# Patient Record
Sex: FEMALE | Race: BLACK OR AFRICAN AMERICAN | NOT HISPANIC OR LATINO | Employment: OTHER | URBAN - METROPOLITAN AREA
[De-identification: names, ages, dates, MRNs, and addresses within clinical notes are randomized per-mention and may not be internally consistent; named-entity substitution may affect disease eponyms.]

---

## 2017-03-31 ENCOUNTER — TRANSCRIBE ORDERS (OUTPATIENT)
Dept: ADMINISTRATIVE | Facility: HOSPITAL | Age: 57
End: 2017-03-31

## 2017-03-31 DIAGNOSIS — Z12.39 ENCOUNTER FOR OTHER SCREENING FOR MALIGNANT NEOPLASM OF BREAST: ICD-10-CM

## 2017-03-31 DIAGNOSIS — Z12.31 VISIT FOR SCREENING MAMMOGRAM: Primary | ICD-10-CM

## 2017-04-13 ENCOUNTER — LAB REQUISITION (OUTPATIENT)
Dept: LAB | Facility: HOSPITAL | Age: 57
End: 2017-04-13
Payer: COMMERCIAL

## 2017-04-13 ENCOUNTER — GENERIC CONVERSION - ENCOUNTER (OUTPATIENT)
Dept: OTHER | Facility: OTHER | Age: 57
End: 2017-04-13

## 2017-04-13 ENCOUNTER — ALLSCRIPTS OFFICE VISIT (OUTPATIENT)
Dept: OTHER | Facility: OTHER | Age: 57
End: 2017-04-13

## 2017-04-13 DIAGNOSIS — R73.9 HYPERGLYCEMIA: ICD-10-CM

## 2017-04-13 DIAGNOSIS — E78.2 MIXED HYPERLIPIDEMIA: ICD-10-CM

## 2017-04-13 DIAGNOSIS — I10 ESSENTIAL (PRIMARY) HYPERTENSION: ICD-10-CM

## 2017-04-13 LAB
ALBUMIN SERPL BCP-MCNC: 3.8 G/DL (ref 3.5–5)
ALP SERPL-CCNC: 106 U/L (ref 46–116)
ALT SERPL W P-5'-P-CCNC: 25 U/L (ref 12–78)
ANION GAP SERPL CALCULATED.3IONS-SCNC: 8 MMOL/L (ref 4–13)
AST SERPL W P-5'-P-CCNC: 12 U/L (ref 5–45)
BASOPHILS # BLD AUTO: 0.03 THOUSANDS/ΜL (ref 0–0.1)
BASOPHILS NFR BLD AUTO: 1 % (ref 0–1)
BILIRUB SERPL-MCNC: 1.01 MG/DL (ref 0.2–1)
BUN SERPL-MCNC: 15 MG/DL (ref 5–25)
CALCIUM SERPL-MCNC: 9.1 MG/DL (ref 8.3–10.1)
CHLORIDE SERPL-SCNC: 106 MMOL/L (ref 100–108)
CHOLEST SERPL-MCNC: 188 MG/DL (ref 50–200)
CK SERPL-CCNC: 117 U/L (ref 26–192)
CO2 SERPL-SCNC: 27 MMOL/L (ref 21–32)
CREAT SERPL-MCNC: 0.78 MG/DL (ref 0.6–1.3)
EOSINOPHIL # BLD AUTO: 0.17 THOUSAND/ΜL (ref 0–0.61)
EOSINOPHIL NFR BLD AUTO: 3 % (ref 0–6)
ERYTHROCYTE [DISTWIDTH] IN BLOOD BY AUTOMATED COUNT: 13.9 % (ref 11.6–15.1)
EST. AVERAGE GLUCOSE BLD GHB EST-MCNC: 128 MG/DL
GFR SERPL CREATININE-BSD FRML MDRD: >60 ML/MIN/1.73SQ M
GLUCOSE P FAST SERPL-MCNC: 100 MG/DL (ref 65–99)
HBA1C MFR BLD: 6.1 % (ref 4.2–6.3)
HCT VFR BLD AUTO: 40.7 % (ref 34.8–46.1)
HDLC SERPL-MCNC: 79 MG/DL (ref 40–60)
HGB BLD-MCNC: 13.6 G/DL (ref 11.5–15.4)
LDLC SERPL CALC-MCNC: 98 MG/DL (ref 0–100)
LYMPHOCYTES # BLD AUTO: 1.92 THOUSANDS/ΜL (ref 0.6–4.47)
LYMPHOCYTES NFR BLD AUTO: 33 % (ref 14–44)
MCH RBC QN AUTO: 28.6 PG (ref 26.8–34.3)
MCHC RBC AUTO-ENTMCNC: 33.4 G/DL (ref 31.4–37.4)
MCV RBC AUTO: 86 FL (ref 82–98)
MONOCYTES # BLD AUTO: 0.21 THOUSAND/ΜL (ref 0.17–1.22)
MONOCYTES NFR BLD AUTO: 4 % (ref 4–12)
NEUTROPHILS # BLD AUTO: 3.48 THOUSANDS/ΜL (ref 1.85–7.62)
NEUTS SEG NFR BLD AUTO: 59 % (ref 43–75)
NRBC BLD AUTO-RTO: 0 /100 WBCS
PLATELET # BLD AUTO: 306 THOUSANDS/UL (ref 149–390)
PMV BLD AUTO: 9.7 FL (ref 8.9–12.7)
POTASSIUM SERPL-SCNC: 3.9 MMOL/L (ref 3.5–5.3)
PROT SERPL-MCNC: 7.7 G/DL (ref 6.4–8.2)
RBC # BLD AUTO: 4.76 MILLION/UL (ref 3.81–5.12)
SODIUM SERPL-SCNC: 141 MMOL/L (ref 136–145)
TRIGL SERPL-MCNC: 56 MG/DL
TSH SERPL DL<=0.05 MIU/L-ACNC: 1.11 UIU/ML (ref 0.36–3.74)
WBC # BLD AUTO: 5.82 THOUSAND/UL (ref 4.31–10.16)

## 2017-04-13 PROCEDURE — 82550 ASSAY OF CK (CPK): CPT | Performed by: FAMILY MEDICINE

## 2017-04-13 PROCEDURE — 83036 HEMOGLOBIN GLYCOSYLATED A1C: CPT | Performed by: FAMILY MEDICINE

## 2017-04-13 PROCEDURE — 80061 LIPID PANEL: CPT | Performed by: FAMILY MEDICINE

## 2017-04-13 PROCEDURE — 84443 ASSAY THYROID STIM HORMONE: CPT | Performed by: FAMILY MEDICINE

## 2017-04-13 PROCEDURE — 85025 COMPLETE CBC W/AUTO DIFF WBC: CPT | Performed by: FAMILY MEDICINE

## 2017-04-13 PROCEDURE — 80053 COMPREHEN METABOLIC PANEL: CPT | Performed by: FAMILY MEDICINE

## 2017-05-01 DIAGNOSIS — Z12.31 ENCOUNTER FOR SCREENING MAMMOGRAM FOR MALIGNANT NEOPLASM OF BREAST: ICD-10-CM

## 2017-05-01 DIAGNOSIS — M81.0 AGE-RELATED OSTEOPOROSIS WITHOUT CURRENT PATHOLOGICAL FRACTURE: ICD-10-CM

## 2017-05-02 ENCOUNTER — ALLSCRIPTS OFFICE VISIT (OUTPATIENT)
Dept: OTHER | Facility: OTHER | Age: 57
End: 2017-05-02

## 2017-05-02 ENCOUNTER — LAB REQUISITION (OUTPATIENT)
Dept: LAB | Facility: HOSPITAL | Age: 57
End: 2017-05-02
Payer: COMMERCIAL

## 2017-05-02 ENCOUNTER — HOSPITAL ENCOUNTER (OUTPATIENT)
Dept: MAMMOGRAPHY | Facility: MEDICAL CENTER | Age: 57
Discharge: HOME | End: 2017-05-02
Payer: COMMERCIAL

## 2017-05-02 DIAGNOSIS — Z12.31 ENCOUNTER FOR SCREENING MAMMOGRAM FOR MALIGNANT NEOPLASM OF BREAST: ICD-10-CM

## 2017-05-02 DIAGNOSIS — Z01.419 ENCOUNTER FOR GYNECOLOGICAL EXAMINATION WITHOUT ABNORMAL FINDING: ICD-10-CM

## 2017-05-02 PROCEDURE — G0202 SCR MAMMO BI INCL CAD: HCPCS

## 2017-05-02 PROCEDURE — G0145 SCR C/V CYTO,THINLAYER,RESCR: HCPCS | Performed by: OBSTETRICS & GYNECOLOGY

## 2017-05-03 ENCOUNTER — HOSPITAL ENCOUNTER (OUTPATIENT)
Dept: BONE DENSITY | Facility: MEDICAL CENTER | Age: 57
Discharge: HOME | End: 2017-05-03
Payer: COMMERCIAL

## 2017-05-03 DIAGNOSIS — M81.0 AGE-RELATED OSTEOPOROSIS WITHOUT CURRENT PATHOLOGICAL FRACTURE: ICD-10-CM

## 2017-05-03 PROCEDURE — 77080 DXA BONE DENSITY AXIAL: CPT

## 2017-05-05 ENCOUNTER — GENERIC CONVERSION - ENCOUNTER (OUTPATIENT)
Dept: OTHER | Facility: OTHER | Age: 57
End: 2017-05-05

## 2017-05-08 LAB
LAB AP GYN PRIMARY INTERPRETATION: NORMAL
Lab: NORMAL

## 2017-05-10 ENCOUNTER — TRANSCRIBE ORDERS (OUTPATIENT)
Dept: ADMINISTRATIVE | Facility: HOSPITAL | Age: 57
End: 2017-05-10

## 2017-05-10 DIAGNOSIS — R25.2 CRAMP OF LIMB: Primary | ICD-10-CM

## 2017-05-11 ENCOUNTER — GENERIC CONVERSION - ENCOUNTER (OUTPATIENT)
Dept: OTHER | Facility: OTHER | Age: 57
End: 2017-05-11

## 2017-05-22 ENCOUNTER — HOSPITAL ENCOUNTER (OUTPATIENT)
Dept: NEUROLOGY | Facility: CLINIC | Age: 57
Discharge: HOME | End: 2017-05-22
Payer: COMMERCIAL

## 2017-05-22 DIAGNOSIS — R25.2 CRAMP OF LIMB: ICD-10-CM

## 2017-05-22 DIAGNOSIS — M79.604 PAIN IN BOTH LOWER EXTREMITIES: ICD-10-CM

## 2017-05-22 DIAGNOSIS — M79.605 PAIN IN BOTH LOWER EXTREMITIES: ICD-10-CM

## 2017-05-22 PROCEDURE — 95910 NRV CNDJ TEST 7-8 STUDIES: CPT

## 2017-05-22 PROCEDURE — 95886 MUSC TEST DONE W/N TEST COMP: CPT

## 2017-08-29 ENCOUNTER — LAB REQUISITION (OUTPATIENT)
Dept: LAB | Facility: HOSPITAL | Age: 57
End: 2017-08-29
Payer: COMMERCIAL

## 2017-08-29 ENCOUNTER — ALLSCRIPTS OFFICE VISIT (OUTPATIENT)
Dept: OTHER | Facility: OTHER | Age: 57
End: 2017-08-29

## 2017-08-29 DIAGNOSIS — N89.8 OTHER SPECIFIED NONINFLAMMATORY DISORDER OF VAGINA: ICD-10-CM

## 2017-08-29 PROCEDURE — 87510 GARDNER VAG DNA DIR PROBE: CPT | Performed by: OBSTETRICS & GYNECOLOGY

## 2017-08-29 PROCEDURE — 87660 TRICHOMONAS VAGIN DIR PROBE: CPT | Performed by: OBSTETRICS & GYNECOLOGY

## 2017-08-29 PROCEDURE — 87480 CANDIDA DNA DIR PROBE: CPT | Performed by: OBSTETRICS & GYNECOLOGY

## 2017-08-30 LAB
CANDIDA RRNA VAG QL PROBE: POSITIVE
G VAGINALIS RRNA GENITAL QL PROBE: NEGATIVE
T VAGINALIS RRNA GENITAL QL PROBE: NEGATIVE

## 2017-10-11 ENCOUNTER — GENERIC CONVERSION - ENCOUNTER (OUTPATIENT)
Dept: OTHER | Facility: OTHER | Age: 57
End: 2017-10-11

## 2017-10-17 ENCOUNTER — ALLSCRIPTS OFFICE VISIT (OUTPATIENT)
Dept: OTHER | Facility: OTHER | Age: 57
End: 2017-10-17

## 2017-10-21 NOTE — PROGRESS NOTES
Assessment  1  Tinnitus, right ear (388 30) (H93 11)   2  Benign essential hypertension (401 1) (I10)    Discussion/Summary    Discussed condition with patient  I suspect that she has tinnitus of the right ear or any sensorineural hearing loss as this could be long-term hearing nerve damage that is the cause of this  I am also concerned, however, that the patient in essence can hear her pulse in her ear and she also has an elevated blood pressure today 150/94  She has been compliantly taking her blood pressure medication which is amlodipine 10 milligrams once daily  I recommend that she continue with this but that she buy a blood pressure cuff to start monitoring her pressures at home consistently once daily in the morning over the next month and if her pressures consistently run elevated above 140/90, then she will need to follow-up to discuss her blood pressure medication regimen and possibly adding an additional medication to the amlodipine  Possible side effects of new medications were reviewed with the patient/guardian today  The treatment plan was reviewed with the patient/guardian  The patient/guardian understands and agrees with the treatment plan      Chief Complaint  Pt presents with right ear discomfort, no pain  History of Present Illness  HPI: Pt  presents with a constant banging noise in her right ear for the past 2 weeks  She feels as though she can sense or hear her heart pulsing in her ear as well  She denies any pain in the ear itself but she does report a HA on that side  She denies any recent congestion or sinus symptoms and denies any changes in her hearing  Review of Systems    Constitutional: No fever, no chills, feels well, no tiredness, no recent weight gain or loss  ENT: as noted in HPI  Cardiovascular: no complaints of slow or fast heart rate, no chest pain, no palpitations, no leg claudication or lower extremity edema     Respiratory: no complaints of shortness of breath, no wheezing, no dyspnea on exertion, no orthopnea or PND  Gastrointestinal: no complaints of abdominal pain, no constipation, no nausea or diarrhea, no vomiting, no bloody stools  Genitourinary: no complaints of dysuria, no incontinence, no pelvic pain, no dysmenorrhea, no vaginal discharge or abnormal vaginal bleeding  Active Problems  1  Acquired trigger finger (727 03) (M65 30)   2  Acute vulvitis (616 10) (N76 2)   3  Anxiety (300 00) (F41 9)   4  Asthma (493 90) (J45 909)   5  Benign essential hypertension (401 1) (I10)   6  Cervicalgia (723 1) (M54 2)   7  Combined hyperlipidemia (272 2) (E78 2)   8  Cramp of both lower extremities (729 82) (R25 2)   9  Elevated blood sugar (790 29) (R73 9)   10  Encounter for breast cancer screening other than mammogram (V76 10) (Z12 39)   11  Encounter for routine gynecological examination with Papanicolaou smear of cervix    (V72 31,V76 2) (Z01 419)   12  Encounter for screening mammogram for malignant neoplasm of breast (V76 12)    (Z12 31)   13  Esophageal reflux (530 81) (K21 9)   14  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)   15  Flu vaccine need (V04 81) (Z23)   16  Ganglion cyst (727 43) (M67 40)   17  Helicobacter pylori gastritis (535 10,041 86) (K29 70,B96 81)   18  Helicobacter pylori gastritis (535 10,041 86) (K29 70,B96 81)   19  Morning joint stiffness of right hand (719 54) (M25 641)   20  Need for prophylactic vaccination and inoculation against influenza (V04 81) (Z23)   21  Osteoporosis (733 00) (M81 0)   22  Prediabetes (790 29) (R73 03)   23  Right shoulder pain (719 41) (M25 511)   24  Screening for depression (V79 0) (Z13 89)   25  Vaginal irritation (623 9) (N89 8)   26  Vitamin D deficiency (268 9) (E55 9)   27  Yeast infection (112 9) (B37 9)    Past Medical History  1  History of Abdominal discomfort (789 00) (R10 9)   2  Asthma (493 90) (J45 909)   3  History of Encounter for screening mammogram for breast cancer (V76 12) (Z12 31)   4   History of hypertension (V12 59) (Z86 79)   5  History of Screen for colon cancer (V76 51) (Z12 11)   6  History of Screening for malignant neoplasm of cervix (V76 2) (Z12 4)   7  History of Stomach problems (536 9) (K31 9)    Family History  Mother    1  Family history of Hypertension (401 9) (I10)  Father    2  Family history of Diabetes (250 00) (E11 9)   3  Family history of Hypertension (401 9) (I10)  Brother    4  Family history of Hypertension (401 9) (I10)  Paternal Grandmother    11  Family history of Diabetes (250 00) (E11 9)  Maternal Aunt    6  Family history of Hypertension (401 9) (I10)  Paternal Aunt    9  Family history of Diabetes (250 00) (E11 9)  Paternal Uncle    6  Family history of Diabetes (250 00) (E11 9)  Maternal Cousin    9  Family history of Hypertension (401 9) (I10)  Paternal Cousin    8  Family history of Diabetes (250 00) (E11 9)    Social History   · Never a smoker  The social history was reviewed and is unchanged  Surgical History  1  History of Foot Surgery    Current Meds   1  AmLODIPine Besylate 10 MG Oral Tablet; TAKE 1 TABLET BY MOUTH ONCE DAILY; Therapy: 03WGE5446 to Dorian Cameron)  Requested for: 62PEG4188; Last   Rx:90Dyj5203 Ordered   2  Atorvastatin Calcium 10 MG Oral Tablet; take 1 tablet daily at bedtime; Therapy: 06PPJ6122 to (Evaluate:10Uvc9822)  Requested for: 23Oct2016; Last   Rx:18Lji4845 Ordered   3  DrRx Mycolog II Cream 15 Grams; Apply small amount to area bid  a 15g tube; Therapy: 02Ozr0742 to (Evaluate:78Nmb0029); Last Rx:24Hpo6581 Ordered   4  Fluconazole 150 MG Oral Tablet; take one tab one time; Therapy: 72Tzc7321 to (Evaluate:34Lxe7397)  Requested for: 26Snp1940; Last   Rx:60Bzj1280; Status: ACTIVE - Transmit to Pharmacy - Awaiting Verification   Ordered   5  Nystatin-Triamcinolone 255190-4 1 UNIT/GM-% External Cream; Apply to the area twice   a day;    Therapy: 42Hew2949 to (Evaluate:33Tds2098)  Requested for: 52Hfv6096; Last   Mae Olea Ordered   6  ProAir  (90 Base) MCG/ACT Inhalation Aerosol Solution; INHALE 2 PUFFS   EVERY 4 HOURS AS NEEDED  Requested for: 27QIA9237; Last Rx:09Oct2017 Ordered    The medication list was reviewed and updated today  Allergies  1  No Known Drug Allergies    Vitals   Recorded: 15EAX7996 09:14AM Recorded: 92PMO9388 08:48AM   Temperature  97 5 F, Tympanic   Heart Rate  101   Respiration  16   Systolic 419 345   Diastolic 94 90   Height  5 ft 4 in   Weight  211 lb 3 oz   BMI Calculated  36 25   BSA Calculated  2   O2 Saturation  98   Pain Scale  0     Physical Exam    Constitutional   General appearance: No acute distress, well appearing and well nourished  Ears, Nose, Mouth, and Throat   External inspection of ears and nose: Normal     Otoscopic examination: Tympanic membranes translucent with normal light reflex  Canals patent without erythema  Nasal mucosa, septum, and turbinates: Normal without edema or erythema  Oropharynx: Normal with no erythema, edema, exudate or lesions  Pulmonary   Respiratory effort: No increased work of breathing or signs of respiratory distress  Auscultation of lungs: Clear to auscultation  Cardiovascular   Auscultation of heart: Normal rate and rhythm, normal S1 and S2, without murmurs  Carotid pulses: Normal     Lymphatic   Palpation of lymph nodes in neck: No lymphadenopathy  Psychiatric   Orientation to person, place, and time: Normal     Mood and affect: Normal     Additional Exam:  Vital signs reviewed; neck supple  Results/Data  PHQ-9 Adult Depression Screening 17Oct2017 08:54AM User, [facility]     Test Name Result Flag Reference   PHQ-9 Adult Depression Score 0     Over the last two weeks, how often have you been bothered by any of the following problems?   Little interest or pleasure in doing things: Not at all - 0  Feeling down, depressed, or hopeless: Not at all - 0  Trouble falling or staying asleep, or sleeping too much: Not at all - 0  Feeling tired or having little energy: Not at all - 0  Poor appetite or over eating: Not at all - 0  Feeling bad about yourself - or that you are a failure or have let yourself or your family down: Not at all - 0  Trouble concentrating on things, such as reading the newspaper or watching television: Not at all - 0  Moving or speaking so slowly that other people could have noticed  Or the opposite -  being so fidgety or restless that you have been moving around a lot more than usual: Not at all - 0  Thoughts that you would be better off dead, or of hurting yourself in some way: Not at all - 0   PHQ-9 Adult Depression Screening Negative     PHQ-9 Difficulty Level Not difficult at all     PHQ-9 Severity No Depression         Signatures   Electronically signed by :  Hulan Dance, [facility]; Oct 19 2017 12:28PM EST                       (Author)    Electronically signed by : Claudio Ho DO; Oct 20 2017  5:58AM EST                       (Co-author)

## 2017-10-24 NOTE — PROGRESS NOTES
Assessment  1  Vaginal irritation (623 9) (N89 8)    Plan  Acute vulvitis    · Nystatin-Triamcinolone 601344-8 1 UNIT/GM-% External Cream; Apply to the area  twice a day   Rx By: Francisco J Bentley; Dispense: 30 Days ; #:1 X 60 GM Tube; Refill: 3;For: Acute vulvitis; BONI = N; Verified Transmission to [facility]; Last Updated By: System, Melly; 8/29/2017 11:17:09 AM  Vaginal irritation    · (1) VAGINITIS/ VAGINOSIS, DNA ( AFFIRM); Status:Active - Retrospective By Protocol  Authorization; Requested for:94Vkx5431;    Perform:[facility]; Due:48Elv8485; Last Updated Juan Alberto Brock; 8/29/2017 11:16:28 AM;Ordered; For:Vaginal irritation; Ordered By:Myriam Ny;    Discussion/Summary  Discussion Summary:   No evidence vulvitis on exam  Will trial of topical triamcinolone nystatin cream for alleviation of symptoms  Affirm sent today  Chief Complaint  Chief Complaint Free Text Note Form: pt presents for vaginal irritation  History of Present Illness  HPI: Patient is a 50-year-old presenting with complaints of vulvar itching  Patient states her symptoms started 3 days ago and has progressively worsened  States she had douched 2 weeks ago  Denies any change in soaps or laundry detergent  Denies any vaginal discharge itching or burning  Reports mostly on the inside of the labia minora  No alleviating or precipitating factors  Patient denies dysuria, hematuria, urgency, or frequency  Review of Systems   Female ROS: vulvar pain, but-- no pelvic pain,-- no pelvic pressure,-- no vaginal pain,-- no vaginal discharge,-- no vaginal itching,-- no vaginal lump or mass,-- no vaginal odor,-- no vulvar itching,-- no vulvar lump or mass,-- no dysuria-- and-- no urinary loss of control  Active Problems  1  Acquired trigger finger (727 03) (M65 30)   2  Anxiety (300 00) (F41 9)   3  Asthma (493 90) (J45 909)   4  Benign essential hypertension (401 1) (I10)   5  Cervicalgia (723 1) (M54 2)   6  Combined hyperlipidemia (272 2) (E78 2)   7  Cramp of both lower extremities (729 82) (R25 2)   8  Elevated blood sugar (790 29) (R73 9)   9  Encounter for breast cancer screening other than mammogram (V76 10) (Z12 39)   10  Encounter for routine gynecological examination with Papanicolaou smear of cervix    (V72 31,V76 2) (Z01 419)   11  Encounter for screening mammogram for malignant neoplasm of breast (V76 12)    (Z12 31)   12  Esophageal reflux (530 81) (K21 9)   13  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)   14  Ganglion cyst (727 43) (M67 40)   15  Helicobacter pylori gastritis (535 10,041 86) (K29 70,B96 81)   16  Helicobacter pylori gastritis (535 10,041 86) (K29 70,B96 81)   17  Morning joint stiffness of right hand (719 54) (M25 641)   18  Need for prophylactic vaccination and inoculation against influenza (V04 81) (Z23)   19  Osteoporosis (733 00) (M81 0)   20  Prediabetes (790 29) (R73 03)   21  Right shoulder pain (719 41) (M25 511)   22  Screening for depression (V79 0) (Z13 89)   23  Vitamin D deficiency (268 9) (E55 9)    Past Medical History  1  History of Abdominal discomfort (789 00) (R10 9)   2  Asthma (493 90) (J45 909)   3  History of Encounter for screening mammogram for breast cancer (V76 12) (Z12 31)   4  History of hypertension (V12 59) (Z86 79)   5  History of Screen for colon cancer (V76 51) (Z12 11)   6  History of Screening for malignant neoplasm of cervix (V76 2) (Z12 4)   7  History of Stomach problems (536 9) (K31 9)    Surgical History  1  History of Foot Surgery    Family History  Mother    1  Family history of Hypertension (401 9) (I10)  Father    2  Family history of Diabetes (250 00) (E11 9)   3  Family history of Hypertension (401 9) (I10)  Brother    4  Family history of Hypertension (401 9) (I10)  Paternal Grandmother    11  Family history of Diabetes (250 00) (E11 9)  Maternal Aunt    6   Family history of Hypertension (401 9) (I10)  Paternal Aunt 7  Family history of Diabetes (250 00) (E11 9)  Paternal Uncle    8  Family history of Diabetes (250 00) (E11 9)  Maternal Cousin    9  Family history of Hypertension (401 9) (I10)  Paternal Cousin    8  Family history of Diabetes (250 00) (E11 9)    Social History   · Never a smoker    Current Meds   1  AmLODIPine Besylate 10 MG Oral Tablet; TAKE 1 TABLET BY MOUTH ONCE DAILY; Therapy: 64BHJ5772 to (([phone])  Requested for: 74OWS0065; Last   Rx:17Fbp2214 Ordered   2  Atorvastatin Calcium 10 MG Oral Tablet; take 1 tablet daily at bedtime; Therapy: 05XEP2819 to (Evaluate:21Apr2017)  Requested for: 23Oct2016; Last   Rx:23Oct2016 Ordered   3  ProAir  (90 Base) MCG/ACT Inhalation Aerosol Solution; INHALE 2 PUFFS   EVERY 4 HOURS AS NEEDED  Requested for: 01Apr2016; Last Rx:01Apr2016 Ordered    Allergies  1  No Known Drug Allergies    Vitals  Vital Signs    Recorded: 41SJP6134 46:89GN   Systolic 513    Diastolic 70    Patient Refused Weight Yes Yes     Physical Exam    Pulmonary   Respiratory effort: No increased work of breathing or signs of respiratory distress  Auscultation of lungs: Clear to auscultation  Cardiovascular   Auscultation of heart: Normal rate and rhythm, normal S1 and S2, no murmurs  Peripheral vascular exam: Normal pulses Throughout  Genitourinary   External genitalia: Normal and no lesions appreciated  Examination of the external genitalia showed no vulvitis  No lesions were seen on the external genitalia  The labia majora were normal  The labia minora were normal  Both Bartholin's glands were normal    Vagina: Normal, no lesions or dryness appreciated  Urethra: Normal     Urethral meatus: Normal     Bladder: Normal, soft, non-tender and no prolapse or masses appreciated  Cervix: Normal, no palpable masses  Health Management  History of Screen for colon cancer   COLONOSCOPY (GI, SURG); every 10 years; Last 99IAH6218; Next Due: 19QAN8087;  Active  Health Maintenance   *VB - Eye Exam; every 1 year; Last 08Apr2015; Next Due: 08Apr2016;  Overdue    Signatures   Electronically signed by : PEDRO Lind ; Aug 29 2017  1:30PM EST                       (Author)    Electronically signed by : PEDRO Lind ; Aug 29 2017  1:30PM EST                       (Author)

## 2017-10-27 ENCOUNTER — GENERIC CONVERSION - ENCOUNTER (OUTPATIENT)
Dept: OTHER | Facility: OTHER | Age: 57
End: 2017-10-27

## 2018-01-09 NOTE — MISCELLANEOUS
Provider Comments  Provider Comments:   Dear Brando Rodriguez     We called you about your scheduled appointment for 12/19/2016 today but were unable to reach you  It is very important that you follow up with us so that we can assess your physical and nutritional safety  Please call our office at [phone] to reschedule your appointment       Sincerely,     [facility]            Signatures   Electronically signed by : Faraz Arenas, ; Dec 19 2016  2:06PM EST                       (Author)

## 2018-01-11 NOTE — MISCELLANEOUS
Message  GI Reminder Recall Jaron Iba:   Date: 02/18/2016   Dear Elisha Villalta:     Review of our records shows you are due for the following: Colonoscopy  We have been trying to reach you several times  and have been unsuccessful  Please contact our office to schedule your procedure  Thank you  Please call the following office to schedule your appointment:   [address], [address], [address] [facility] [phone]  We look forward to hearing from you!      Sincerely,         Signatures   Electronically signed by : Nicole Ureña, ; Feb 18 2016  4:36PM EST                       (Author)

## 2018-01-11 NOTE — RESULT NOTES
Verified Results  * MAMMO SCREENING BILATERAL W CAD 26Apr2016 02:56PM Dominick Iverson Order Number: [id number]     Test Name Result Flag Reference   MAMMO SCREENING BILATERAL W CAD (Report)     Patient History:   Patient is postmenopausal and is nulliparous  Family history of breast cancer in maternal grandmother under age   48, breast cancer in maternal cousin under age 48, and breast    cancer in mother at age 48 or over  Reason for exam: screening (asymptomatic)  Mammo Screening Bilateral W CAD: April 26, 2016 - Check In #:    [de-identified]   Bilateral CC and MLO view(s) were taken  Technologist: DANETTE Camargo (DANETTE)(M)   Prior study comparison: March 6, 2015, bilateral digital    screening mammogram performed at [address] [facility]  January 28, 2014, digital bilateral screening    mammogram, performed at [address] [facility]  There are scattered fibroglandular densities  The parenchymal pattern appears stable  No dominant soft tissue    mass or suspicious calcifications are noted  The skin and nipple   contours are within normal limits  No mammographic evidence of malignancy  No    significant changes when compared with prior studies  ASSESSMENT: BiRad:1 - Negative     Recommendation:   Routine screening mammogram in 1 year  A reminder letter will be   scheduled  Analyzed by CAD     8-10% of cancers will be missed on mammography  Management of a    palpable abnormality must be based on clinical grounds  Patients   will be notified of their results via letter from our facility  Accredited by [employer] of Radiology and FDA       Transcription Location: DANETTE Crowder 98: [id number]     Risk Value(s):   Tyrer-Cuzick 10 Year: 7 822%, Tyrer-Cuzick Lifetime: 22 916%,    Myriad Table: 5 6%, CELIA 5 Year: 2 2%, NCI Lifetime: 12 0%, MRS    : Based on personal and/or family history,    consideration of hereditary risk assessment may be warranted     Signed by:   Erwin Fong MD   4/26/16

## 2018-01-11 NOTE — RESULT NOTES
Verified Results  (1) CBC/PLT/DIFF 19Oct2016 08:23AM Diego Crown[facility]oca Order Number: [id number]     Test Name Result Flag Reference   WBC COUNT 4 73 Thousand/uL  4 31-10 16   RBC COUNT 4 71 Million/uL  3 81-5 12   HEMOGLOBIN 13 3 g/dL  11 5-15 4   HEMATOCRIT 40 2 %  34 8-46  1   MCV 85 fL  82-98   MCH 28 2 pg  26 8-34 3   MCHC 33 1 g/dL  31 4-37 4   RDW 14 4 %  11 6-15 1   MPV 9 2 fL  8 9-12 7   PLATELET COUNT 966 Thousands/uL  149-390   nRBC AUTOMATED 0 /100 WBCs     NEUTROPHILS RELATIVE PERCENT 47 %  43-75   LYMPHOCYTES RELATIVE PERCENT 44 %  14-44   MONOCYTES RELATIVE PERCENT 5 %  4-12   EOSINOPHILS RELATIVE PERCENT 3 %  0-6   BASOPHILS RELATIVE PERCENT 1 %  0-1   NEUTROPHILS ABSOLUTE COUNT 2 23 Thousands/?L  1 85-7 62   LYMPHOCYTES ABSOLUTE COUNT 2 09 Thousands/?L  0 60-4 47   MONOCYTES ABSOLUTE COUNT 0 23 Thousand/?L  0 17-1 22   EOSINOPHILS ABSOLUTE COUNT 0 14 Thousand/?L  0 00-0 61   BASOPHILS ABSOLUTE COUNT 0 04 Thousands/?L  0 00-0 10   - Patient Instructions: This bloodwork is non-fasting  Please drink two glasses of water morning of bloodwork  (1) COMPREHENSIVE METABOLIC PANEL 34HUS4091 64:05XU Diego Mir Order Number: [id number]     Test Name Result Flag Reference   GLUCOSE,RANDM 97 mg/dL     If the patient is fasting, the ADA then defines impaired fasting glucose as > 100 mg/dL and diabetes as > or equal to 123 mg/dL     SODIUM 141 mmol/L  136-145   POTASSIUM 3 9 mmol/L  3 5-5 3   CHLORIDE 107 mmol/L  100-108   CARBON DIOXIDE 26 mmol/L  21-32   ANION GAP (CALC) 8 mmol/L  4-13   BLOOD UREA NITROGEN 17 mg/dL  5-25   CREATININE 0 75 mg/dL  0 60-1 30   Standardized to IDMS reference method   CALCIUM 8 9 mg/dL  8 3-10 1   BILI, TOTAL 0 76 mg/dL  0 20-1 00   ALK PHOSPHATAS 104 U/L     ALT (SGPT) 24 U/L  12-78   AST(SGOT) 13 U/L  5-45   ALBUMIN 3 7 g/dL  3 5-5 0   TOTAL PROTEIN 7 5 g/dL  6 4-8 2   eGFR Non-African American      >60 0 ml/min/1 73sq m   [facility] Kidney Disease Education Program recommendations are as follows:  GFR calculation is accurate only with a steady state creatinine  Chronic Kidney disease less than 60 ml/min/1 73 sq  meters  Kidney failure less than 15 ml/min/1 73 sq  meters  (1) LIPID PANEL FASTING W DIRECT LDL REFLEX 19Oct2016 08:23AM [facility] Order Number: [id number]     Test Name Result Flag Reference   CHOLESTEROL 241 mg/dL H    LDL CHOLESTEROL CALCULATED 152 mg/dL H 0-100   - Patient Instructions: This is a fasting blood test  Water, black tea or black coffee only after 9:00pm the night before test   Drink 2 glasses of water the morning of test       Triglyceride:         Normal              <150 mg/dl       Borderline High    150-199 mg/dl       High               200-499 mg/dl       Very High          >499 mg/dl  Cholesterol:         Desirable        <200 mg/dl      Borderline High  200-239 mg/dl      High             >239 mg/dl  HDL Cholesterol:        High    >59 mg/dL      Low     <41 mg/dL  LDL Cholesterol:        Optimal          <100 mg/dl        Near Optimal     100-129 mg/dl        Above Optimal          Borderline High   130-159 mg/dl          High              160-189 mg/dl          Very High        >189 mg/dl  LDL CALCULATED:    This screening LDL is a calculated result  It does not have the accuracy of the Direct Measured LDL in the monitoring of patients with hyperlipidemia and/or statin therapy  Direct Measure LDL (VTD751) must be ordered separately in these patients  TRIGLYCERIDES 69 mg/dL  <=150   Specimen collection should occur prior to N-Acetylcysteine or Metamizole administration due to the potential for falsely depressed results  HDL,DIRECT 75 mg/dL H 40-60   Specimen collection should occur prior to Metamizole administration due to the potential for falsely depressed results       (1) TSH WITH FT4 REFLEX 19Oct2016 08:23AM [facility] Order Number: [id number]     Test Name Result Flag Reference   TSH 1 110 uIU/mL  0 358-3 740   Patients undergoing fluorescein dye angiography may retain small amounts of fluorescein in the body for 48-72 hours post procedure  Samples containing fluorescein can produce falsely depressed TSH values  If the patient had this procedure,a specimen should be resubmitted post fluorescein clearance  The recommended reference ranges for TSH during pregnancy are as follows:  First trimester 0 1 to 2 5 uIU/mL  Second trimester  0 2 to 3 0 uIU/mL  Third trimester 0 3 to 3 0 uIU/m     (1) VITAMIN D 25-HYDROXY 28Xtm3896 08:23AM Nicole Topete Order Number: [id number]     Test Name Result Flag Reference   VIT D 25-HYDROX 29 7 ng/mL L 30 0-100 0   This assay is a certified procedure of the CDC Vitamin D Standardization Certification Program (VDSCP)     Deficiency <20ng/ml   Insufficiency 20-30ng/ml   Sufficient  ng/ml     *Patients undergoing fluorescein dye angiography may retain small amounts of fluorescein in the body for 48-72 hours post procedure  Samples containing fluorescein can produce falsely elevated Vitamin D values  If the patient had this procedure, a specimen should be resubmitted post fluorescein clearance

## 2018-01-13 VITALS
DIASTOLIC BLOOD PRESSURE: 64 MMHG | BODY MASS INDEX: 35.85 KG/M2 | HEIGHT: 64 IN | WEIGHT: 210 LBS | SYSTOLIC BLOOD PRESSURE: 108 MMHG

## 2018-01-14 VITALS
TEMPERATURE: 97.5 F | HEIGHT: 64 IN | RESPIRATION RATE: 16 BRPM | SYSTOLIC BLOOD PRESSURE: 150 MMHG | OXYGEN SATURATION: 98 % | WEIGHT: 211.19 LBS | DIASTOLIC BLOOD PRESSURE: 94 MMHG | HEART RATE: 101 BPM | BODY MASS INDEX: 36.05 KG/M2

## 2018-01-14 VITALS
HEART RATE: 105 BPM | DIASTOLIC BLOOD PRESSURE: 84 MMHG | OXYGEN SATURATION: 96 % | BODY MASS INDEX: 35.79 KG/M2 | TEMPERATURE: 98.1 F | RESPIRATION RATE: 17 BRPM | SYSTOLIC BLOOD PRESSURE: 132 MMHG | WEIGHT: 208.5 LBS

## 2018-01-14 NOTE — RESULT NOTES
Verified Results  (1) VAGINITIS/ VAGINOSIS, DNA ( AFFIRM) 08GYM3915 01:06PM Sandro Adame     Test Name Result Flag Reference   CANDIDA SPECIES Positive A Negative   GARDNERELLA VAGINALIS Negative  Negative   TRICHOMONAS VAGINALIS Negative  Negative   Performed at:  [facility] [employer] [address]  [id number]  : Brigitte Avendaño MD, Phone:  [phone]       Plan  Acute vulvitis    · Nystatin-Triamcinolone 191096-9 1 UNIT/GM-% External Cream; Apply to the area  twice a day  Vaginal irritation    · (1) VAGINITIS/ VAGINOSIS, DNA ( AFFIRM); Status:Active;  Requested for:35Ixh7524;

## 2018-01-15 VITALS — SYSTOLIC BLOOD PRESSURE: 152 MMHG | DIASTOLIC BLOOD PRESSURE: 70 MMHG

## 2018-01-15 NOTE — PROGRESS NOTES
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)    Plan  Benign essential hypertension, Combined hyperlipidemia, Health Maintenance,  Exogenous obesity, Vitamin D deficiency    · (1) CBC/PLT/DIFF; Status:Active; Requested for:19Oct2016;    · (1) COMPREHENSIVE METABOLIC PANEL; Status:Active; Requested for:19Oct2016;    · (1) LIPID PANEL FASTING W DIRECT LDL REFLEX; Status:Active; Requested  for:19Oct2016;    · (1) TSH WITH FT4 REFLEX; Status:Active; Requested for:19Oct2016;    · (1) VITAMIN D 25-HYDROXY; Status:Active; Requested CTZ:97NWV0613;   Exogenous obesity    · 1 Nunu Marcial MD, Hayley Vargas (Internal Medicine) Physician Referral  Consult  Status: Active   Requested for: 03KEL3121  Care Summary provided  : Yes  Health Maintenance    · Fluzone Quadrivalent Intramuscular Suspension; INJECT 0 5  ML  Intramuscular; To Be Done: [id number]  Screening for depression    · *VB-Depression Screening; Status:Complete - Retrospective By Protocol Authorization;    Done: 71MKU2992 07:54AM    Discussion/Summary    61year-old physical examination today    Overall patient feels well  Her diet is fair  She does not exercise  Patient is current with GYN exams, mammography, and colonoscopy  Flu shot given today  Will also check fasting blood work  --Hypertension: Suboptimal  Patient has not been compliant with her amlodipine 10 (she only takes 3-4 days per week)  Stressed compliance  --Lipids: Patient stopped atorvastatin 10 shortly after was prescribed  Will check labs today  Patient is agreeable to restarting med if needed  --Asthma: Mild intermittent  With rare use of Proventil hfa  Med refilled today  --Obesity: BMI 36  Discussed diet and exercise   Recommend consider Weight Watchers, my fitness pal, and purchasing a fitbit  Patient also requested referral to weight loss specialist (dr Dariusz Lipscomb)  We'll continue to monitor    Will call with lab results  Recheck blood pressure and weight in 2 months  Chief Complaint  Pt presents for a yearly PE and med refills  Pt states she would like to discuss alternatives for her inhalers due to cost       History of Present Illness  HPI: Patient presents today for 61year-old physical examination/checkup today  Overall she is feeling well  She has not been compliant with her blood pressure medication, amlodipine (she only takes medication 3-4 days a week)  Patient stopped atorvastatin shortly after it was prescribed  Patient states diet is fair  She does not exercise  She is current with GYN exams and mammography      Review of Systems    Constitutional: No fever, no chills, feels well, no tiredness, no recent weight gain or weight loss  Cardiovascular: No complaints of slow heart rate, no fast heart rate, no chest pain, no palpitations, no leg claudication, no lower extremity edema  Respiratory: No complaints of shortness of breath, no wheezing, no cough, no SOB on exertion, no orthopnea, no PND  Gastrointestinal: No complaints of abdominal pain, no constipation, no nausea or vomiting, no diarrhea, no bloody stools  Genitourinary: No complaints of dysuria, no incontinence, no pelvic pain, no dysmenorrhea, no vaginal discharge or bleeding  Musculoskeletal: No complaints of arthralgias, no myalgias, no joint swelling or stiffness, no limb pain or swelling  Over the past 2 weeks, how often have you been bothered by the following problems? 1 ) Little interest or pleasure in doing things? Not at all    2 ) Feeling down, depressed or hopeless? Not at all    3 ) Trouble falling asleep or sleeping too much? Not at all    4 ) Feeling tired or having little energy? Not at all    5 ) Poor appetite or overeating?  Not at all    6 ) Feeling bad about yourself, or that you are a failure, or have let yourself or your family down? Not at all    7 ) Trouble concentrating on things, such as reading a newspaper or watching television? Not at all    8 ) Moving or speaking so slowly that other people could have noticed, or the opposite, moving or speaking faster than usual? Not at all  How difficult have these problems made it for you to do your work, take care of things at home, or get along with people? Not at all  Score 0      Active Problems    1  Acquired trigger finger (727 03) (M65 30)   2  Anxiety (300 00) (F41 9)   3  Asthma (493 90) (J45 909)   4  Benign essential hypertension (401 1) (I10)   5  Cervicalgia (723 1) (M54 2)   6  Combined hyperlipidemia (272 2) (E78 2)   7  Esophageal reflux (530 81) (K21 9)   8  Ganglion cyst (727 43) (M67 40)   9  Helicobacter pylori gastritis (535 10,041 86) (K29 70,B96 81)   10  Helicobacter pylori gastritis (535 10,041 86) (K29 70,B96 81)   11  Morning joint stiffness of right hand (719 54) (M25 641)   12  Osteoporosis (733 00) (M81 0)   13  Right shoulder pain (719 41) (M25 511)   14   Vitamin D deficiency (268 9) (E55 9)    Past Medical History    · Asthma (493 90) (J45 909)   · History of Encounter for screening mammogram for malignant neoplasm of breast  (V76 12) (Z12 31)   · History of hypertension (V12 59) (Z86 79)   · History of Screening for malignant neoplasm of cervix (V76 2) (Z12 4)   · History of Stomach problems (536 9) (K31 9)    Surgical History    · History of Foot Surgery    Family History  Mother    · Family history of Hypertension (401 9) (I10)  Father    · Family history of Diabetes (250 00) (E11 9)   · Family history of Hypertension (401 9) (I10)  Brother    · Family history of Hypertension (401 9) (I10)  Paternal Grandmother    · Family history of Diabetes (250 00) (E11 9)  Maternal Aunt    · Family history of Hypertension (401 9) (I10)  Paternal Aunt    · Family history of Diabetes (250 00) (E11 9)  Paternal Uncle    · Family history of Diabetes (250 00) (E11 9)  Maternal Cousin    · Family history of Hypertension (401 9) (I10)  Paternal Cousin    · Family history of Diabetes (250 00) (E11 9)    Social History    · Never a smoker    Current Meds   1  AmLODIPine Besylate 10 MG Oral Tablet; TAKE 1 TABLET BY MOUTH ONCE DAILY    Requested for: 04Oct2016; Last Rx:04Oct2016 Ordered   2  Atrovent HFA 17 MCG/ACT Inhalation Aerosol Solution; INHALE 2 PUFFS Twice daily    Requested for: 01Apr2016; Last Rx:01Apr2016 Ordered   3  Nasonex 50 MCG/ACT Nasal Suspension; USE AS DIRECTED Recorded   4  ProAir  (90 Base) MCG/ACT Inhalation Aerosol Solution; INHALE 2 PUFFS   EVERY 4 HOURS AS NEEDED  Requested for: 01Apr2016; Last Rx:01Apr2016 Ordered    Allergies    1  No Known Drug Allergies    Vitals   Recorded: 86WQT6595 08:06AM Recorded: 49LVS0145 45:75QD   Systolic 274 606, LUE, Sitting   Diastolic 94 98, LUE, Sitting   Heart Rate  96   Pulse Quality  Norm   Respiration Quality  Norm   Respiration  16   Temperature  97 9 F, Tympanic   Pain Scale  0   O2 Saturation  98   Height  5 ft 4 in   Weight  212 lb 4 96 oz   BMI Calculated  36 44   BSA Calculated  2 02     Physical Exam    Constitutional   General appearance: No acute distress, well appearing and well nourished  Ears, Nose, Mouth, and Throat   Oropharynx: Normal with no erythema, edema, exudate or lesions  Pulmonary   Respiratory effort: No increased work of breathing or signs of respiratory distress  Auscultation of lungs: Clear to auscultation  Cardiovascular   Palpation of heart: Normal PMI, no thrills  Auscultation of heart: Normal rate and rhythm, normal S1 and S2, without murmurs  Examination of extremities for edema and/or varicosities: Normal     Abdomen   Abdomen: Non-tender, no masses  Liver and spleen: No hepatomegaly or splenomegaly  Lymphatic   Palpation of lymph nodes in neck: No lymphadenopathy      Musculoskeletal   Gait and station: Normal     Psychiatric   Orientation to person, place, and time: Normal     Mood and affect: Normal        Results/Data  *VB-Depression Screening 19Oct2016 07:54AM Rollene Doing     Test Name Result Flag Reference   Depression Scale Result      Depression Screen - Negative For Symptoms       Health Management  History of Screen for colon cancer   COLONOSCOPY (GI, SURG); every 10 years; Last 17DRS4757; Next Due: 28JFQ8605; Active  Health Maintenance   *VB - Eye Exam; every 1 year; Last 08Apr2015; Next Due: 08Apr2016; Overdue    Signatures   Electronically signed by :  Annia Gresham DO; Oct 19 2016  8:22AM EST                       (Author)

## 2018-01-16 NOTE — RESULT NOTES
Verified Results  (1) CBC/PLT/DIFF 13Apr2017 08:37AM Becheikhn Finer Order Number: [id number]     Test Name Result Flag Reference   WBC COUNT 5 82 Thousand/uL  4 31-10 16   RBC COUNT 4 76 Million/uL  3 81-5 12   HEMOGLOBIN 13 6 g/dL  11 5-15 4   HEMATOCRIT 40 7 %  34 8-46  1   MCV 86 fL  82-98   MCH 28 6 pg  26 8-34 3   MCHC 33 4 g/dL  31 4-37 4   RDW 13 9 %  11 6-15 1   MPV 9 7 fL  8 9-12 7   PLATELET COUNT 890 Thousands/uL  149-390   nRBC AUTOMATED 0 /100 WBCs     NEUTROPHILS RELATIVE PERCENT 59 %  43-75   LYMPHOCYTES RELATIVE PERCENT 33 %  14-44   MONOCYTES RELATIVE PERCENT 4 %  4-12   EOSINOPHILS RELATIVE PERCENT 3 %  0-6   BASOPHILS RELATIVE PERCENT 1 %  0-1   NEUTROPHILS ABSOLUTE COUNT 3 48 Thousands/? ??L  1 85-7 62   LYMPHOCYTES ABSOLUTE COUNT 1 92 Thousands/? ??L  0 60-4 47   MONOCYTES ABSOLUTE COUNT 0 21 Thousand/? ??L  0 17-1 22   EOSINOPHILS ABSOLUTE COUNT 0 17 Thousand/? ??L  0 00-0 61   BASOPHILS ABSOLUTE COUNT 0 03 Thousands/? ??L  0 00-0 10   - Patient Instructions: This bloodwork is non-fasting  Please drink two glasses of water morning of bloodwork       (1) COMPREHENSIVE METABOLIC PANEL 15XRG1799 49:34DZ Miltonjose maria Finer Order Number: [id number]     Test Name Result Flag Reference   SODIUM 141 mmol/L  136-145   POTASSIUM 3 9 mmol/L  3 5-5 3   CHLORIDE 106 mmol/L  100-108   CARBON DIOXIDE 27 mmol/L  21-32   ANION GAP (CALC) 8 mmol/L  4-13   BLOOD UREA NITROGEN 15 mg/dL  5-25   CREATININE 0 78 mg/dL  0 60-1 30   Standardized to IDMS reference method   CALCIUM 9 1 mg/dL  8 3-10 1   BILI, TOTAL 1 01 mg/dL H 0 20-1 00   ALK PHOSPHATAS 106 U/L     ALT (SGPT) 25 U/L  12-78   AST(SGOT) 12 U/L  5-45   ALBUMIN 3 8 g/dL  3 5-5 0   TOTAL PROTEIN 7 7 g/dL  6 4-8 2   eGFR Non-African American      >60 0 ml/min/1 73sq m   [employer] Disease Education Program recommendations are as follows:  GFR calculation is accurate only with a steady state creatinine  Chronic Kidney disease less than 60 ml/min/1 73 sq  meters  Kidney failure less than 15 ml/min/1 73 sq  meters  GLUCOSE FASTING 100 mg/dL H 65-99     (1) HEMOGLOBIN A1C 13Apr2017 08:37AM Rondi Sensor Order Number: [id number]     Test Name Result Flag Reference   HEMOGLOBIN A1C 6 1 %  4 2-6 3   EST  AVG  GLUCOSE 128 mg/dl       (1) LIPID PANEL FASTING W DIRECT LDL REFLEX 13Apr2017 08:37AM Rondi Sensor Order Number: [id number]     Test Name Result Flag Reference   CHOLESTEROL 188 mg/dL     LDL CHOLESTEROL CALCULATED 98 mg/dL  0-100   - Patient Instructions: This is a fasting blood test  Water, black tea or black coffee only after 9:00pm the night before test   Drink 2 glasses of water the morning of test       Triglyceride:         Normal              <150 mg/dl       Borderline High    150-199 mg/dl       High               200-499 mg/dl       Very High          >499 mg/dl  Cholesterol:         Desirable        <200 mg/dl      Borderline High  200-239 mg/dl      High             >239 mg/dl  HDL Cholesterol:        High    >59 mg/dL      Low     <41 mg/dL  LDL Cholesterol:        Optimal          <100 mg/dl        Near Optimal     100-129 mg/dl        Above Optimal          Borderline High   130-159 mg/dl          High              160-189 mg/dl          Very High        >189 mg/dl  LDL CALCULATED:    This screening LDL is a calculated result  It does not have the accuracy of the Direct Measured LDL in the monitoring of patients with hyperlipidemia and/or statin therapy  Direct Measure LDL (BDY006) must be ordered separately in these patients  TRIGLYCERIDES 56 mg/dL  <=150   Specimen collection should occur prior to N-Acetylcysteine or Metamizole administration due to the potential for falsely depressed results  HDL,DIRECT 79 mg/dL H 40-60   Specimen collection should occur prior to Metamizole administration due to the potential for falsely depressed results       (1) TSH WITH FT4 REFLEX 05XBC8026 08:37AM Anai Fail Order Number: [id number]     Test Name Result Flag Reference   TSH 1 110 uIU/mL  0 358-3 740   Patients undergoing fluorescein dye angiography may retain small amounts of fluorescein in the body for 48-72 hours post procedure  Samples containing fluorescein can produce falsely depressed TSH values  If the patient had this procedure,a specimen should be resubmitted post fluorescein clearance            The recommended reference ranges for TSH during pregnancy are as follows:  First trimester 0 1 to 2 5 uIU/mL  Second trimester  0 2 to 3 0 uIU/mL  Third trimester 0 3 to 3 0 uIU/m     (1) CK (CPK) 13Apr2017 08:37AM Scholastica Fail Order Number: [id number]     Test Name Result Flag Reference   CK (CPK) 117 U/L         Plan  Cramp of both lower extremities    · EMG TWO EXTREMITIES WITH OR W/O RELATED PARASPINAL AREAS; Status:Hold  For - Scheduling; Requested for:13Apr2017;   TWO : RLE  ONE : LLE

## 2018-01-18 NOTE — RESULT NOTES
Verified Results  * DXA BONE DENSITY SPINE HIP AND PELVIS 48THS1932 01:11PM Anna Colbert Order Number: [id number]    - Patient Instructions: To schedule this appointment, please contact Central Scheduling at [phone]  Test Name Result Flag Reference   DXA BONE DENSITY SPINE HIP AND PELVIS (Report)     CENTRAL DXA SCAN     CLINICAL HISTORY:  61year old post-menopausal  female risk factors include estrogen deficiency  Osteopenia  TECHNIQUE: Bone densitometry was performed using a [employer] W bone densitometer  Regions of interest appear properly placed  There are no obvious fractures or other confounding variables which could limit the study  None     COMPARISON: Follow-up     RESULTS:    LUMBAR SPINE: L1-L4:   BMD 0 960 gm/cm2   T-score below normal, -1 7 and 7% higher than in 2015, statistically significant change  Z-score -0 3     LEFT TOTAL HIP:   BMD 0 905 gm/cm2   T-score normal, -0 8   Z-score -0 1     LEFT FEMORAL NECK:   BMD 0 807 gm/cm2   T-score below normal, -1 0,and1% higher than in 2015  Z-score -0 1             IMPRESSION:   1  Based on the [facility] classification, this study identifies osteopenia as the diagnosis at the spine area and the patient is considered at low risk for fracture  2  A daily intake of calcium of at least 1200 mg and vitamin D, 800-1000 IU, as well as weight bearing and muscle strengthening exercise, fall prevention and avoidance of tobacco and excessive alcohol intake as basic preventive measures are recommended  3  Repeat DXA scan on the same equipment in 24-36 months as clinically indicated  The 10 year risk of hip fracture is 0 1%, with the 10 year risk of major osteoporotic fracture being 2 4%, as calculated by the [facility] fracture risk assessment tool (FRAX)  The current NOF guidelines recommend treating patients with FRAX 10 year risk score   of >3% for hip fracture and >20% for major osteoporotic fracture        WHO CLASSIFICATION:   Normal (a T-score of -1 0 or higher)   Low bone mineral density (a T-score of less than -1 0 but higher than -2 5)   Osteoporosis (a T-score of -2 5 or less)   Severe osteoporosis (a T-score of -2 5 or less with a fragility fracture)     Thank you for allowing us the opportunity to participate in your patient care  The expanded DEXA report will no longer be arriving in your mail  If you desire to view the full report please contact [address] [place] [address] or access the PACS system          Workstation performed: [id number]     Signed by:   Henrry Singer MD   5/5/17

## 2018-01-22 VITALS — TEMPERATURE: 97.9 F

## 2018-02-26 ENCOUNTER — TELEPHONE (OUTPATIENT)
Dept: FAMILY MEDICINE CLINIC | Facility: CLINIC | Age: 58
End: 2018-02-26

## 2018-02-27 DIAGNOSIS — Z12.39 SCREENING FOR BREAST CANCER: Primary | ICD-10-CM

## 2018-05-03 ENCOUNTER — HOSPITAL ENCOUNTER (OUTPATIENT)
Dept: MAMMOGRAPHY | Facility: MEDICAL CENTER | Age: 58
Discharge: HOME | End: 2018-05-03
Payer: COMMERCIAL

## 2018-05-03 DIAGNOSIS — Z12.39 SCREENING FOR BREAST CANCER: ICD-10-CM

## 2018-05-03 PROCEDURE — 77067 SCR MAMMO BI INCL CAD: CPT

## 2018-05-04 ENCOUNTER — ANNUAL EXAM (OUTPATIENT)
Dept: OBGYN CLINIC | Facility: MEDICAL CENTER | Age: 58
End: 2018-05-04
Payer: COMMERCIAL

## 2018-05-04 VITALS — BODY MASS INDEX: 37.08 KG/M2 | SYSTOLIC BLOOD PRESSURE: 132 MMHG | WEIGHT: 216 LBS | DIASTOLIC BLOOD PRESSURE: 82 MMHG

## 2018-05-04 DIAGNOSIS — Z01.419 ENCOUNTER FOR WELL WOMAN EXAM WITH ROUTINE GYNECOLOGICAL EXAM: Primary | ICD-10-CM

## 2018-05-04 DIAGNOSIS — Z12.39 SCREENING FOR MALIGNANT NEOPLASM OF BREAST: ICD-10-CM

## 2018-05-04 DIAGNOSIS — Z12.4 ENCOUNTER FOR PAPANICOLAOU SMEAR FOR CERVICAL CANCER SCREENING: ICD-10-CM

## 2018-05-04 PROCEDURE — G0145 SCR C/V CYTO,THINLAYER,RESCR: HCPCS | Performed by: OBSTETRICS & GYNECOLOGY

## 2018-05-04 PROCEDURE — S0612 ANNUAL GYNECOLOGICAL EXAMINA: HCPCS | Performed by: OBSTETRICS & GYNECOLOGY

## 2018-05-04 NOTE — PROGRESS NOTES
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 for annual gynecologic examination  She has the following concerns:  No GYN complaints  Requesting pap due to mother's history  DEXA normal last year - stopped alendronate     Health Maintenance:    She wears her seatbelt routinely  She does perform regular monthly self breast exams  She feels safe at home  Pap: 2017  Last mammogram: 2018  Last colonoscopy:     Past Medical History:   Diagnosis Date    Asthma     Last Assessed: 10/19/2016    Hypertension        Past Surgical History:   Procedure Laterality Date    BUNIONECTOMY      BUNIONECTOMY Right     BUNIONECTOMY Left     FOOT SURGERY      NASAL POLYP EXCISION      IL COLONOSCOPY FLX DX W/COLLJ SPEC WHEN PFRMD N/A 2016    Procedure: COLONOSCOPY;  Surgeon: Irma Burton DO;  Location: [facility]; Service: Gastroenterology    SHOULDER ARTHROSCOPY Right     SHOULDER ARTHROSCOPY W/ ROTATOR CUFF REPAIR Right        Past OB/Gyn History:  OB History      Para Term  AB Living    0 0 0 0 0 0    SAB TAB Ectopic Multiple Live Births    0 0 0 0 0        Pt does not have menstrual issues  History of sexually transmitted infection: No   History of abnormal pap smears: No      Patient  currently sexually active  heterosexual   The current method of family planning is post menopausal status      Family History   Problem Relation Age of Onset    Hypertension Mother     Diabetes Father     Hypertension Father    Vena Yung Hypertension Brother     Diabetes Paternal Grandmother     Hypertension Maternal Aunt     Diabetes Paternal Aunt     Diabetes Paternal Uncle     Hypertension Cousin     Diabetes Cousin        Social History:  Social History     Social History    Marital status: /Civil Union     Spouse name: N/A    Number of children: N/A    Years of education: N/A     Occupational History    Not on file  Social History Main Topics    Smoking status: Never Smoker    Smokeless tobacco: Never Used    Alcohol use No    Drug use: No    Sexual activity: Yes     Partners: Male     Birth control/ protection: None     Other Topics Concern    Not on file     Social History Narrative    No narrative on file     No Known Allergies    Current Outpatient Prescriptions:     albuterol (2 5 mg/3 mL) 0 083 % nebulizer solution, Take 2 5 mg by nebulization every 4 (four) hours as needed for wheezing , Disp: , Rfl:     amLODIPine (NORVASC) 10 mg tablet, Take 10 mg by mouth daily Indications: has not taken in several months , Disp: , Rfl:     ipratropium (ATROVENT HFA) 17 mcg/act inhaler, Inhale 2 puffs every 6 (six) hours as needed for wheezing , Disp: , Rfl:     multivitamin (THERAGRAN) TABS, Take 1 tablet by mouth daily  , Disp: , Rfl:     omeprazole (PriLOSEC) 40 MG capsule, Take 40 mg by mouth daily  , Disp: , Rfl:       Review of Systems  Constitutional :no fever, feels well, no tiredness, no recent weight gain or loss  ENT: no ear ache, no loss of hearing, no nosebleeds or nasal discharge, no sore throat or hoarseness  Cardiovascular: no complaints of slow or fast heart beat, no chest pain, no palpitations, no leg claudication or lower extremity edema    Respiratory: no complaints of shortness of shortness of breath, no CASTANON  Breasts:no complaints of breast pain, breast lump, or nipple discharge  Gastrointestinal: no complaints of abdominal pain, constipation, nausea, vomiting, or diarrhea or bloody stools  Genitourinary : no complaints of dysuria, incontinence, pelvic pain, no dysmenorrhea, vaginal discharge or abnormal vaginal bleeding and as noted in HPI  Musculoskeletal: no complaints of arthralgia, no myalgia, no joint swelling or stiffness, no limb pain or swelling  Integumentary: no complaints of skin rash or lesion, itching or dry skin  Neurological: no complaints of headache, no confusion, no numbness or tingling, no dizziness or fainting    Objective      /82   Wt 98 kg (216 lb)   BMI 37 08 kg/m²     General:   appears stated age, cooperative, alert normal mood and affect   Neck: normal, supple,trachea midline, no masses   Heart: regular rate and rhythm, S1, S2 normal, no murmur, click, rub or gallop   Lungs: clear to auscultation bilaterally   Breasts: normal appearance, no masses or tenderness, Inspection negative, No nipple retraction or dimpling, No nipple discharge or bleeding, No axillary or supraclavicular adenopathy, Normal to palpation without dominant masses   Abdomen: soft, non-tender, without masses or organomegaly   Vulva: normal, normal female genitalia, Bartholin's, Urethra, Donaldson normal, no lesions   Vagina: normal vagina, no discharge, exudate, lesion, or erythema   Urethra: normal   Cervix: Normal, no discharge  PAP done  Uterus: normal size, contour, position, consistency, mobility, non-tender   Adnexa: normal adnexa   Lymphatic palpation of lymph nodes in neck, axilla, groin and/or other locations: no lymphadenopathy or masses noted   Skin normal skin turgor and no rashes     Psychiatric orientation to person, place, and time: normal  mood and affect: normal

## 2018-05-09 LAB
LAB AP GYN PRIMARY INTERPRETATION: NORMAL
Lab: NORMAL

## 2018-06-01 DIAGNOSIS — I10 BENIGN ESSENTIAL HYPERTENSION: Primary | ICD-10-CM

## 2018-06-02 RX ORDER — AMLODIPINE BESYLATE 10 MG/1
10 TABLET ORAL DAILY
Qty: 90 TABLET | Refills: 1 | Status: SHIPPED | OUTPATIENT
Start: 2018-06-02 | End: 2019-03-19 | Stop reason: SDUPTHER

## 2018-12-15 ENCOUNTER — TELEPHONE (OUTPATIENT)
Dept: FAMILY MEDICINE CLINIC | Facility: CLINIC | Age: 58
End: 2018-12-15

## 2018-12-15 ENCOUNTER — OFFICE VISIT (OUTPATIENT)
Dept: FAMILY MEDICINE CLINIC | Facility: CLINIC | Age: 58
End: 2018-12-15
Payer: COMMERCIAL

## 2018-12-15 VITALS
SYSTOLIC BLOOD PRESSURE: 130 MMHG | TEMPERATURE: 99.9 F | HEART RATE: 110 BPM | WEIGHT: 206 LBS | BODY MASS INDEX: 36.5 KG/M2 | DIASTOLIC BLOOD PRESSURE: 80 MMHG | RESPIRATION RATE: 18 BRPM | HEIGHT: 63 IN | OXYGEN SATURATION: 97 %

## 2018-12-15 DIAGNOSIS — Z13.0 SCREENING FOR IRON DEFICIENCY ANEMIA: ICD-10-CM

## 2018-12-15 DIAGNOSIS — I10 BENIGN ESSENTIAL HYPERTENSION: ICD-10-CM

## 2018-12-15 DIAGNOSIS — E78.2 COMBINED HYPERLIPIDEMIA: ICD-10-CM

## 2018-12-15 DIAGNOSIS — J01.90 ACUTE SINUSITIS, RECURRENCE NOT SPECIFIED, UNSPECIFIED LOCATION: ICD-10-CM

## 2018-12-15 DIAGNOSIS — Z13.29 SCREENING FOR THYROID DISORDER: ICD-10-CM

## 2018-12-15 DIAGNOSIS — R73.03 PREDIABETES: Primary | ICD-10-CM

## 2018-12-15 PROCEDURE — 99214 OFFICE O/P EST MOD 30 MIN: CPT | Performed by: FAMILY MEDICINE

## 2018-12-15 RX ORDER — MOMETASONE FUROATE 50 UG/1
2 SPRAY, METERED NASAL DAILY
Qty: 17 G | Refills: 0 | Status: SHIPPED | OUTPATIENT
Start: 2018-12-15 | End: 2019-07-12

## 2018-12-15 RX ORDER — ATORVASTATIN CALCIUM 10 MG/1
1 TABLET, FILM COATED ORAL
COMMUNITY
Start: 2015-03-10 | End: 2019-07-15 | Stop reason: SDUPTHER

## 2018-12-15 RX ORDER — FLUCONAZOLE 150 MG/1
TABLET ORAL
COMMUNITY
Start: 2017-08-31 | End: 2020-01-16 | Stop reason: ALTCHOICE

## 2018-12-15 RX ORDER — LEVOFLOXACIN 500 MG/1
500 TABLET, FILM COATED ORAL EVERY 24 HOURS
Qty: 7 TABLET | Refills: 0 | Status: SHIPPED | OUTPATIENT
Start: 2018-12-15 | End: 2018-12-22

## 2018-12-15 NOTE — PROGRESS NOTES
Assessment/Plan:   1  Acute sinusitis, recurrence not specified, unspecified location  Symptoms appear likely secondary to acute sinusitis  Start supportive care  Maintain hydration  Take over-the-counter Mucinex  She may be highly benefit from a nasal steroid  She was given a referral for Nasonex  If this is not covered by her insurance, she may take over-the-counter Flonase  Start treatment with Levaquin 500 mg daily for 7 days  Follow up if any symptoms are persisting   - levofloxacin (LEVAQUIN) 500 mg tablet; Take 1 tablet (500 mg total) by mouth every 24 hours for 7 days  Dispense: 7 tablet; Refill: 0  - mometasone (NASONEX) 50 mcg/act nasal spray; 2 sprays into each nostril daily  Dispense: 17 g; Refill: 0    2  Prediabetes/Combined hyperlipidemia/Benign essential hypertension  Patient has not been seen for a evaluation on her chronic conditions in over 1 year  At this time, she has self discontinued multiple medications  Will check fasting blood work for her  She was advised as well to follow up 1 week after she completes this blood work to address all of her medical conditions  - Comprehensive metabolic panel; Future  - Lipid Panel with Direct LDL reflex; Future  - TSH, 3rd generation with Free T4 reflex; Future  - CBC and Platelet; Future  - HEMOGLOBIN A1C W/ EAG ESTIMATION; Future         Diagnoses and all orders for this visit:    Prediabetes  -     Comprehensive metabolic panel; Future  -     Lipid Panel with Direct LDL reflex; Future  -     TSH, 3rd generation with Free T4 reflex; Future  -     CBC and Platelet; Future  -     HEMOGLOBIN A1C W/ EAG ESTIMATION; Future    Combined hyperlipidemia  -     Comprehensive metabolic panel; Future  -     Lipid Panel with Direct LDL reflex; Future  -     TSH, 3rd generation with Free T4 reflex; Future  -     CBC and Platelet; Future  -     HEMOGLOBIN A1C W/ EAG ESTIMATION;  Future    Benign essential hypertension  -     Comprehensive metabolic panel; Future  -     Lipid Panel with Direct LDL reflex; Future  -     TSH, 3rd generation with Free T4 reflex; Future  -     CBC and Platelet; Future  -     HEMOGLOBIN A1C W/ EAG ESTIMATION; Future    Screening for iron deficiency anemia  -     Comprehensive metabolic panel; Future  -     Lipid Panel with Direct LDL reflex; Future  -     TSH, 3rd generation with Free T4 reflex; Future  -     CBC and Platelet; Future  -     HEMOGLOBIN A1C W/ EAG ESTIMATION; Future    Screening for thyroid disorder  -     Comprehensive metabolic panel; Future  -     Lipid Panel with Direct LDL reflex; Future  -     TSH, 3rd generation with Free T4 reflex; Future  -     CBC and Platelet; Future  -     HEMOGLOBIN A1C W/ EAG ESTIMATION; Future    Other orders  -     atorvastatin (LIPITOR) 10 mg tablet; Take 1 tablet by mouth  -     fluconazole (DIFLUCAN) 150 mg tablet; Take by mouth          Subjective:    Chief Complaint   Patient presents with    Sore Throat    Cough    Nasal Drainage    Headache    patient is also c/o pain on R/ side of rib cage     for 2 days        Patient ID: Cliff Bowman is a 60 y o  female  URI    This is a new problem  The current episode started in the past 7 days  The problem has been unchanged  There has been no fever  Associated symptoms include congestion, coughing, headaches, rhinorrhea, sinus pain, sneezing and a sore throat  Pertinent negatives include no abdominal pain, chest pain, diarrhea, dysuria, ear pain or nausea  She has tried decongestant for the symptoms  The treatment provided no relief  Review of Systems   Constitutional: Negative for activity change, chills, fatigue and fever  HENT: Positive for congestion, rhinorrhea, sinus pain, sneezing and sore throat  Negative for ear pain and sinus pressure  Eyes: Negative for redness, itching and visual disturbance  Respiratory: Positive for cough  Negative for shortness of breath      Cardiovascular: Negative for chest pain and palpitations  Gastrointestinal: Negative for abdominal pain, diarrhea and nausea  Endocrine: Negative for cold intolerance and heat intolerance  Genitourinary: Negative for dysuria, flank pain and frequency  Musculoskeletal: Negative for arthralgias, back pain, gait problem and myalgias  Skin: Negative for color change  Allergic/Immunologic: Negative for environmental allergies  Neurological: Positive for headaches  Negative for dizziness and numbness  Psychiatric/Behavioral: Negative for behavioral problems and sleep disturbance  The following portions of the patient's history were reviewed and updated as appropriate : past family history, past medical history, past social history and past surgical history  Current Outpatient Prescriptions:     albuterol (2 5 mg/3 mL) 0 083 % nebulizer solution, Take 2 5 mg by nebulization every 4 (four) hours as needed for wheezing , Disp: , Rfl:     amLODIPine (NORVASC) 10 mg tablet, Take 1 tablet (10 mg total) by mouth daily, Disp: 90 tablet, Rfl: 1    multivitamin (THERAGRAN) TABS, Take 1 tablet by mouth daily  , Disp: , Rfl:     omeprazole (PriLOSEC) 40 MG capsule, Take 40 mg by mouth daily  , Disp: , Rfl:     atorvastatin (LIPITOR) 10 mg tablet, Take 1 tablet by mouth, Disp: , Rfl:     fluconazole (DIFLUCAN) 150 mg tablet, Take by mouth, Disp: , Rfl:     ipratropium (ATROVENT HFA) 17 mcg/act inhaler, Inhale 2 puffs every 6 (six) hours as needed for wheezing , Disp: , Rfl:     Objective:    Vitals:    12/15/18 1103   BP: 130/80   BP Location: Left arm   Patient Position: Sitting   Cuff Size: Large   Pulse: (!) 110   Resp: 18   Temp: 99 9 °F (37 7 °C)   TempSrc: Tympanic   SpO2: 97%   Weight: 98 2 kg (216 lb 9 6 oz)   Height: 5' 3 39" (1 61 m)        Physical Exam   Constitutional: She is oriented to person, place, and time  She appears well-developed and well-nourished  HENT:   Head: Normocephalic and atraumatic     Nose: Nose normal  Mouth/Throat: No oropharyngeal exudate  Eyes: Pupils are equal, round, and reactive to light  Right eye exhibits no discharge  Left eye exhibits no discharge  Neck: Normal range of motion  Neck supple  No tracheal deviation present  Cardiovascular: Normal rate, regular rhythm and intact distal pulses  Exam reveals no gallop and no friction rub  No murmur heard  Pulses:       Dorsalis pedis pulses are 2+ on the right side, and 2+ on the left side  Posterior tibial pulses are 2+ on the right side, and 2+ on the left side  Pulmonary/Chest: Effort normal and breath sounds normal  No respiratory distress  She has no wheezes  She has no rales  Abdominal: Soft  Bowel sounds are normal  She exhibits no distension  There is no tenderness  There is no rebound and no guarding  Musculoskeletal: Normal range of motion  She exhibits no edema  Lymphadenopathy:        Head (right side): No submental and no submandibular adenopathy present  Head (left side): No submental and no submandibular adenopathy present  She has no cervical adenopathy  Right cervical: No superficial cervical, no deep cervical and no posterior cervical adenopathy present  Left cervical: No superficial cervical, no deep cervical and no posterior cervical adenopathy present  Neurological: She is alert and oriented to person, place, and time  No cranial nerve deficit or sensory deficit  Skin: Skin is warm, dry and intact  Psychiatric: Her speech is normal and behavior is normal  Judgment normal  Her mood appears not anxious  Cognition and memory are normal  She does not exhibit a depressed mood  Vitals reviewed

## 2018-12-15 NOTE — TELEPHONE ENCOUNTER
Patient called the office, stating that on her AVS it showed 216 for weight but while it might not matter to some it matters to her  She says scale showed 206  Weight updated

## 2019-03-19 DIAGNOSIS — I10 BENIGN ESSENTIAL HYPERTENSION: ICD-10-CM

## 2019-03-19 RX ORDER — AMLODIPINE BESYLATE 10 MG/1
10 TABLET ORAL DAILY
Qty: 90 TABLET | Refills: 1 | Status: SHIPPED | OUTPATIENT
Start: 2019-03-19 | End: 2019-07-12 | Stop reason: SDUPTHER

## 2019-04-09 ENCOUNTER — TELEPHONE (OUTPATIENT)
Dept: FAMILY MEDICINE CLINIC | Facility: CLINIC | Age: 59
End: 2019-04-09

## 2019-04-09 DIAGNOSIS — Z12.39 SCREENING FOR BREAST CANCER: Primary | ICD-10-CM

## 2019-05-15 ENCOUNTER — ANNUAL EXAM (OUTPATIENT)
Dept: OBGYN CLINIC | Facility: MEDICAL CENTER | Age: 59
End: 2019-05-15
Payer: COMMERCIAL

## 2019-05-15 ENCOUNTER — HOSPITAL ENCOUNTER (OUTPATIENT)
Dept: MAMMOGRAPHY | Facility: MEDICAL CENTER | Age: 59
Discharge: HOME | End: 2019-05-15
Payer: COMMERCIAL

## 2019-05-15 VITALS — BODY MASS INDEX: 36.5 KG/M2 | HEIGHT: 63 IN | WEIGHT: 206 LBS

## 2019-05-15 VITALS — DIASTOLIC BLOOD PRESSURE: 65 MMHG | WEIGHT: 209.3 LBS | SYSTOLIC BLOOD PRESSURE: 130 MMHG | BODY MASS INDEX: 37.08 KG/M2

## 2019-05-15 DIAGNOSIS — D21.9 FIBROIDS: ICD-10-CM

## 2019-05-15 DIAGNOSIS — Z01.419 ENCOUNTER FOR WELL WOMAN EXAM WITH ROUTINE GYNECOLOGICAL EXAM: Primary | ICD-10-CM

## 2019-05-15 DIAGNOSIS — M85.80 OSTEOPENIA, UNSPECIFIED LOCATION: ICD-10-CM

## 2019-05-15 DIAGNOSIS — Z12.4 ENCOUNTER FOR PAPANICOLAOU SMEAR FOR CERVICAL CANCER SCREENING: ICD-10-CM

## 2019-05-15 DIAGNOSIS — Z12.39 BREAST CANCER SCREENING: ICD-10-CM

## 2019-05-15 DIAGNOSIS — Z12.39 SCREENING FOR BREAST CANCER: ICD-10-CM

## 2019-05-15 PROCEDURE — 77063 BREAST TOMOSYNTHESIS BI: CPT

## 2019-05-15 PROCEDURE — 87624 HPV HI-RISK TYP POOLED RSLT: CPT | Performed by: OBSTETRICS & GYNECOLOGY

## 2019-05-15 PROCEDURE — G0145 SCR C/V CYTO,THINLAYER,RESCR: HCPCS | Performed by: OBSTETRICS & GYNECOLOGY

## 2019-05-15 PROCEDURE — S0612 ANNUAL GYNECOLOGICAL EXAMINA: HCPCS | Performed by: OBSTETRICS & GYNECOLOGY

## 2019-05-15 PROCEDURE — 77067 SCR MAMMO BI INCL CAD: CPT

## 2019-05-17 LAB
HPV HR 12 DNA CVX QL NAA+PROBE: NEGATIVE
HPV16 DNA CVX QL NAA+PROBE: NEGATIVE
HPV18 DNA CVX QL NAA+PROBE: NEGATIVE

## 2019-05-21 LAB
LAB AP GYN PRIMARY INTERPRETATION: NORMAL
Lab: NORMAL

## 2019-05-28 ENCOUNTER — HOSPITAL ENCOUNTER (OUTPATIENT)
Dept: BONE DENSITY | Facility: MEDICAL CENTER | Age: 59
Discharge: HOME | End: 2019-05-28
Payer: COMMERCIAL

## 2019-05-28 ENCOUNTER — HOSPITAL ENCOUNTER (OUTPATIENT)
Dept: ULTRASOUND IMAGING | Facility: MEDICAL CENTER | Age: 59
Discharge: HOME | End: 2019-05-28
Payer: COMMERCIAL

## 2019-05-28 DIAGNOSIS — D21.9 FIBROIDS: ICD-10-CM

## 2019-05-28 DIAGNOSIS — M85.80 OSTEOPENIA, UNSPECIFIED LOCATION: ICD-10-CM

## 2019-05-28 PROCEDURE — 76856 US EXAM PELVIC COMPLETE: CPT

## 2019-05-28 PROCEDURE — 76830 TRANSVAGINAL US NON-OB: CPT

## 2019-05-28 PROCEDURE — 77080 DXA BONE DENSITY AXIAL: CPT

## 2019-06-19 ENCOUNTER — OFFICE VISIT (OUTPATIENT)
Dept: OBGYN CLINIC | Facility: MEDICAL CENTER | Age: 59
End: 2019-06-19
Payer: COMMERCIAL

## 2019-06-19 VITALS — SYSTOLIC BLOOD PRESSURE: 130 MMHG | WEIGHT: 210.8 LBS | BODY MASS INDEX: 37.34 KG/M2 | DIASTOLIC BLOOD PRESSURE: 70 MMHG

## 2019-06-19 DIAGNOSIS — M81.6 LOCALIZED OSTEOPOROSIS WITHOUT CURRENT PATHOLOGICAL FRACTURE: ICD-10-CM

## 2019-06-19 DIAGNOSIS — D21.9 FIBROID: Primary | ICD-10-CM

## 2019-06-19 PROCEDURE — 99214 OFFICE O/P EST MOD 30 MIN: CPT | Performed by: OBSTETRICS & GYNECOLOGY

## 2019-06-19 RX ORDER — IBUPROFEN 200 MG
1200 CAPSULE ORAL DAILY
COMMUNITY

## 2019-07-12 ENCOUNTER — OFFICE VISIT (OUTPATIENT)
Dept: FAMILY MEDICINE CLINIC | Facility: CLINIC | Age: 59
End: 2019-07-12
Payer: COMMERCIAL

## 2019-07-12 ENCOUNTER — APPOINTMENT (OUTPATIENT)
Dept: LAB | Facility: CLINIC | Age: 59
End: 2019-07-12
Payer: COMMERCIAL

## 2019-07-12 VITALS
BODY MASS INDEX: 36.5 KG/M2 | HEIGHT: 63 IN | TEMPERATURE: 98 F | SYSTOLIC BLOOD PRESSURE: 130 MMHG | WEIGHT: 206 LBS | DIASTOLIC BLOOD PRESSURE: 90 MMHG | RESPIRATION RATE: 16 BRPM | HEART RATE: 98 BPM | OXYGEN SATURATION: 98 %

## 2019-07-12 DIAGNOSIS — E78.2 COMBINED HYPERLIPIDEMIA: ICD-10-CM

## 2019-07-12 DIAGNOSIS — J45.909 MILD ASTHMA WITHOUT COMPLICATION, UNSPECIFIED WHETHER PERSISTENT: Primary | ICD-10-CM

## 2019-07-12 DIAGNOSIS — H69.81 DYSFUNCTION OF RIGHT EUSTACHIAN TUBE: ICD-10-CM

## 2019-07-12 DIAGNOSIS — R73.03 PREDIABETES: ICD-10-CM

## 2019-07-12 DIAGNOSIS — I10 BENIGN ESSENTIAL HYPERTENSION: ICD-10-CM

## 2019-07-12 LAB
ALBUMIN SERPL BCP-MCNC: 3.6 G/DL (ref 3.5–5)
ALP SERPL-CCNC: 105 U/L (ref 46–116)
ALT SERPL W P-5'-P-CCNC: 21 U/L (ref 12–78)
ANION GAP SERPL CALCULATED.3IONS-SCNC: 5 MMOL/L (ref 4–13)
AST SERPL W P-5'-P-CCNC: 14 U/L (ref 5–45)
BASOPHILS # BLD AUTO: 0.05 THOUSANDS/ΜL (ref 0–0.1)
BASOPHILS NFR BLD AUTO: 1 % (ref 0–1)
BILIRUB SERPL-MCNC: 0.81 MG/DL (ref 0.2–1)
BUN SERPL-MCNC: 14 MG/DL (ref 5–25)
CALCIUM SERPL-MCNC: 9.4 MG/DL (ref 8.3–10.1)
CHLORIDE SERPL-SCNC: 107 MMOL/L (ref 100–108)
CHOLEST SERPL-MCNC: 235 MG/DL (ref 50–200)
CO2 SERPL-SCNC: 28 MMOL/L (ref 21–32)
CREAT SERPL-MCNC: 0.92 MG/DL (ref 0.6–1.3)
EOSINOPHIL # BLD AUTO: 0.12 THOUSAND/ΜL (ref 0–0.61)
EOSINOPHIL NFR BLD AUTO: 3 % (ref 0–6)
ERYTHROCYTE [DISTWIDTH] IN BLOOD BY AUTOMATED COUNT: 13.6 % (ref 11.6–15.1)
EST. AVERAGE GLUCOSE BLD GHB EST-MCNC: 117 MG/DL
GFR SERPL CREATININE-BSD FRML MDRD: 78 ML/MIN/1.73SQ M
GLUCOSE P FAST SERPL-MCNC: 88 MG/DL (ref 65–99)
HBA1C MFR BLD: 5.7 % (ref 4.2–6.3)
HCT VFR BLD AUTO: 41.6 % (ref 34.8–46.1)
HDLC SERPL-MCNC: 69 MG/DL (ref 40–60)
HGB BLD-MCNC: 13.5 G/DL (ref 11.5–15.4)
IMM GRANULOCYTES # BLD AUTO: 0.01 THOUSAND/UL (ref 0–0.2)
IMM GRANULOCYTES NFR BLD AUTO: 0 % (ref 0–2)
LDLC SERPL CALC-MCNC: 153 MG/DL (ref 0–100)
LYMPHOCYTES # BLD AUTO: 2.09 THOUSANDS/ΜL (ref 0.6–4.47)
LYMPHOCYTES NFR BLD AUTO: 45 % (ref 14–44)
MCH RBC QN AUTO: 28.4 PG (ref 26.8–34.3)
MCHC RBC AUTO-ENTMCNC: 32.5 G/DL (ref 31.4–37.4)
MCV RBC AUTO: 88 FL (ref 82–98)
MONOCYTES # BLD AUTO: 0.26 THOUSAND/ΜL (ref 0.17–1.22)
MONOCYTES NFR BLD AUTO: 6 % (ref 4–12)
NEUTROPHILS # BLD AUTO: 2.17 THOUSANDS/ΜL (ref 1.85–7.62)
NEUTS SEG NFR BLD AUTO: 45 % (ref 43–75)
NRBC BLD AUTO-RTO: 0 /100 WBCS
PLATELET # BLD AUTO: 274 THOUSANDS/UL (ref 149–390)
PMV BLD AUTO: 9.4 FL (ref 8.9–12.7)
POTASSIUM SERPL-SCNC: 3.7 MMOL/L (ref 3.5–5.3)
PROT SERPL-MCNC: 8 G/DL (ref 6.4–8.2)
RBC # BLD AUTO: 4.75 MILLION/UL (ref 3.81–5.12)
SODIUM SERPL-SCNC: 140 MMOL/L (ref 136–145)
TRIGL SERPL-MCNC: 67 MG/DL
TSH SERPL DL<=0.05 MIU/L-ACNC: 0.86 UIU/ML (ref 0.36–3.74)
WBC # BLD AUTO: 4.7 THOUSAND/UL (ref 4.31–10.16)

## 2019-07-12 PROCEDURE — 80053 COMPREHEN METABOLIC PANEL: CPT

## 2019-07-12 PROCEDURE — 84443 ASSAY THYROID STIM HORMONE: CPT

## 2019-07-12 PROCEDURE — 99214 OFFICE O/P EST MOD 30 MIN: CPT | Performed by: FAMILY MEDICINE

## 2019-07-12 PROCEDURE — 1036F TOBACCO NON-USER: CPT | Performed by: FAMILY MEDICINE

## 2019-07-12 PROCEDURE — 3008F BODY MASS INDEX DOCD: CPT | Performed by: FAMILY MEDICINE

## 2019-07-12 PROCEDURE — 36415 COLL VENOUS BLD VENIPUNCTURE: CPT

## 2019-07-12 PROCEDURE — 85025 COMPLETE CBC W/AUTO DIFF WBC: CPT

## 2019-07-12 PROCEDURE — 80061 LIPID PANEL: CPT

## 2019-07-12 PROCEDURE — 83036 HEMOGLOBIN GLYCOSYLATED A1C: CPT

## 2019-07-12 RX ORDER — FLUTICASONE PROPIONATE 50 MCG
2 SPRAY, SUSPENSION (ML) NASAL DAILY
Qty: 1 BOTTLE | Refills: 3 | Status: SHIPPED | OUTPATIENT
Start: 2019-07-12 | End: 2021-11-16

## 2019-07-12 RX ORDER — ALBUTEROL SULFATE 90 UG/1
2 AEROSOL, METERED RESPIRATORY (INHALATION) EVERY 6 HOURS PRN
Qty: 1 INHALER | Refills: 3 | Status: SHIPPED | OUTPATIENT
Start: 2019-07-12 | End: 2020-09-16 | Stop reason: SDUPTHER

## 2019-07-12 RX ORDER — ALBUTEROL SULFATE 2.5 MG/3ML
2.5 SOLUTION RESPIRATORY (INHALATION) EVERY 4 HOURS PRN
Qty: 3 ML | Refills: 3 | Status: CANCELLED | OUTPATIENT
Start: 2019-07-12

## 2019-07-12 RX ORDER — AMLODIPINE BESYLATE 10 MG/1
10 TABLET ORAL DAILY
Qty: 90 TABLET | Refills: 1 | Status: SHIPPED | OUTPATIENT
Start: 2019-07-12 | End: 2020-01-16 | Stop reason: SDUPTHER

## 2019-07-12 NOTE — PATIENT INSTRUCTIONS
Heart Healthy Diet   WHAT YOU NEED TO KNOW:   A heart healthy diet is an eating plan low in total fat, unhealthy fats, and sodium (salt)  A heart healthy diet helps decrease your risk for heart disease and stroke  Limit the amount of fat you eat to 25% to 35% of your total daily calories  Limit sodium to less than 2,300 mg each day  DISCHARGE INSTRUCTIONS:   Healthy fats:  Healthy fats can help improve cholesterol levels  The risk for heart disease is decreased when cholesterol levels are normal  Choose healthy fats, such as the following:  · Unsaturated fat  is found in foods such as soybean, canola, olive, corn, and safflower oils  It is also found in soft tub margarine that is made with liquid vegetable oil  · Omega-3 fat  is found in certain fish, such as salmon, tuna, and trout, and in walnuts and flaxseed  Unhealthy fats:  Unhealthy fats can cause unhealthy cholesterol levels in your blood and increase your risk of heart disease  Limit unhealthy fats, such as the following:  · Cholesterol  is found in animal foods, such as eggs and lobster, and in dairy products made from whole milk  Limit cholesterol to less than 300 milligrams (mg) each day  You may need to limit cholesterol to 200 mg each day if you have heart disease  · Saturated fat  is found in meats, such as hernandez and hamburger  It is also found in chicken or turkey skin, whole milk, and butter  Limit saturated fat to less than 7% of your total daily calories  Limit saturated fat to less than 6% if you have heart disease or are at increased risk for it  · Trans fat  is found in packaged foods, such as potato chips and cookies  It is also in hard margarine, some fried foods, and shortening  Avoid trans fats as much as possible    Heart healthy foods and drinks to include:  Ask your dietitian or healthcare provider how many servings to have from each of the following food groups:  · Grains:      ¨ Whole-wheat breads, cereals, and pastas, and brown rice    ¨ Low-fat, low-sodium crackers and chips    · Vegetables:      ¨ Broccoli, green beans, green peas, and spinach    ¨ Collards, kale, and lima beans    ¨ Carrots, sweet potatoes, tomatoes, and peppers    ¨ Canned vegetables with no salt added    · Fruits:      ¨ Bananas, peaches, pears, and pineapple    ¨ Grapes, raisins, and dates    ¨ Oranges, tangerines, grapefruit, orange juice, and grapefruit juice    ¨ Apricots, mangoes, melons, and papaya    ¨ Raspberries and strawberries    ¨ Canned fruit with no added sugar    · Low-fat dairy products:      ¨ Nonfat (skim) milk, 1% milk, and low-fat almond, cashew, or soy milks fortified with calcium    ¨ Low-fat cheese, regular or frozen yogurt, and cottage cheese    · Meats and proteins , such as lean cuts of beef and pork (loin, leg, round), skinless chicken and turkey, legumes, soy products, egg whites, and nuts  Foods and drinks to limit or avoid:  Ask your dietitian or healthcare provider about these and other foods that are high in unhealthy fat, sodium, and sugar:  · Snack or packaged foods , such as frozen dinners, cookies, macaroni and cheese, and cereals with more than 300 mg of sodium per serving    · Canned or dry mixes  for cakes, soups, sauces, or gravies    · Vegetables with added sodium , such as instant potatoes, vegetables with added sauces, or regular canned vegetables    · Other foods high in sodium , such as ketchup, barbecue sauce, salad dressing, pickles, olives, soy sauce, and miso    · High-fat dairy foods  such as whole or 2% milk, cream cheese, or sour cream, and cheeses     · High-fat protein foods  such as high-fat cuts of beef (T-bone steaks, ribs), chicken or turkey with skin, and organ meats, such as liver    · Cured or smoked meats , such as hot dogs, hernandez, and sausage    · Unhealthy fats and oils , such as butter, stick margarine, shortening, and cooking oils such as coconut or palm oil    · Food and drinks high in sugar , such as soft drinks (soda), sports drinks, sweetened tea, candy, cake, cookies, pies, and doughnuts  Other diet guidelines to follow:   · Eat more foods containing omega-3 fats  Eat fish high in omega-3 fats at least 2 times a week  · Limit alcohol  Too much alcohol can damage your heart and raise your blood pressure  Women should limit alcohol to 1 drink a day  Men should limit alcohol to 2 drinks a day  A drink of alcohol is 12 ounces of beer, 5 ounces of wine, or 1½ ounces of liquor  · Choose low-sodium foods  High-sodium foods can lead to high blood pressure  Add little or no salt to food you prepare  Use herbs and spices in place of salt  · Eat more fiber  to help lower cholesterol levels  Eat at least 5 servings of fruits and vegetables each day  Eat 3 ounces of whole-grain foods each day  Legumes (beans) are also a good source of fiber  Lifestyle guidelines:   · Do not smoke  Nicotine and other chemicals in cigarettes and cigars can cause lung and heart damage  Ask your healthcare provider for information if you currently smoke and need help to quit  E-cigarettes or smokeless tobacco still contain nicotine  Talk to your healthcare provider before you use these products  · Exercise regularly  to help you maintain a healthy weight and improve your blood pressure and cholesterol levels  Ask your healthcare provider about the best exercise plan for you  Do not start an exercise program without asking your healthcare provider  Follow up with your healthcare provider as directed:  Write down your questions so you remember to ask them during your visits  © 2017 [employer] Information is for End User's use only and may not be sold, redistributed or otherwise used for commercial purposes  All illustrations and images included in CareNotes® are the copyrighted property of A [employer]  or [employer]  The above information is an  only   It is not intended as medical advice for individual conditions or treatments  Talk to your doctor, nurse or pharmacist before following any medical regimen to see if it is safe and effective for you

## 2019-07-12 NOTE — PROGRESS NOTES
[facility]      NAME: Padmini Herman  AGE: 60 y o  SEX: female  : 1960   MRN: [id number]    DATE: 2019  TIME: 11:00 AM    Assessment and Plan     Problem List Items Addressed This Visit     Asthma - Primary     Doing well with rare/occasional use of ProAir  Med was refilled today         Relevant Medications    albuterol (PROAIR HFA) 90 mcg/act inhaler    Benign essential hypertension     Reasonably controlled on amlodipine 10 mg daily         Relevant Medications    amLODIPine (NORVASC) 10 mg tablet    Other Relevant Orders    CBC and differential    TSH, 3rd generation with Free T4 reflex    Combined hyperlipidemia     Patient stopped atorvastatin 10  Will check lipids today to see if she needs to restart med  I also gave her heart healthy diet information         Relevant Orders    Comprehensive metabolic panel    Lipid Panel with Direct LDL reflex    Prediabetes     Will check A1c today  Recommend reduced carb diet and exercise  Relevant Orders    Comprehensive metabolic panel    Hemoglobin A1C      Other Visit Diagnoses     Dysfunction of right eustachian tube        Relevant Medications    fluticasone (FLONASE) 50 mcg/act nasal spray              Return to office in: will give rx for FBW  Will call    6 mos    Chief Complaint     Chief Complaint   Patient presents with    Follow-up     6 months check up       History of Present Illness     Patient presents for recheck chronic medical problems today  Patient complains of occasional aches and pains  Otherwise she is doing well  Doing well on amlodipine for blood pressure  Doing well with rare use of ProAir for asthma  Patient no longer taking atorvastatin for cholesterol    She is fasting this morning      The following portions of the patient's history were reviewed and updated as appropriate: allergies, current medications, past family history, past medical history, past social history, past surgical history and problem list     Review of Systems   Review of Systems   Respiratory: Negative  Cardiovascular: Negative  Gastrointestinal: Negative  Genitourinary: Negative  Active Problem List     Patient Active Problem List   Diagnosis    Anxiety    Asthma    Benign essential hypertension    Combined hyperlipidemia    Prediabetes    Vitamin D deficiency    Fibroid    Localized osteoporosis without current pathological fracture       Objective   /90 (BP Location: Left arm, Patient Position: Sitting, Cuff Size: Adult)   Pulse 98   Temp 98 °F (36 7 °C) (Tympanic)   Resp 16   Ht 5' 2 99" (1 6 m)   Wt 93 4 kg (206 lb)   LMP 12/15/2003 (Within Years)   SpO2 98%   BMI 36 50 kg/m²     Physical Exam   Cardiovascular: Normal rate, regular rhythm, normal heart sounds and intact distal pulses  Carotids: no bruits  Ext: no edema   Pulmonary/Chest: Effort normal  No respiratory distress  She has no wheezes  She has no rales  Psychiatric: She has a normal mood and affect  Her behavior is normal  Thought content normal        Pertinent Laboratory/Diagnostic Studies:  none    Current Medications     Current Outpatient Medications:     amLODIPine (NORVASC) 10 mg tablet, Take 1 tablet (10 mg total) by mouth daily, Disp: 90 tablet, Rfl: 1    atorvastatin (LIPITOR) 10 mg tablet, Take 1 tablet by mouth, Disp: , Rfl:     calcium carbonate (OS-HALIMA) 1250 (500 Ca) MG tablet, Take 1 tablet by mouth daily, Disp: , Rfl:     cholecalciferol (VITAMIN D3) 1,000 units tablet, Take 1,000 Units by mouth daily, Disp: , Rfl:     multivitamin (THERAGRAN) TABS, Take 1 tablet by mouth daily  , Disp: , Rfl:     albuterol (PROAIR HFA) 90 mcg/act inhaler, Inhale 2 puffs every 6 (six) hours as needed for wheezing, Disp: 1 Inhaler, Rfl: 3    fluconazole (DIFLUCAN) 150 mg tablet, Take by mouth, Disp: , Rfl:     fluticasone (FLONASE) 50 mcg/act nasal spray, 2 sprays into each nostril daily, Disp: 1 Bottle, Rfl: 3    Health Maintenance     Health Maintenance   Topic Date Due    Hepatitis C Screening  1960    BMI: Followup Plan  07/05/1977    DTaP,Tdap,and Td Vaccines (1 - Tdap) 07/05/1980    INFLUENZA VACCINE  07/01/2019    Depression Screening PHQ  12/15/2019    MAMMOGRAM  05/15/2020    BMI: Adult  06/19/2020    Pneumococcal Vaccine: 65+ Years (1 of 2 - PCV13) 07/05/2024    CRC Screening: Colonoscopy  05/23/2026    Pneumococcal Vaccine: Pediatrics (0 to 5 Years) and At-Risk Patients (6 to 59 Years)  Aged Out    HEPATITIS B VACCINES  Aged [employer] History   Administered Date(s) Administered     Influenza (IM) Preservative Free 10/15/2018    Influenza Quadrivalent, 6-35 Months IM 10/19/2016, 10/11/2017    Influenza TIV (IM) 1960, 11/05/2015, 11/01/2016       Barbra Litten, [facility]

## 2019-07-12 NOTE — ASSESSMENT & PLAN NOTE
Patient stopped atorvastatin 10  Will check lipids today to see if she needs to restart med    I also gave her heart healthy diet information

## 2019-07-15 ENCOUNTER — TELEPHONE (OUTPATIENT)
Dept: FAMILY MEDICINE CLINIC | Facility: CLINIC | Age: 59
End: 2019-07-15

## 2019-07-15 DIAGNOSIS — E78.2 COMBINED HYPERLIPIDEMIA: Primary | ICD-10-CM

## 2019-07-15 RX ORDER — ATORVASTATIN CALCIUM 10 MG/1
10 TABLET, FILM COATED ORAL DAILY
Qty: 30 TABLET | Refills: 5 | Status: SHIPPED | OUTPATIENT
Start: 2019-07-15 | End: 2020-01-16 | Stop reason: SDUPTHER

## 2019-07-15 NOTE — TELEPHONE ENCOUNTER
Pt notified with details and recommendations, pt would like Rx sent to [id number] [facility] on [facility]

## 2019-07-15 NOTE — TELEPHONE ENCOUNTER
----- Message from Anderson Alfredo DO sent at 7/12/2019  5:31 PM EDT -----  Call patient with lab results  Her cholesterol was high at 235  Remainder of labs looked good  I would recommend restarting her cholesterol medication (atorvastatin 10 mg daily)  Where which she like me to send prescription?   Recheck labs and appointment in 6 months

## 2019-09-19 DIAGNOSIS — I10 BENIGN ESSENTIAL HYPERTENSION: ICD-10-CM

## 2019-09-19 RX ORDER — AMLODIPINE BESYLATE 10 MG/1
10 TABLET ORAL DAILY
Qty: 90 TABLET | Refills: 0 | Status: CANCELLED | OUTPATIENT
Start: 2019-09-19

## 2019-10-30 ENCOUNTER — IMMUNIZATIONS (OUTPATIENT)
Dept: FAMILY MEDICINE CLINIC | Facility: CLINIC | Age: 59
End: 2019-10-30
Payer: COMMERCIAL

## 2019-10-30 DIAGNOSIS — Z23 NEED FOR VACCINATION: Primary | ICD-10-CM

## 2019-10-30 PROCEDURE — 90682 RIV4 VACC RECOMBINANT DNA IM: CPT | Performed by: FAMILY MEDICINE

## 2019-10-30 PROCEDURE — 90471 IMMUNIZATION ADMIN: CPT | Performed by: FAMILY MEDICINE

## 2019-12-30 DIAGNOSIS — L60.0 INGROWN TOENAIL: Primary | ICD-10-CM

## 2020-01-16 ENCOUNTER — OFFICE VISIT (OUTPATIENT)
Dept: FAMILY MEDICINE CLINIC | Facility: CLINIC | Age: 60
End: 2020-01-16
Payer: COMMERCIAL

## 2020-01-16 ENCOUNTER — APPOINTMENT (OUTPATIENT)
Dept: RADIOLOGY | Facility: CLINIC | Age: 60
End: 2020-01-16
Payer: COMMERCIAL

## 2020-01-16 ENCOUNTER — APPOINTMENT (OUTPATIENT)
Dept: LAB | Facility: CLINIC | Age: 60
End: 2020-01-16
Payer: COMMERCIAL

## 2020-01-16 VITALS
RESPIRATION RATE: 17 BRPM | SYSTOLIC BLOOD PRESSURE: 136 MMHG | WEIGHT: 209.5 LBS | DIASTOLIC BLOOD PRESSURE: 80 MMHG | BODY MASS INDEX: 35.77 KG/M2 | HEIGHT: 64 IN | HEART RATE: 90 BPM | TEMPERATURE: 97.5 F | OXYGEN SATURATION: 97 %

## 2020-01-16 DIAGNOSIS — J45.909 MILD ASTHMA WITHOUT COMPLICATION, UNSPECIFIED WHETHER PERSISTENT: ICD-10-CM

## 2020-01-16 DIAGNOSIS — M79.605 PAIN IN BOTH LOWER EXTREMITIES: ICD-10-CM

## 2020-01-16 DIAGNOSIS — M54.2 NECK PAIN: ICD-10-CM

## 2020-01-16 DIAGNOSIS — I10 BENIGN ESSENTIAL HYPERTENSION: Primary | ICD-10-CM

## 2020-01-16 DIAGNOSIS — R73.03 PREDIABETES: ICD-10-CM

## 2020-01-16 DIAGNOSIS — J01.00 ACUTE NON-RECURRENT MAXILLARY SINUSITIS: ICD-10-CM

## 2020-01-16 DIAGNOSIS — M79.604 PAIN IN BOTH LOWER EXTREMITIES: ICD-10-CM

## 2020-01-16 DIAGNOSIS — E78.2 COMBINED HYPERLIPIDEMIA: ICD-10-CM

## 2020-01-16 DIAGNOSIS — L60.0 INGROWN TOENAIL: ICD-10-CM

## 2020-01-16 PROBLEM — M25.511 ACUTE PAIN OF RIGHT SHOULDER: Status: ACTIVE | Noted: 2020-01-16

## 2020-01-16 LAB
ALBUMIN SERPL BCP-MCNC: 3.8 G/DL (ref 3.5–5)
ALP SERPL-CCNC: 114 U/L (ref 46–116)
ALT SERPL W P-5'-P-CCNC: 26 U/L (ref 12–78)
ANION GAP SERPL CALCULATED.3IONS-SCNC: 5 MMOL/L (ref 4–13)
AST SERPL W P-5'-P-CCNC: 16 U/L (ref 5–45)
BILIRUB SERPL-MCNC: 1.12 MG/DL (ref 0.2–1)
BUN SERPL-MCNC: 16 MG/DL (ref 5–25)
CALCIUM SERPL-MCNC: 9.7 MG/DL (ref 8.3–10.1)
CHLORIDE SERPL-SCNC: 111 MMOL/L (ref 100–108)
CHOLEST SERPL-MCNC: 188 MG/DL (ref 50–200)
CO2 SERPL-SCNC: 27 MMOL/L (ref 21–32)
CREAT SERPL-MCNC: 0.9 MG/DL (ref 0.6–1.3)
EST. AVERAGE GLUCOSE BLD GHB EST-MCNC: 120 MG/DL
GFR SERPL CREATININE-BSD FRML MDRD: 80 ML/MIN/1.73SQ M
GLUCOSE P FAST SERPL-MCNC: 83 MG/DL (ref 65–99)
HBA1C MFR BLD: 5.8 % (ref 4.2–6.3)
HDLC SERPL-MCNC: 77 MG/DL
LDLC SERPL CALC-MCNC: 100 MG/DL (ref 0–100)
POTASSIUM SERPL-SCNC: 4 MMOL/L (ref 3.5–5.3)
PROT SERPL-MCNC: 8 G/DL (ref 6.4–8.2)
SODIUM SERPL-SCNC: 143 MMOL/L (ref 136–145)
TRIGL SERPL-MCNC: 57 MG/DL

## 2020-01-16 PROCEDURE — 72110 X-RAY EXAM L-2 SPINE 4/>VWS: CPT

## 2020-01-16 PROCEDURE — 80061 LIPID PANEL: CPT

## 2020-01-16 PROCEDURE — 36415 COLL VENOUS BLD VENIPUNCTURE: CPT

## 2020-01-16 PROCEDURE — 3079F DIAST BP 80-89 MM HG: CPT | Performed by: FAMILY MEDICINE

## 2020-01-16 PROCEDURE — 72050 X-RAY EXAM NECK SPINE 4/5VWS: CPT

## 2020-01-16 PROCEDURE — 3075F SYST BP GE 130 - 139MM HG: CPT | Performed by: FAMILY MEDICINE

## 2020-01-16 PROCEDURE — 83036 HEMOGLOBIN GLYCOSYLATED A1C: CPT

## 2020-01-16 PROCEDURE — 3008F BODY MASS INDEX DOCD: CPT | Performed by: FAMILY MEDICINE

## 2020-01-16 PROCEDURE — 99214 OFFICE O/P EST MOD 30 MIN: CPT | Performed by: FAMILY MEDICINE

## 2020-01-16 PROCEDURE — 80053 COMPREHEN METABOLIC PANEL: CPT

## 2020-01-16 RX ORDER — ATORVASTATIN CALCIUM 10 MG/1
10 TABLET, FILM COATED ORAL DAILY
Qty: 30 TABLET | Refills: 5 | Status: SHIPPED | OUTPATIENT
Start: 2020-01-16 | End: 2020-09-16 | Stop reason: SDUPTHER

## 2020-01-16 RX ORDER — AMLODIPINE BESYLATE 10 MG/1
10 TABLET ORAL DAILY
Qty: 90 TABLET | Refills: 1 | Status: SHIPPED | OUTPATIENT
Start: 2020-01-16 | End: 2020-09-16 | Stop reason: SDUPTHER

## 2020-01-16 RX ORDER — AMOXICILLIN 875 MG/1
875 TABLET, COATED ORAL 2 TIMES DAILY
Qty: 20 TABLET | Refills: 0 | Status: SHIPPED | OUTPATIENT
Start: 2020-01-16 | End: 2020-01-26

## 2020-01-16 NOTE — ASSESSMENT & PLAN NOTE
Somewhat suboptimal today  Patient compliant with amlodipine 10 mg daily  Recommend lifestyle modification and weight loss    Will refer to weight loss

## 2020-01-16 NOTE — ASSESSMENT & PLAN NOTE
Patient also complains of bilateral thigh and lower leg pain (right greater than left )  Also low back pain  Will sent for x-rays of lumbar spine  Possible refer to physical therapy and or advanced imaging (patient may also need vascular workup )

## 2020-01-16 NOTE — PROGRESS NOTES
BMI Counseling: Body mass index is 35 96 kg/m²  The BMI is above normal  Nutrition recommendations include 3-5 servings of fruits/vegetables daily  [facility]      NAME: Adeline Velasquez  AGE: 60 y o  SEX: female  : 1960   MRN: [id number]    DATE: 2020  TIME: 9:19 AM    Assessment and Plan     Problem List Items Addressed This Visit     Asthma       Patient continues to do well with only rare use of rescue inhaler ( ProAir HFA )  Benign essential hypertension - Primary      Somewhat suboptimal today  Patient compliant with amlodipine 10 mg daily  Recommend lifestyle modification and weight loss  Will refer to weight loss          Relevant Medications    amLODIPine (NORVASC) 10 mg tablet    Combined hyperlipidemia      Cholesterol from 2019 was 235 with LDL of 153  HDL was 69  Patient currently on atorvastatin 10  Will recheck lipids today  Possible adjust med         Relevant Medications    atorvastatin (LIPITOR) 10 mg tablet    Other Relevant Orders    Lipid Panel with Direct LDL reflex    Prediabetes       A1c from 2019 was 5 7%  Discussed diet and exercise  Will recheck today         Relevant Orders    Comprehensive metabolic panel    Hemoglobin A1C    Pain in both lower extremities      Patient also complains of bilateral thigh and lower leg pain (right greater than left )  Also low back pain  Will sent for x-rays of lumbar spine  Possible refer to physical therapy and or advanced imaging (patient may also need vascular workup )  Relevant Orders    XR spine lumbar minimum 4 views non injury    Neck pain      Patient complains of right-sided neck and shoulder pain for the past few months  She does have history of rotator cuff tear with surgical repair in  by a surgeon in [place]  Pain occasionally radiates  Further down patient's right arm  Will sent for x-rays of cervical spine    Possible refer to physical therapy or advanced imaging         Relevant Orders    XR spine cervical complete 4 or 5 vw non injury    BMI 35 0-35 9,adult      BMI today 35 96  Discussed diet and exercise  Will refer to weight loss           Relevant Orders    Ambulatory referral to Weight Management      Other Visit Diagnoses     Ingrown toenail        Acute non-recurrent maxillary sinusitis        Relevant Medications    amoxicillin (AMOXIL) 875 mg tablet              Return to office in:  Rx given for fasting blood work   And x-rays of cervical and lumbar spine  Will call with results     6 months (?labs)    Chief Complaint     Chief Complaint   Patient presents with    Follow-up     6m check up to chronic conditions with no recent labs    Cold Like Symptoms     sinus pressure and PND on/off 1 month  Has not taken anything for Sx's       History of Present Illness      Patient presents for recheck of chronic medical problems today  pt c/o right  Sided neck andshoulder pain recently  Hx of R RC tear w/ surgical repain in [place] in 2012  Also, pt c/o increased thick nasal mucus  No fevers  Also occ pain in b/l thighs/lower legs (R>L)  occ LBP  Otherwise doing well on blood pressure and cholesterol meds  The following portions of the patient's history were reviewed and updated as appropriate: allergies, current medications, past family history, past medical history, past social history, past surgical history and problem list     Review of Systems   Review of Systems   Respiratory: Negative  Cardiovascular: Negative  Gastrointestinal: Negative  Genitourinary: Negative  Musculoskeletal: Positive for arthralgias         Active Problem List     Patient Active Problem List   Diagnosis    Anxiety    Asthma    Benign essential hypertension    Combined hyperlipidemia    Prediabetes    Vitamin D deficiency    Fibroid    Localized osteoporosis without current pathological fracture    Acute pain of right shoulder    Pain in both lower extremities    Neck pain    BMI 35 0-35 9,adult       Objective   /80 (BP Location: Left arm, Patient Position: Sitting, Cuff Size: Adult)   Pulse 90   Temp 97 5 °F (36 4 °C) (Tympanic)   Resp 17   Ht 5' 4" (1 626 m)   Wt 95 kg (209 lb 8 oz)   LMP 12/15/2003 (Within Years)   SpO2 97%   Breastfeeding? No   BMI 35 96 kg/m²     Physical Exam   Cardiovascular: Normal rate, regular rhythm, normal heart sounds and intact distal pulses  Carotids: no bruits  Ext: no edema   Pulmonary/Chest: Effort normal  No respiratory distress  She has no wheezes  She has no rales  Psychiatric: She has a normal mood and affect  Her behavior is normal  Thought content normal        Pertinent Laboratory/Diagnostic Studies:    Labs from July 2019 reviewed    Current Medications     Current Outpatient Medications:     albuterol (PROAIR HFA) 90 mcg/act inhaler, Inhale 2 puffs every 6 (six) hours as needed for wheezing, Disp: 1 Inhaler, Rfl: 3    amLODIPine (NORVASC) 10 mg tablet, Take 1 tablet (10 mg total) by mouth daily, Disp: 90 tablet, Rfl: 1    atorvastatin (LIPITOR) 10 mg tablet, Take 1 tablet (10 mg total) by mouth daily, Disp: 30 tablet, Rfl: 5    calcium carbonate (OS-HALIMA) 1250 (500 Ca) MG tablet, Take 1,200 mg by mouth daily , Disp: , Rfl:     cholecalciferol (VITAMIN D3) 1,000 units tablet, Take 1,000 Units by mouth daily, Disp: , Rfl:     fluticasone (FLONASE) 50 mcg/act nasal spray, 2 sprays into each nostril daily, Disp: 1 Bottle, Rfl: 3    multivitamin (THERAGRAN) TABS, Take 1 tablet by mouth daily  , Disp: , Rfl:     amoxicillin (AMOXIL) 875 mg tablet, Take 1 tablet (875 mg total) by mouth 2 (two) times a day for 10 days, Disp: 20 tablet, Rfl: 0    Health Maintenance     Health Maintenance   Topic Date Due    Hepatitis C Screening  1960    BMI: Followup Plan  07/05/1977    Annual Physical  07/05/1977    HIV Screening  01/17/2020 (Originally 7/5/1974)   Mac Patel DTaP,Tdap,and Td Vaccines (1 - Tdap) 01/16/2021 (Originally 7/5/1970)    Pneumococcal Vaccine: Pediatrics (0 to 5 Years) and At-Risk Patients (6 to 59 Years) (1 of 1 - PPSV23) 01/16/2025 (Originally 7/5/1965)    MAMMOGRAM  05/15/2020    Depression Screening PHQ  01/16/2021    BMI: Adult  01/16/2021    Cervical Cancer Screening  05/15/2024    Pneumococcal Vaccine: 65+ Years (1 of 2 - PCV13) 07/05/2024    CRC Screening: Colonoscopy  05/23/2026    Influenza Vaccine  Completed    HIB Vaccine  Aged Out    Hepatitis B Vaccine  Aged Out    IPV Vaccine  Aged Out    Hepatitis A Vaccine  Aged Out    Meningococcal ACWY Vaccine  Aged Out    HPV Vaccine  Aged Out     Immunization History   Administered Date(s) Administered     Influenza (IM) Preservative Free 10/15/2018    INFLUENZA 10/15/2018    Influenza Quadrivalent, 6-35 Months IM 10/19/2016, 10/11/2017    Influenza TIV (IM) 1960, 11/05/2015, 11/01/2016    Influenza, recombinant, quadrivalent,injectable, preservative free 10/30/2019       Cyndy Bai DO  [facility]

## 2020-01-16 NOTE — PATIENT INSTRUCTIONS
Obesity   AMBULATORY CARE:   Obesity  is when your body mass index (BMI) is greater than 30  Your healthcare provider will use your height and weight to measure your BMI  The risks of obesity include  many health problems, such as injuries or physical disability  You may need tests to check for the following:  · Diabetes     · High blood pressure or high cholesterol     · Heart disease     · Gallbladder or liver disease     · Cancer of the colon, breast, prostate, liver, or kidney     · Sleep apnea     · Arthritis or gout  Seek care immediately if:   · You have a severe headache, confusion, or difficulty speaking  · You have weakness on one side of your body  · You have chest pain, sweating, or shortness of breath  Contact your healthcare provider if:   · You have symptoms of gallbladder or liver disease, such as pain in your upper abdomen  · You have knee or hip pain and discomfort while walking  · You have symptoms of diabetes, such as intense hunger and thirst, and frequent urination  · You have symptoms of sleep apnea, such as snoring or daytime sleepiness  · You have questions or concerns about your condition or care  Treatment for obesity  focuses on helping you lose weight to improve your health  Even a small decrease in BMI can reduce the risk for many health problems  Your healthcare provider will help you set a weight-loss goal   · Lifestyle changes  are the first step in treating obesity  These include making healthy food choices and getting regular physical activity  Your healthcare provider may suggest a weight-loss program that involves coaching, education, and therapy  · Medicine  may help you lose weight when it is used with a healthy diet and physical activity  · Surgery  can help you lose weight if you are very obese and have other health problems  There are several types of weight-loss surgery  Ask your healthcare provider for more information    Be successful losing weight:   · Set small, realistic goals  An example of a small goal is to walk for 20 minutes 5 days a week  Anther goal is to lose 5% of your body weight  · Tell friends, family members, and coworkers about your goals  and ask for their support  Ask a friend to lose weight with you, or join a weight-loss support group  · Identify foods or triggers that may cause you to overeat , and find ways to avoid them  Remove tempting high-calorie foods from your home and workplace  Place a bowl of fresh fruit on your kitchen counter  If stress causes you to eat, then find other ways to cope with stress  · Keep a diary to track what you eat and drink  Also write down how many minutes of physical activity you do each day  Weigh yourself once a week and record it in your diary  Eating changes: You will need to eat 500 to 1,000 fewer calories each day than you currently eat to lose 1 to 2 pounds a week  The following changes will help you cut calories:  · Eat smaller portions  Use small plates, no larger than 9 inches in diameter  Fill your plate half full of fruits and vegetables  Measure your food using measuring cups until you know what a serving size looks like  · Eat 3 meals and 1 or 2 snacks each day  Plan your meals in advance  Laurie Brush and eat at home most of the time  Eat slowly  · Eat fruits and vegetables at every meal   They are low in calories and high in fiber, which makes you feel full  Do not add butter, margarine, or cream sauce to vegetables  Use herbs to season steamed vegetables  · Eat less fat and fewer fried foods  Eat more baked or grilled chicken and fish  These protein sources are lower in calories and fat than red meat  Limit fast food  Dress your salads with olive oil and vinegar instead of bottled dressing  · Limit the amount of sugar you eat  Do not drink sugary beverages  Limit alcohol  Activity changes:  Physical activity is good for your body in many ways   It helps you burn calories and build strong muscles  It decreases stress and depression, and improves your mood  It can also help you sleep better  Talk to your healthcare provider before you begin an exercise program   · Exercise for at least 30 minutes 5 days a week  Start slowly  Set aside time each day for physical activity that you enjoy and that is convenient for you  It is best to do both weight training and an activity that increases your heart rate, such as walking, bicycling, or swimming  · Find ways to be more active  Do yard work and housecleaning  Walk up the stairs instead of using elevators  Spend your leisure time going to events that require walking, such as outdoor festivals or fairs  This extra physical activity can help you lose weight and keep it off  Follow up with your healthcare provider as directed: You may need to meet with a dietitian  Write down your questions so you remember to ask them during your visits  © 2017 [employer] Information is for End User's use only and may not be sold, redistributed or otherwise used for commercial purposes  All illustrations and images included in CareNotes® are the copyrighted property of A [employer] M [employer] Inc  or [employer]  The above information is an  only  It is not intended as medical advice for individual conditions or treatments  Talk to your doctor, nurse or pharmacist before following any medical regimen to see if it is safe and effective for you

## 2020-01-16 NOTE — ASSESSMENT & PLAN NOTE
Patient complains of right-sided neck and shoulder pain for the past few months  She does have history of rotator cuff tear with surgical repair in 2012 by a surgeon in [place]  Pain occasionally radiates  Further down patient's right arm  Will sent for x-rays of cervical spine    Possible refer to physical therapy or advanced imaging

## 2020-01-22 ENCOUNTER — TELEPHONE (OUTPATIENT)
Dept: FAMILY MEDICINE CLINIC | Facility: CLINIC | Age: 60
End: 2020-01-22

## 2020-01-22 DIAGNOSIS — M25.511 ACUTE PAIN OF RIGHT SHOULDER: Primary | ICD-10-CM

## 2020-01-22 DIAGNOSIS — M79.605 PAIN IN BOTH LOWER EXTREMITIES: Primary | ICD-10-CM

## 2020-01-22 DIAGNOSIS — M79.604 PAIN IN BOTH LOWER EXTREMITIES: Primary | ICD-10-CM

## 2020-01-22 NOTE — TELEPHONE ENCOUNTER
----- Message from Milton Quiroz DO sent at 1/22/2020  9:48 AM EST -----  Call patient, the x-rays of her lumbar spine showed evidence of arthritis  Recommend refer to physical therapy  They can hopefully work on her low back and neck at the same time)

## 2020-01-22 NOTE — TELEPHONE ENCOUNTER
----- Message from Sailaja Yost DO sent at 1/22/2020  9:46 AM EST -----  Call patient,  The x-rays of her neck showed evidence of degenerative disc disease especially at the level of C5-C6  This could very well be causing the symptoms into her shoulder and upper arm  Recommend refer to physical therapy    I would like to see her back in 1 month

## 2020-01-27 ENCOUNTER — EVALUATION (OUTPATIENT)
Dept: PHYSICAL THERAPY | Facility: CLINIC | Age: 60
End: 2020-01-27
Payer: COMMERCIAL

## 2020-01-27 DIAGNOSIS — M54.2 CERVICALGIA: Primary | ICD-10-CM

## 2020-01-27 DIAGNOSIS — M79.605 PAIN IN BOTH LOWER EXTREMITIES: ICD-10-CM

## 2020-01-27 DIAGNOSIS — M25.511 ACUTE PAIN OF RIGHT SHOULDER: ICD-10-CM

## 2020-01-27 DIAGNOSIS — M79.604 PAIN IN BOTH LOWER EXTREMITIES: ICD-10-CM

## 2020-01-27 PROCEDURE — 97163 PT EVAL HIGH COMPLEX 45 MIN: CPT | Performed by: PHYSICAL THERAPIST

## 2020-01-27 NOTE — PROGRESS NOTES
PT Evaluation     Today's date: 2020  Patient name: Donny Lanier  : 1960  MRN: [id number]  Referring provider: Key العلي DO  Dx:   Encounter Diagnosis     ICD-10-CM    1  Cervicalgia M54 2    2  Acute pain of right shoulder M25 511    3  Pain in both lower extremities M79 604 Ambulatory referral to Physical Therapy    M79 605                   Assessment  Assessment details: Pt presents w/ c/o R shldr PN, further inspection revealing R UT, posterior shldr and periscapular PN w/ moderate ttp thru UT, lev scap, and c/s paraspinal musculature, w/ no consistent effect to postural correction nor distraction of compression  R shldr ROM is WNL's but slight PN is noted at end range  Screen of R shldr is - for [facility] pathology  Sx's most indicative of C5-6 radiculopathy which coincides w/ recent X-ray films  Skilled PT is advised to address, pending response further diagnostic study may be warranted to r/o disc pathology  Screen of LB yields little ROM deficit, slight core strength deficit, but PN thru B LE is reported by pt that varies at times  Pt reports swelling of legs at times, no pitting present this day and girth measurements are equal B  Will cont to monitor  Impairments: abnormal or restricted ROM, lacks appropriate home exercise program, pain with function and poor posture   Understanding of Dx/Px/POC: good   Prognosis: good    Goals  ST  Reduce PN <3/10 thru B LE and R shldr/C/s within 3 wks  2  Increase C/s AROM >10% within 2 wks  3  Improve postural strength +1 grade within 3 wks  LT  5/5 strength t/o c/s and posterior shldr within 5 wks  2  C/s ROM WNL's within 5 wks  3   I in HEP within 5 wks    Plan  Patient would benefit from: skilled physical therapy  Planned therapy interventions: stretching, strengthening, therapeutic exercise and patient education  Frequency: 2x week  Duration in visits: 10  Duration in weeks: 5  Plan of Care beginning date: 2020  Plan of Care expiration date: 3/2/2020  Treatment plan discussed with: patient        Subjective Evaluation    History of Present Illness  Mechanism of injury: Pt reports B LE PN that comes and goes w/ no significant aggravators w/ insidious onset at times disturbing sleep and affecting activity tolerance  She admits at times the legs feel tight, like they are swollen at times, reporting her socks cut in sometimes, concerned w/ potential circulation issue  More significant aggravation is noted in R posterior shldr, UT region to c/s irritated w/ household chores and heavier physical activity but also at times worsened w/ sleep, disrupting sleep, and occasionally responding to use of heating pad  Pt denies parasthesia thru R UE  Recurrent probem    Quality of life: good    Pain  Current pain ratin  At best pain ratin  At worst pain ratin  Location: R shldr + c/s, also  LE  Quality: dull ache, cramping, discomfort, tight and squeezing      Diagnostic Tests  X-ray: abnormal    FCE comments: LB X-ray + for mild deg change  C/s X-ray + for severe C5-6 deg change Treatments  No previous or current treatments  Patient Goals  Patient goals for therapy: decreased pain, increased motion, independence with ADLs/IADLs and increased strength          Objective     Postural Observations  Seated posture: fair  Standing posture: fair    Additional Postural Observation Details  Moderate B rounded shldrs w/ moderate fwd posturing       Active Range of Motion     Lumbar   Flexion:  WFL  Extension:  WFL  Left lateral flexion:  WFL  Right lateral flexion:  WFL  Left rotation:  WFL  Right rotation:  [facility]    Additional Active Range of Motion Details  C/s SB R limited 10%  C/s SB L limited 15%  C/s extension limited 25%  C/s rotation limited 15% R, 20% L    Joint Play     Hypomobile: C5 and C6     Pain: C5 and C6   Mechanical Assessment    Cervical    Seated retraction: repeated movements   Pain location: no change  Pain level: produced  Seated Extension: repeated movements  Pain intensity: worse  Pain level: produced  Seated Left Sidebend: repeated movements   Pain intensity: worse  Pain level: increased    Thoracic      Lumbar      Tests   Cervical   Positive repeated extension  Negative vertical compression and lumbar distraction  Lumbar   Negative vertical compression  General Comments:      Shoulder Comments   R shldr ROM WNL's, Mild PN response end range OH flexion and abd within R UT to posterior shldr region  R shldr strength WNL's all planes    [facility]  [facility] -     Graphical documentation and              Precautions: NKA      Manual  1-27            MFR R posterior shldr/C/s NV                                                                    Exercise Diary  1-27            C/s AROM NV            UT str 10s x3            Chin tucks NV            Scap retraction 3s x10            Lev scap str 10s x3                                                                                                                                                                                                                   Modalities

## 2020-02-05 ENCOUNTER — OFFICE VISIT (OUTPATIENT)
Dept: PHYSICAL THERAPY | Facility: CLINIC | Age: 60
End: 2020-02-05
Payer: COMMERCIAL

## 2020-02-05 DIAGNOSIS — M79.605 PAIN IN BOTH LOWER EXTREMITIES: ICD-10-CM

## 2020-02-05 DIAGNOSIS — M79.604 PAIN IN BOTH LOWER EXTREMITIES: ICD-10-CM

## 2020-02-05 DIAGNOSIS — M25.511 ACUTE PAIN OF RIGHT SHOULDER: ICD-10-CM

## 2020-02-05 DIAGNOSIS — M54.2 CERVICALGIA: Primary | ICD-10-CM

## 2020-02-05 PROCEDURE — 97110 THERAPEUTIC EXERCISES: CPT | Performed by: PHYSICAL THERAPIST

## 2020-02-05 PROCEDURE — 97140 MANUAL THERAPY 1/> REGIONS: CPT | Performed by: PHYSICAL THERAPIST

## 2020-02-05 NOTE — PROGRESS NOTES
Daily Note     Today's date: 2020  Patient name: Radha Iraheat  : 1960  MRN: [id number]  Referring provider: Skylar Mcdonough DO  Dx:   Encounter Diagnosis     ICD-10-CM    1  Cervicalgia M54 2    2  Acute pain of right shoulder M25 511    3  Pain in both lower extremities M79 604     M79 605                   Subjective: Pt reports feeling well this AM, initiated HEP after IE w/ good alicia, PN sx's vary dependent on activity level and such  Objective: See treatment diary below      Assessment: Tolerated treatment well  Patient would benefit from continued PT       Plan: Continue per plan of care        Precautions: NKA      Manual   2-5           MFR R posterior shldr/C/s NV jph                                                                   Exercise Diary   2-5           C/s AROM NV Rot 3s x10           UT str 10s x3 10s x3           Chin tucks NV 3s x15           Scap retraction 3s x10 3s x15           Lev scap str 10s x3 10s x3                                                  LAQ  x15           H/s curl  x15           Seated HR  x30           Seated TR  x30                                                                                                                       Modalities

## 2020-02-12 ENCOUNTER — OFFICE VISIT (OUTPATIENT)
Dept: PODIATRY | Facility: CLINIC | Age: 60
End: 2020-02-12
Payer: COMMERCIAL

## 2020-02-12 ENCOUNTER — OFFICE VISIT (OUTPATIENT)
Dept: PHYSICAL THERAPY | Facility: CLINIC | Age: 60
End: 2020-02-12
Payer: COMMERCIAL

## 2020-02-12 VITALS
DIASTOLIC BLOOD PRESSURE: 75 MMHG | SYSTOLIC BLOOD PRESSURE: 130 MMHG | HEIGHT: 64 IN | BODY MASS INDEX: 35 KG/M2 | HEART RATE: 85 BPM | WEIGHT: 205 LBS

## 2020-02-12 DIAGNOSIS — L60.0 INGROWN TOENAIL: Primary | ICD-10-CM

## 2020-02-12 DIAGNOSIS — M79.674 PAIN IN TOE OF RIGHT FOOT: ICD-10-CM

## 2020-02-12 DIAGNOSIS — M79.675 PAIN IN TOE OF LEFT FOOT: ICD-10-CM

## 2020-02-12 DIAGNOSIS — M79.604 PAIN IN BOTH LOWER EXTREMITIES: ICD-10-CM

## 2020-02-12 DIAGNOSIS — M25.511 ACUTE PAIN OF RIGHT SHOULDER: ICD-10-CM

## 2020-02-12 DIAGNOSIS — M54.2 CERVICALGIA: Primary | ICD-10-CM

## 2020-02-12 DIAGNOSIS — M79.605 PAIN IN BOTH LOWER EXTREMITIES: ICD-10-CM

## 2020-02-12 PROCEDURE — 3008F BODY MASS INDEX DOCD: CPT | Performed by: PODIATRIST

## 2020-02-12 PROCEDURE — 11750 EXCISION NAIL&NAIL MATRIX: CPT | Performed by: PODIATRIST

## 2020-02-12 PROCEDURE — 97110 THERAPEUTIC EXERCISES: CPT | Performed by: PHYSICAL THERAPIST

## 2020-02-12 PROCEDURE — 97140 MANUAL THERAPY 1/> REGIONS: CPT | Performed by: PHYSICAL THERAPIST

## 2020-02-12 PROCEDURE — 3075F SYST BP GE 130 - 139MM HG: CPT | Performed by: PODIATRIST

## 2020-02-12 PROCEDURE — 3078F DIAST BP <80 MM HG: CPT | Performed by: PODIATRIST

## 2020-02-12 PROCEDURE — 1036F TOBACCO NON-USER: CPT | Performed by: PODIATRIST

## 2020-02-12 PROCEDURE — 99202 OFFICE O/P NEW SF 15 MIN: CPT | Performed by: PODIATRIST

## 2020-02-12 RX ORDER — LIDOCAINE HYDROCHLORIDE AND EPINEPHRINE 10; 10 MG/ML; UG/ML
1 INJECTION, SOLUTION INFILTRATION; PERINEURAL ONCE
Status: COMPLETED | OUTPATIENT
Start: 2020-02-12 | End: 2020-02-12

## 2020-02-12 RX ORDER — LIDOCAINE HYDROCHLORIDE 10 MG/ML
1 INJECTION, SOLUTION EPIDURAL; INFILTRATION; INTRACAUDAL; PERINEURAL ONCE
Status: COMPLETED | OUTPATIENT
Start: 2020-02-12 | End: 2020-02-12

## 2020-02-12 RX ADMIN — LIDOCAINE HYDROCHLORIDE 1 ML: 10 INJECTION, SOLUTION EPIDURAL; INFILTRATION; INTRACAUDAL; PERINEURAL at 15:30

## 2020-02-12 RX ADMIN — LIDOCAINE HYDROCHLORIDE AND EPINEPHRINE 1 ML: 10; 10 INJECTION, SOLUTION INFILTRATION; PERINEURAL at 15:31

## 2020-02-12 NOTE — PROGRESS NOTES
Assessment/Plan:    Explained to patient that she is dealing with chronic ingrown toenails  Discussed treatment options recommending partial matrixectomy  The goal of this procedure is permanent read occasion of the offending nail border  Verbal consent obtained  Procedure performed as follows: Anesthesia via 6 cc of a 1:1 mixture of 1% lidocaine with epinephrine and 1% xylocaine plain  Betadine prep was performed  Lateral nail border of each great toe was avulsed to the eponychium  A partial matrixectomy was performed utilizing phenol in a standard manner  A bacitracin dressing was applied  The patient is to soak in warm water b i d  Tomorrow followed by a Neosporin dressing  No problem-specific Assessment & Plan notes found for this encounter  Diagnoses and all orders for this visit:    Ingrown toenail  -     lidocaine (PF) (XYLOCAINE-MPF) 1 % injection 1 mL  -     lidocaine-epinephrine (XYLOCAINE/EPINEPHRINE) 1 %-1:100,000 injection 1 mL    Pain in toe of right foot    Pain in toe of left foot          Subjective:      Patient ID: Lopez Schultz is a 60 y o  female  HPI     Patient, a 79-year-old female in good health presents with chronic ingrown toenail pain affecting the lateral nail border of each great toe  Patient states that she has had difficulty with this toenails for years  She states that she goes to a cosmetic tolerated assist frequently and they always remove it but they can never get it all out  Patient is interested in surgical correction  The following portions of the patient's history were reviewed and updated as appropriate: allergies, current medications, past family history, past medical history, past social history, past surgical history and problem list     Review of Systems   Respiratory: Negative  Cardiovascular: Negative  Gastrointestinal: Negative  Musculoskeletal: Negative  Skin: Negative                Objective:      /75   Pulse 85   Ht 5' 4" (1 626 m)   Wt 93 kg (205 lb)   LMP 12/15/2003 (Within Years)   BMI 35 19 kg/m²          Physical Exam   Cardiovascular: Regular rhythm and intact distal pulses  Musculoskeletal: Normal range of motion  She exhibits no deformity  Skin:   Pain with palpation lateral nail border each great toe  No evidence of infection or paronychia  Nail removal  Date/Time: 2/12/2020 4:21 PM  Performed by: Werner Ramirez DPM  Authorized by: Werner Ramirez DPM     Patient location:  Clinic  Other Assisting Provider: No    Consent:     Consent obtained:  Verbal    Consent given by:  Patient    Risks discussed:  Permanent nail deformity, pain and infection    Alternatives discussed:  No treatment  Universal protocol:     Procedure explained and questions answered to patient or proxy's satisfaction: yes      Patient identity confirmed:  Verbally with patient  Location:     Foot:  R big toe  Pre-procedure details:     Skin preparation:  Betadine  Anesthesia (see MAR for exact dosages): Anesthesia method:  Nerve block    Block needle gauge:  25 G    Block anesthetic:  Lidocaine 1% w/o epi and lidocaine 1% WITH epi    Block outcome:  Anesthesia achieved  Nail Removal:     Nail removed:  Partial    Nail side:  Lateral    Nail bed sutured: no    Ingrown nail:     Nail matrix removed or ablated:  Partial  Post-procedure details:     Dressing:  4x4 sterile gauze and antibiotic ointment    Patient tolerance of procedure:   Tolerated well, no immediate complications  Nail removal  Date/Time: 2/12/2020 4:21 PM  Performed by: Werner Ramirez DPM  Authorized by: Werner Ramirez DPM     Patient location:  Clinic  Other Assisting Provider: No    Consent:     Consent obtained:  Verbal    Consent given by:  Patient    Risks discussed:  Permanent nail deformity, pain and infection    Alternatives discussed:  No treatment  Universal protocol:     Procedure explained and questions answered to patient or proxy's satisfaction: yes Patient identity confirmed:  Verbally with patient  Location:     Foot:  L big toe  Pre-procedure details:     Skin preparation:  Betadine  Anesthesia (see MAR for exact dosages): Anesthesia method:  Nerve block    Block needle gauge:  25 G    Block anesthetic:  Lidocaine 1% w/o epi and lidocaine 1% WITH epi    Block outcome:  Anesthesia achieved  Nail Removal:     Nail removed:  Partial    Nail side:  Lateral    Nail bed sutured: no    Ingrown nail:     Nail matrix removed or ablated:  Partial  Post-procedure details:     Dressing:  4x4 sterile gauze and antibiotic ointment    Patient tolerance of procedure:   Tolerated well, no immediate complications

## 2020-02-12 NOTE — PROGRESS NOTES
Daily Note     Today's date: 2020  Patient name: Vira Zamora  : 1960  MRN: [id number]  Referring provider: Russ Nichole DO  Dx:   Encounter Diagnosis     ICD-10-CM    1  Cervicalgia M54 2    2  Acute pain of right shoulder M25 511    3  Pain in both lower extremities M79 604     M79 605                   Subjective: Pt reports no PN this AM, PN comes on insidiously and w/ certain activity and she rates it about a 9/10  Objective: See treatment diary below      Assessment: Tolerated treatment well  Patient would benefit from continued PT Able to advance TE this day emphasizing posturing and core activation w/ good response but mod fatigue  Manuals reveal significant soft tissue restriction from c/s thru R posterior shldr region  Plan: Continue per plan of care        Precautions: NKA      Manual   2-5 2-12          MFR R posterior shldr/C/s NV jph jph                                                                  Exercise Diary   2-5 2-12          C/s AROM NV Rot 3s x10 Rot 3s x10          UT str 10s x3 10s x3 10s x3          Chin tucks NV 3s x15 3s x20          Scap retraction 3s x10 3s x15 3s x30          Lev scap str 10s x3 10s x3 10s x3          Supine serratus push   3s x20          Alt B shldr extension w/ TB   PnkTB x20          B shldr ER w/ TB   PnkTB x20          LAQ  x15 2x15          H/s curl  x15 2x15          Seated HR  x30 x30          Seated TR  x30 x30          PPT    3s x20          Seated march w/ abd iso   x20                                                                                            Modalities

## 2020-02-19 ENCOUNTER — OFFICE VISIT (OUTPATIENT)
Dept: PODIATRY | Facility: CLINIC | Age: 60
End: 2020-02-19

## 2020-02-19 ENCOUNTER — OFFICE VISIT (OUTPATIENT)
Dept: PHYSICAL THERAPY | Facility: CLINIC | Age: 60
End: 2020-02-19
Payer: COMMERCIAL

## 2020-02-19 VITALS — WEIGHT: 215 LBS | HEIGHT: 64 IN | BODY MASS INDEX: 36.7 KG/M2

## 2020-02-19 DIAGNOSIS — M79.604 PAIN IN BOTH LOWER EXTREMITIES: ICD-10-CM

## 2020-02-19 DIAGNOSIS — M79.605 PAIN IN BOTH LOWER EXTREMITIES: ICD-10-CM

## 2020-02-19 DIAGNOSIS — L60.0 INGROWN TOENAIL: Primary | ICD-10-CM

## 2020-02-19 DIAGNOSIS — M25.511 ACUTE PAIN OF RIGHT SHOULDER: ICD-10-CM

## 2020-02-19 DIAGNOSIS — M54.2 CERVICALGIA: Primary | ICD-10-CM

## 2020-02-19 PROCEDURE — 3075F SYST BP GE 130 - 139MM HG: CPT | Performed by: PODIATRIST

## 2020-02-19 PROCEDURE — 3078F DIAST BP <80 MM HG: CPT | Performed by: PODIATRIST

## 2020-02-19 PROCEDURE — 3008F BODY MASS INDEX DOCD: CPT | Performed by: PODIATRIST

## 2020-02-19 PROCEDURE — 97140 MANUAL THERAPY 1/> REGIONS: CPT | Performed by: PHYSICAL THERAPIST

## 2020-02-19 PROCEDURE — 99024 POSTOP FOLLOW-UP VISIT: CPT | Performed by: PODIATRIST

## 2020-02-19 PROCEDURE — 97110 THERAPEUTIC EXERCISES: CPT | Performed by: PHYSICAL THERAPIST

## 2020-02-19 NOTE — PROGRESS NOTES
Daily Note     Today's date: 2020  Patient name: Abigail Lai  : 1960  MRN: [id number]  Referring provider: Marco Farah DO  Dx:   Encounter Diagnosis     ICD-10-CM    1  Cervicalgia M54 2    2  Acute pain of right shoulder M25 511    3  Pain in both lower extremities M79 604     M79 605                   Subjective: Pt reports no PN this AM, PN still does come on at times but less significant overall  Held some TE this day due to pt citing a procedure to her toes performed last wk still causing some irritation and an antalgic gt  Objective: See treatment diary below      Assessment: Tolerated treatment well  Patient would benefit from continued PT Cont'd advancement in TE w/ some postural fatigue realized w/ TB TE, PN w/ palpation R posterior shldr into posterior c/s w/ reduced soft tissue restriction compared to last visit  Pt encouraged to maintain self stretching  Plan: Continue per plan of care        Precautions: NKA      Manual   2- 2-12 2-19         MFR R posterior shldr/C/s NV jph jph jph                                                                 Exercise Diary   2-5 2-12 2-19         C/s AROM NV Rot 3s x10 Rot 3s x10 Rot 3s x10         UT str 10s x3 10s x3 10s x3 10 x3         Chin tucks NV 3s x15 3s x20 3s x20         Scap retraction 3s x10 3s x15 3s x30 PnkTB 3s x30         Lev scap str 10s x3 10s x3 10s x3 10s x3         Supine serratus push   3s x20          Alt B shldr extension w/ TB   PnkTB x20 PnkTB x30         B shldr ER w/ TB   PnkTB x20 PnkTB x30         LAQ  x15 2x15 x30e         H/s curl  x15 2x15          Seated HR  x30 x30 x30 *ankle pump supine         Seated TR  x30 x30 NT         PPT    3s x20 3s x20         Seated march w/ abd iso   x20 x30                                                                                           Modalities

## 2020-02-19 NOTE — PROGRESS NOTES
Patient presents for assessment of partial matrixectomy of each hallux which was performed last week  Surgical sites healing uneventfully with minimal discomfort related  Drainage is present within normal limits for procedure performed  There is no evidence of infection  Patient to contact me should problems arise

## 2020-02-26 ENCOUNTER — OFFICE VISIT (OUTPATIENT)
Dept: PHYSICAL THERAPY | Facility: CLINIC | Age: 60
End: 2020-02-26
Payer: COMMERCIAL

## 2020-02-26 DIAGNOSIS — M25.511 ACUTE PAIN OF RIGHT SHOULDER: ICD-10-CM

## 2020-02-26 DIAGNOSIS — M79.604 PAIN IN BOTH LOWER EXTREMITIES: ICD-10-CM

## 2020-02-26 DIAGNOSIS — M79.605 PAIN IN BOTH LOWER EXTREMITIES: ICD-10-CM

## 2020-02-26 DIAGNOSIS — M54.2 CERVICALGIA: Primary | ICD-10-CM

## 2020-02-26 PROCEDURE — 97140 MANUAL THERAPY 1/> REGIONS: CPT | Performed by: PHYSICAL THERAPIST

## 2020-02-26 PROCEDURE — 97110 THERAPEUTIC EXERCISES: CPT | Performed by: PHYSICAL THERAPIST

## 2020-02-26 NOTE — PROGRESS NOTES
Daily Note     Today's date: 2020  Patient name: Hayden Restrepo  : 1960  MRN: [id number]  Referring provider: Robby Aguirre DO  Dx:   Encounter Diagnosis     ICD-10-CM    1  Cervicalgia M54 2    2  Acute pain of right shoulder M25 511    3  Pain in both lower extremities M79 604     M79 605                   Subjective: Pt reports no PN at this point, doing and feeling well  Had to cx f/u w/ PCP due to traveling out of town  Objective: See treatment diary below      Assessment: Tolerated treatment well  Patient would benefit from continued PT Fatigue associated w/ advancing TE and postural strengthening but no sig PN c/o  Anticipate potential d/c NV  Plan: Continue per plan of care        Precautions: NKA      Manual   2- 2-12 2- 2-26        [facility]                                                                Exercise Diary   2- 2-12 2-19 2-26        C/s AROM NV Rot 3s x10 Rot 3s x10 Rot 3s x10 Rot 3s x10        UT str 10s x3 10s x3 10s x3 10 x3 15s x3        Chin tucks NV 3s x15 3s x20 3s x20 3s x20        Scap retraction 3s x10 3s x15 3s x30 PnkTB 3s x30 PnkTB 3s x30        Lev scap str 10s x3 10s x3 10s x3 10s x3 15s x3        Supine serratus push   3s x20          Alt B shldr extension w/ TB   PnkTB x20 PnkTB x30 PnkTB x30        B shldr ER w/ TB   PnkTB x20 PnkTB x30 PnkTB x30        LAQ  x15 2x15 x30e         H/s curl  x15 2x15          Seated HR  x30 x30 x30 *ankle pump supine         Seated TR  x30 x30 NT         PPT    3s x20 3s x20 3s x30        Seated march w/ abd iso   x20 x30         abd iso + glute set     3s x30        Wand supine shldr flexion     5s x10        Wand supine serratus punch     3s x15                                                   Modalities

## 2020-03-03 ENCOUNTER — OFFICE VISIT (OUTPATIENT)
Dept: PHYSICAL THERAPY | Facility: CLINIC | Age: 60
End: 2020-03-03
Payer: COMMERCIAL

## 2020-03-03 DIAGNOSIS — M25.511 ACUTE PAIN OF RIGHT SHOULDER: ICD-10-CM

## 2020-03-03 DIAGNOSIS — M54.2 CERVICALGIA: Primary | ICD-10-CM

## 2020-03-03 DIAGNOSIS — M79.605 PAIN IN BOTH LOWER EXTREMITIES: ICD-10-CM

## 2020-03-03 DIAGNOSIS — M79.604 PAIN IN BOTH LOWER EXTREMITIES: ICD-10-CM

## 2020-03-03 PROCEDURE — 97110 THERAPEUTIC EXERCISES: CPT | Performed by: PHYSICAL THERAPIST

## 2020-03-03 PROCEDURE — 97140 MANUAL THERAPY 1/> REGIONS: CPT | Performed by: PHYSICAL THERAPIST

## 2020-03-03 NOTE — PROGRESS NOTES
Daily Note + PT Discharge    Today's date: 3/3/2020  Patient name: Ngozi Elder  : 1960  MRN: [id number]  Referring provider: Sandra Boggs DO  Dx:   Encounter Diagnosis     ICD-10-CM    1  Cervicalgia M54 2    2  Acute pain of right shoulder M25 511    3  Pain in both lower extremities M79 604     M79 605                   Subjective: Pt reports cont'd no PN, feeling well as of now  Maintains HEP  Objective: See treatment diary below    Assessment: Tolerated treatment well  Patient would benefit from continued PT B shldr ER, postural training yields fatigue when utilizing theraband  Provided TB for home, reviewed HEP  Goals met, pt now d/c skilled PT, will f/u if needed  Goals  ST  Reduce PN <3/10 thru B LE and R shldr/C/s within 3 wks - MET  2  Increase C/s AROM >10% within 2 wks -MET  3  Improve postural strength +1 grade within 3 wks -MET  LT  5/5 strength t/o c/s and posterior shldr within 5 wks -Partially met  2  C/s ROM WNL's within 5 wks -MET  3  I in HEP within 5 wks -MET  Plan: D/c skilled PT to HEP        Precautions: NKA      Manual  1-27 2-5 2-12 2-19 2-26 3-3       MFR R posterior shldr/C/s NV jph [facility]                                                               Exercise Diary  - 2-5 2-12 2-19 2-26 3-3       C/s AROM NV Rot 3s x10 Rot 3s x10 Rot 3s x10 Rot 3s x10 Rot 3s x10       UT str 10s x3 10s x3 10s x3 10 x3 15s x3 15s x3       Chin tucks NV 3s x15 3s x20 3s x20 3s x20 3s x20       Scap retraction 3s x10 3s x15 3s x30 PnkTB 3s x30 PnkTB 3s x30 GrnTB 3s x30       Lev scap str 10s x3 10s x3 10s x3 10s x3 15s x3 15s x3       Supine serratus push   3s x20          Alt B shldr extension w/ TB   PnkTB x20 PnkTB x30 PnkTB x30 GrnTB x30       B shldr ER w/ TB   PnkTB x20 PnkTB x30 PnkTB x30 PnkTB x30       LAQ  x15 2x15 x30e  x30e       H/s curl  x15 2x15          Seated HR  x30 x30 x30 *ankle pump supine  x30       Seated TR  x30 x30 NT         PPT    3s x20 3s x20 3s x30 3s x30       Seated march w/ abd iso   x20 x30  x30       abd iso + glute set     3s x30 3s x30       Wand supine shldr flexion     5s x10 5s x10       Wand supine serratus punch     3s x15 3s x20                                                  Modalities

## 2020-08-27 ENCOUNTER — HOSPITAL ENCOUNTER (OUTPATIENT)
Dept: MAMMOGRAPHY | Facility: MEDICAL CENTER | Age: 60
Discharge: HOME | End: 2020-08-27
Payer: COMMERCIAL

## 2020-08-27 VITALS — BODY MASS INDEX: 36.7 KG/M2 | HEIGHT: 64 IN | WEIGHT: 215 LBS

## 2020-08-27 DIAGNOSIS — Z12.39 BREAST CANCER SCREENING: ICD-10-CM

## 2020-08-27 DIAGNOSIS — Z12.31 VISIT FOR SCREENING MAMMOGRAM: ICD-10-CM

## 2020-08-27 PROCEDURE — 77063 BREAST TOMOSYNTHESIS BI: CPT

## 2020-08-27 PROCEDURE — 77067 SCR MAMMO BI INCL CAD: CPT

## 2020-08-28 DIAGNOSIS — Z80.3 FAMILY HISTORY OF BREAST CANCER IN FIRST DEGREE RELATIVE: Primary | ICD-10-CM

## 2020-09-16 DIAGNOSIS — E78.2 COMBINED HYPERLIPIDEMIA: ICD-10-CM

## 2020-09-16 DIAGNOSIS — I10 BENIGN ESSENTIAL HYPERTENSION: ICD-10-CM

## 2020-09-16 DIAGNOSIS — J45.909 MILD ASTHMA WITHOUT COMPLICATION, UNSPECIFIED WHETHER PERSISTENT: ICD-10-CM

## 2020-09-19 RX ORDER — ALBUTEROL SULFATE 90 UG/1
2 AEROSOL, METERED RESPIRATORY (INHALATION) EVERY 6 HOURS PRN
Qty: 1 INHALER | Refills: 0 | Status: SHIPPED | OUTPATIENT
Start: 2020-09-19 | End: 2021-08-25 | Stop reason: SDUPTHER

## 2020-09-19 RX ORDER — AMLODIPINE BESYLATE 10 MG/1
10 TABLET ORAL DAILY
Qty: 90 TABLET | Refills: 0 | Status: SHIPPED | OUTPATIENT
Start: 2020-09-19 | End: 2020-12-31 | Stop reason: SDUPTHER

## 2020-09-19 RX ORDER — ATORVASTATIN CALCIUM 10 MG/1
10 TABLET, FILM COATED ORAL DAILY
Qty: 90 TABLET | Refills: 0 | Status: SHIPPED | OUTPATIENT
Start: 2020-09-19 | End: 2020-12-31 | Stop reason: SDUPTHER

## 2020-10-07 ENCOUNTER — IMMUNIZATIONS (OUTPATIENT)
Dept: FAMILY MEDICINE CLINIC | Facility: CLINIC | Age: 60
End: 2020-10-07
Payer: COMMERCIAL

## 2020-10-07 DIAGNOSIS — Z23 NEED FOR VACCINATION: Primary | ICD-10-CM

## 2020-10-07 PROCEDURE — 90471 IMMUNIZATION ADMIN: CPT | Performed by: FAMILY MEDICINE

## 2020-10-07 PROCEDURE — 90682 RIV4 VACC RECOMBINANT DNA IM: CPT | Performed by: FAMILY MEDICINE

## 2020-12-31 DIAGNOSIS — I10 BENIGN ESSENTIAL HYPERTENSION: ICD-10-CM

## 2020-12-31 DIAGNOSIS — E78.2 COMBINED HYPERLIPIDEMIA: ICD-10-CM

## 2020-12-31 RX ORDER — AMLODIPINE BESYLATE 10 MG/1
10 TABLET ORAL DAILY
Qty: 90 TABLET | Refills: 0 | Status: SHIPPED | OUTPATIENT
Start: 2020-12-31 | End: 2021-04-25 | Stop reason: SDUPTHER

## 2020-12-31 RX ORDER — ATORVASTATIN CALCIUM 10 MG/1
10 TABLET, FILM COATED ORAL DAILY
Qty: 90 TABLET | Refills: 0 | Status: SHIPPED | OUTPATIENT
Start: 2020-12-31 | End: 2021-04-25 | Stop reason: SDUPTHER

## 2021-03-10 DIAGNOSIS — Z23 ENCOUNTER FOR IMMUNIZATION: ICD-10-CM

## 2021-03-23 ENCOUNTER — IMMUNIZATIONS (OUTPATIENT)
Dept: FAMILY MEDICINE CLINIC | Facility: HOSPITAL | Age: 61
End: 2021-03-23

## 2021-03-23 DIAGNOSIS — Z23 ENCOUNTER FOR IMMUNIZATION: Primary | ICD-10-CM

## 2021-03-23 PROCEDURE — 0011A SARS-COV-2 / COVID-19 MRNA VACCINE (MODERNA) 100 MCG: CPT

## 2021-03-23 PROCEDURE — 91301 SARS-COV-2 / COVID-19 MRNA VACCINE (MODERNA) 100 MCG: CPT

## 2021-04-22 ENCOUNTER — IMMUNIZATIONS (OUTPATIENT)
Dept: FAMILY MEDICINE CLINIC | Facility: HOSPITAL | Age: 61
End: 2021-04-22

## 2021-04-22 DIAGNOSIS — Z23 ENCOUNTER FOR IMMUNIZATION: Primary | ICD-10-CM

## 2021-04-22 PROCEDURE — 0012A SARS-COV-2 / COVID-19 MRNA VACCINE (MODERNA) 100 MCG: CPT

## 2021-04-22 PROCEDURE — 91301 SARS-COV-2 / COVID-19 MRNA VACCINE (MODERNA) 100 MCG: CPT

## 2021-04-25 DIAGNOSIS — E78.2 COMBINED HYPERLIPIDEMIA: ICD-10-CM

## 2021-04-25 DIAGNOSIS — I10 BENIGN ESSENTIAL HYPERTENSION: ICD-10-CM

## 2021-04-26 RX ORDER — AMLODIPINE BESYLATE 10 MG/1
10 TABLET ORAL DAILY
Qty: 90 TABLET | Refills: 0 | Status: SHIPPED | OUTPATIENT
Start: 2021-04-26 | End: 2021-08-25 | Stop reason: SDUPTHER

## 2021-04-26 RX ORDER — ATORVASTATIN CALCIUM 10 MG/1
10 TABLET, FILM COATED ORAL DAILY
Qty: 90 TABLET | Refills: 0 | Status: SHIPPED | OUTPATIENT
Start: 2021-04-26 | End: 2021-08-25 | Stop reason: SDUPTHER

## 2021-08-25 DIAGNOSIS — I10 BENIGN ESSENTIAL HYPERTENSION: ICD-10-CM

## 2021-08-25 DIAGNOSIS — J45.909 MILD ASTHMA WITHOUT COMPLICATION, UNSPECIFIED WHETHER PERSISTENT: ICD-10-CM

## 2021-08-25 DIAGNOSIS — E78.2 COMBINED HYPERLIPIDEMIA: ICD-10-CM

## 2021-08-25 RX ORDER — AMLODIPINE BESYLATE 10 MG/1
10 TABLET ORAL DAILY
Qty: 90 TABLET | Refills: 0 | Status: SHIPPED | OUTPATIENT
Start: 2021-08-25 | End: 2021-11-27 | Stop reason: SDUPTHER

## 2021-08-25 RX ORDER — ALBUTEROL SULFATE 90 UG/1
2 AEROSOL, METERED RESPIRATORY (INHALATION) EVERY 6 HOURS PRN
Qty: 18 G | Refills: 0 | Status: SHIPPED | OUTPATIENT
Start: 2021-08-25 | End: 2021-11-27 | Stop reason: SDUPTHER

## 2021-08-25 RX ORDER — ATORVASTATIN CALCIUM 10 MG/1
10 TABLET, FILM COATED ORAL DAILY
Qty: 90 TABLET | Refills: 0 | Status: SHIPPED | OUTPATIENT
Start: 2021-08-25 | End: 2021-11-27 | Stop reason: SDUPTHER

## 2021-08-28 ENCOUNTER — HOSPITAL ENCOUNTER (OUTPATIENT)
Dept: MAMMOGRAPHY | Facility: MEDICAL CENTER | Age: 61
Discharge: HOME | End: 2021-08-28
Payer: COMMERCIAL

## 2021-08-28 VITALS — BODY MASS INDEX: 36.7 KG/M2 | HEIGHT: 64 IN | WEIGHT: 215 LBS

## 2021-08-28 DIAGNOSIS — Z12.31 ENCOUNTER FOR SCREENING MAMMOGRAM FOR MALIGNANT NEOPLASM OF BREAST: ICD-10-CM

## 2021-08-28 PROCEDURE — 77067 SCR MAMMO BI INCL CAD: CPT

## 2021-08-28 PROCEDURE — 77063 BREAST TOMOSYNTHESIS BI: CPT

## 2021-09-14 ENCOUNTER — HOSPITAL ENCOUNTER (OUTPATIENT)
Dept: MAMMOGRAPHY | Facility: CLINIC | Age: 61
Discharge: HOME | End: 2021-09-14
Payer: COMMERCIAL

## 2021-09-14 VITALS — HEIGHT: 64 IN | BODY MASS INDEX: 36.7 KG/M2 | WEIGHT: 215 LBS

## 2021-09-14 DIAGNOSIS — R92.8 ABNORMAL MAMMOGRAM: ICD-10-CM

## 2021-09-14 PROCEDURE — 77065 DX MAMMO INCL CAD UNI: CPT

## 2021-09-14 NOTE — PROGRESS NOTES
Met with patient, pt spouse and Dr Guilhreme Thornton  regarding recommendation for;    _____ RIGHT _____X_LEFT      _____Ultrasound guided  __X____Stereotactic breast biopsy (X2 sites)  __X___Verbalized understanding        Blood thinners:  No: __X___ Yes: ______ What:                 Biopsy teaching sheet given:  Yes: ___X___ No: ________    Pt given contact information and adv to call with any questions/needs

## 2021-09-28 ENCOUNTER — HOSPITAL ENCOUNTER (OUTPATIENT)
Dept: MAMMOGRAPHY | Facility: CLINIC | Age: 61
Discharge: HOME | End: 2021-09-28
Payer: COMMERCIAL

## 2021-09-28 VITALS — DIASTOLIC BLOOD PRESSURE: 80 MMHG | HEART RATE: 60 BPM | SYSTOLIC BLOOD PRESSURE: 130 MMHG

## 2021-09-28 DIAGNOSIS — R92.8 ABNORMAL MAMMOGRAM: ICD-10-CM

## 2021-09-28 PROCEDURE — 19082 BX BREAST ADD LESION STRTCTC: CPT

## 2021-09-28 PROCEDURE — 19081 BX BREAST 1ST LESION STRTCTC: CPT

## 2021-09-28 PROCEDURE — 88342 IMHCHEM/IMCYTCHM 1ST ANTB: CPT | Performed by: PATHOLOGY

## 2021-09-28 PROCEDURE — 88341 IMHCHEM/IMCYTCHM EA ADD ANTB: CPT | Performed by: PATHOLOGY

## 2021-09-28 PROCEDURE — A4648 IMPLANTABLE TISSUE MARKER: HCPCS

## 2021-09-28 PROCEDURE — 88305 TISSUE EXAM BY PATHOLOGIST: CPT | Performed by: PATHOLOGY

## 2021-09-28 PROCEDURE — 88361 TUMOR IMMUNOHISTOCHEM/COMPUT: CPT | Performed by: PATHOLOGY

## 2021-09-28 RX ORDER — LIDOCAINE HYDROCHLORIDE 10 MG/ML
5 INJECTION, SOLUTION EPIDURAL; INFILTRATION; INTRACAUDAL; PERINEURAL ONCE
Status: COMPLETED | OUTPATIENT
Start: 2021-09-28 | End: 2021-09-28

## 2021-09-28 RX ORDER — LIDOCAINE HYDROCHLORIDE AND EPINEPHRINE BITARTRATE 20; .01 MG/ML; MG/ML
10 INJECTION, SOLUTION SUBCUTANEOUS ONCE
Status: COMPLETED | OUTPATIENT
Start: 2021-09-28 | End: 2021-09-28

## 2021-09-28 RX ADMIN — LIDOCAINE HYDROCHLORIDE 5 ML: 10 INJECTION, SOLUTION EPIDURAL; INFILTRATION; INTRACAUDAL; PERINEURAL at 14:22

## 2021-09-28 RX ADMIN — LIDOCAINE HYDROCHLORIDE AND EPINEPHRINE 10 ML: 20; 10 INJECTION, SOLUTION INFILTRATION; PERINEURAL at 14:22

## 2021-09-28 RX ADMIN — LIDOCAINE HYDROCHLORIDE 5 ML: 10 INJECTION, SOLUTION EPIDURAL; INFILTRATION; INTRACAUDAL; PERINEURAL at 14:02

## 2021-09-28 RX ADMIN — LIDOCAINE HYDROCHLORIDE AND EPINEPHRINE 10 ML: 20; 10 INJECTION, SOLUTION INFILTRATION; PERINEURAL at 14:02

## 2021-09-28 NOTE — DISCHARGE INSTR - OTHER ORDERS
POST LARGE CORE BREAST BIOPSY PATIENT INFORMATION      1  Place an ice pack inside your bra over the top of the dressing every hour for 20 minutes (20 minutes on, 60 minutes off)  Do this until bedtime  2  Do not shower or bathe until the following morning  3  You may bathe your breast carefully with the steri-strips in place  Be careful    Not to loosen them  The steri-strips will fall off in 3-5 days  4  You may have mild discomfort, and you may have some bruising where the   Needle entered the skin  This should clear within 5-7 days  5  If you need medicine for discomfort, take acetaminophen products such as   Tylenol  You may also take Advil or Motrin products  6  Do not participate in strenuous activities such as-tennis, aerobics, skiing,  Weight lifting, etc  for 24 hours  Refrain from swimming/soaking for 72 hours  7  Wearing a bra for sleeping may be more comfortable for the first 24-48 hours  8  Watch for continued bleeding, pain or fever over 101; please call with any questions or concerns  For [facility] call:  Chula Gresham RN at [phone]  Elizabeth Hooks RN at [phone]                    *After 4 PM call the Interventional Radiology Department                    [phone] and ask to speak with the nurse on call  For procedures done at the [address] [place] [address] call:         Nathalie Grace RN at   *After 4 PM call the Interventional Radiology Department   [phone] and ask to speak with the nurse on call  For procedures done at [address] [place] [address] call: The Radiology Nurse at [phone]  *After 4 PM call your physician, or go to the Emergency Department  9          The final results of your biopsy are usually available within one week

## 2021-09-28 NOTE — PROGRESS NOTES
Procedure type:    _____ultrasound guided ____X_stereotactic    Breast:    __X___Left _____Right    Location: Left breast 9 o'clock    Needle: 8ga Revovle    # of passes: 2 cores with calcs and 1 core without calcs    Clip: U shaped clip    Performed by: Dr Juany Teixeira held for 5 minutes by:  Waqar Bush(PCA)    Steri Strips:    ___X__yes _____no    Band aid:    ___X__yes_____no    Tape and guaze:    _____yes __X___no    Tolerated procedure:    ___X__yes _____no

## 2021-09-28 NOTE — PROGRESS NOTES
Procedure type:    _____ultrasound guided ___X__stereotactic    Breast:    __X___Left _____Right    Location: Left breast 7 o'clock    Needle: 8ga Revovle    # of passes: 2 cores with calcs and 1 cores without calcs  Clip: Triple twist    Performed by: Dr Glen Burns held for 5 minutes by:  Walter GreenePCA)    Steri Strips:    __X___yes _____no    Band aid:    ___X__yes_____no    Tape and guaze:    _____yes __X___no    Tolerated procedure:    __X___yes _____no

## 2021-09-28 NOTE — PROGRESS NOTES
Patient arrived via:    ___X__ambulatory    _____wheelchair    _____stretcher      Domestic violence screen    ____X__negative______positive    Breast Implants:    _______yes _____X___no

## 2021-09-28 NOTE — PROGRESS NOTES
Ice pack given:    ___X__yes _____no    Discharge instructions signed by patient:    ____X_yes _____no    Discharge instructions given to patient:    ___X__yes _____no    Discharged via:    ___X__ambulatory    _____wheelchair    _____stretcher    Stable on discharge:    ___X__yes ____no

## 2021-09-29 NOTE — PROGRESS NOTES
Post procedure call completed    Bleeding: _____yes __x___no    Pain: _____yes ___x___no    Redness/Swelling: ______yes ___x___no    Band aid removed: _____yes __x___no    Steri-Strips intact: __x____yes _____no    Glen Matilde stated she had small amount of bloody drainage at one band aid but dry now  She also stated  Tylenol was effective for pain relief and she agreed to call [facility] with questions/concerns

## 2021-09-30 ENCOUNTER — TELEPHONE (OUTPATIENT)
Dept: MAMMOGRAPHY | Facility: CLINIC | Age: 61
End: 2021-09-30

## 2021-09-30 ENCOUNTER — TELEPHONE (OUTPATIENT)
Dept: HEMATOLOGY ONCOLOGY | Facility: CLINIC | Age: 61
End: 2021-09-30

## 2021-09-30 NOTE — TELEPHONE ENCOUNTER
Call placed to patient after pt given biopsy results from radiologist, questions answered, adv next step is to set patient up with surgeon, options discussed and patient wanted to have appt made with Dr Ta Crespo, determined pt availability and adv pt I would make appt for her and call her back

## 2021-09-30 NOTE — TELEPHONE ENCOUNTER
Soft Intake Form    Patient Details:    Mohit Elder    1960    [id number]    Reason For Appointment    Who is calling? [facility]   If not patient, name? RALPH   What is the diagnosis? DCIS LEFT BREAST   Is this cancer or non-cancer? CANCER   Who is the referring doctor?  [facility]   Scheduling Information    Which department are they being referred to? SURG ONC   Best number to call back on? If it is the [facility], please use the nurses number   Information REQ DR Tara Uriostegui   Please advise the patient a member from the navigation team will be reaching out to them within 2 business days

## 2021-10-01 ENCOUNTER — TELEPHONE (OUTPATIENT)
Dept: SURGICAL ONCOLOGY | Facility: CLINIC | Age: 61
End: 2021-10-01

## 2021-10-01 ENCOUNTER — TELEPHONE (OUTPATIENT)
Dept: MAMMOGRAPHY | Facility: CLINIC | Age: 61
End: 2021-10-01

## 2021-10-08 ENCOUNTER — TELEPHONE (OUTPATIENT)
Dept: SURGICAL ONCOLOGY | Facility: HOSPITAL | Age: 61
End: 2021-10-08

## 2021-10-13 ENCOUNTER — CLINICAL SUPPORT (OUTPATIENT)
Dept: GENETICS | Facility: CLINIC | Age: 61
End: 2021-10-13
Payer: COMMERCIAL

## 2021-10-13 ENCOUNTER — CONSULT (OUTPATIENT)
Dept: SURGICAL ONCOLOGY | Facility: CLINIC | Age: 61
End: 2021-10-13
Payer: COMMERCIAL

## 2021-10-13 ENCOUNTER — PATIENT OUTREACH (OUTPATIENT)
Dept: CASE MANAGEMENT | Facility: OTHER | Age: 61
End: 2021-10-13

## 2021-10-13 VITALS
HEART RATE: 90 BPM | DIASTOLIC BLOOD PRESSURE: 80 MMHG | RESPIRATION RATE: 18 BRPM | HEIGHT: 64 IN | BODY MASS INDEX: 35.51 KG/M2 | SYSTOLIC BLOOD PRESSURE: 128 MMHG | TEMPERATURE: 98.1 F | OXYGEN SATURATION: 97 % | WEIGHT: 208 LBS

## 2021-10-13 DIAGNOSIS — Z80.9 FAMILY HISTORY OF CANCER: ICD-10-CM

## 2021-10-13 DIAGNOSIS — D24.2 INTRADUCTAL PAPILLOMA OF LEFT BREAST: ICD-10-CM

## 2021-10-13 DIAGNOSIS — D05.12 DUCTAL CARCINOMA IN SITU (DCIS) OF LEFT BREAST: Primary | ICD-10-CM

## 2021-10-13 DIAGNOSIS — C50.912 MALIGNANT NEOPLASM OF LEFT FEMALE BREAST, UNSPECIFIED ESTROGEN RECEPTOR STATUS, UNSPECIFIED SITE OF BREAST (HCC): ICD-10-CM

## 2021-10-13 DIAGNOSIS — R92.1 CALCIFICATION OF LEFT BREAST ON MAMMOGRAPHY: ICD-10-CM

## 2021-10-13 PROCEDURE — 36415 COLL VENOUS BLD VENIPUNCTURE: CPT

## 2021-10-13 PROCEDURE — 99205 OFFICE O/P NEW HI 60 MIN: CPT | Performed by: SURGERY

## 2021-10-19 ENCOUNTER — DOCUMENTATION (OUTPATIENT)
Dept: HEMATOLOGY ONCOLOGY | Facility: CLINIC | Age: 61
End: 2021-10-19

## 2021-10-19 ENCOUNTER — PATIENT OUTREACH (OUTPATIENT)
Dept: CASE MANAGEMENT | Facility: OTHER | Age: 61
End: 2021-10-19

## 2021-10-20 ENCOUNTER — TELEPHONE (OUTPATIENT)
Dept: GENETICS | Facility: CLINIC | Age: 61
End: 2021-10-20

## 2021-10-21 ENCOUNTER — TELEPHONE (OUTPATIENT)
Dept: SURGICAL ONCOLOGY | Facility: CLINIC | Age: 61
End: 2021-10-21

## 2021-10-22 ENCOUNTER — TELEPHONE (OUTPATIENT)
Dept: GENETICS | Facility: CLINIC | Age: 61
End: 2021-10-22

## 2021-10-26 ENCOUNTER — OFFICE VISIT (OUTPATIENT)
Dept: SURGICAL ONCOLOGY | Facility: CLINIC | Age: 61
End: 2021-10-26
Payer: COMMERCIAL

## 2021-10-26 VITALS
OXYGEN SATURATION: 98 % | DIASTOLIC BLOOD PRESSURE: 80 MMHG | WEIGHT: 205 LBS | HEIGHT: 64 IN | SYSTOLIC BLOOD PRESSURE: 130 MMHG | BODY MASS INDEX: 35 KG/M2 | TEMPERATURE: 98 F | RESPIRATION RATE: 16 BRPM | HEART RATE: 84 BPM

## 2021-10-26 DIAGNOSIS — R92.1 CALCIFICATION OF LEFT BREAST ON MAMMOGRAPHY: ICD-10-CM

## 2021-10-26 DIAGNOSIS — D05.12 DUCTAL CARCINOMA IN SITU (DCIS) OF LEFT BREAST: Primary | ICD-10-CM

## 2021-10-26 DIAGNOSIS — Z13.71 BRCA NEGATIVE: ICD-10-CM

## 2021-10-26 PROCEDURE — 1036F TOBACCO NON-USER: CPT | Performed by: SURGERY

## 2021-10-26 PROCEDURE — 99215 OFFICE O/P EST HI 40 MIN: CPT | Performed by: SURGERY

## 2021-10-26 RX ORDER — CEFAZOLIN SODIUM 2 G/50ML
2000 SOLUTION INTRAVENOUS
Status: CANCELLED | OUTPATIENT
Start: 2021-11-19

## 2021-10-27 ENCOUNTER — TELEPHONE (OUTPATIENT)
Dept: GENETICS | Facility: CLINIC | Age: 61
End: 2021-10-27

## 2021-10-29 ENCOUNTER — OFFICE VISIT (OUTPATIENT)
Dept: OBGYN CLINIC | Facility: MEDICAL CENTER | Age: 61
End: 2021-10-29
Payer: COMMERCIAL

## 2021-10-29 ENCOUNTER — APPOINTMENT (OUTPATIENT)
Dept: LAB | Facility: MEDICAL CENTER | Age: 61
End: 2021-10-29
Payer: COMMERCIAL

## 2021-10-29 ENCOUNTER — APPOINTMENT (OUTPATIENT)
Dept: RADIOLOGY | Facility: MEDICAL CENTER | Age: 61
End: 2021-10-29
Payer: COMMERCIAL

## 2021-10-29 ENCOUNTER — CLINICAL SUPPORT (OUTPATIENT)
Dept: URGENT CARE | Facility: MEDICAL CENTER | Age: 61
End: 2021-10-29
Payer: COMMERCIAL

## 2021-10-29 VITALS
DIASTOLIC BLOOD PRESSURE: 70 MMHG | HEIGHT: 64 IN | BODY MASS INDEX: 35.2 KG/M2 | SYSTOLIC BLOOD PRESSURE: 120 MMHG | WEIGHT: 206.2 LBS

## 2021-10-29 DIAGNOSIS — R92.1 CALCIFICATION OF LEFT BREAST ON MAMMOGRAPHY: ICD-10-CM

## 2021-10-29 DIAGNOSIS — Z13.820 ENCOUNTER FOR OSTEOPOROSIS SCREENING IN ASYMPTOMATIC POSTMENOPAUSAL PATIENT: Primary | ICD-10-CM

## 2021-10-29 DIAGNOSIS — Z01.419 ENCOUNTER FOR WELL WOMAN EXAM WITH ROUTINE GYNECOLOGICAL EXAM: ICD-10-CM

## 2021-10-29 DIAGNOSIS — D05.12 DUCTAL CARCINOMA IN SITU (DCIS) OF LEFT BREAST: ICD-10-CM

## 2021-10-29 DIAGNOSIS — Z78.0 ENCOUNTER FOR OSTEOPOROSIS SCREENING IN ASYMPTOMATIC POSTMENOPAUSAL PATIENT: Primary | ICD-10-CM

## 2021-10-29 LAB
ALBUMIN SERPL BCP-MCNC: 3.7 G/DL (ref 3.5–5)
ALP SERPL-CCNC: 116 U/L (ref 46–116)
ALT SERPL W P-5'-P-CCNC: 22 U/L (ref 12–78)
ANION GAP SERPL CALCULATED.3IONS-SCNC: 2 MMOL/L (ref 4–13)
AST SERPL W P-5'-P-CCNC: 15 U/L (ref 5–45)
ATRIAL RATE: 62 BPM
BACTERIA UR QL AUTO: ABNORMAL /HPF
BASOPHILS # BLD AUTO: 0.04 THOUSANDS/ΜL (ref 0–0.1)
BASOPHILS NFR BLD AUTO: 1 % (ref 0–1)
BILIRUB SERPL-MCNC: 1.19 MG/DL (ref 0.2–1)
BILIRUB UR QL STRIP: ABNORMAL
BUN SERPL-MCNC: 15 MG/DL (ref 5–25)
CALCIUM SERPL-MCNC: 9.7 MG/DL (ref 8.3–10.1)
CAOX CRY URNS QL MICRO: ABNORMAL /HPF
CHLORIDE SERPL-SCNC: 108 MMOL/L (ref 100–108)
CLARITY UR: ABNORMAL
CO2 SERPL-SCNC: 29 MMOL/L (ref 21–32)
COLOR UR: ABNORMAL
CREAT SERPL-MCNC: 0.89 MG/DL (ref 0.6–1.3)
EOSINOPHIL # BLD AUTO: 0.13 THOUSAND/ΜL (ref 0–0.61)
EOSINOPHIL NFR BLD AUTO: 3 % (ref 0–6)
ERYTHROCYTE [DISTWIDTH] IN BLOOD BY AUTOMATED COUNT: 14 % (ref 11.6–15.1)
GFR SERPL CREATININE-BSD FRML MDRD: 80 ML/MIN/1.73SQ M
GLUCOSE P FAST SERPL-MCNC: 95 MG/DL (ref 65–99)
GLUCOSE UR STRIP-MCNC: NEGATIVE MG/DL
HCT VFR BLD AUTO: 42 % (ref 34.8–46.1)
HGB BLD-MCNC: 13.9 G/DL (ref 11.5–15.4)
HGB UR QL STRIP.AUTO: ABNORMAL
IMM GRANULOCYTES # BLD AUTO: 0.01 THOUSAND/UL (ref 0–0.2)
IMM GRANULOCYTES NFR BLD AUTO: 0 % (ref 0–2)
KETONES UR STRIP-MCNC: ABNORMAL MG/DL
LEUKOCYTE ESTERASE UR QL STRIP: ABNORMAL
LYMPHOCYTES # BLD AUTO: 2 THOUSANDS/ΜL (ref 0.6–4.47)
LYMPHOCYTES NFR BLD AUTO: 46 % (ref 14–44)
MCH RBC QN AUTO: 28.8 PG (ref 26.8–34.3)
MCHC RBC AUTO-ENTMCNC: 33.1 G/DL (ref 31.4–37.4)
MCV RBC AUTO: 87 FL (ref 82–98)
MONOCYTES # BLD AUTO: 0.29 THOUSAND/ΜL (ref 0.17–1.22)
MONOCYTES NFR BLD AUTO: 7 % (ref 4–12)
NEUTROPHILS # BLD AUTO: 1.86 THOUSANDS/ΜL (ref 1.85–7.62)
NEUTS SEG NFR BLD AUTO: 43 % (ref 43–75)
NITRITE UR QL STRIP: NEGATIVE
NON-SQ EPI CELLS URNS QL MICRO: ABNORMAL /HPF
NRBC BLD AUTO-RTO: 0 /100 WBCS
P AXIS: 42 DEGREES
PH UR STRIP.AUTO: 6 [PH]
PLATELET # BLD AUTO: 328 THOUSANDS/UL (ref 149–390)
PMV BLD AUTO: 9.3 FL (ref 8.9–12.7)
POTASSIUM SERPL-SCNC: 3.7 MMOL/L (ref 3.5–5.3)
PR INTERVAL: 150 MS
PROT SERPL-MCNC: 8.1 G/DL (ref 6.4–8.2)
PROT UR STRIP-MCNC: ABNORMAL MG/DL
QRS AXIS: 43 DEGREES
QRSD INTERVAL: 90 MS
QT INTERVAL: 412 MS
QTC INTERVAL: 418 MS
RBC # BLD AUTO: 4.83 MILLION/UL (ref 3.81–5.12)
RBC #/AREA URNS AUTO: ABNORMAL /HPF
SODIUM SERPL-SCNC: 139 MMOL/L (ref 136–145)
SP GR UR STRIP.AUTO: 1.02 (ref 1–1.03)
T WAVE AXIS: 35 DEGREES
UROBILINOGEN UR QL STRIP.AUTO: 1 E.U./DL
VENTRICULAR RATE: 62 BPM
WBC # BLD AUTO: 4.33 THOUSAND/UL (ref 4.31–10.16)
WBC #/AREA URNS AUTO: ABNORMAL /HPF

## 2021-10-29 PROCEDURE — S0612 ANNUAL GYNECOLOGICAL EXAMINA: HCPCS | Performed by: OBSTETRICS & GYNECOLOGY

## 2021-10-29 PROCEDURE — 3008F BODY MASS INDEX DOCD: CPT | Performed by: SURGERY

## 2021-10-29 PROCEDURE — 71046 X-RAY EXAM CHEST 2 VIEWS: CPT

## 2021-10-29 PROCEDURE — 36415 COLL VENOUS BLD VENIPUNCTURE: CPT

## 2021-10-29 PROCEDURE — 81001 URINALYSIS AUTO W/SCOPE: CPT | Performed by: SURGERY

## 2021-10-29 PROCEDURE — G0145 SCR C/V CYTO,THINLAYER,RESCR: HCPCS | Performed by: OBSTETRICS & GYNECOLOGY

## 2021-10-29 PROCEDURE — 93005 ELECTROCARDIOGRAM TRACING: CPT

## 2021-10-29 PROCEDURE — 80053 COMPREHEN METABOLIC PANEL: CPT

## 2021-10-29 PROCEDURE — 93010 ELECTROCARDIOGRAM REPORT: CPT | Performed by: INTERNAL MEDICINE

## 2021-10-29 PROCEDURE — 85025 COMPLETE CBC W/AUTO DIFF WBC: CPT

## 2021-11-04 LAB
LAB AP GYN PRIMARY INTERPRETATION: NORMAL
Lab: NORMAL

## 2021-11-12 ENCOUNTER — HOSPITAL ENCOUNTER (OUTPATIENT)
Dept: MAMMOGRAPHY | Facility: CLINIC | Age: 61
Discharge: HOME | End: 2021-11-12
Payer: COMMERCIAL

## 2021-11-12 VITALS — DIASTOLIC BLOOD PRESSURE: 68 MMHG | SYSTOLIC BLOOD PRESSURE: 128 MMHG | HEART RATE: 68 BPM

## 2021-11-12 DIAGNOSIS — D05.12 DUCTAL CARCINOMA IN SITU (DCIS) OF LEFT BREAST: ICD-10-CM

## 2021-11-12 DIAGNOSIS — R92.1 CALCIFICATION OF LEFT BREAST ON MAMMOGRAPHY: ICD-10-CM

## 2021-11-12 PROCEDURE — 19282 PERQ DEVICE BREAST EA IMAG: CPT

## 2021-11-12 PROCEDURE — 19281 PERQ DEVICE BREAST 1ST IMAG: CPT

## 2021-11-12 PROCEDURE — A4648 IMPLANTABLE TISSUE MARKER: HCPCS

## 2021-11-12 RX ORDER — LIDOCAINE HYDROCHLORIDE 10 MG/ML
5 INJECTION, SOLUTION EPIDURAL; INFILTRATION; INTRACAUDAL; PERINEURAL ONCE
Status: COMPLETED | OUTPATIENT
Start: 2021-11-12 | End: 2021-11-12

## 2021-11-12 RX ADMIN — LIDOCAINE HYDROCHLORIDE 5 ML: 10 INJECTION, SOLUTION EPIDURAL; INFILTRATION; INTRACAUDAL; PERINEURAL at 13:53

## 2021-11-12 RX ADMIN — LIDOCAINE HYDROCHLORIDE 5 ML: 10 INJECTION, SOLUTION EPIDURAL; INFILTRATION; INTRACAUDAL; PERINEURAL at 13:33

## 2021-11-18 ENCOUNTER — ANESTHESIA EVENT (OUTPATIENT)
Dept: PERIOP | Facility: HOSPITAL | Age: 61
End: 2021-11-18
Payer: COMMERCIAL

## 2021-11-19 ENCOUNTER — HOSPITAL ENCOUNTER (OUTPATIENT)
Dept: MAMMOGRAPHY | Facility: HOSPITAL | Age: 61
Setting detail: OUTPATIENT SURGERY
Discharge: HOME | End: 2021-11-19
Payer: COMMERCIAL

## 2021-11-19 ENCOUNTER — ANESTHESIA (OUTPATIENT)
Dept: PERIOP | Facility: HOSPITAL | Age: 61
End: 2021-11-19
Payer: COMMERCIAL

## 2021-11-19 ENCOUNTER — HOSPITAL ENCOUNTER (OUTPATIENT)
Facility: HOSPITAL | Age: 61
Setting detail: OUTPATIENT SURGERY
Discharge: HOME | End: 2021-11-19
Attending: SURGERY | Admitting: SURGERY
Payer: COMMERCIAL

## 2021-11-19 VITALS
TEMPERATURE: 98.7 F | WEIGHT: 207.01 LBS | RESPIRATION RATE: 18 BRPM | SYSTOLIC BLOOD PRESSURE: 140 MMHG | DIASTOLIC BLOOD PRESSURE: 87 MMHG | OXYGEN SATURATION: 96 % | HEIGHT: 64 IN | HEART RATE: 82 BPM | BODY MASS INDEX: 35.34 KG/M2

## 2021-11-19 DIAGNOSIS — D05.12 DUCTAL CARCINOMA IN SITU (DCIS) OF LEFT BREAST: ICD-10-CM

## 2021-11-19 DIAGNOSIS — R92.1 CALCIFICATION OF LEFT BREAST ON MAMMOGRAPHY: ICD-10-CM

## 2021-11-19 PROCEDURE — 19301 PARTIAL MASTECTOMY: CPT | Performed by: SURGERY

## 2021-11-19 PROCEDURE — 88342 IMHCHEM/IMCYTCHM 1ST ANTB: CPT | Performed by: PATHOLOGY

## 2021-11-19 PROCEDURE — 88361 TUMOR IMMUNOHISTOCHEM/COMPUT: CPT | Performed by: PATHOLOGY

## 2021-11-19 PROCEDURE — 88341 IMHCHEM/IMCYTCHM EA ADD ANTB: CPT | Performed by: PATHOLOGY

## 2021-11-19 PROCEDURE — 88374 M/PHMTRC ALYS ISHQUANT/SEMIQ: CPT | Performed by: PATHOLOGY

## 2021-11-19 PROCEDURE — 19301 PARTIAL MASTECTOMY: CPT | Performed by: PHYSICIAN ASSISTANT

## 2021-11-19 PROCEDURE — 88307 TISSUE EXAM BY PATHOLOGIST: CPT | Performed by: PATHOLOGY

## 2021-11-19 RX ORDER — FENTANYL CITRATE/PF 50 MCG/ML
25 SYRINGE (ML) INJECTION
Status: DISCONTINUED | OUTPATIENT
Start: 2021-11-19 | End: 2021-11-19 | Stop reason: HOSPADM

## 2021-11-19 RX ORDER — DEXAMETHASONE SODIUM PHOSPHATE 4 MG/ML
INJECTION, SOLUTION INTRA-ARTICULAR; INTRALESIONAL; INTRAMUSCULAR; INTRAVENOUS; SOFT TISSUE AS NEEDED
Status: DISCONTINUED | OUTPATIENT
Start: 2021-11-19 | End: 2021-11-19

## 2021-11-19 RX ORDER — MAGNESIUM HYDROXIDE 1200 MG/15ML
LIQUID ORAL AS NEEDED
Status: DISCONTINUED | OUTPATIENT
Start: 2021-11-19 | End: 2021-11-19 | Stop reason: HOSPADM

## 2021-11-19 RX ORDER — BUPIVACAINE HYDROCHLORIDE 5 MG/ML
INJECTION, SOLUTION PERINEURAL AS NEEDED
Status: DISCONTINUED | OUTPATIENT
Start: 2021-11-19 | End: 2021-11-19 | Stop reason: HOSPADM

## 2021-11-19 RX ORDER — LIDOCAINE HYDROCHLORIDE 20 MG/ML
INJECTION, SOLUTION EPIDURAL; INFILTRATION; INTRACAUDAL; PERINEURAL AS NEEDED
Status: DISCONTINUED | OUTPATIENT
Start: 2021-11-19 | End: 2021-11-19

## 2021-11-19 RX ORDER — EPHEDRINE SULFATE 50 MG/ML
INJECTION INTRAVENOUS AS NEEDED
Status: DISCONTINUED | OUTPATIENT
Start: 2021-11-19 | End: 2021-11-19

## 2021-11-19 RX ORDER — SODIUM CHLORIDE, SODIUM LACTATE, POTASSIUM CHLORIDE, CALCIUM CHLORIDE 600; 310; 30; 20 MG/100ML; MG/100ML; MG/100ML; MG/100ML
INJECTION, SOLUTION INTRAVENOUS CONTINUOUS PRN
Status: DISCONTINUED | OUTPATIENT
Start: 2021-11-19 | End: 2021-11-19

## 2021-11-19 RX ORDER — KETOROLAC TROMETHAMINE 30 MG/ML
INJECTION, SOLUTION INTRAMUSCULAR; INTRAVENOUS AS NEEDED
Status: DISCONTINUED | OUTPATIENT
Start: 2021-11-19 | End: 2021-11-19

## 2021-11-19 RX ORDER — ACETAMINOPHEN 325 MG/1
650 TABLET ORAL EVERY 6 HOURS PRN
Status: DISCONTINUED | OUTPATIENT
Start: 2021-11-19 | End: 2021-11-19 | Stop reason: HOSPADM

## 2021-11-19 RX ORDER — ONDANSETRON 2 MG/ML
4 INJECTION INTRAMUSCULAR; INTRAVENOUS ONCE AS NEEDED
Status: DISCONTINUED | OUTPATIENT
Start: 2021-11-19 | End: 2021-11-19 | Stop reason: HOSPADM

## 2021-11-19 RX ORDER — SODIUM CHLORIDE 9 MG/ML
125 INJECTION, SOLUTION INTRAVENOUS CONTINUOUS
Status: DISCONTINUED | OUTPATIENT
Start: 2021-11-19 | End: 2021-11-19 | Stop reason: HOSPADM

## 2021-11-19 RX ORDER — PROPOFOL 10 MG/ML
INJECTION, EMULSION INTRAVENOUS AS NEEDED
Status: DISCONTINUED | OUTPATIENT
Start: 2021-11-19 | End: 2021-11-19

## 2021-11-19 RX ORDER — MIDAZOLAM HYDROCHLORIDE 2 MG/2ML
INJECTION, SOLUTION INTRAMUSCULAR; INTRAVENOUS AS NEEDED
Status: DISCONTINUED | OUTPATIENT
Start: 2021-11-19 | End: 2021-11-19

## 2021-11-19 RX ORDER — ONDANSETRON 2 MG/ML
INJECTION INTRAMUSCULAR; INTRAVENOUS AS NEEDED
Status: DISCONTINUED | OUTPATIENT
Start: 2021-11-19 | End: 2021-11-19

## 2021-11-19 RX ORDER — FENTANYL CITRATE 50 UG/ML
INJECTION, SOLUTION INTRAMUSCULAR; INTRAVENOUS AS NEEDED
Status: DISCONTINUED | OUTPATIENT
Start: 2021-11-19 | End: 2021-11-19

## 2021-11-19 RX ORDER — CEFAZOLIN SODIUM 2 G/50ML
2000 SOLUTION INTRAVENOUS
Status: DISCONTINUED | OUTPATIENT
Start: 2021-11-19 | End: 2021-11-19 | Stop reason: HOSPADM

## 2021-11-19 RX ADMIN — CEFAZOLIN SODIUM 2000 MG: 2 SOLUTION INTRAVENOUS at 12:02

## 2021-11-19 RX ADMIN — FENTANYL CITRATE 50 MCG: 50 INJECTION INTRAMUSCULAR; INTRAVENOUS at 12:10

## 2021-11-19 RX ADMIN — PROPOFOL 200 MG: 10 INJECTION, EMULSION INTRAVENOUS at 12:10

## 2021-11-19 RX ADMIN — DEXAMETHASONE SODIUM PHOSPHATE 4 MG: 4 INJECTION INTRA-ARTICULAR; INTRALESIONAL; INTRAMUSCULAR; INTRAVENOUS; SOFT TISSUE at 12:14

## 2021-11-19 RX ADMIN — SODIUM CHLORIDE 125 ML/HR: 0.9 INJECTION, SOLUTION INTRAVENOUS at 11:23

## 2021-11-19 RX ADMIN — MIDAZOLAM 2 MG: 1 INJECTION INTRAMUSCULAR; INTRAVENOUS at 12:03

## 2021-11-19 RX ADMIN — KETOROLAC TROMETHAMINE 15 MG: 30 INJECTION, SOLUTION INTRAMUSCULAR at 13:21

## 2021-11-19 RX ADMIN — FENTANYL CITRATE 50 MCG: 50 INJECTION INTRAMUSCULAR; INTRAVENOUS at 12:34

## 2021-11-19 RX ADMIN — EPHEDRINE SULFATE 10 MG: 50 INJECTION, SOLUTION INTRAVENOUS at 12:28

## 2021-11-19 RX ADMIN — SODIUM CHLORIDE 125 ML/HR: 0.9 INJECTION, SOLUTION INTRAVENOUS at 13:51

## 2021-11-19 RX ADMIN — EPHEDRINE SULFATE 15 MG: 50 INJECTION, SOLUTION INTRAVENOUS at 12:59

## 2021-11-19 RX ADMIN — ONDANSETRON 4 MG: 2 INJECTION INTRAMUSCULAR; INTRAVENOUS at 13:05

## 2021-11-19 RX ADMIN — SODIUM CHLORIDE, SODIUM LACTATE, POTASSIUM CHLORIDE, AND CALCIUM CHLORIDE: .6; .31; .03; .02 INJECTION, SOLUTION INTRAVENOUS at 13:04

## 2021-11-19 RX ADMIN — LIDOCAINE HYDROCHLORIDE 100 MG: 20 INJECTION, SOLUTION EPIDURAL; INFILTRATION; INTRACAUDAL; PERINEURAL at 12:10

## 2021-12-03 ENCOUNTER — TELEPHONE (OUTPATIENT)
Dept: HEMATOLOGY ONCOLOGY | Facility: CLINIC | Age: 61
End: 2021-12-03

## 2021-12-07 ENCOUNTER — OFFICE VISIT (OUTPATIENT)
Dept: SURGICAL ONCOLOGY | Facility: CLINIC | Age: 61
End: 2021-12-07

## 2021-12-07 VITALS
RESPIRATION RATE: 16 BRPM | TEMPERATURE: 98 F | SYSTOLIC BLOOD PRESSURE: 136 MMHG | WEIGHT: 206 LBS | BODY MASS INDEX: 35.17 KG/M2 | HEART RATE: 76 BPM | DIASTOLIC BLOOD PRESSURE: 86 MMHG | HEIGHT: 64 IN | OXYGEN SATURATION: 99 %

## 2021-12-07 DIAGNOSIS — Z17.0 MALIGNANT NEOPLASM OF LOWER-INNER QUADRANT OF LEFT BREAST IN FEMALE, ESTROGEN RECEPTOR POSITIVE (HCC): Primary | ICD-10-CM

## 2021-12-07 DIAGNOSIS — C50.312 MALIGNANT NEOPLASM OF LOWER-INNER QUADRANT OF LEFT BREAST IN FEMALE, ESTROGEN RECEPTOR POSITIVE (HCC): Primary | ICD-10-CM

## 2021-12-07 PROCEDURE — 99024 POSTOP FOLLOW-UP VISIT: CPT | Performed by: SURGERY

## 2021-12-07 RX ORDER — CEFAZOLIN SODIUM 2 G/50ML
2000 SOLUTION INTRAVENOUS ONCE
Status: CANCELLED | OUTPATIENT
Start: 2022-01-21 | End: 2021-12-07

## 2021-12-07 RX ORDER — TRAMADOL HYDROCHLORIDE 50 MG/1
50 TABLET ORAL EVERY 8 HOURS PRN
Qty: 9 TABLET | Refills: 0 | Status: SHIPPED | OUTPATIENT
Start: 2021-12-07 | End: 2022-05-05

## 2021-12-08 ENCOUNTER — IMMUNIZATIONS (OUTPATIENT)
Dept: FAMILY MEDICINE CLINIC | Facility: CLINIC | Age: 61
End: 2021-12-08
Payer: COMMERCIAL

## 2021-12-08 ENCOUNTER — APPOINTMENT (OUTPATIENT)
Dept: LAB | Facility: CLINIC | Age: 61
End: 2021-12-08
Payer: COMMERCIAL

## 2021-12-08 DIAGNOSIS — Z23 NEED FOR INFLUENZA VACCINATION: Primary | ICD-10-CM

## 2021-12-08 DIAGNOSIS — Z17.0 MALIGNANT NEOPLASM OF LOWER-INNER QUADRANT OF LEFT BREAST IN FEMALE, ESTROGEN RECEPTOR POSITIVE (HCC): ICD-10-CM

## 2021-12-08 DIAGNOSIS — C50.312 MALIGNANT NEOPLASM OF LOWER-INNER QUADRANT OF LEFT BREAST IN FEMALE, ESTROGEN RECEPTOR POSITIVE (HCC): ICD-10-CM

## 2021-12-08 LAB
ALBUMIN SERPL BCP-MCNC: 3.3 G/DL (ref 3.5–5)
ALP SERPL-CCNC: 112 U/L (ref 46–116)
ALT SERPL W P-5'-P-CCNC: 26 U/L (ref 12–78)
ANION GAP SERPL CALCULATED.3IONS-SCNC: 5 MMOL/L (ref 4–13)
AST SERPL W P-5'-P-CCNC: 16 U/L (ref 5–45)
BACTERIA UR QL AUTO: ABNORMAL /HPF
BASOPHILS # BLD AUTO: 0.02 THOUSANDS/ΜL (ref 0–0.1)
BASOPHILS NFR BLD AUTO: 0 % (ref 0–1)
BILIRUB SERPL-MCNC: 0.81 MG/DL (ref 0.2–1)
BILIRUB UR QL STRIP: NEGATIVE
BUN SERPL-MCNC: 13 MG/DL (ref 5–25)
CALCIUM ALBUM COR SERPL-MCNC: 10.1 MG/DL (ref 8.3–10.1)
CALCIUM SERPL-MCNC: 9.5 MG/DL (ref 8.3–10.1)
CHLORIDE SERPL-SCNC: 109 MMOL/L (ref 100–108)
CLARITY UR: CLEAR
CO2 SERPL-SCNC: 27 MMOL/L (ref 21–32)
COLOR UR: YELLOW
CREAT SERPL-MCNC: 0.83 MG/DL (ref 0.6–1.3)
EOSINOPHIL # BLD AUTO: 0.12 THOUSAND/ΜL (ref 0–0.61)
EOSINOPHIL NFR BLD AUTO: 3 % (ref 0–6)
ERYTHROCYTE [DISTWIDTH] IN BLOOD BY AUTOMATED COUNT: 13.9 % (ref 11.6–15.1)
GFR SERPL CREATININE-BSD FRML MDRD: 87 ML/MIN/1.73SQ M
GLUCOSE P FAST SERPL-MCNC: 98 MG/DL (ref 65–99)
GLUCOSE UR STRIP-MCNC: NEGATIVE MG/DL
HCT VFR BLD AUTO: 41.4 % (ref 34.8–46.1)
HGB BLD-MCNC: 13.5 G/DL (ref 11.5–15.4)
HGB UR QL STRIP.AUTO: NEGATIVE
HYALINE CASTS #/AREA URNS LPF: ABNORMAL /LPF
IMM GRANULOCYTES # BLD AUTO: 0.01 THOUSAND/UL (ref 0–0.2)
IMM GRANULOCYTES NFR BLD AUTO: 0 % (ref 0–2)
KETONES UR STRIP-MCNC: NEGATIVE MG/DL
LEUKOCYTE ESTERASE UR QL STRIP: NEGATIVE
LYMPHOCYTES # BLD AUTO: 2.09 THOUSANDS/ΜL (ref 0.6–4.47)
LYMPHOCYTES NFR BLD AUTO: 44 % (ref 14–44)
MCH RBC QN AUTO: 29 PG (ref 26.8–34.3)
MCHC RBC AUTO-ENTMCNC: 32.6 G/DL (ref 31.4–37.4)
MCV RBC AUTO: 89 FL (ref 82–98)
MONOCYTES # BLD AUTO: 0.27 THOUSAND/ΜL (ref 0.17–1.22)
MONOCYTES NFR BLD AUTO: 6 % (ref 4–12)
NEUTROPHILS # BLD AUTO: 2.21 THOUSANDS/ΜL (ref 1.85–7.62)
NEUTS SEG NFR BLD AUTO: 47 % (ref 43–75)
NITRITE UR QL STRIP: NEGATIVE
NON-SQ EPI CELLS URNS QL MICRO: ABNORMAL /HPF
NRBC BLD AUTO-RTO: 0 /100 WBCS
PH UR STRIP.AUTO: 7 [PH]
PLATELET # BLD AUTO: 335 THOUSANDS/UL (ref 149–390)
PMV BLD AUTO: 8.9 FL (ref 8.9–12.7)
POTASSIUM SERPL-SCNC: 3.6 MMOL/L (ref 3.5–5.3)
PROT SERPL-MCNC: 7.7 G/DL (ref 6.4–8.2)
PROT UR STRIP-MCNC: ABNORMAL MG/DL
RBC # BLD AUTO: 4.66 MILLION/UL (ref 3.81–5.12)
RBC #/AREA URNS AUTO: ABNORMAL /HPF
SODIUM SERPL-SCNC: 141 MMOL/L (ref 136–145)
SP GR UR STRIP.AUTO: 1.02 (ref 1–1.03)
UROBILINOGEN UR QL STRIP.AUTO: 1 E.U./DL
WBC # BLD AUTO: 4.72 THOUSAND/UL (ref 4.31–10.16)
WBC #/AREA URNS AUTO: ABNORMAL /HPF

## 2021-12-08 PROCEDURE — 90682 RIV4 VACC RECOMBINANT DNA IM: CPT | Performed by: FAMILY MEDICINE

## 2021-12-08 PROCEDURE — 85025 COMPLETE CBC W/AUTO DIFF WBC: CPT

## 2021-12-08 PROCEDURE — 36415 COLL VENOUS BLD VENIPUNCTURE: CPT

## 2021-12-08 PROCEDURE — 90471 IMMUNIZATION ADMIN: CPT | Performed by: FAMILY MEDICINE

## 2021-12-08 PROCEDURE — 81001 URINALYSIS AUTO W/SCOPE: CPT | Performed by: SURGERY

## 2021-12-08 PROCEDURE — 80053 COMPREHEN METABOLIC PANEL: CPT

## 2022-01-18 NOTE — PRE-PROCEDURE INSTRUCTIONS
Pre-Surgery Instructions:   Medication Instructions    albuterol (ProAir HFA) 90 mcg/act inhaler Instructed to take as needed including DOS    amLODIPine (NORVASC) 10 mg tablet Instructed to take per normal schedule including DOS with sips water    atorvastatin (LIPITOR) 10 mg tablet Instructed to take per normal schedule including DOS with sips water    calcium carbonate (OS-HALIMA) 1250 (500 Ca) MG tablet Instructed to avoid all ASA and OTC Vit/Supp 1 week prior to surgery and to avoid NSAIDs 3 days prior to surgery per anesthesia instructions  Tylenol ok to take prn   cholecalciferol (VITAMIN D3) 1,000 units tablet Instructed to avoid all ASA and OTC Vit/Supp 1 week prior to surgery and to avoid NSAIDs 3 days prior to surgery per anesthesia instructions  Tylenol ok to take prn   multivitamin (THERAGRAN) TABS Instructed to avoid all ASA and OTC Vit/Supp 1 week prior to surgery and to avoid NSAIDs 3 days prior to surgery per anesthesia instructions  Tylenol ok to take prn  Have you had / have a sore throat? no  Have you had / have a cough less than 1 week? no  Have you had / have a fever greater than 100 0 - 100  4? No  Are you experiencing any shortness of breath? No  Fully vaccinated 4/22/2021, 3/23/2021    Reviewed with patient, in detail, instructions from "My Surgical Experience"  Instructed to avoid all ASA and OTC Vit/Supp 1 week prior to surgery and to avoid NSAIDs 7 days prior to surgery per anesthesia instructions  Tylenol ok to take prn  Advised patient that Cornel Zazueta will call with surgery arrival time and hospital directions the business day prior to surgery  Advised patient nothing eat or drink after midnight  Patient verbalized understanding and knows to call surgeon's office with any additional questions prior to surgery  Patient verbalized understanding of current visitor restrictions and advised that he/she can confirm these at time of arrival call with Cornel Zazueta       My Surgical Experience    The following information was developed to assist you to prepare for your operation  What do I need to do before coming to the hospital?   Arrange for a responsible person to drive you to and from the hospital    Arrange care for your children at home  Children are not allowed in the recovery areas of the hospital   Plan to wear clothing that is easy to put on and take off  If you are having shoulder surgery, wear a shirt that buttons or zippers in the front  Bathing  o Shower the evening before and the morning of your surgery with an antibacterial soap  Please refer to the Pre Op Showering Instructions for Surgery Patients Sheet   o Remove nail polish and all body piercing jewelry  o Do not shave any body part for at least 24 hours before surgery-this includes face, arms, legs and upper body  Food  o Nothing to eat or drink after midnight the night before your surgery  This includes candy and chewing gum  o Exception: If your surgery is after 12:00pm (noon), you may have clear liquids such as 7-Up®, ginger ale, apple or cranberry juice, Jell-O®, water, or clear broth until 8:00 am  o Do not drink milk or juice with pulp on the morning before surgery  o Do not drink alcohol 24 hours before surgery  Medicine  o Follow instructions you received from your surgeon about which medicines you may take on the day of surgery  o If instructed to take medicine on the morning of surgery, take pills with just a small sip of water  Call your prescribing doctor for specific information on what to do if you take insulin    What should I bring to the hospital?    Bring:  Myranda Hatfield or a walker, if you have them, for foot or knee surgery   A list of the daily medicines, vitamins, minerals, herbals and nutritional supplements you take   Include the dosages of medicines and the time you take them each day   Glasses, dentures or hearing aids   Minimal clothing; you will be wearing hospital sleepwear   Photo ID; required to verify your identity  [facility] If you have a Living Will or Power of , bring a copy of the documents   If you have an ostomy, bring an extra pouch and any supplies you use    Do not bring   Medicines or inhalers   Money, valuables or jewelry    What other information should I know about the day of surgery?  Notify your surgeons if you develop a cold, sore throat, cough, fever, rash or any other illness   Report to the Ambulatory Surgical/Same Day Surgery Unit   You will be instructed to stop at Registration only if you have not been pre-registered   Inform your  fi they do not stay that they will be asked by the staff to leave a phone number where they can be reached   Be available to be reached before surgery  In the event the operating room schedule changes, you may be asked to come in earlier or later than expected    *It is important to tell your doctor and others involved in your health care if you are taking or have been taking any non-prescription drugs, vitamins, minerals, herbals or other nutritional supplements   Any of these may interact with some food or medicines and cause a reaction

## 2022-01-19 ENCOUNTER — ANESTHESIA EVENT (OUTPATIENT)
Dept: PERIOP | Facility: HOSPITAL | Age: 62
End: 2022-01-19
Payer: COMMERCIAL

## 2022-01-21 ENCOUNTER — HOSPITAL ENCOUNTER (OUTPATIENT)
Facility: HOSPITAL | Age: 62
Setting detail: OUTPATIENT SURGERY
Discharge: HOME | End: 2022-01-21
Attending: SURGERY | Admitting: SURGERY
Payer: COMMERCIAL

## 2022-01-21 ENCOUNTER — HOSPITAL ENCOUNTER (OUTPATIENT)
Dept: NUCLEAR MEDICINE | Facility: HOSPITAL | Age: 62
Discharge: HOME | End: 2022-01-21
Attending: SURGERY
Payer: COMMERCIAL

## 2022-01-21 ENCOUNTER — ANESTHESIA (OUTPATIENT)
Dept: PERIOP | Facility: HOSPITAL | Age: 62
End: 2022-01-21
Payer: COMMERCIAL

## 2022-01-21 ENCOUNTER — APPOINTMENT (OUTPATIENT)
Dept: MAMMOGRAPHY | Facility: HOSPITAL | Age: 62
End: 2022-01-21
Payer: COMMERCIAL

## 2022-01-21 VITALS
RESPIRATION RATE: 16 BRPM | TEMPERATURE: 97.1 F | OXYGEN SATURATION: 98 % | SYSTOLIC BLOOD PRESSURE: 134 MMHG | WEIGHT: 208.11 LBS | HEIGHT: 64 IN | BODY MASS INDEX: 35.53 KG/M2 | HEART RATE: 79 BPM | DIASTOLIC BLOOD PRESSURE: 73 MMHG

## 2022-01-21 DIAGNOSIS — C50.312 MALIGNANT NEOPLASM OF LOWER-INNER QUADRANT OF LEFT BREAST IN FEMALE, ESTROGEN RECEPTOR POSITIVE (HCC): ICD-10-CM

## 2022-01-21 DIAGNOSIS — Z17.0 MALIGNANT NEOPLASM OF LOWER-INNER QUADRANT OF LEFT BREAST IN FEMALE, ESTROGEN RECEPTOR POSITIVE (HCC): ICD-10-CM

## 2022-01-21 LAB
HBV SURFACE AG SER QL: NORMAL
HCV AB SER QL: NORMAL
HIV 1+2 AB+HIV1 P24 AG SERPL QL IA: NORMAL
HIV1 P24 AG SER QL: NORMAL

## 2022-01-21 PROCEDURE — A9520 TC99 TILMANOCEPT DIAG 0.5MCI: HCPCS

## 2022-01-21 PROCEDURE — 19301 PARTIAL MASTECTOMY: CPT | Performed by: PHYSICIAN ASSISTANT

## 2022-01-21 PROCEDURE — 87806 HIV AG W/HIV1&2 ANTB W/OPTIC: CPT | Performed by: SURGERY

## 2022-01-21 PROCEDURE — 38792 RA TRACER ID OF SENTINL NODE: CPT

## 2022-01-21 PROCEDURE — 19301 PARTIAL MASTECTOMY: CPT | Performed by: SURGERY

## 2022-01-21 PROCEDURE — 88342 IMHCHEM/IMCYTCHM 1ST ANTB: CPT | Performed by: PATHOLOGY

## 2022-01-21 PROCEDURE — 38525 BIOPSY/REMOVAL LYMPH NODES: CPT | Performed by: SURGERY

## 2022-01-21 PROCEDURE — NC001 PR NO CHARGE: Performed by: SURGERY

## 2022-01-21 PROCEDURE — 86803 HEPATITIS C AB TEST: CPT | Performed by: SURGERY

## 2022-01-21 PROCEDURE — 88341 IMHCHEM/IMCYTCHM EA ADD ANTB: CPT | Performed by: PATHOLOGY

## 2022-01-21 PROCEDURE — 88307 TISSUE EXAM BY PATHOLOGIST: CPT | Performed by: PATHOLOGY

## 2022-01-21 PROCEDURE — 87340 HEPATITIS B SURFACE AG IA: CPT | Performed by: SURGERY

## 2022-01-21 PROCEDURE — 38900 IO MAP OF SENT LYMPH NODE: CPT | Performed by: SURGERY

## 2022-01-21 RX ORDER — MIDAZOLAM HYDROCHLORIDE 2 MG/2ML
INJECTION, SOLUTION INTRAMUSCULAR; INTRAVENOUS AS NEEDED
Status: DISCONTINUED | OUTPATIENT
Start: 2022-01-21 | End: 2022-01-21

## 2022-01-21 RX ORDER — ONDANSETRON 2 MG/ML
4 INJECTION INTRAMUSCULAR; INTRAVENOUS ONCE AS NEEDED
Status: DISCONTINUED | OUTPATIENT
Start: 2022-01-21 | End: 2022-01-21 | Stop reason: HOSPADM

## 2022-01-21 RX ORDER — DEXAMETHASONE SODIUM PHOSPHATE 4 MG/ML
INJECTION, SOLUTION INTRA-ARTICULAR; INTRALESIONAL; INTRAMUSCULAR; INTRAVENOUS; SOFT TISSUE AS NEEDED
Status: DISCONTINUED | OUTPATIENT
Start: 2022-01-21 | End: 2022-01-21

## 2022-01-21 RX ORDER — TRAMADOL HYDROCHLORIDE 50 MG/1
50 TABLET ORAL EVERY 6 HOURS PRN
Status: DISCONTINUED | OUTPATIENT
Start: 2022-01-21 | End: 2022-01-21 | Stop reason: HOSPADM

## 2022-01-21 RX ORDER — ISOSULFAN BLUE 50 MG/5ML
INJECTION, SOLUTION SUBCUTANEOUS AS NEEDED
Status: DISCONTINUED | OUTPATIENT
Start: 2022-01-21 | End: 2022-01-21 | Stop reason: HOSPADM

## 2022-01-21 RX ORDER — LIDOCAINE HYDROCHLORIDE 20 MG/ML
INJECTION, SOLUTION EPIDURAL; INFILTRATION; INTRACAUDAL; PERINEURAL AS NEEDED
Status: DISCONTINUED | OUTPATIENT
Start: 2022-01-21 | End: 2022-01-21

## 2022-01-21 RX ORDER — IBUPROFEN 600 MG/1
600 TABLET ORAL EVERY 8 HOURS PRN
Status: DISCONTINUED | OUTPATIENT
Start: 2022-01-21 | End: 2022-01-21 | Stop reason: HOSPADM

## 2022-01-21 RX ORDER — BUPIVACAINE HYDROCHLORIDE 5 MG/ML
INJECTION, SOLUTION PERINEURAL AS NEEDED
Status: DISCONTINUED | OUTPATIENT
Start: 2022-01-21 | End: 2022-01-21 | Stop reason: HOSPADM

## 2022-01-21 RX ORDER — CEFAZOLIN SODIUM 2 G/50ML
2000 SOLUTION INTRAVENOUS ONCE
Status: COMPLETED | OUTPATIENT
Start: 2022-01-21 | End: 2022-01-21

## 2022-01-21 RX ORDER — SODIUM CHLORIDE 9 MG/ML
125 INJECTION, SOLUTION INTRAVENOUS CONTINUOUS
Status: DISCONTINUED | OUTPATIENT
Start: 2022-01-21 | End: 2022-01-21 | Stop reason: HOSPADM

## 2022-01-21 RX ORDER — FENTANYL CITRATE 50 UG/ML
INJECTION, SOLUTION INTRAMUSCULAR; INTRAVENOUS AS NEEDED
Status: DISCONTINUED | OUTPATIENT
Start: 2022-01-21 | End: 2022-01-21

## 2022-01-21 RX ORDER — ONDANSETRON 2 MG/ML
INJECTION INTRAMUSCULAR; INTRAVENOUS AS NEEDED
Status: DISCONTINUED | OUTPATIENT
Start: 2022-01-21 | End: 2022-01-21

## 2022-01-21 RX ORDER — MAGNESIUM HYDROXIDE 1200 MG/15ML
LIQUID ORAL AS NEEDED
Status: DISCONTINUED | OUTPATIENT
Start: 2022-01-21 | End: 2022-01-21 | Stop reason: HOSPADM

## 2022-01-21 RX ORDER — ACETAMINOPHEN 325 MG/1
650 TABLET ORAL EVERY 6 HOURS PRN
Status: DISCONTINUED | OUTPATIENT
Start: 2022-01-21 | End: 2022-01-21 | Stop reason: HOSPADM

## 2022-01-21 RX ORDER — CEFAZOLIN SODIUM 2 G/50ML
2000 SOLUTION INTRAVENOUS
Status: CANCELLED | OUTPATIENT
Start: 2022-01-21

## 2022-01-21 RX ORDER — PROPOFOL 10 MG/ML
INJECTION, EMULSION INTRAVENOUS AS NEEDED
Status: DISCONTINUED | OUTPATIENT
Start: 2022-01-21 | End: 2022-01-21

## 2022-01-21 RX ORDER — FENTANYL CITRATE/PF 50 MCG/ML
25 SYRINGE (ML) INJECTION
Status: DISCONTINUED | OUTPATIENT
Start: 2022-01-21 | End: 2022-01-21 | Stop reason: HOSPADM

## 2022-01-21 RX ADMIN — MIDAZOLAM 2 MG: 1 INJECTION INTRAMUSCULAR; INTRAVENOUS at 08:41

## 2022-01-21 RX ADMIN — PROPOFOL 200 MG: 10 INJECTION, EMULSION INTRAVENOUS at 08:51

## 2022-01-21 RX ADMIN — DEXAMETHASONE SODIUM PHOSPHATE 4 MG: 4 INJECTION INTRA-ARTICULAR; INTRALESIONAL; INTRAMUSCULAR; INTRAVENOUS; SOFT TISSUE at 08:54

## 2022-01-21 RX ADMIN — FENTANYL CITRATE 25 MCG: 50 INJECTION INTRAMUSCULAR; INTRAVENOUS at 09:21

## 2022-01-21 RX ADMIN — FENTANYL CITRATE 25 MCG: 50 INJECTION, SOLUTION INTRAMUSCULAR; INTRAVENOUS at 10:36

## 2022-01-21 RX ADMIN — SODIUM CHLORIDE 125 ML/HR: 9 INJECTION, SOLUTION INTRAVENOUS at 07:00

## 2022-01-21 RX ADMIN — ONDANSETRON 4 MG: 2 INJECTION INTRAMUSCULAR; INTRAVENOUS at 08:54

## 2022-01-21 RX ADMIN — ACETAMINOPHEN 650 MG: 325 TABLET, FILM COATED ORAL at 12:03

## 2022-01-21 RX ADMIN — FENTANYL CITRATE 50 MCG: 50 INJECTION INTRAMUSCULAR; INTRAVENOUS at 09:05

## 2022-01-21 RX ADMIN — SODIUM CHLORIDE: 9 INJECTION, SOLUTION INTRAVENOUS at 09:13

## 2022-01-21 RX ADMIN — LIDOCAINE HYDROCHLORIDE 100 MG: 20 INJECTION, SOLUTION EPIDURAL; INFILTRATION; INTRACAUDAL; PERINEURAL at 08:51

## 2022-01-21 RX ADMIN — CEFAZOLIN SODIUM 2000 MG: 2 SOLUTION INTRAVENOUS at 08:29

## 2022-01-21 RX ADMIN — FENTANYL CITRATE 25 MCG: 50 INJECTION INTRAMUSCULAR; INTRAVENOUS at 09:22

## 2022-01-21 NOTE — OP NOTE
PERATIVE REPORT  PATIENT NAME: Joseph Ornelas    :  1960  MRN: [id number]  Pt Location: [facility] OR ROOM 05    SURGERY DATE: 2022    Surgeon(s) and Role:     * Wisam Manuel MD - Primary     * Julius Mckeon PA-C - Assisting    Preop Diagnosis:  Malignant neoplasm of lower-inner quadrant of left breast in female, estrogen receptor positive ([facility] [address] ) [C50 312, Z17 0]    Post-Op Diagnosis Codes:     * Malignant neoplasm of lower-inner quadrant of left breast in female, estrogen receptor positive ([facility] [address] ) [C50 312, Z17 0]    Procedure(s) (LRB):  LUMPECTOMY BREAST, re-excision; 0800 NUC MED (Left)  BIOPSY LYMPH NODE SENTINEL (Left)   Injection of blue dye  Use of gamma probe    Specimen(s):  ID Type Source Tests Collected by Time Destination   1 : Anterior Margin Left Breast Tissue Other TISSUE EXAM Wisam Manuel MD 2022 0912    2 : new posterior margin left breast Tissue Breast, Left TISSUE EXAM Wisam Manuel MD 2022 0915    3 : medial margin left breast Tissue Breast, Left TISSUE EXAM Wisam Manuel MD 2022 8051    4 : Falls Node #1 Left Axilla Tissue Lymph Node, Falls TISSUE EXAM Wisam Manuel MD 2022 6111    5 : Falls Node #2 Left Axilla Tissue Lymph Node, Falls TISSUE EXAM Beverley Maza RN 2022 3084    6 : Falls Node #3 Left Axilla Tissue Lymph Node, Falls TISSUE EXAM Wisam Manuel MD 2022 3749    7 : Falls Node #4 Left Axilla Tissue Lymph Node, Falls TISSUE EXAM Wisam Manuel MD 2022 9781        Estimated Blood Loss:   10 mL    Drains:  * No LDAs found *    Anesthesia Type:   General    Operative Indications:  Malignant neoplasm of lower-inner quadrant of left breast in female, estrogen receptor positive ([facility] [address] ) [C50 312, Z17 0]      Operative Findings:  Not applicable    Complications:   None    Procedure and Technique:  Sam Martinez is a 57year old female status post left lumpectomy for DCIS    Her final pathology revealed an invasive component as well as close margins on the in Situ component  She was counseled on re-excision of the lumpectomy as well as sentinel node biopsy  She presented the day of surgery to the radiology suite referred medicine injection  The the operating room  She had preoperative antibiotics  She was given anesthesia  She had cc injection of Lymphazurin into left subareolar plexus  She was  Prepped and draped in the usual standard fashion  Time-out was performed  Attention was turned to the medial and   % Marcaine plain was injected for local anesthesia  The prior incision was ellipsed out to include skin underlying subcutaneous tissue to the superficial lumpectomy cavity  This was submitted as anterior margin left breast   Electrocautery was then used to dissect tissue from the base of the lumpectomy cavity  A suture was placed on the true margin  This was submitted as new posterior margin breast   Attention was then turned to the medial aspect cavity  Tissue was elevated into the wound using an Allis clamp and excised and marked with a suture on the true margin and submitted as new medial margin left breast   Attention was then turned to the left axilla  There was an area of increased radioactive uptake at the lower axillary hairline  Additional 0 5% Marcaine plain was injected for local anesthesia  An incision was created through the skin and subcutaneous tissue  Electrocautery was used to dissect through the remaining subcutaneous tissue and clavipectoral fascia to enter the axillary fat pad  Deep in the mid axilla were two adjacent radioactive nodes which were excised and submitted as sentinel node one and two of the left axilla  There was additional radio activity more superior in the axilla  This traced to two additional adjacent nodes that were elevated into the wound using an Allis clamp  These were excised completely,  and submitted as sentinel nodes three and four of the left axilla    Following removal of the 4th lymph node, there were no additional blue-stained, radioactive or palpable nodes  Both of her wounds were irrigated and hemostasis was achieved  Surgicel powder was also used for additional hemostasis  The wounds closed in a layered fashion using multiple interrupted 3-0 Monocryl suture and a running 4-0 Monocryl subcuticular stitch  All counts were correct  The skin was cleaned and dried  Surgical glue, fluffs and a bra were applied  The patient was then extubated and taken to recovery in stable condition  A physician assistant was required during the procedure for retraction, tissue handling,dissection and suturing; no residents were available      Patient Disposition:  extubated and stable      SIGNATURE: Len Atkinson MD  DATE: January 21, 2022  TIME: 10:20 AM

## 2022-01-21 NOTE — DISCHARGE INSTRUCTIONS
POST-OPERATIVE CARE INSTRUCTIONS       Care after your procedure:   General  · Rest and relax for 24 hours, then gradually return to normal activities  · Do not preform any heavy lifting or strenuous physical activities for 14 days  · Your activity restrictions will be re-evaluated at your post op visit  · Drink clear liquids until you are certain there is no nausea, then resume a normal diet  · Do not drink alcohol, drive any vehicle, operate mechanical equipment or make critical decisions for at least 24 hours and until you are off any narcotic pain medications  The Incision  · Your incision is closed with:   dissolvable stiches just underneath the skin  · The incision is also covered with:                          clear waterproof glue  · A gauze-pad is covering the wound  Wound care  · Remove your gauze-pad after 24 hours  · You may then shower using soap and water to clean your incision  Gently dry the wound  · You may redress your wound with additional gauze and tape if you choose  · A little bruising at the wound site is normal     Medication  · Resume all previous medications  · Take either Naproxen (Aleve) one tablet every 8 hours or Ibuprofen(Advil/Motrin) one(1) to two(2) tablets every 6 hours as needed  · Pain Medication Instructions:  May also use over-the-counter Tylenol or prescription tramadol if needed          Other (If applicable)  · Wear a post-surgical bra around the clock  · May use ice to the incision site(s) for the next 24-48 hours, twice daily     Call your  doctor if you have any of the following:  · Redness, swelling, heat, drainage, and/or bleeding from your wound  · Chills or fever ( above 101' F )  · Pain, not relieved with the above medications  · If you have any questions or problems call our office [phone]    Follow-up appointment:  · As scheduled

## 2022-01-21 NOTE — ANESTHESIA PREPROCEDURE EVALUATION
Review of Systems/Medical History  Patient summary reviewed  Chart reviewed  No history of anesthetic complications     Cardiovascular  Exercise tolerance (METS): good  ,  Hypertension well controlled,   Comment: HLD,  Pulmonary  Asthma Last rescue: < 6 months ago Asthma type of rescue: nebulizers, No shortness of breath, No recent URI , No sleep apnea ,        GI/Hepatic    GERD well controlled, Bowel prep            Endo/Other  Negative endo/other ROS   Comment: S/p bunionectomy, nasal polpectomy    GYN       Hematology   Musculoskeletal       Neurology   Psychology   Psychiatric history (anxiety),              Physical Exam    Airway    Mallampati score: I  TM Distance: >3 FB  Neck ROM: full     Dental   No notable dental hx     Cardiovascular  Rhythm: regular, Rate: normal, Cardiovascular exam normal    Pulmonary  Pulmonary exam normal Breath sounds clear to auscultation,     Other Findings        Anesthesia Plan  ASA Score- 2     Anesthesia Type- general with ASA Monitors  Additional Monitors:   Airway Plan: LMA  Plan Factors-Exercise tolerance (METS): good  Chart reviewed  Existing labs reviewed  Patient summary reviewed  Patient is not a current smoker  Patient instructed to abstain from smoking on day of procedure  Patient did not smoke on day of surgery  Induction- intravenous  Postoperative Plan-     Informed Consent- Anesthetic plan and risks discussed with patient and spouse (Wm)

## 2022-01-21 NOTE — H&P
H&P Exam - Surgical Oncology   Jordi Villegas 57 y o  female MRN: [id number]  Unit/Bed#: OR POOL Encounter: [id number]    Assessment/Plan     Assessment extensive dcis with close margins, invasive component noted on surgical excision  Plan:  Re excision lumpectomy left breast, sentinel node biopsy    History of Present Illness   HX and PE limited by: n/a  HPI:  Jordi Villegas is a 57 y o  female who is status post left bracketed lumpectomy secondary to ductal carcinoma in Situ, a discordant papilloma and intervening calcifications that spanned roughly four to five cm  Her final pathology shows extensive DCIS as well as an 8 mm invasive component ER/IA positive HER2 negative grade 2  She does have close margins in three locations for the DCIS to include the anterior, posterior and medial aspects  I am recommending reexcision of these areas as well as a sentinel node biopsy  Review of Systems   Constitutional: Negative  Negative for appetite change and fever  Eyes: Negative  Respiratory: Negative for shortness of breath  Cardiovascular: Negative  Gastrointestinal: Negative  Endocrine: Negative  Genitourinary: Negative  Musculoskeletal: Negative  Negative for arthralgias and myalgias  Skin: Negative  Allergic/Immunologic: Negative  Neurological: Negative  Hematological: Negative  Negative for adenopathy  Does not bruise/bleed easily  Psychiatric/Behavioral: Negative          Historical Information   Past Medical History:   Diagnosis Date    Asthma     Last Assessed: 10/19/2016    GERD (gastroesophageal reflux disease)     controlled with diet    Hypercholesteremia     Hypertension     Wears glasses      Past Surgical History:   Procedure Laterality Date    BREAST BIOPSY Left 09/28/2021    multiple sites    BREAST LUMPECTOMY Left 11/19/2021    Procedure: LUMPECTOMY BREAST BRACKETED AJAY LOCALIZED;  Surgeon: Real Kaye MD;  Location: [facility];  Service: Surgical Oncology    BUNIONECTOMY      BUNIONECTOMY Right 2009    BUNIONECTOMY Left 2007    COLONOSCOPY      FOOT SURGERY      MAMMO NEEDLE LOCALIZATION LEFT (ALL INC) Left 11/12/2021    MAMMO NEEDLE LOCALIZATION LEFT (ALL INC) EACH ADD Left 11/12/2021    MAMMO STEREOTACTIC BREAST BIOPSY LEFT (ALL INC) Left 9/28/2021    MAMMO STEREOTACTIC BREAST BIOPSY LEFT (ALL INC) EACH ADD Left 9/28/2021    NASAL POLYP EXCISION      AK COLONOSCOPY FLX DX W/COLLJ SPEC WHEN PFRMD N/A 5/23/2016    Procedure: COLONOSCOPY;  Surgeon: Adrian Mcdaniel DO;  Location: [facility];   Service: Gastroenterology    SHOULDER ARTHROSCOPY Right 2012    SHOULDER ARTHROSCOPY W/ ROTATOR CUFF REPAIR Right 2012     Social History   Social History     Substance and Sexual Activity   Alcohol Use No     Social History     Substance and Sexual Activity   Drug Use Never     Social History     Tobacco Use   Smoking Status Never Smoker   Smokeless Tobacco Never Used     E-Cigarette/Vaping    E-Cigarette Use Never User      E-Cigarette/Vaping Substances    Nicotine No     THC No     CBD No     Flavoring No     Other No     Unknown No      Family History   Problem Relation Age of Onset    Hypertension Mother    Cirilo Peters Breast cancer Mother         age unknown    Lung cancer Mother     Cervical cancer Mother    Cirilo Peters Diabetes Father     Hypertension Father     Prostate cancer Father         age unknown    Hypertension Brother     Prostate cancer Brother 48    Breast cancer Maternal Grandmother 40    Diabetes Paternal Grandmother     Hypertension Maternal Aunt     No Known Problems Maternal Aunt     Diabetes Paternal Aunt     Diabetes Paternal Uncle     Hypertension Cousin     Breast cancer Cousin         under 48    Diabetes Cousin     Breast cancer Cousin         under 48    Lung cancer Maternal Uncle        Meds/Allergies   all current active meds have been reviewed  No Known Allergies    Objective   Vitals: Blood pressure 155/79, pulse 84, temperature 98 5 °F (36 9 °C), temperature source Temporal, resp  rate 16, height 5' 4" (1 626 m), weight 94 4 kg (208 lb 1 8 oz), last menstrual period 12/15/2003, SpO2 96 %, not currently breastfeeding  No intake or output data in the 24 hours ending 01/21/22 0812    Invasive Devices  Report    Peripheral Intravenous Line            Peripheral IV 01/21/22 Dorsal (posterior); Right Hand <1 day                Physical Exam  Constitutional:       General: She is not in acute distress  HENT:      Head: Normocephalic and atraumatic  Cardiovascular:      Heart sounds: Normal heart sounds  Pulmonary:      Breath sounds: Normal breath sounds  Abdominal:      Palpations: Abdomen is soft  Musculoskeletal:      Right lower leg: No edema  Left lower leg: No edema  Neurological:      Mental Status: She is alert and oriented to person, place, and time  Psychiatric:         Mood and Affect: Mood normal          Lab Results: I have personally reviewed pertinent lab results  Imaging: I have personally reviewed pertinent reports      Pathology, and Other Studies: as noted    Code Status: No Order  Advance Directive and Living Will:      Power of :    POLST:

## 2022-01-21 NOTE — ANESTHESIA POSTPROCEDURE EVALUATION
Post-Op Assessment Note    CV Status:  Stable  Pain Score: 1    Pain management: adequate     Mental Status:  Alert and awake   Hydration Status:  Euvolemic   PONV Controlled:  Controlled   Airway Patency:  Patent      Post Op Vitals Reviewed: Yes      Staff: Anesthesiologist         No complications documented      BP      Temp     Pulse     Resp      SpO2

## 2022-02-03 ENCOUNTER — TELEPHONE (OUTPATIENT)
Dept: SURGICAL ONCOLOGY | Facility: CLINIC | Age: 62
End: 2022-02-03

## 2022-02-03 NOTE — TELEPHONE ENCOUNTER
Due to Dr Cortes Child schedule change, called Nasrin Cardoso and moved her appt for post op to 02/09/22 at 11:30 AM  She understands and is agreeable

## 2022-02-07 ENCOUNTER — OFFICE VISIT (OUTPATIENT)
Dept: SURGICAL ONCOLOGY | Facility: CLINIC | Age: 62
End: 2022-02-07

## 2022-02-07 VITALS
HEART RATE: 98 BPM | BODY MASS INDEX: 35.48 KG/M2 | TEMPERATURE: 95.8 F | SYSTOLIC BLOOD PRESSURE: 138 MMHG | OXYGEN SATURATION: 96 % | HEIGHT: 64 IN | DIASTOLIC BLOOD PRESSURE: 84 MMHG | RESPIRATION RATE: 17 BRPM | WEIGHT: 207.8 LBS

## 2022-02-07 DIAGNOSIS — Z17.0 MALIGNANT NEOPLASM OF LOWER-INNER QUADRANT OF LEFT BREAST IN FEMALE, ESTROGEN RECEPTOR POSITIVE (HCC): Primary | ICD-10-CM

## 2022-02-07 DIAGNOSIS — C50.312 MALIGNANT NEOPLASM OF LOWER-INNER QUADRANT OF LEFT BREAST IN FEMALE, ESTROGEN RECEPTOR POSITIVE (HCC): Primary | ICD-10-CM

## 2022-02-07 PROCEDURE — 99024 POSTOP FOLLOW-UP VISIT: CPT | Performed by: SURGERY

## 2022-02-07 NOTE — PROGRESS NOTES
57 y o  female is here today s/p re-excision of the left breast along with sentinel node biopsy  She reports some soreness and some discomfort in the posterior upper arm  Physical Exam  Constitutional:       General: She is not in acute distress  Chest:   Breasts:      Left: Swelling ( mild in the axilla) and skin change ( incision in the breast and axilla with no signs of infection) present  Neurological:      Mental Status: She is alert and oriented to person, place, and time  Psychiatric:         Mood and Affect: Mood normal          Data:   01/21/2022 left breast re-excision and sentinel node biopsy shows no residual in-situ or invasive carcinoma in the margins, four lymph nodes were sampled all of which were negative    Staging:    Eight mm IDC  Tumor grade two  LVI absent  Margins clean  Estrogen receptor and progesterone receptor status positive  HER2 status and test method negative    Lymph node assessment/status negative      Neoadjuvant therapy:  Not applicable  Stage: IA        Diagnoses and all orders for this visit:    Malignant neoplasm of lower-inner quadrant of left breast in female, estrogen receptor positive ([facility] )  -     Ambulatory referral to Hematology / Oncology; Future  -     Ambulatory referral to Radiation Oncology; Future        Assessment/Plan:  69-year-old female who presented with DCIS and atypical lobular hyperplasia status post bracketed lumpectomy  Her pathology revealed extensive ductal carcinoma in Situ as well as an 8 mm component of invasive ductal carcinoma  She had three close margins  She then had a re-excision of those margins as well as a sentinel node biopsy  She is healing well with no signs of infection  I am referring her to Medical and Radiation Oncology  We will plan to see her again in roughly six months or sooner should the need arise

## 2022-02-08 ENCOUNTER — TELEPHONE (OUTPATIENT)
Dept: HEMATOLOGY ONCOLOGY | Facility: CLINIC | Age: 62
End: 2022-02-08

## 2022-02-08 NOTE — TELEPHONE ENCOUNTER
Intra-Department Referral    Patient Details:  Tamiko Branham  1960  [id number]   Background Information:  [id number] starts by opening a telephone encounter and gathering the following information   What department is patient being referred to? Med onc    Who is calling to schedule and relationship?    Patient    Diagnosis Malignant neoplasm of lower-inner quadrant of left breast in female, estrogen receptor positive    What department was patient seen in last?       surg onc    Miscellaneous:

## 2022-02-09 ENCOUNTER — TELEPHONE (OUTPATIENT)
Dept: SURGICAL ONCOLOGY | Facility: CLINIC | Age: 62
End: 2022-02-09

## 2022-02-09 ENCOUNTER — TELEPHONE (OUTPATIENT)
Dept: HEMATOLOGY ONCOLOGY | Facility: CLINIC | Age: 62
End: 2022-02-09

## 2022-02-09 NOTE — TELEPHONE ENCOUNTER
Received a call from [facility] with questions about post surgical exercises  Discussed these with her  Referred her to the Breast Cancer Handbook she was given where these exercises are illustrated  She understands

## 2022-02-09 NOTE — TELEPHONE ENCOUNTER
The patient phoned in and is asking if someone from Dr Cody Ours office would be able to return her call to review the exercises which Dr Alexandria Cotton gave to her at her last follow up appointment  The best call back number is [phone]

## 2022-02-21 ENCOUNTER — CONSULT (OUTPATIENT)
Dept: HEMATOLOGY ONCOLOGY | Facility: CLINIC | Age: 62
End: 2022-02-21
Payer: COMMERCIAL

## 2022-02-21 VITALS
BODY MASS INDEX: 35.51 KG/M2 | WEIGHT: 208 LBS | DIASTOLIC BLOOD PRESSURE: 80 MMHG | HEART RATE: 97 BPM | OXYGEN SATURATION: 98 % | RESPIRATION RATE: 18 BRPM | TEMPERATURE: 96.8 F | HEIGHT: 64 IN | SYSTOLIC BLOOD PRESSURE: 138 MMHG

## 2022-02-21 DIAGNOSIS — C50.312 MALIGNANT NEOPLASM OF LOWER-INNER QUADRANT OF LEFT BREAST IN FEMALE, ESTROGEN RECEPTOR POSITIVE (HCC): Primary | ICD-10-CM

## 2022-02-21 DIAGNOSIS — Z17.0 MALIGNANT NEOPLASM OF LOWER-INNER QUADRANT OF LEFT BREAST IN FEMALE, ESTROGEN RECEPTOR POSITIVE (HCC): Primary | ICD-10-CM

## 2022-02-21 PROCEDURE — 3008F BODY MASS INDEX DOCD: CPT | Performed by: INTERNAL MEDICINE

## 2022-02-21 PROCEDURE — 99205 OFFICE O/P NEW HI 60 MIN: CPT | Performed by: INTERNAL MEDICINE

## 2022-02-21 PROCEDURE — 1036F TOBACCO NON-USER: CPT | Performed by: INTERNAL MEDICINE

## 2022-02-21 RX ORDER — ANASTROZOLE 1 MG/1
1 TABLET ORAL DAILY
Qty: 90 TABLET | Refills: 1 | Status: SHIPPED | OUTPATIENT
Start: 2022-02-21 | End: 2022-08-09 | Stop reason: SDUPTHER

## 2022-02-21 NOTE — LETTER
February 21, 2022     Heron Michelle, [address] [facility] [address]  [address] [facility]    Patient: Shila Pierce   YOB: 1960   Date of Visit: 2/21/2022       Dear Dr Liv Saenz:    Thank you for referring Lore Duran to me for evaluation  Below are my notes for this consultation  If you have questions, please do not hesitate to call me  I look forward to following your patient along with you  Sincerely,        Cruz Elliott MD        CC: MD Cruz Brooks MD  2/21/2022 10:00 AM  Sign when Signing Visit  Hematology / Oncology Outpatient Consult Note    Shila Pierce 57 y o  female [id number]         Date:  2/21/2022    Assessment / Plan:  A 50-year-old postmenopausal woman with newly diagnosed stage I A left breast cancer, grade 2, ER 90% positive, WI 90% positive, HER2 negative disease  She underwent lumpectomy and sentinel lymph node biopsy, resulting in HAWK  Her tumor size was 8 mm  She presents today with her  to discuss adjuvant treatment options  We had extensive discussion regarding the diagnosis, staging information, tumor phenotype, good prognosis and treatment options  Based on her age, tumor grade as well as size of tumor, adjuvant chemotherapy is not indicated  I recommended her to have adjuvant hormonal therapy with anastrozole for 5 years  Side effects of anastrozole was thoroughly discussed, including but not limited to hot flashes, musculoskeletal symptom and bone mineral density loss  I recommended her to continue calcium vitamin-D on a regular basis  She is in agreement with my recommendation  I will see her again in 6 months for routine follow-up  Subjective:     HPI:  A 50-year-old postmenopausal woman who was found to have abnormality in her left breast, based on a screening mammography  Therefore, she underwent left breast biopsy in September 28, 2021    She was found to have ductal carcinoma in Situ, ER 90% positive, SD 90% positive  She was seen by Dr Lester De Jesus  She has strong family history of breast cancer  Therefore, she underwent genetic testing which showed MSH-6 variant with uncertain significance  She subsequently underwent lumpectomy by Dr Lester De Jesus in November 19, 2021 which showed 8 mm of invasive ductal carcinoma, grade 2  This was ER 90% positive, SD 90% positive, HER2 negative disease  Because of the invasive cancer was found, she underwent left axillary sentinel lymph node biopsy in January 21, 2021   4 sentinel lymph nodes were all negative for metastatic disease  She presents today with her  to discuss adjuvant treatment options  She feels well  She has minor discomfort in the left breast   She denied any pain  She has no respiratory symptoms  Her weight is stable  She has hypertension, hypercholesteremia as well as asthma  She has no other surgical history  Her mother and have her grandmother had breast cancer  She is a lifetime never smoker  Her performance status is normal         Interval History:          Objective:     Primary Diagnosis:    1  Left breast cancer, stage I A (pT1b, pN0, M0) grade 2, ER 90% positive, SD 90% positive, HER2 negative disease  Diagnosed in January 2022  2  MSH-6 variant with uncertain significance      Cancer Staging:  Cancer Staging  Malignant neoplasm of lower-inner quadrant of left breast in female, estrogen receptor positive ([facility] [address] )  Staging form: Breast, AJCC 8th Edition  - Clinical: Stage 0 (cTis (DCIS), cN0, cM0, ER+, SD+, HER2: Not Assessed) - Signed by Monika Boggs MD on 10/26/2021  Stage prefix: Initial diagnosis  Method of lymph node assessment: Clinical  Nuclear grade: G2  - Pathologic stage from 12/7/2021: Stage IA (pT1b, pN0, cM0, G2, ER+, SD+, HER2-) - Signed by Monika Boggs MD on 12/7/2021  Stage prefix: Initial diagnosis  Method of lymph node assessment: Clinical  Histologic grading system: 3 grade system        Previous Hematologic/ Oncologic Treatment:         Current Hematologic/ Oncologic Treatment:      Adjuvant hormonal therapy with anastrozole to be started in February 2022  Disease Status:     HAWK status post lumpectomy and sentinel lymph node biopsy  Test Results:    Pathology:    8 mm of invasive ductal carcinoma, grade 2  No evidence of lymphovascular invasion  4 sentinel lymph nodes were all negative for metastatic disease  ER 90% positive, RI 90% positive, HER2 negative disease  Stage I A (pT1b, pN0, M0)    Radiology:    Chest x-ray was negative for pulmonary disease  DEXA scan in 2017 showed T-score-1 7, consistent with osteopenia  Laboratory:    See below  Physical Exam:      General Appearance:    Alert, oriented        Eyes:    PERRL   Ears:    Normal external ear canals, both ears   Nose:   Nares normal, septum midline   Throat:   Mucosa moist  Pharynx without injection  Neck:   Supple       Lungs:     Clear to auscultation bilaterally   Chest Wall:    No tenderness or deformity    Heart:    Regular rate and rhythm       Abdomen:     Soft, non-tender, bowel sounds +, no organomegaly           Extremities:   Extremities no cyanosis or edema       Skin:   no rash or icterus  Lymph nodes:   Cervical, supraclavicular, and axillary nodes normal   Neurologic:   CNII-XII intact, normal strength, sensation and reflexes     Throughout          Breast exam:   Lumpectomy scar at inner lower quadrant of her left breast with no palpable abnormalities  Right breast exam is negative  ROS: Review of Systems   All other systems reviewed and are negative  Imaging: No results found        Labs:   Lab Results   Component Value Date    WBC 4 72 12/08/2021    HGB 13 5 12/08/2021    HCT 41 4 12/08/2021    MCV 89 12/08/2021     12/08/2021     Lab Results   Component Value Date     03/05/2015    K 3 6 12/08/2021     (H) 12/08/2021    CO2 27 12/08/2021    ANIONGAP 10 03/05/2015    BUN 13 12/08/2021    CREATININE 0 83 12/08/2021    GLUCOSE 91 03/05/2015    GLUF 98 12/08/2021    CALCIUM 9 5 12/08/2021    CORRECTEDCA 10 1 12/08/2021    AST 16 12/08/2021    ALT 26 12/08/2021    ALKPHOS 112 12/08/2021    PROT 7 7 03/05/2015    BILITOT 0 81 03/05/2015    EGFR 87 12/08/2021       Vital Sign:    Body surface area is 1 99 meters squared      Wt Readings from Last 3 Encounters:   02/21/22 94 3 kg (208 lb)   02/07/22 94 3 kg (207 lb 12 8 oz)   01/21/22 94 4 kg (208 lb 1 8 oz)        Temp Readings from Last 3 Encounters:   02/21/22 (!) 96 8 °F (36 °C) (Tympanic Core)   02/07/22 (!) 95 8 °F (35 4 °C)   01/21/22 (!) 97 1 °F (36 2 °C) (Temporal)        BP Readings from Last 3 Encounters:   02/21/22 138/80   02/07/22 138/84   01/21/22 134/73         Pulse Readings from Last 3 Encounters:   02/21/22 97   02/07/22 98   01/21/22 79     @LASTSAO2(3)@    Active Problems:   Patient Active Problem List   Diagnosis    Anxiety    Asthma    Benign essential hypertension    Combined hyperlipidemia    Prediabetes    Vitamin D deficiency    Fibroid    Localized osteoporosis without current pathological fracture    Acute pain of right shoulder    Pain in both lower extremities    Neck pain    BMI 35 0-35 9,adult    Malignant neoplasm of lower-inner quadrant of left breast in female, estrogen receptor positive ([facility] [address] )    Family history of cancer    Calcification of left breast on mammography    BRCA negative    GERD (gastroesophageal reflux disease)       Past Medical History:   Past Medical History:   Diagnosis Date    Asthma     Last Assessed: 10/19/2016    GERD (gastroesophageal reflux disease)     controlled with diet    Hypercholesteremia     Hypertension     Wears glasses        Surgical History:   Past Surgical History:   Procedure Laterality Date    BREAST BIOPSY Left 09/28/2021    multiple sites    BREAST LUMPECTOMY Left 11/19/2021    Procedure: LUMPECTOMY BREAST BRACKETED AJAY LOCALIZED;  Surgeon: Ottoniel Hollingsworth MD;  [facility];  Service: Surgical Oncology    BREAST LUMPECTOMY Left 1/21/2022    Procedure: LUMPECTOMY BREAST, re-excision; 0800 NUC MED;  Surgeon: Ottoniel Hollingsworth MD;  [facility];  Service: Surgical Oncology    BUNIONECTOMY      BUNIONECTOMY Right 2009    BUNIONECTOMY Left 2007    COLONOSCOPY      FOOT SURGERY      LYMPH NODE BIOPSY Left 1/21/2022    Procedure: BIOPSY LYMPH NODE SENTINEL;  Surgeon: Ottoniel Hollingsworth MD;  [facility];  Service: Surgical Oncology    MAMMO NEEDLE LOCALIZATION LEFT (ALL INC) Left 11/12/2021    MAMMO NEEDLE LOCALIZATION LEFT (ALL INC) EACH ADD Left 11/12/2021    MAMMO STEREOTACTIC BREAST BIOPSY LEFT (ALL INC) Left 9/28/2021    MAMMO STEREOTACTIC BREAST BIOPSY LEFT (ALL INC) EACH ADD Left 9/28/2021    NASAL POLYP EXCISION      CT COLONOSCOPY FLX DX W/COLLJ SPEC WHEN PFRMD N/A 5/23/2016    Procedure: COLONOSCOPY;  Surgeon: Ngozi Mckeon DO;  [facility];   Service: Gastroenterology    SHOULDER ARTHROSCOPY Right 2012    SHOULDER ARTHROSCOPY W/ ROTATOR CUFF REPAIR Right 2012       Family History:    Family History   Problem Relation Age of Onset    Hypertension Mother    Jaymie Nj Breast cancer Mother         age unknown   Jaymie Nj Lung cancer Mother     Cervical cancer Mother    Jaymie Nj Diabetes Father     Hypertension Father     Prostate cancer Father         age unknown    Hypertension Brother     Prostate cancer Brother 48    Breast cancer Maternal Grandmother 40    Diabetes Paternal Grandmother     Hypertension Maternal Aunt     No Known Problems Maternal Aunt     Diabetes Paternal Aunt     Diabetes Paternal Uncle     Hypertension Cousin     Breast cancer Cousin         under 48    Diabetes Cousin     Breast cancer Cousin         under 48    Lung cancer Maternal Uncle        Cancer-related family history includes Breast cancer in her cousin, cousin, and mother; Breast cancer (age of onset: 40) in her maternal grandmother; Cervical cancer in her mother; Lung cancer in her maternal uncle and mother; Prostate cancer in her father; Prostate cancer (age of onset: 48) in her brother  Social History:   Social History     Socioeconomic History    Marital status: /Civil Union     Spouse name: Not on file    Number of children: Not on file    Years of education: Not on file    Highest education level: Not on file   Occupational History    Not on file   Tobacco Use    Smoking status: Never Smoker    Smokeless tobacco: Never Used   Vaping Use    Vaping Use: Never used   Substance and Sexual Activity    Alcohol use: No    Drug use: Never    Sexual activity: Yes     Partners: Male     Birth control/protection: None, Post-menopausal   Other Topics Concern    Not on file   Social History Narrative    Not on file     Social Determinants of Health     Financial Resource Strain: Not on file   Food Insecurity: Not on file   Transportation Needs: Not on file   Physical Activity: Not on file   Stress: Not on file   Social Connections: Not on file   Intimate Partner Violence: Not on file   Housing Stability: Not on file       Current Medications:   Current Outpatient Medications   Medication Sig Dispense Refill    albuterol (ProAir HFA) 90 mcg/act inhaler Inhale 2 puffs every 6 (six) hours as needed for wheezing 18 g 0    amLODIPine (NORVASC) 10 mg tablet Take 1 tablet (10 mg total) by mouth daily 90 tablet 0    atorvastatin (LIPITOR) 10 mg tablet Take 1 tablet (10 mg total) by mouth daily 90 tablet 0    calcium carbonate (OS-HALIMA) 1250 (500 Ca) MG tablet Take 1,200 mg by mouth daily       cholecalciferol (VITAMIN D3) 1,000 units tablet Take 2,000 Units by mouth daily        multivitamin (THERAGRAN) TABS Take 1 tablet by mouth daily        traMADol (ULTRAM) 50 mg tablet Take 1 tablet (50 mg total) by mouth every 8 (eight) hours as needed for moderate pain 9 tablet 0    anastrozole (ARIMIDEX) 1 mg tablet Take 1 tablet (1 mg total) by mouth daily 90 tablet 1     No current facility-administered medications for this visit         Allergies: No Known Allergies

## 2022-02-21 NOTE — PROGRESS NOTES
Hematology / Oncology Outpatient Consult Note    Paddy Kauffman 57 y o  female [id number]         Date:  2/21/2022    Assessment / Plan:  A 59-year-old postmenopausal woman with newly diagnosed stage I A left breast cancer, grade 2, ER 90% positive, MD 90% positive, HER2 negative disease  She underwent lumpectomy and sentinel lymph node biopsy, resulting in HAWK  Her tumor size was 8 mm  She presents today with her  to discuss adjuvant treatment options  We had extensive discussion regarding the diagnosis, staging information, tumor phenotype, good prognosis and treatment options  Based on her age, tumor grade as well as size of tumor, adjuvant chemotherapy is not indicated  I recommended her to have adjuvant hormonal therapy with anastrozole for 5 years  Side effects of anastrozole was thoroughly discussed, including but not limited to hot flashes, musculoskeletal symptom and bone mineral density loss  I recommended her to continue calcium vitamin-D on a regular basis  She is in agreement with my recommendation  I will see her again in 6 months for routine follow-up  Subjective:     HPI:  A 59-year-old postmenopausal woman who was found to have abnormality in her left breast, based on a screening mammography  Therefore, she underwent left breast biopsy in September 28, 2021  She was found to have ductal carcinoma in Situ, ER 90% positive, MD 90% positive  She was seen by Dr Edgard Cesar  She has strong family history of breast cancer  Therefore, she underwent genetic testing which showed MSH-6 variant with uncertain significance  She subsequently underwent lumpectomy by Dr Edgard Cesar in November 19, 2021 which showed 8 mm of invasive ductal carcinoma, grade 2  This was ER 90% positive, MD 90% positive, HER2 negative disease    Because of the invasive cancer was found, she underwent left axillary sentinel lymph node biopsy in January 21, 2021   4 sentinel lymph nodes were all negative for metastatic disease  She presents today with her  to discuss adjuvant treatment options  She feels well  She has minor discomfort in the left breast   She denied any pain  She has no respiratory symptoms  Her weight is stable  She has hypertension, hypercholesteremia as well as asthma  She has no other surgical history  Her mother and have her grandmother had breast cancer  She is a lifetime never smoker  Her performance status is normal         Interval History:          Objective:     Primary Diagnosis:    1  Left breast cancer, stage I A (pT1b, pN0, M0) grade 2, ER 90% positive, ME 90% positive, HER2 negative disease  Diagnosed in January 2022  2  MSH-6 variant with uncertain significance  Cancer Staging:  Cancer Staging  Malignant neoplasm of lower-inner quadrant of left breast in female, estrogen receptor positive ([facility] [address] )  Staging form: Breast, AJCC 8th Edition  - Clinical: Stage 0 (cTis (DCIS), cN0, cM0, ER+, ME+, HER2: Not Assessed) - Signed by Jessica Carr MD on 10/26/2021  Stage prefix: Initial diagnosis  Method of lymph node assessment: Clinical  Nuclear grade: G2  - Pathologic stage from 12/7/2021: Stage IA (pT1b, pN0, cM0, G2, ER+, ME+, HER2-) - Signed by Jessica Carr MD on 12/7/2021  Stage prefix: Initial diagnosis  Method of lymph node assessment: Clinical  Histologic grading system: 3 grade system        Previous Hematologic/ Oncologic Treatment:         Current Hematologic/ Oncologic Treatment:      Adjuvant hormonal therapy with anastrozole to be started in February 2022  Disease Status:     HAWK status post lumpectomy and sentinel lymph node biopsy  Test Results:    Pathology:    8 mm of invasive ductal carcinoma, grade 2  No evidence of lymphovascular invasion  4 sentinel lymph nodes were all negative for metastatic disease  ER 90% positive, ME 90% positive, HER2 negative disease    Stage I A (pT1b, pN0, M0)    Radiology:    Chest x-ray was negative for pulmonary disease  DEXA scan in 2017 showed T-score-1 7, consistent with osteopenia  Laboratory:    See below  Physical Exam:      General Appearance:    Alert, oriented        Eyes:    PERRL   Ears:    Normal external ear canals, both ears   Nose:   Nares normal, septum midline   Throat:   Mucosa moist  Pharynx without injection  Neck:   Supple       Lungs:     Clear to auscultation bilaterally   Chest Wall:    No tenderness or deformity    Heart:    Regular rate and rhythm       Abdomen:     Soft, non-tender, bowel sounds +, no organomegaly           Extremities:   Extremities no cyanosis or edema       Skin:   no rash or icterus  Lymph nodes:   Cervical, supraclavicular, and axillary nodes normal   Neurologic:   CNII-XII intact, normal strength, sensation and reflexes     Throughout          Breast exam:   Lumpectomy scar at inner lower quadrant of her left breast with no palpable abnormalities  Right breast exam is negative  ROS: Review of Systems   All other systems reviewed and are negative  Imaging: No results found  Labs:   Lab Results   Component Value Date    WBC 4 72 12/08/2021    HGB 13 5 12/08/2021    HCT 41 4 12/08/2021    MCV 89 12/08/2021     12/08/2021     Lab Results   Component Value Date     03/05/2015    K 3 6 12/08/2021     (H) 12/08/2021    CO2 27 12/08/2021    ANIONGAP 10 03/05/2015    BUN 13 12/08/2021    CREATININE 0 83 12/08/2021    GLUCOSE 91 03/05/2015    GLUF 98 12/08/2021    CALCIUM 9 5 12/08/2021    CORRECTEDCA 10 1 12/08/2021    AST 16 12/08/2021    ALT 26 12/08/2021    ALKPHOS 112 12/08/2021    PROT 7 7 03/05/2015    BILITOT 0 81 03/05/2015    EGFR 87 12/08/2021       Vital Sign:    Body surface area is 1 99 meters squared      Wt Readings from Last 3 Encounters:   02/21/22 94 3 kg (208 lb)   02/07/22 94 3 kg (207 lb 12 8 oz)   01/21/22 94 4 kg (208 lb 1 8 oz)        Temp Readings from Last 3 Encounters:   02/21/22 (!) 96 8 °F (36 °C) (Tympanic Core)   02/07/22 (!) 95 8 °F (35 4 °C)   01/21/22 (!) 97 1 °F (36 2 °C) (Temporal)        BP Readings from Last 3 Encounters:   02/21/22 138/80   02/07/22 138/84   01/21/22 134/73         Pulse Readings from Last 3 Encounters:   02/21/22 97   02/07/22 98   01/21/22 79     @LASTSAO2(3)@    Active Problems:   Patient Active Problem List   Diagnosis    Anxiety    Asthma    Benign essential hypertension    Combined hyperlipidemia    Prediabetes    Vitamin D deficiency    Fibroid    Localized osteoporosis without current pathological fracture    Acute pain of right shoulder    Pain in both lower extremities    Neck pain    BMI 35 0-35 9,adult    Malignant neoplasm of lower-inner quadrant of left breast in female, estrogen receptor positive ([facility] [address] )    Family history of cancer    Calcification of left breast on mammography    BRCA negative    GERD (gastroesophageal reflux disease)       Past Medical History:   Past Medical History:   Diagnosis Date    Asthma     Last Assessed: 10/19/2016    GERD (gastroesophageal reflux disease)     controlled with diet    Hypercholesteremia     Hypertension     Wears glasses        Surgical History:   Past Surgical History:   Procedure Laterality Date    BREAST BIOPSY Left 09/28/2021    multiple sites    BREAST LUMPECTOMY Left 11/19/2021    Procedure: LUMPECTOMY BREAST BRACKETED AJAY LOCALIZED;  Surgeon: Zahra Sheets MD;  [facility];  Service: Surgical Oncology    BREAST LUMPECTOMY Left 1/21/2022    Procedure: LUMPECTOMY BREAST, re-excision; 0800 NUC MED;  Surgeon: Zahra Sheets MD;  [facility];  Service: Surgical Oncology    BUNIONECTOMY      BUNIONECTOMY Right 2009    BUNIONECTOMY Left 2007    COLONOSCOPY      FOOT SURGERY      LYMPH NODE BIOPSY Left 1/21/2022    Procedure: BIOPSY LYMPH NODE SENTINEL;  Surgeon: Zahra Sheets MD;  [facility];  Service: Surgical Oncology    MAMMO NEEDLE LOCALIZATION LEFT (ALL INC) Left 11/12/2021    MAMMO NEEDLE LOCALIZATION LEFT (ALL INC) EACH ADD Left 11/12/2021    MAMMO STEREOTACTIC BREAST BIOPSY LEFT (ALL INC) Left 9/28/2021    MAMMO STEREOTACTIC BREAST BIOPSY LEFT (ALL INC) EACH ADD Left 9/28/2021    NASAL POLYP EXCISION      TN COLONOSCOPY FLX DX W/COLLJ SPEC WHEN PFRMD N/A 5/23/2016    Procedure: COLONOSCOPY;  Surgeon: Alex Ibarra DO;  [facility]; Service: Gastroenterology    SHOULDER ARTHROSCOPY Right 2012    SHOULDER ARTHROSCOPY W/ ROTATOR CUFF REPAIR Right 2012       Family History:    Family History   Problem Relation Age of Onset    Hypertension Mother    Luis Lemme Breast cancer Mother         age unknown   Luis Lemme Lung cancer Mother     Cervical cancer Mother    Luis Lemme Diabetes Father     Hypertension Father     Prostate cancer Father         age unknown    Hypertension Brother     Prostate cancer Brother 48    Breast cancer Maternal Grandmother 40    Diabetes Paternal Grandmother     Hypertension Maternal Aunt     No Known Problems Maternal Aunt     Diabetes Paternal Aunt     Diabetes Paternal Uncle     Hypertension Cousin     Breast cancer Cousin         under 48    Diabetes Cousin     Breast cancer Cousin         under 48    Lung cancer Maternal Uncle        Cancer-related family history includes Breast cancer in her cousin, cousin, and mother; Breast cancer (age of onset: 40) in her maternal grandmother; Cervical cancer in her mother; Lung cancer in her maternal uncle and mother; Prostate cancer in her father; Prostate cancer (age of onset: 48) in her brother      Social History:   Social History     Socioeconomic History    Marital status: /Civil Union     Spouse name: Not on file    Number of children: Not on file    Years of education: Not on file    Highest education level: Not on file   Occupational History    Not on file   Tobacco Use    Smoking status: Never Smoker    Smokeless tobacco: Never Used   Vaping Use    Vaping Use: Never used   Substance and Sexual Activity    Alcohol use: No    Drug use: Never    Sexual activity: Yes     Partners: Male     Birth control/protection: None, Post-menopausal   Other Topics Concern    Not on file   Social History Narrative    Not on file     Social Determinants of Health     Financial Resource Strain: Not on file   Food Insecurity: Not on file   Transportation Needs: Not on file   Physical Activity: Not on file   Stress: Not on file   Social Connections: Not on file   Intimate Partner Violence: Not on file   Housing Stability: Not on file       Current Medications:   Current Outpatient Medications   Medication Sig Dispense Refill    albuterol (ProAir HFA) 90 mcg/act inhaler Inhale 2 puffs every 6 (six) hours as needed for wheezing 18 g 0    amLODIPine (NORVASC) 10 mg tablet Take 1 tablet (10 mg total) by mouth daily 90 tablet 0    atorvastatin (LIPITOR) 10 mg tablet Take 1 tablet (10 mg total) by mouth daily 90 tablet 0    calcium carbonate (OS-HALIMA) 1250 (500 Ca) MG tablet Take 1,200 mg by mouth daily       cholecalciferol (VITAMIN D3) 1,000 units tablet Take 2,000 Units by mouth daily        multivitamin (THERAGRAN) TABS Take 1 tablet by mouth daily   traMADol (ULTRAM) 50 mg tablet Take 1 tablet (50 mg total) by mouth every 8 (eight) hours as needed for moderate pain 9 tablet 0    anastrozole (ARIMIDEX) 1 mg tablet Take 1 tablet (1 mg total) by mouth daily 90 tablet 1     No current facility-administered medications for this visit         Allergies: No Known Allergies

## 2022-02-24 ENCOUNTER — CLINICAL SUPPORT (OUTPATIENT)
Dept: RADIATION ONCOLOGY | Facility: HOSPITAL | Age: 62
End: 2022-02-24
Attending: RADIOLOGY
Payer: COMMERCIAL

## 2022-02-24 VITALS
TEMPERATURE: 97.8 F | DIASTOLIC BLOOD PRESSURE: 70 MMHG | OXYGEN SATURATION: 98 % | WEIGHT: 209.4 LBS | HEIGHT: 64 IN | SYSTOLIC BLOOD PRESSURE: 136 MMHG | RESPIRATION RATE: 16 BRPM | BODY MASS INDEX: 35.75 KG/M2 | HEART RATE: 79 BPM

## 2022-02-24 DIAGNOSIS — C50.312 MALIGNANT NEOPLASM OF LOWER-INNER QUADRANT OF LEFT BREAST IN FEMALE, ESTROGEN RECEPTOR POSITIVE (HCC): ICD-10-CM

## 2022-02-24 DIAGNOSIS — Z17.0 MALIGNANT NEOPLASM OF LOWER-INNER QUADRANT OF LEFT BREAST IN FEMALE, ESTROGEN RECEPTOR POSITIVE (HCC): Primary | ICD-10-CM

## 2022-02-24 DIAGNOSIS — C50.312 MALIGNANT NEOPLASM OF LOWER-INNER QUADRANT OF LEFT BREAST IN FEMALE, ESTROGEN RECEPTOR POSITIVE (HCC): Primary | ICD-10-CM

## 2022-02-24 DIAGNOSIS — Z17.0 MALIGNANT NEOPLASM OF LOWER-INNER QUADRANT OF LEFT BREAST IN FEMALE, ESTROGEN RECEPTOR POSITIVE (HCC): ICD-10-CM

## 2022-02-24 PROCEDURE — 99211 OFF/OP EST MAY X REQ PHY/QHP: CPT | Performed by: RADIOLOGY

## 2022-02-24 PROCEDURE — 77263 THER RADIOLOGY TX PLNG CPLX: CPT | Performed by: RADIOLOGY

## 2022-02-24 NOTE — PROGRESS NOTES
Calderon Esqueda 1960 is a 57 y o  female     Patient is a 57 y o female with newly diagnosed stage IA left breast cancer  She presents today for radiation oncology consult  She was referred by Dr Yenifer De La Garza  This was discovered on a screening mammogram done 8/28/21  Biopsy done 9/28/21 revealed DCIS ER 90% CO 90%  She was seen by Dr Yenifer De La Garza  She had a lumpectomy 11/19/21 which showed 8 mm invasive ductal carcinoma grade 2 ER 90% CO 90% HER2 negative  She had close margins in the anterior, posterior and medial aspects  She then had a re-excision and sentinel lymph node biopsy 1/21/21, 4 lymph nodes were negative for metastatic disease  She has a family history of breast cancer  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  8/28/21 bilateral screening mammogram-  FINDINGS:   LEFT  1) CALCIFICATIONS: There are calcifications in a grouped distribution seen in the left breast at 7 o'clock in the middle depth  Right  There are no suspicious masses, grouped microcalcifications or areas of architectural distortion  The skin and nipple areolar complex are unremarkable  IMPRESSION:  Additional imaging required  A breast health care nurse from our facility will be contacting the patient regarding the need for additional imaging     ASSESSMENT/BI-RADS CATEGORY:  Left: 0 - Incomplete: Needs Additional Imaging Evaluation  Right: 1 - Negative  Overall: 0 - Incomplete: Needs Additional Imaging Evaluation      9/14/21 diagnostic left mammogram-  FINDINGS:   LEFT  1) CALCIFICATIONS: There are amorphous calcifications in a grouped distribution seen in the left breast at 7 o'clock in the middle depth  2) CALCIFICATIONS: There are amorphous calcifications in a grouped distribution seen in the left breast at 9 o'clock in the middle depth  IMPRESSION:   2 groups of indeterminate calcifications in the inner left breast   Left stereotactic guided core needle biopsies are recommended  ASSESSMENT/BI-RADS CATEGORY:  Left: 4 - Suspicious  Overall: 4 - Suspicious      9/28/21   Final Diagnosis  A  Breast, Left, 7:00 location 2 cores w/ Calcs, Biopsy:  - Ductal carcinoma in situ   - No invasive carcinoma is seen  Architectural patterns: Solid, papillary       Nuclear grade of ductal carcinoma in situ: Grade 2 (II)  Necrosis:  Focal        Size of in-situ carcinoma: At least 4 mm   - Microcalcifications: Present in DCIS  - Breast cancer biomarker studies (paraffin block number: A1):           Estrogen, progesterone receptor studies pending, to be described in a separate receptor report  B  Breast, Left, 7:00 location 1 core w/ No Calcs, Biopsy:  - Atypical lobular hyperplasia  - Usual ductal hyperplasia involving intraductal papilloma  C  Breast, Left, 9:00 location 2 cores w/ Calcs, Biopsy:  - Non-proliferative fibrocystic changes, including microcysts, microcalcifications with stromal fibrosis  - Negative for atypia or carcinoma  D  Breast, Left, 9:00 location 1 core w/ No Calcs, Biopsy:  - Intraductal papilloma  - Negative for atypia or carcinoma       11/19/21 Left breast lumpectomy  Synoptic Checklist     INVASIVE CARCINOMA OF THE BREAST: Resection  8th Edition - Protocol posted: 2/26/2020  INVASIVE CARCINOMA OF THE BREAST: COMPLETE EXCISION - All Specimens  SPECIMEN   Procedure  Excision (less than total mastectomy)    Specimen Laterality  Left    TUMOR   Tumor Site  Lower inner quadrant      Clock position      7 o'clock    Histologic Type  Invasive carcinoma of no special type (ductal)    Glandular (Acinar) / Tubular Differentiation  Score 3    Nuclear Pleomorphism  Score 2    Mitotic Rate  Score 2    Overall Grade  Grade 2 (scores of 6 or 7)    Tumor Size  Greatest dimension of largest invasive focus (Millimeters): 8 mm   Tumor Focality  Single focus of invasive carcinoma    Ductal Carcinoma In Situ (DCIS)  Present      Positive for extensive intraductal component (EIC)    Size (Extent) of DCIS  Estimated size (extent) of DCIS is at least JOSE LUIS COM [facility]): At least 48 mm   Number of Blocks with DCIS  50    Number of Blocks Examined  169    Architectural Patterns  Cribriform      Micropapillary      Papillary      Solid    Nuclear Grade  Grade II (intermediate)    Necrosis  Present, central (expansive "comedo" necrosis)    Lobular Carcinoma In Situ (LCIS)  Present    Tumor Extent     Lymphovascular Invasion  Not identified    Dermal Lymphovascular Invasion  Not identified    Microcalcifications  Present in DCIS      Present in non-neoplastic tissue      Present in non-invasive lobular neoplasia  Treatment Effect in the Breast  No known presurgical therapy    MARGINS   Invasive Carcinoma Margins  Uninvolved by invasive carcinoma    Distance from Closest Margin (Millimeters)  8 mm   Closest Margin(s)  Posterior    DCIS Margins  Uninvolved by DCIS    Distance from Closest Margin (Millimeters)  0 1 (posterior margin specimen A) mm   Closest Margin(s)  Posterior    Distance from Other Margins     Anterior Margin (Millimeters)  < 1 mm   Medial Margin (Millimeters)  < 1-2 mm   LYMPH NODES   Regional Lymph Nodes  No lymph nodes submitted or found    PATHOLOGIC STAGE CLASSIFICATION (pTNM, AJCC 8th Edition)      Primary Tumor (pT)  pT1b    Regional Lymph Nodes (pN)  pNX    ADDITIONAL FINDINGS   Additional Findings  Atypical ductal hyperplasia; Non-invasive lobular neoplasia (focally florid lobular carcinoma in-situ and atypical lobular hyperplasia with pagetoid ductal spread); columnar cell lesions with FEA               1/21/22 Re-excision, left sentinel lymph node biopsy  A  Left breast, anterior margin:     - Histologic changes compatible with prior surgical procedure      - Skin with dermal cicatrix  - No invasive or in situ carcinoma identified      - Final anterior margin is negative for malignancy  B    Left breast, new posterior margin:     - Focus of atypical lobular hyperplasia ([place])  - No invasive or in situ carcinoma identified      - Final posterior margin is negative for malignancy  C   Left breast, medial margin:     - Histologic changes compatible with prior surgical procedure  - No invasive or in situ carcinoma identified      - Final medial margin is negative for malignacny  D   Left axilla, sentinel lymph node #1:     - Single lymph node; negative for malignancy (0/1)  E   Left axilla, sentinel lymph node #2:     - Single lymph node; negative for malignancy (0/1)  F   Left axilla, sentinel lymph node #3:     - Single lymph node; negative for malignancy (0/1)  G   Left axilla, sentinel lymph node #4:     - Single lymph node; negative for malignancy (0/1)  22 Dr Niko Francois- Healing well no signs of infection  Refer to med onc and rad onc  F/u 6 months  Staging:  Eight mm IDC  Tumor grade two  LVI absent  Margins clean  Estrogen receptor and progesterone receptor status positive  HER2 status and test method negative    Lymph node assessment/status negative  Neoadjuvant therapy:  Not applicable  Stage: IA    22 HemOnc Consult, Dr Tana Woodson- Chemotherapy not indicated  Recommend anastrozole for 5 years  F/u 6 months      Upcomin/1/22 Nandini Villanueva NP  22 Dr Tana Woodson      Oncology History   Malignant neoplasm of lower-inner quadrant of left breast in female, estrogen receptor positive ([facility] [address] )   2021 Initial Diagnosis    Ductal carcinoma in situ (DCIS) of left breast     2021 Biopsy    Final Diagnosis   A  Breast, Left, 7:00 location 2 cores w/ Calcs, Biopsy:  - Ductal carcinoma in situ   - No invasive carcinoma is seen  Architectural patterns: Solid, papillary       Nuclear grade of ductal carcinoma in situ: Grade 2 (II)  Necrosis:  Focal        Size of in-situ carcinoma: At least 4 mm   - Microcalcifications: Present in DCIS    - Breast cancer biomarker studies (paraffin block number: A1):           Estrogen, progesterone receptor studies pending, to be described in a separate receptor report  ER 90-95% positive  Pr90-95% positive  B  Breast, Left, 7:00 location 1 core w/ No Calcs, Biopsy:  - Atypical lobular hyperplasia  - Usual ductal hyperplasia involving intraductal papilloma  C  Breast, Left, 9:00 location 2 cores w/ Calcs, Biopsy:  - Non-proliferative fibrocystic changes, including microcysts, microcalcifications with stromal fibrosis  - Negative for atypia or carcinoma  D  Breast, Left, 9:00 location 1 core w/ No Calcs, Biopsy:  - Intraductal papilloma  - Negative for atypia or carcinoma  10/26/2021 -  Cancer Staged    Staging form: Breast, AJCC 8th Edition  - Clinical: Stage 0 (cTis (DCIS), cN0, cM0, ER+, WA+, HER2: Not Assessed) - Signed by Reno Solomon MD on 10/26/2021  Stage prefix: Initial diagnosis  Method of lymph node assessment: Clinical  Nuclear grade: G2       10/2021 Genetic Testing    Test: [employer] BRCAplus STAT Panel (8 genes): ZAKIA, BRCA1, BRCA2, CDH1, CHEK2, PALB2, PTEN, TP53   Result:   Negative - No Clinically Significant Variants Detected       Assessment:  Negative     Additional Testing:  Test(s): [employer] BRCAplus STAT Panel (8 genes): ZAKIA, BRCA1, BRCA2, CDH1, CHEK2, PALB2, PTEN, TP53 with reflex to [employer] CancerNext (28 additional genes): APC, ZAKIA, AXIN2 BARD1, BRCA1, BRCA2, BRIP1, BMPR1A, CDH1, CDK4, CDKN2A, CHEK2, DICER1, EPCAM, GREM1, HOXB13, MLH1, MSH2, MSH3, MSH6, MUTYH, NBN, NF1, NTHL1, PALB2, PMS2, POLD1, POLE, PTEN, RAD51C, RAD51D, RECQL SMAD4, SMARCA4, STK11, TP53      Result: Variant of uncertain significance     Variant: MSH6 Variant, Unknown Significance: p  T6P       Variant of uncertain significance (VUS)     11/19/2021 Surgery    Final Diagnosis   A   Breast, Left, Left breast lumpectomy, short superior, long lateral:  - Invasive mammary carcinoma of no special type (ductal), 8 mm in greatest dimension with    extensive adjacent ductal carcinoma in-situ and associated non-invasive lobular neoplasia  -- Jamie histologic grade 2 of 3 (total score: 7 of 9)        * Glandular (acinar) Tubular Differentiation < 10%, score 3         * Nuclear Pleomorphism 2 of 3, score 2         * Mitotic Rate 4-7/mm2, (8-14 mitoses/10HPF; 8 mitoses/10 HPF), score 2     -- Confirmed by tumor cell immunophenotype:        * Positive: E-cadherin, P120 - each in a membranous pattern  * Negative: p63, SMMHC   - Ductal carcinoma in-situ (DCIS): Present; not an extensive intraductal component associated with     invasive carcinoma (< 5% of total tumor) but extensive DCIS in adjacent tissue  -- Confirmed by positive E-cadherin, p120 (membranous), SMMHC, p63 and loss of epithelial         mosaicism on CK5/6 immunostains  -- Size and Extent: At least 48 mm in approximate extent; present in 12 sequential sections; 43 of 91 blocks  -- Architectural Patterns: Cribriform, micropapillary, solid, papillary  -- Nuclear grade: I-II (low to intermediate)    -- Necrosis: Present, focal and central necrosis identified  -- Associated prior biopsy site changes with twist clip and Aliyah  marker   - Multifocal non-invasive lobular neoplasia (focally florid lobular carcinoma in-situ/LCIS and atypical lobular     hyperplasia with pagetoid ductal spread) involving and adjacent to DCIS  -- Confirmed by negative E-cadherin; positive p120 (cytoplasmic), SMMHC, p63; and loss of epithelial        mosaicism on CK5/6 immunostains    - Margins uninvolved by invasive and in-situ carcinoma  -- Invasive carcinoma: 8 mm from nearest posterior margin  -- DCIS: 0 1 mm from nearest posterior margin; < 1 mm from nearest anterior margin; 1 5 mm from        nearest medial margin; > 2 mm from all remaining margins    - Benign skin  -- No dermal lymphovascular invasion    - Lymphovascular invasion: Not identified    - Microcalcifications: Present, associated with DCIS  - Benign fibrocystic changes with atypia (atypical and florid usual ductal hyperplasia, columnar cell     lesions with flat epithelial atypia/FEA, cystic and papillary apocrine metaplasia)  - Radial scar, 0 4 cm  B  Breast, Left, Left Breast Lumpectomy new superior margin - Short Superior, Long Lateral:  - Ductal carcinoma in-situ (DCIS)  -- Size and Extent: 16 mm in greatest approximate extent; present in 4 sequential sections; 7 of 78 blocks  -- Architectural Patterns: Cribriform, micropapillary, solid, papillary  -- Nuclear grade: I-II (low to intermediate)  -- Necrosis: Present, focal      -- Confirmed by positive E-cadherin, P120 (membranous) and loss of epithelial mosaicism on         CK5/6 immunostains  -- Associated prior biopsy site changes with biopsy clip and AJAY  marker   - No invasive carcinoma identified  - Margins uninvolved by DCIS  -- DCIS < 1 mm from nearest posterior margin; < 1-2 mm from medial margin; > 2 mm from all       remaining margins  - Lymphovascular invasion: Not identified  - Microcalcifications: Present, associated with DCIS, lobular neoplasia and non-neoplastic breast tissue     - Benign fibrocystic changes with atypia:    -- Multifocal/extensive non-invasive lobular neoplasia (focal lobular carcinoma in-situ and        predominantly atypical lobular hyperplasia with pagetoid ductal spread), focally involving        and adjacent to DCIS  ** Confirmed by negative E-cadherin; positive p120 (cytoplasmic) immunostains  -- Usual ductal hyperplasia, columnar cell change and hyperplasia with focal flat epithelial        atypia/FEA, sclerosing adenosis, fibroadenomatoid changes, cystic and papillary apocrine metaplasia     - Small intraductal papilloma without atypia, 0 1 cm          12/7/2021 -  Cancer Staged    Staging form: Breast, AJCC 8th Edition  - Pathologic stage from 12/7/2021: Stage IA (pT1b, pN0, cM0, G2, ER+, [place]+, HER2-) - Signed by Gricelda Soriano MD on 12/7/2021  Stage prefix: Initial diagnosis  Method of lymph node assessment: Clinical  Histologic grading system: 3 grade system       1/21/2022 Surgery    Final Diagnosis   A  Left breast, anterior margin:     - Histologic changes compatible with prior surgical procedure      - Skin with dermal cicatrix  - No invasive or in situ carcinoma identified      - Final anterior margin is negative for malignancy  B  Left breast, new posterior margin:     - Focus of atypical lobular hyperplasia ([facility])  - No invasive or in situ carcinoma identified      - Final posterior margin is negative for malignancy  C   Left breast, medial margin:     - Histologic changes compatible with prior surgical procedure  - No invasive or in situ carcinoma identified      - Final medial margin is negative for malignacny  D   Left axilla, sentinel lymph node #1:     - Single lymph node; negative for malignancy (0/1)  E   Left axilla, sentinel lymph node #2:     - Single lymph node; negative for malignancy (0/1)  F   Left axilla, sentinel lymph node #3:     - Single lymph node; negative for malignancy (0/1)  G   Left axilla, sentinel lymph node #4:     - Single lymph node; negative for malignancy (0/1)  Review of Systems:  Review of Systems   Constitutional: Negative for appetite change, fatigue, fever and unexpected weight change  HENT: Negative  Eyes: Negative  Wears glasses   Respiratory: Negative  Negative for shortness of breath  Cardiovascular: Negative  Negative for chest pain and leg swelling  Gastrointestinal: Negative  Negative for constipation and diarrhea  Genitourinary: Negative  Negative for difficulty urinating  Musculoskeletal: Negative for arthralgias and gait problem  Skin: Positive for wound (left axilla and left breast incisions closed, healing )     Neurological: Positive for numbness (mild left upper inner arm, improving)  Negative for weakness  Hematological: Negative  Psychiatric/Behavioral: Positive for dysphoric mood (mild)  Negative for sleep disturbance  The patient is nervous/anxious  Clinical Trial: no    OB/GYN History:  The patient underwent menarche at 14 years  Menopause Status Pre, Carmencita, Post, Unknown and No Answer  Patient's last menstrual period was 12/15/2003 (within years)  Menopause at ~43 years  Menopause Reason: natural  Hormone replacement therapy: no   1   Para 0   Birth control pills: yes  Years used unknown; Use of IUD  Bra size 40D    Pregnancy test needed:  no    ONCOTYPE/MAMMOPRINT results: no    PFT: n/a    Prior Radiation: no    Teaching: NCI RT packet given, RT to breast reviewed with patient and spouse, Bill    MST: completed    Implantable Devices (Port, Pacemaker, pain stimulator): no    Hip Replacement: no    Covid Vaccine Status:  Eva Ocampo x2     [unfilled]  Health Maintenance   Topic Date Due    Annual Physical  Never done    DTaP,Tdap,and Td Vaccines (1 - Tdap) Never done    BMI: Followup Plan  2021    Depression Screening  2021    COVID-19 Vaccine (3 - Booster for Moderna series) 2021    Pneumococcal Vaccine: Pediatrics (0 to 5 Years) and At-Risk Patients (6 to 59 Years) (1 of 2 - PPSV23) 2025 (Originally 1966)    Breast Cancer Screening: Mammogram  2022    BMI: Adult  2023    Colorectal Cancer Screening  2026    Cervical Cancer Screening  10/29/2026    HIV Screening  Completed    Hepatitis C Screening  Completed    Osteoporosis Screening  Completed    Influenza Vaccine  Completed    HIB Vaccine  Aged Out    Hepatitis B Vaccine  Aged Out    IPV Vaccine  Aged Out    Hepatitis A Vaccine  Aged Out    Meningococcal ACWY Vaccine  Aged Out    HPV Vaccine  Aged Out     Past Medical History:   Diagnosis Date    Asthma     Last Assessed: 10/19/2016    Breast cancer ([address] )     GERD (gastroesophageal reflux disease)     controlled with diet    Hypercholesteremia     Hypertension     Wears glasses      Past Surgical History:   Procedure Laterality Date    BREAST BIOPSY Left 09/28/2021    multiple sites    BREAST LUMPECTOMY Left 11/19/2021    Procedure: LUMPECTOMY BREAST BRACKETED AJAY LOCALIZED;  Surgeon: Kelechi Ennis MD;  [facility];  Service: Surgical Oncology    BREAST LUMPECTOMY Left 1/21/2022    Procedure: LUMPECTOMY BREAST, re-excision; 0[address] MED;  Surgeon: Kelechi Ennis MD;  [facility];  Service: Surgical Oncology    BUNIONECTOMY      BUNIONECTOMY Right 2009    BUNIONECTOMY Left 2007    COLONOSCOPY      FOOT SURGERY      LYMPH NODE BIOPSY Left 1/21/2022    Procedure: BIOPSY LYMPH NODE SENTINEL;  Surgeon: Kelechi Ennis MD;  Location[facility];  Service: Surgical Oncology    MAMMO NEEDLE LOCALIZATION LEFT (ALL INC) Left 11/12/2021    MAMMO NEEDLE LOCALIZATION LEFT (ALL INC) EACH ADD Left 11/12/2021    MAMMO STEREOTACTIC BREAST BIOPSY LEFT (ALL INC) Left 9/28/2021    MAMMO STEREOTACTIC BREAST BIOPSY LEFT (ALL INC) EACH ADD Left 9/28/2021    NASAL POLYP EXCISION      CO COLONOSCOPY FLX DX W/COLLJ SPEC WHEN PFRMD N/A 5/23/2016    Procedure: COLONOSCOPY;  Surgeon: Ramya Parra DO;  Location: [facility];   Service: Gastroenterology    SHOULDER ARTHROSCOPY Right 2012    SHOULDER ARTHROSCOPY W/ ROTATOR CUFF REPAIR Right 2012     Family History   Problem Relation Age of Onset    Hypertension Mother    Andreina Diones Breast cancer Mother         age unknown   Andreina Diones Lung cancer Mother     Cervical cancer Mother     Diabetes Father     Hypertension Father     Prostate cancer Father         age unknown    Hypertension Brother     Prostate cancer Brother 48    Breast cancer Maternal Grandmother 40    Diabetes Paternal Grandmother     Hypertension Maternal Aunt     No Known Problems Maternal Aunt     Diabetes Paternal Aunt     Diabetes Paternal Uncle     Hypertension Cousin  Breast cancer Cousin         under 48    Diabetes Cousin     Breast cancer Cousin         under 48    Lung cancer Maternal Uncle      Social History     Tobacco Use    Smoking status: Never Smoker    Smokeless tobacco: Never Used   Vaping Use    Vaping Use: Never used   Substance Use Topics    Alcohol use: No    Drug use: Never        Current Outpatient Medications:     albuterol (ProAir HFA) 90 mcg/act inhaler, Inhale 2 puffs every 6 (six) hours as needed for wheezing, Disp: 18 g, Rfl: 0    amLODIPine (NORVASC) 10 mg tablet, Take 1 tablet (10 mg total) by mouth daily, Disp: 90 tablet, Rfl: 0    anastrozole (ARIMIDEX) 1 mg tablet, Take 1 tablet (1 mg total) by mouth daily, Disp: 90 tablet, Rfl: 1    atorvastatin (LIPITOR) 10 mg tablet, Take 1 tablet (10 mg total) by mouth daily, Disp: 90 tablet, Rfl: 0    calcium carbonate (OS-HALIMA) 1250 (500 Ca) MG tablet, Take 1,200 mg by mouth daily , Disp: , Rfl:     cholecalciferol (VITAMIN D3) 1,000 units tablet, Take 2,000 Units by mouth daily  , Disp: , Rfl:     multivitamin (THERAGRAN) TABS, Take 1 tablet by mouth daily  , Disp: , Rfl:     traMADol (ULTRAM) 50 mg tablet, Take 1 tablet (50 mg total) by mouth every 8 (eight) hours as needed for moderate pain, Disp: 9 tablet, Rfl: 0  No Known Allergies   There were no vitals filed for this visit

## 2022-02-24 NOTE — PROGRESS NOTES
Consultation - Radiation Oncology      EOR:[id number] : 1960  Encounter: [id number]  Patient Information: [address]  Chief Complaint   Patient presents with    Consult     Cancer Staging  Malignant neoplasm of lower-inner quadrant of left breast in female, estrogen receptor positive ([facility] [address] )  Staging form: Breast, AJCC 8th Edition  - Clinical: Stage 0 (cTis (DCIS), cN0, cM0, ER+, AR+, HER2: Not Assessed) - Signed by Elizabet Reyes MD on 10/26/2021  Stage prefix: Initial diagnosis  Method of lymph node assessment: Clinical  Nuclear grade: G2  - Pathologic stage from 2021: Stage IA (pT1b, pN0, cM0, G2, ER+, AR+, HER2-) - Signed by Elizabet Reyes MD on 2021  Stage prefix: Initial diagnosis  Method of lymph node assessment: Clinical  Histologic grading system: 3 grade system           History of Present Illness     Patient is a 57 y o female with newly diagnosed stage IA left breast cancer  She presents today for radiation oncology consult  She was referred by Dr Irasema Stern      This was discovered on a screening mammogram done 21  Biopsy done 21 revealed DCIS ER 90% AR 90%  She was seen by Dr Irasema Stern  She had a lumpectomy 21 which showed 8 mm invasive ductal carcinoma grade 2 ER 90% AR 90% HER2 negative  She had close margins in the anterior, posterior and medial aspects  She then had a re-excision and sentinel lymph node biopsy 21, 4 lymph nodes were negative for metastatic disease      She has a family history of breast cancer  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      21 bilateral screening mammogram-  FINDINGS:   LEFT  1) CALCIFICATIONS: There are calcifications in a grouped distribution seen in the left breast at 7 o'clock in the middle depth  Right  There are no suspicious masses, grouped microcalcifications or areas of architectural distortion  The skin and nipple areolar complex are unremarkable        IMPRESSION:  Additional imaging required  A breast health care nurse from our facility will be contacting the patient regarding the need for additional imaging     ASSESSMENT/BI-RADS CATEGORY:  Left: 0 - Incomplete: Needs Additional Imaging Evaluation  Right: 1 - Negative  Overall: 0 - Incomplete: Needs Additional Imaging Evaluation        9/14/21 diagnostic left mammogram-  FINDINGS:   LEFT  1) CALCIFICATIONS: There are amorphous calcifications in a grouped distribution seen in the left breast at 7 o'clock in the middle depth  2) CALCIFICATIONS: There are amorphous calcifications in a grouped distribution seen in the left breast at 9 o'clock in the middle depth       IMPRESSION:   2 groups of indeterminate calcifications in the inner left breast   Left stereotactic guided core needle biopsies are recommended      ASSESSMENT/BI-RADS CATEGORY:  Left: 4 - Suspicious  Overall: 4 - Suspicious  9/28/21   Final Diagnosis  A  Breast, Left, 7:00 location 2 cores w/ Calcs, Biopsy:  - Ductal carcinoma in situ   - No invasive carcinoma is seen  Architectural patterns: Solid, papillary       Nuclear grade of ductal carcinoma in situ: Grade 2 (II)  Necrosis:  Focal        Size of in-situ carcinoma: At least 4 mm   - Microcalcifications: Present in DCIS  - Breast cancer biomarker studies (paraffin block number: A1):           Estrogen, progesterone receptor studies pending, to be described in a separate receptor report  B  Breast, Left, 7:00 location 1 core w/ No Calcs, Biopsy:  - Atypical lobular hyperplasia  - Usual ductal hyperplasia involving intraductal papilloma  C  Breast, Left, 9:00 location 2 cores w/ Calcs, Biopsy:  - Non-proliferative fibrocystic changes, including microcysts, microcalcifications with stromal fibrosis  - Negative for atypia or carcinoma  D  Breast, Left, 9:00 location 1 core w/ No Calcs, Biopsy:  - Intraductal papilloma  - Negative for atypia or carcinoma  Historical Information   Oncology History   Malignant neoplasm of lower-inner quadrant of left breast in female, estrogen receptor positive ([facility] [address] )   9/28/2021 Initial Diagnosis    Ductal carcinoma in situ (DCIS) of left breast     9/28/2021 Biopsy    Final Diagnosis   A  Breast, Left, 7:00 location 2 cores w/ Calcs, Biopsy:  - Ductal carcinoma in situ   - No invasive carcinoma is seen  Architectural patterns: Solid, papillary       Nuclear grade of ductal carcinoma in situ: Grade 2 (II)  Necrosis:  Focal        Size of in-situ carcinoma: At least 4 mm   - Microcalcifications: Present in DCIS  - Breast cancer biomarker studies (paraffin block number: A1):           Estrogen, progesterone receptor studies pending, to be described in a separate receptor report  ER 90-95% positive  Pr90-95% positive  B  Breast, Left, 7:00 location 1 core w/ No Calcs, Biopsy:  - Atypical lobular hyperplasia  - Usual ductal hyperplasia involving intraductal papilloma  C  Breast, Left, 9:00 location 2 cores w/ Calcs, Biopsy:  - Non-proliferative fibrocystic changes, including microcysts, microcalcifications with stromal fibrosis  - Negative for atypia or carcinoma  D  Breast, Left, 9:00 location 1 core w/ No Calcs, Biopsy:  - Intraductal papilloma  - Negative for atypia or carcinoma           10/26/2021 -  Cancer Staged    Staging form: Breast, AJCC 8th Edition  - Clinical: Stage 0 (cTis (DCIS), cN0, cM0, ER+, NC+, HER2: Not Assessed) - Signed by Jessica Carr MD on 10/26/2021  Stage prefix: Initial diagnosis  Method of lymph node assessment: Clinical  Nuclear grade: G2       10/2021 Genetic Testing    Test: Stitcher BRCAplus STAT Panel (8 genes): ZAKIA, BRCA1, BRCA2, CDH1, CHEK2, PALB2, PTEN, TP53   Result:   Negative - No Clinically Significant Variants Detected       Assessment:  Negative     Additional Testing:  Test(s): Stitcher BRCAplus STAT Panel (8 genes): ZAKIA, BRCA1, BRCA2, CDH1, CHEK2, PALB2, PTEN, TP53 with reflex to [employer] (28 additional genes): APC, ZAKIA, AXIN2 BARD1, BRCA1, BRCA2, BRIP1, BMPR1A, CDH1, CDK4, CDKN2A, CHEK2, DICER1, EPCAM, GREM1, HOXB13, MLH1, MSH2, MSH3, MSH6, MUTYH, NBN, NF1, NTHL1, PALB2, PMS2, POLD1, POLE, PTEN, RAD51C, RAD51D, RECQL SMAD4, SMARCA4, STK11, TP53      Result: Variant of uncertain significance     Variant: MSH6 Variant, Unknown Significance: p  T6P       Variant of uncertain significance (VUS)     11/19/2021 Surgery    Final Diagnosis   A  Breast, Left, Left breast lumpectomy, short superior, long lateral:  - Invasive mammary carcinoma of no special type (ductal), 8 mm in greatest dimension with    extensive adjacent ductal carcinoma in-situ and associated non-invasive lobular neoplasia  -- Jamie histologic grade 2 of 3 (total score: 7 of 9)        * Glandular (acinar) Tubular Differentiation < 10%, score 3         * Nuclear Pleomorphism 2 of 3, score 2         * Mitotic Rate 4-7/mm2, (8-14 mitoses/10HPF; 8 mitoses/10 HPF), score 2     -- Confirmed by tumor cell immunophenotype:        * Positive: E-cadherin, P120 - each in a membranous pattern  * Negative: p63, SMMHC   - Ductal carcinoma in-situ (DCIS): Present; not an extensive intraductal component associated with     invasive carcinoma (< 5% of total tumor) but extensive DCIS in adjacent tissue  -- Confirmed by positive E-cadherin, p120 (membranous), SMMHC, p63 and loss of epithelial         mosaicism on CK5/6 immunostains  -- Size and Extent: At least 48 mm in approximate extent; present in 12 sequential sections; 43 of 91 blocks  -- Architectural Patterns: Cribriform, micropapillary, solid, papillary  -- Nuclear grade: I-II (low to intermediate)    -- Necrosis: Present, focal and central necrosis identified       -- Associated prior biopsy site changes with twist clip and Aliyah  marker   - Multifocal non-invasive lobular neoplasia (focally florid lobular carcinoma in-situ/LCIS and atypical lobular     hyperplasia with pagetoid ductal spread) involving and adjacent to DCIS  -- Confirmed by negative E-cadherin; positive p120 (cytoplasmic), SMMHC, p63; and loss of epithelial        mosaicism on CK5/6 immunostains    - Margins uninvolved by invasive and in-situ carcinoma  -- Invasive carcinoma: 8 mm from nearest posterior margin  -- DCIS: 0 1 mm from nearest posterior margin; < 1 mm from nearest anterior margin; 1 5 mm from        nearest medial margin; > 2 mm from all remaining margins    - Benign skin  -- No dermal lymphovascular invasion    - Lymphovascular invasion: Not identified  - Microcalcifications: Present, associated with DCIS  - Benign fibrocystic changes with atypia (atypical and florid usual ductal hyperplasia, columnar cell     lesions with flat epithelial atypia/FEA, cystic and papillary apocrine metaplasia)  - Radial scar, 0 4 cm  B  Breast, Left, Left Breast Lumpectomy new superior margin - Short Superior, Long Lateral:  - Ductal carcinoma in-situ (DCIS)  -- Size and Extent: 16 mm in greatest approximate extent; present in 4 sequential sections; 7 of 78 blocks  -- Architectural Patterns: Cribriform, micropapillary, solid, papillary  -- Nuclear grade: I-II (low to intermediate)  -- Necrosis: Present, focal      -- Confirmed by positive E-cadherin, P120 (membranous) and loss of epithelial mosaicism on         CK5/6 immunostains  -- Associated prior biopsy site changes with biopsy clip and AJAY  marker   - No invasive carcinoma identified  - Margins uninvolved by DCIS  -- DCIS < 1 mm from nearest posterior margin; < 1-2 mm from medial margin; > 2 mm from all       remaining margins  - Lymphovascular invasion: Not identified    - Microcalcifications: Present, associated with DCIS, lobular neoplasia and non-neoplastic breast tissue     - Benign fibrocystic changes with atypia:    -- Multifocal/extensive non-invasive lobular neoplasia (focal lobular carcinoma in-situ and        predominantly atypical lobular hyperplasia with pagetoid ductal spread), focally involving        and adjacent to DCIS  ** Confirmed by negative E-cadherin; positive p120 (cytoplasmic) immunostains  -- Usual ductal hyperplasia, columnar cell change and hyperplasia with focal flat epithelial        atypia/FEA, sclerosing adenosis, fibroadenomatoid changes, cystic and papillary apocrine metaplasia  - Small intraductal papilloma without atypia, 0 1 cm          12/7/2021 -  Cancer Staged    Staging form: Breast, AJCC 8th Edition  - Pathologic stage from 12/7/2021: Stage IA (pT1b, pN0, cM0, G2, ER+, MA+, HER2-) - Signed by Julián Flores MD on 12/7/2021  Stage prefix: Initial diagnosis  Method of lymph node assessment: Clinical  Histologic grading system: 3 grade system       1/21/2022 Surgery    Final Diagnosis   A  Left breast, anterior margin:     - Histologic changes compatible with prior surgical procedure      - Skin with dermal cicatrix  - No invasive or in situ carcinoma identified      - Final anterior margin is negative for malignancy  B  Left breast, new posterior margin:     - Focus of atypical lobular hyperplasia (ALH)  - No invasive or in situ carcinoma identified      - Final posterior margin is negative for malignancy  C   Left breast, medial margin:     - Histologic changes compatible with prior surgical procedure  - No invasive or in situ carcinoma identified      - Final medial margin is negative for malignacny  D   Left axilla, sentinel lymph node #1:     - Single lymph node; negative for malignancy (0/1)  E   Left axilla, sentinel lymph node #2:     - Single lymph node; negative for malignancy (0/1)  F   Left axilla, sentinel lymph node #3:     - Single lymph node; negative for malignancy (0/1)  G   Left axilla, sentinel lymph node #4:     - Single lymph node; negative for malignancy (0/1)  Past Medical History:   Diagnosis Date    Asthma     Last Assessed: 10/19/2016    Breast cancer ([address] )     GERD (gastroesophageal reflux disease)     controlled with diet    Hypercholesteremia     Hypertension     Wears glasses      Past Surgical History:   Procedure Laterality Date    BREAST BIOPSY Left 09/28/2021    multiple sites    BREAST LUMPECTOMY Left 11/19/2021    Procedure: LUMPECTOMY BREAST BRACKETED AJAY LOCALIZED;  Surgeon: Monika Boggs MD;  [facility];  Service: Surgical Oncology    BREAST LUMPECTOMY Left 1/21/2022    Procedure: LUMPECTOMY BREAST, re-excision; 0[address]UC MED;  Surgeon: Monika Boggs MD;  [facility];  Service: Surgical Oncology    BUNIONECTOMY      BUNIONECTOMY Right 2009    BUNIONECTOMY Left 2007    COLONOSCOPY      FOOT SURGERY      LYMPH NODE BIOPSY Left 1/21/2022    Procedure: BIOPSY LYMPH NODE SENTINEL;  Surgeon: Monika Boggs MD;  [facility];  Service: Surgical Oncology    MAMMO NEEDLE LOCALIZATION LEFT (ALL INC) Left 11/12/2021    MAMMO NEEDLE LOCALIZATION LEFT (ALL INC) EACH ADD Left 11/12/2021    MAMMO STEREOTACTIC BREAST BIOPSY LEFT (ALL INC) Left 9/28/2021    MAMMO STEREOTACTIC BREAST BIOPSY LEFT (ALL INC) EACH ADD Left 9/28/2021    NASAL POLYP EXCISION      DE COLONOSCOPY FLX DX W/COLLJ SPEC WHEN PFRMD N/A 5/23/2016    Procedure: COLONOSCOPY;  Surgeon: Sandra Shepherd DO;  Location: [facility];   Service: Gastroenterology    SHOULDER ARTHROSCOPY Right 2012    SHOULDER ARTHROSCOPY W/ ROTATOR CUFF REPAIR Right 2012       Family History   Problem Relation Age of Onset    Hypertension Mother    Dorota Zabala Breast cancer Mother         age unknown; bilateral breast cancer    Lung cancer Mother     Cervical cancer Mother     Diabetes Father     Hypertension Father     Prostate cancer Father         age unknown    Hypertension Brother     Prostate cancer Brother 48    Breast cancer Maternal Grandmother 40    Diabetes Paternal Grandmother     Hypertension Maternal Aunt     No Known Problems Maternal Aunt     Diabetes Paternal Aunt     Diabetes Paternal Uncle     Hypertension Cousin     Breast cancer Cousin         under 48    Diabetes Cousin     Breast cancer Cousin         under 48    Lung cancer Maternal Uncle        Social History   Social History     Substance and Sexual Activity   Alcohol Use No     Social History     Substance and Sexual Activity   Drug Use Never     Social History     Tobacco Use   Smoking Status Never Smoker   Smokeless Tobacco Never Used         Meds/Allergies     Current Outpatient Medications:     albuterol (ProAir HFA) 90 mcg/act inhaler, Inhale 2 puffs every 6 (six) hours as needed for wheezing, Disp: 18 g, Rfl: 0    amLODIPine (NORVASC) 10 mg tablet, Take 1 tablet (10 mg total) by mouth daily, Disp: 90 tablet, Rfl: 0    anastrozole (ARIMIDEX) 1 mg tablet, Take 1 tablet (1 mg total) by mouth daily, Disp: 90 tablet, Rfl: 1    atorvastatin (LIPITOR) 10 mg tablet, Take 1 tablet (10 mg total) by mouth daily, Disp: 90 tablet, Rfl: 0    calcium carbonate (OS-HALIMA) 1250 (500 Ca) MG tablet, Take 1,200 mg by mouth daily , Disp: , Rfl:     cholecalciferol (VITAMIN D3) 1,000 units tablet, Take 2,000 Units by mouth daily  , Disp: , Rfl:     multivitamin (THERAGRAN) TABS, Take 1 tablet by mouth daily  , Disp: , Rfl:     traMADol (ULTRAM) 50 mg tablet, Take 1 tablet (50 mg total) by mouth every 8 (eight) hours as needed for moderate pain (Patient not taking: Reported on 2/24/2022 ), Disp: 9 tablet, Rfl: 0  No Known Allergies      Review of Systems   Constitutional: Negative for appetite change, fatigue, fever and unexpected weight change  HENT: Negative  Eyes: Negative  Wears glasses   Respiratory: Negative  Negative for shortness of breath  Cardiovascular: Negative  Negative for chest pain and leg swelling  Gastrointestinal: Negative  Negative for constipation and diarrhea  Genitourinary: Negative  Negative for difficulty urinating  Musculoskeletal: Negative for arthralgias and gait problem  Skin: Positive for wound (left axilla and left breast incisions closed, healing )  Neurological: Positive for numbness (mild left upper inner arm, improving)  Negative for weakness  Hematological: Negative  Psychiatric/Behavioral: Positive for dysphoric mood (mild)  Negative for sleep disturbance  The patient is nervous/anxious           Clinical Trial: no     OB/GYN History:  The patient underwent menarche at 14 years  Menopause Status Pre, Carmencita, Post, Unknown and No Answer  Patient's last menstrual period was 12/15/2003 (within years)  Menopause at ~43 years  Menopause Reason: natural  Hormone replacement therapy: no   1   Para 0   Birth control pills: yes  Years used unknown; Use of IUD  Bra size 40D      OBJECTIVE:   /70 (BP Location: Right arm, Patient Position: Sitting)   Pulse 79   Temp 97 8 °F (36 6 °C) (Temporal)   Resp 16   Ht 5' 4" (1 626 m)   Wt 95 kg (209 lb 6 4 oz)   LMP 12/15/2003 (Within Years)   SpO2 98%   BMI 35 94 kg/m²   Pain Assessment:  0  Performance Status: ECOG/Zubrod/WHO: 0 - Asymptomatic    Physical Exam  Vitals reviewed  Constitutional:       Appearance: She is well-developed  HENT:      Head: Normocephalic  Hair is normal    Eyes:      General: No scleral icterus  Cardiovascular:      Rate and Rhythm: Normal rate and regular rhythm  Pulmonary:      Effort: Pulmonary effort is normal  No respiratory distress  Breath sounds: Normal breath sounds  No wheezing  Chest:      Chest wall: No tenderness  Comments: There is no supraclavicular axillary adenopathy palpable no suspicious lesions palpable in either breast   Her incision the left breast are healing well without any surrounding erythema or drainage noted  She has large pendulous breast   Abdominal:      General: Bowel sounds are normal  There is no distension  Palpations: Abdomen is soft  There is no mass  Tenderness: There is no abdominal tenderness  There is no rebound  Genitourinary:     Vagina: Normal    Musculoskeletal:         General: No tenderness  Normal range of motion  Cervical back: Neck supple  No edema  No spinous process tenderness  Lymphadenopathy:      Cervical: No cervical adenopathy  Neurological:      Mental Status: She is alert  Cranial Nerves: No cranial nerve deficit  Coordination: Coordination normal       Gait: Gait normal    Psychiatric:         Speech: Speech normal          Behavior: Behavior normal            RESULTS  Lab Results    Chemistry        Component Value Date/Time     03/05/2015 1503    K 3 6 12/08/2021 0902    K 4 2 03/05/2015 1503     (H) 12/08/2021 0902     03/05/2015 1503    CO2 27 12/08/2021 0902    CO2 26 03/05/2015 1503    BUN 13 12/08/2021 0902    BUN 12 03/05/2015 1503    CREATININE 0 83 12/08/2021 0902    CREATININE 0 69 03/05/2015 1503        Component Value Date/Time    CALCIUM 9 5 12/08/2021 0902    CALCIUM 9 1 03/05/2015 1503    ALKPHOS 112 12/08/2021 0902    ALKPHOS 113 03/05/2015 1503    AST 16 12/08/2021 0902    AST 16 03/05/2015 1503    ALT 26 12/08/2021 0902    ALT 20 03/05/2015 1503    BILITOT 0 81 03/05/2015 1503            Lab Results   Component Value Date    WBC 4 72 12/08/2021    HGB 13 5 12/08/2021    HCT 41 4 12/08/2021    MCV 89 12/08/2021     12/08/2021         Imaging Studies  No results found        Pathology:  Performed on invasive carcinoma block A83:  Test Description                        Result               Prognostic Interpretation     Estrogen Receptor/ER                90-95%                           Positive  Primary Antibody: SP1  Internal control:positive               Staining Intensity:Strong  External control:positive              Cookie Score*: 7     Progesterone Receptor/PgR       90-95% Positive  Primary Antibody:1E2  Internal control:positive               Staining Intensity: Strong  External control: positive             Cookie Score*: 7        HER2 by IHC                               1+                                   Negative  Primary Antibody:4B5  SPECIMEN   Procedure  Excision (less than total mastectomy)    Specimen Laterality  Left    TUMOR   Tumor Site  Lower inner quadrant      Clock position      7 o'clock    Histologic Type  Invasive carcinoma of no special type (ductal)    Glandular (Acinar) / Tubular Differentiation  Score 3    Nuclear Pleomorphism  Score 2    Mitotic Rate  Score 2    Overall Grade  Grade 2 (scores of 6 or 7)    Tumor Size  Greatest dimension of largest invasive focus (Millimeters): 8 mm   Tumor Focality  Single focus of invasive carcinoma    Ductal Carcinoma In Situ (DCIS)  Present      Positive for extensive intraductal component (EIC)    Size (Extent) of DCIS  Estimated size (extent) of DCIS is at least JOSE LUIS COM HSPTL): At least 48 mm   Number of Blocks with DCIS  50    Number of Blocks Examined  169    Architectural Patterns  Cribriform      Micropapillary      Papillary      Solid    Nuclear Grade  Grade II (intermediate)    Necrosis  Present, central (expansive "comedo" necrosis)    Lobular Carcinoma In Situ (LCIS)  Present    Tumor Extent     Lymphovascular Invasion  Not identified    Dermal Lymphovascular Invasion  Not identified    Microcalcifications  Present in DCIS      Present in non-neoplastic tissue      Present in non-invasive lobular neoplasia      Treatment Effect in the Breast  No known presurgical therapy    MARGINS   Invasive Carcinoma Margins  Uninvolved by invasive carcinoma    Distance from Closest Margin (Millimeters)  8 mm   Closest Margin(s)  Posterior    DCIS Margins  Uninvolved by DCIS    Distance from Closest Margin (Millimeters)  0 1 (posterior margin specimen A) mm   Closest Margin(s)  Posterior    Distance from Other Margins Anterior Margin (Millimeters)  < 1 mm   Medial Margin (Millimeters)  < 1-2 mm   LYMPH NODES   Regional Lymph Nodes  No lymph nodes submitted or found    PATHOLOGIC STAGE CLASSIFICATION (pTNM, AJCC 8th Edition)      Primary Tumor (pT)  pT1b    Regional Lymph Nodes (pN)  pNX    ADDITIONAL FINDINGS   Additional Findings  Atypical ductal hyperplasia; Non-invasive lobular neoplasia (focally florid lobular carcinoma in-situ and atypical lobular hyperplasia with pagetoid ductal spread); columnar cell lesions with FEA              ASSESSMENT  1  Malignant neoplasm of lower-inner quadrant of left breast in female, estrogen receptor positive ([facility] [address] )  Ambulatory referral to Radiation Oncology     Cancer Staging  Malignant neoplasm of lower-inner quadrant of left breast in female, estrogen receptor positive ([facility] [address] )  Staging form: Breast, AJCC 8th Edition  - Clinical: Stage 0 (cTis (DCIS), cN0, cM0, ER+, IL+, HER2: Not Assessed) - Signed by Wisam Manuel MD on 10/26/2021  Stage prefix: Initial diagnosis  Method of lymph node assessment: Clinical  Nuclear grade: G2  - Pathologic stage from 12/7/2021: Stage IA (pT1b, pN0, cM0, G2, ER+, IL+, HER2-) - Signed by Wisam Manuel MD on 12/7/2021  Stage prefix: Initial diagnosis  Method of lymph node assessment: Clinical  Histologic grading system: 3 grade system        PLAN/DISCUSSION  No orders of the defined types were placed in this encounter  Joseph Ornelas is a 57y o  year old female with stage I A left breast carcinoma  She required re-excision for close margin with extensive DCIS as well as sentinel lymph node sampling secondary to invasive disease found on her lumpectomy pathology  Her tumor is ERPR positive and HER2 negative  I discussed with her and her  the role of adjuvant radiation therapy directed to the left breast   She understands the goal of treatment    We discussed both standard fractionation as well as a hypo fractionated drake Yanez MD  2/24/2022,11:23 AM      Portions of the record may have been created with voice recognition software  Occasional wrong word or "sound a like" substitutions may have occurred due to the inherent limitations of voice recognition software  Read the chart carefully and recognize, using context, where substitutions have occurred

## 2022-02-26 DIAGNOSIS — I10 BENIGN ESSENTIAL HYPERTENSION: ICD-10-CM

## 2022-02-27 RX ORDER — AMLODIPINE BESYLATE 10 MG/1
10 TABLET ORAL DAILY
Qty: 90 TABLET | Refills: 0 | Status: SHIPPED | OUTPATIENT
Start: 2022-02-27 | End: 2022-05-29 | Stop reason: SDUPTHER

## 2022-03-11 ENCOUNTER — APPOINTMENT (OUTPATIENT)
Dept: RADIATION ONCOLOGY | Facility: CLINIC | Age: 62
End: 2022-03-11
Attending: RADIOLOGY
Payer: COMMERCIAL

## 2022-03-11 PROCEDURE — 77332 RADIATION TREATMENT AID(S): CPT | Performed by: RADIOLOGY

## 2022-03-11 PROCEDURE — 77280 THER RAD SIMULAJ FIELD SMPL: CPT | Performed by: RADIOLOGY

## 2022-03-15 PROCEDURE — 77334 RADIATION TREATMENT AID(S): CPT | Performed by: RADIOLOGY

## 2022-03-15 PROCEDURE — 77295 3-D RADIOTHERAPY PLAN: CPT | Performed by: RADIOLOGY

## 2022-03-15 PROCEDURE — 77300 RADIATION THERAPY DOSE PLAN: CPT | Performed by: RADIOLOGY

## 2022-03-22 PROCEDURE — 77280 THER RAD SIMULAJ FIELD SMPL: CPT | Performed by: INTERNAL MEDICINE

## 2022-03-23 PROCEDURE — 77412 RADIATION TX DELIVERY LVL 3: CPT | Performed by: RADIOLOGY

## 2022-03-23 PROCEDURE — 77427 RADIATION TX MANAGEMENT X5: CPT | Performed by: RADIOLOGY

## 2022-03-24 PROCEDURE — 77412 RADIATION TX DELIVERY LVL 3: CPT | Performed by: RADIOLOGY

## 2022-03-24 PROCEDURE — 77331 SPECIAL RADIATION DOSIMETRY: CPT | Performed by: RADIOLOGY

## 2022-03-25 PROCEDURE — 77412 RADIATION TX DELIVERY LVL 3: CPT | Performed by: INTERNAL MEDICINE

## 2022-03-28 PROCEDURE — 77412 RADIATION TX DELIVERY LVL 3: CPT | Performed by: RADIOLOGY

## 2022-03-29 PROCEDURE — 77417 THER RADIOLOGY PORT IMAGE(S): CPT | Performed by: RADIOLOGY

## 2022-03-29 PROCEDURE — 77412 RADIATION TX DELIVERY LVL 3: CPT | Performed by: RADIOLOGY

## 2022-03-29 PROCEDURE — 77336 RADIATION PHYSICS CONSULT: CPT | Performed by: RADIOLOGY

## 2022-03-30 ENCOUNTER — APPOINTMENT (OUTPATIENT)
Dept: LAB | Facility: CLINIC | Age: 62
End: 2022-03-30
Payer: COMMERCIAL

## 2022-03-30 DIAGNOSIS — C50.312 MALIGNANT NEOPLASM OF LOWER-INNER QUADRANT OF LEFT BREAST IN FEMALE, ESTROGEN RECEPTOR POSITIVE (HCC): ICD-10-CM

## 2022-03-30 DIAGNOSIS — Z17.0 MALIGNANT NEOPLASM OF LOWER-INNER QUADRANT OF LEFT BREAST IN FEMALE, ESTROGEN RECEPTOR POSITIVE (HCC): ICD-10-CM

## 2022-03-30 LAB
BASOPHILS # BLD AUTO: 0.02 THOUSANDS/ΜL (ref 0–0.1)
BASOPHILS NFR BLD AUTO: 0 % (ref 0–1)
EOSINOPHIL # BLD AUTO: 0.16 THOUSAND/ΜL (ref 0–0.61)
EOSINOPHIL NFR BLD AUTO: 3 % (ref 0–6)
ERYTHROCYTE [DISTWIDTH] IN BLOOD BY AUTOMATED COUNT: 13.5 % (ref 11.6–15.1)
HCT VFR BLD AUTO: 41.6 % (ref 34.8–46.1)
HGB BLD-MCNC: 13.2 G/DL (ref 11.5–15.4)
IMM GRANULOCYTES # BLD AUTO: 0.01 THOUSAND/UL (ref 0–0.2)
IMM GRANULOCYTES NFR BLD AUTO: 0 % (ref 0–2)
LYMPHOCYTES # BLD AUTO: 1.74 THOUSANDS/ΜL (ref 0.6–4.47)
LYMPHOCYTES NFR BLD AUTO: 34 % (ref 14–44)
MCH RBC QN AUTO: 28 PG (ref 26.8–34.3)
MCHC RBC AUTO-ENTMCNC: 31.7 G/DL (ref 31.4–37.4)
MCV RBC AUTO: 88 FL (ref 82–98)
MONOCYTES # BLD AUTO: 0.31 THOUSAND/ΜL (ref 0.17–1.22)
MONOCYTES NFR BLD AUTO: 6 % (ref 4–12)
NEUTROPHILS # BLD AUTO: 2.89 THOUSANDS/ΜL (ref 1.85–7.62)
NEUTS SEG NFR BLD AUTO: 57 % (ref 43–75)
NRBC BLD AUTO-RTO: 0 /100 WBCS
PLATELET # BLD AUTO: 311 THOUSANDS/UL (ref 149–390)
PMV BLD AUTO: 9.2 FL (ref 8.9–12.7)
RBC # BLD AUTO: 4.71 MILLION/UL (ref 3.81–5.12)
WBC # BLD AUTO: 5.13 THOUSAND/UL (ref 4.31–10.16)

## 2022-03-30 PROCEDURE — 77427 RADIATION TX MANAGEMENT X5: CPT | Performed by: RADIOLOGY

## 2022-03-30 PROCEDURE — 85025 COMPLETE CBC W/AUTO DIFF WBC: CPT

## 2022-03-30 PROCEDURE — 36415 COLL VENOUS BLD VENIPUNCTURE: CPT

## 2022-03-30 PROCEDURE — 77412 RADIATION TX DELIVERY LVL 3: CPT | Performed by: RADIOLOGY

## 2022-03-31 PROCEDURE — 77412 RADIATION TX DELIVERY LVL 3: CPT | Performed by: RADIOLOGY

## 2022-04-01 ENCOUNTER — APPOINTMENT (OUTPATIENT)
Dept: RADIATION ONCOLOGY | Facility: CLINIC | Age: 62
End: 2022-04-01
Payer: COMMERCIAL

## 2022-04-01 ENCOUNTER — APPOINTMENT (OUTPATIENT)
Dept: RADIATION ONCOLOGY | Facility: CLINIC | Age: 62
End: 2022-04-01
Attending: RADIOLOGY
Payer: COMMERCIAL

## 2022-04-01 PROCEDURE — 77412 RADIATION TX DELIVERY LVL 3: CPT | Performed by: RADIOLOGY

## 2022-04-04 ENCOUNTER — APPOINTMENT (OUTPATIENT)
Dept: RADIATION ONCOLOGY | Facility: CLINIC | Age: 62
End: 2022-04-04
Payer: COMMERCIAL

## 2022-04-04 ENCOUNTER — APPOINTMENT (OUTPATIENT)
Dept: RADIATION ONCOLOGY | Facility: CLINIC | Age: 62
End: 2022-04-04
Attending: RADIOLOGY
Payer: COMMERCIAL

## 2022-04-04 PROCEDURE — 77412 RADIATION TX DELIVERY LVL 3: CPT | Performed by: RADIOLOGY

## 2022-04-05 ENCOUNTER — APPOINTMENT (OUTPATIENT)
Dept: RADIATION ONCOLOGY | Facility: CLINIC | Age: 62
End: 2022-04-05
Attending: RADIOLOGY
Payer: COMMERCIAL

## 2022-04-05 ENCOUNTER — APPOINTMENT (OUTPATIENT)
Dept: RADIATION ONCOLOGY | Facility: CLINIC | Age: 62
End: 2022-04-05
Payer: COMMERCIAL

## 2022-04-05 PROCEDURE — 77336 RADIATION PHYSICS CONSULT: CPT | Performed by: RADIOLOGY

## 2022-04-05 PROCEDURE — 77417 THER RADIOLOGY PORT IMAGE(S): CPT | Performed by: RADIOLOGY

## 2022-04-05 PROCEDURE — 77412 RADIATION TX DELIVERY LVL 3: CPT | Performed by: RADIOLOGY

## 2022-04-06 ENCOUNTER — APPOINTMENT (OUTPATIENT)
Dept: RADIATION ONCOLOGY | Facility: CLINIC | Age: 62
End: 2022-04-06
Payer: COMMERCIAL

## 2022-04-06 ENCOUNTER — APPOINTMENT (OUTPATIENT)
Dept: RADIATION ONCOLOGY | Facility: CLINIC | Age: 62
End: 2022-04-06
Attending: RADIOLOGY
Payer: COMMERCIAL

## 2022-04-06 PROCEDURE — 77412 RADIATION TX DELIVERY LVL 3: CPT | Performed by: RADIOLOGY

## 2022-04-07 ENCOUNTER — APPOINTMENT (OUTPATIENT)
Dept: RADIATION ONCOLOGY | Facility: CLINIC | Age: 62
End: 2022-04-07
Attending: RADIOLOGY
Payer: COMMERCIAL

## 2022-04-07 ENCOUNTER — APPOINTMENT (OUTPATIENT)
Dept: RADIATION ONCOLOGY | Facility: CLINIC | Age: 62
End: 2022-04-07
Payer: COMMERCIAL

## 2022-04-07 PROCEDURE — 77412 RADIATION TX DELIVERY LVL 3: CPT | Performed by: INTERNAL MEDICINE

## 2022-04-08 ENCOUNTER — APPOINTMENT (OUTPATIENT)
Dept: RADIATION ONCOLOGY | Facility: CLINIC | Age: 62
End: 2022-04-08
Attending: RADIOLOGY
Payer: COMMERCIAL

## 2022-04-08 ENCOUNTER — APPOINTMENT (OUTPATIENT)
Dept: RADIATION ONCOLOGY | Facility: CLINIC | Age: 62
End: 2022-04-08
Payer: COMMERCIAL

## 2022-04-08 PROCEDURE — 77412 RADIATION TX DELIVERY LVL 3: CPT | Performed by: RADIOLOGY

## 2022-04-11 ENCOUNTER — APPOINTMENT (OUTPATIENT)
Dept: RADIATION ONCOLOGY | Facility: CLINIC | Age: 62
End: 2022-04-11
Payer: COMMERCIAL

## 2022-04-11 ENCOUNTER — APPOINTMENT (OUTPATIENT)
Dept: RADIATION ONCOLOGY | Facility: CLINIC | Age: 62
End: 2022-04-11
Attending: RADIOLOGY
Payer: COMMERCIAL

## 2022-04-11 PROCEDURE — 77412 RADIATION TX DELIVERY LVL 3: CPT | Performed by: RADIOLOGY

## 2022-04-12 ENCOUNTER — APPOINTMENT (OUTPATIENT)
Dept: RADIATION ONCOLOGY | Facility: HOSPITAL | Age: 62
End: 2022-04-12
Attending: RADIOLOGY
Payer: COMMERCIAL

## 2022-04-12 ENCOUNTER — APPOINTMENT (OUTPATIENT)
Dept: RADIATION ONCOLOGY | Facility: CLINIC | Age: 62
End: 2022-04-12
Attending: RADIOLOGY
Payer: COMMERCIAL

## 2022-04-12 ENCOUNTER — APPOINTMENT (OUTPATIENT)
Dept: RADIATION ONCOLOGY | Facility: CLINIC | Age: 62
End: 2022-04-12
Payer: COMMERCIAL

## 2022-04-12 PROCEDURE — 77334 RADIATION TREATMENT AID(S): CPT | Performed by: RADIOLOGY

## 2022-04-12 PROCEDURE — 77307 TELETHX ISODOSE PLAN CPLX: CPT | Performed by: RADIOLOGY

## 2022-04-12 PROCEDURE — 77336 RADIATION PHYSICS CONSULT: CPT | Performed by: RADIOLOGY

## 2022-04-12 PROCEDURE — 77412 RADIATION TX DELIVERY LVL 3: CPT | Performed by: RADIOLOGY

## 2022-04-13 ENCOUNTER — APPOINTMENT (OUTPATIENT)
Dept: RADIATION ONCOLOGY | Facility: CLINIC | Age: 62
End: 2022-04-13
Attending: RADIOLOGY
Payer: COMMERCIAL

## 2022-04-13 ENCOUNTER — APPOINTMENT (OUTPATIENT)
Dept: RADIATION ONCOLOGY | Facility: CLINIC | Age: 62
End: 2022-04-13
Payer: COMMERCIAL

## 2022-04-13 PROCEDURE — 77412 RADIATION TX DELIVERY LVL 3: CPT | Performed by: RADIOLOGY

## 2022-04-13 PROCEDURE — 77427 RADIATION TX MANAGEMENT X5: CPT | Performed by: RADIOLOGY

## 2022-04-14 ENCOUNTER — APPOINTMENT (OUTPATIENT)
Dept: RADIATION ONCOLOGY | Facility: CLINIC | Age: 62
End: 2022-04-14
Attending: RADIOLOGY
Payer: COMMERCIAL

## 2022-04-14 ENCOUNTER — APPOINTMENT (OUTPATIENT)
Dept: RADIATION ONCOLOGY | Facility: CLINIC | Age: 62
End: 2022-04-14
Payer: COMMERCIAL

## 2022-04-14 PROCEDURE — 77280 THER RAD SIMULAJ FIELD SMPL: CPT | Performed by: RADIOLOGY

## 2022-04-14 PROCEDURE — 77412 RADIATION TX DELIVERY LVL 3: CPT | Performed by: RADIOLOGY

## 2022-04-14 PROCEDURE — 77387 GUIDANCE FOR RADJ TX DLVR: CPT | Performed by: RADIOLOGY

## 2022-04-15 ENCOUNTER — APPOINTMENT (OUTPATIENT)
Dept: RADIATION ONCOLOGY | Facility: CLINIC | Age: 62
End: 2022-04-15
Payer: COMMERCIAL

## 2022-04-15 ENCOUNTER — APPOINTMENT (OUTPATIENT)
Dept: RADIATION ONCOLOGY | Facility: CLINIC | Age: 62
End: 2022-04-15
Attending: RADIOLOGY
Payer: COMMERCIAL

## 2022-04-15 PROCEDURE — 77412 RADIATION TX DELIVERY LVL 3: CPT | Performed by: RADIOLOGY

## 2022-04-15 PROCEDURE — 77014 CHG CT GUIDANCE PLACEMENT RAD THERAPY FIELDS: CPT | Performed by: RADIOLOGY

## 2022-04-15 PROCEDURE — 77387 GUIDANCE FOR RADJ TX DLVR: CPT | Performed by: RADIOLOGY

## 2022-04-18 ENCOUNTER — APPOINTMENT (OUTPATIENT)
Dept: RADIATION ONCOLOGY | Facility: CLINIC | Age: 62
End: 2022-04-18
Payer: COMMERCIAL

## 2022-04-18 ENCOUNTER — APPOINTMENT (OUTPATIENT)
Dept: RADIATION ONCOLOGY | Facility: CLINIC | Age: 62
End: 2022-04-18
Attending: RADIOLOGY
Payer: COMMERCIAL

## 2022-04-18 PROCEDURE — 77014 CHG CT GUIDANCE PLACEMENT RAD THERAPY FIELDS: CPT | Performed by: RADIOLOGY

## 2022-04-18 PROCEDURE — 77387 GUIDANCE FOR RADJ TX DLVR: CPT | Performed by: RADIOLOGY

## 2022-04-18 PROCEDURE — 77412 RADIATION TX DELIVERY LVL 3: CPT | Performed by: RADIOLOGY

## 2022-04-19 ENCOUNTER — APPOINTMENT (OUTPATIENT)
Dept: RADIATION ONCOLOGY | Facility: CLINIC | Age: 62
End: 2022-04-19
Attending: RADIOLOGY
Payer: COMMERCIAL

## 2022-04-19 ENCOUNTER — APPOINTMENT (OUTPATIENT)
Dept: RADIATION ONCOLOGY | Facility: CLINIC | Age: 62
End: 2022-04-19
Payer: COMMERCIAL

## 2022-04-19 PROCEDURE — 77336 RADIATION PHYSICS CONSULT: CPT | Performed by: RADIOLOGY

## 2022-04-19 PROCEDURE — 77412 RADIATION TX DELIVERY LVL 3: CPT | Performed by: RADIOLOGY

## 2022-04-19 PROCEDURE — 77387 GUIDANCE FOR RADJ TX DLVR: CPT | Performed by: RADIOLOGY

## 2022-04-20 ENCOUNTER — APPOINTMENT (OUTPATIENT)
Dept: RADIATION ONCOLOGY | Facility: CLINIC | Age: 62
End: 2022-04-20
Attending: RADIOLOGY
Payer: COMMERCIAL

## 2022-04-20 ENCOUNTER — APPOINTMENT (OUTPATIENT)
Dept: RADIATION ONCOLOGY | Facility: CLINIC | Age: 62
End: 2022-04-20
Payer: COMMERCIAL

## 2022-04-20 PROCEDURE — 77014 CHG CT GUIDANCE PLACEMENT RAD THERAPY FIELDS: CPT | Performed by: RADIOLOGY

## 2022-04-20 PROCEDURE — 77412 RADIATION TX DELIVERY LVL 3: CPT | Performed by: RADIOLOGY

## 2022-04-20 PROCEDURE — 77387 GUIDANCE FOR RADJ TX DLVR: CPT | Performed by: RADIOLOGY

## 2022-04-21 ENCOUNTER — APPOINTMENT (OUTPATIENT)
Dept: RADIATION ONCOLOGY | Facility: CLINIC | Age: 62
End: 2022-04-21
Payer: COMMERCIAL

## 2022-04-22 ENCOUNTER — APPOINTMENT (OUTPATIENT)
Dept: RADIATION ONCOLOGY | Facility: CLINIC | Age: 62
End: 2022-04-22
Payer: COMMERCIAL

## 2022-04-25 ENCOUNTER — APPOINTMENT (OUTPATIENT)
Dept: RADIATION ONCOLOGY | Facility: CLINIC | Age: 62
End: 2022-04-25
Payer: COMMERCIAL

## 2022-04-26 ENCOUNTER — APPOINTMENT (OUTPATIENT)
Dept: RADIATION ONCOLOGY | Facility: CLINIC | Age: 62
End: 2022-04-26
Payer: COMMERCIAL

## 2022-04-27 ENCOUNTER — APPOINTMENT (OUTPATIENT)
Dept: RADIATION ONCOLOGY | Facility: CLINIC | Age: 62
End: 2022-04-27
Payer: COMMERCIAL

## 2022-04-28 ENCOUNTER — APPOINTMENT (OUTPATIENT)
Dept: RADIATION ONCOLOGY | Facility: CLINIC | Age: 62
End: 2022-04-28
Payer: COMMERCIAL

## 2022-04-29 ENCOUNTER — APPOINTMENT (OUTPATIENT)
Dept: RADIATION ONCOLOGY | Facility: CLINIC | Age: 62
End: 2022-04-29
Payer: COMMERCIAL

## 2022-04-29 ENCOUNTER — TELEPHONE (OUTPATIENT)
Dept: RADIATION ONCOLOGY | Facility: CLINIC | Age: 62
End: 2022-04-29

## 2022-04-29 NOTE — TELEPHONE ENCOUNTER
Patient completed radiation therapy to the left breast on 4/20/22  Patient reports peeling of skin in the left inframammary fold with some sensitivity to that area as well  She states that this area is moist  She denies drainage  Denies fever/chills  No other open skin areas at this time  She reports burning of the skin after applying Remedy moisturizing cream to the open skin area  Patient instructed to apply a triple antibiotic ointment to the open skin area 2-3x/day and only apply Remedy to closed skin areas  Continue to use Aquaphor to nipple/areolar area  Also discussed obtaining non-stick dressings which she can use in the inframammary fold to help minimize the rubbing of her bra on the open area  Patient instructed to call back if she has any further questions/concerns  Patient verbalized understanding of instructions given

## 2022-04-29 NOTE — TELEPHONE ENCOUNTER
Pt called and stated that she is using the cream on her breast after finishing RT, she stated underneath her breast is raw, and she doesn't know if she should keep using the cream because it is making it raw   Please call her at [phone]

## 2022-05-05 ENCOUNTER — OFFICE VISIT (OUTPATIENT)
Dept: FAMILY MEDICINE CLINIC | Facility: CLINIC | Age: 62
End: 2022-05-05
Payer: COMMERCIAL

## 2022-05-05 VITALS
HEART RATE: 88 BPM | DIASTOLIC BLOOD PRESSURE: 84 MMHG | RESPIRATION RATE: 16 BRPM | BODY MASS INDEX: 35.51 KG/M2 | SYSTOLIC BLOOD PRESSURE: 124 MMHG | OXYGEN SATURATION: 97 % | TEMPERATURE: 97.8 F | WEIGHT: 208 LBS | HEIGHT: 64 IN

## 2022-05-05 DIAGNOSIS — I10 BENIGN ESSENTIAL HYPERTENSION: ICD-10-CM

## 2022-05-05 DIAGNOSIS — Z00.00 WELL ADULT EXAM: Primary | ICD-10-CM

## 2022-05-05 DIAGNOSIS — J45.909 MILD ASTHMA WITHOUT COMPLICATION, UNSPECIFIED WHETHER PERSISTENT: ICD-10-CM

## 2022-05-05 DIAGNOSIS — C50.312 MALIGNANT NEOPLASM OF LOWER-INNER QUADRANT OF LEFT BREAST IN FEMALE, ESTROGEN RECEPTOR POSITIVE (HCC): ICD-10-CM

## 2022-05-05 DIAGNOSIS — Z17.0 MALIGNANT NEOPLASM OF LOWER-INNER QUADRANT OF LEFT BREAST IN FEMALE, ESTROGEN RECEPTOR POSITIVE (HCC): ICD-10-CM

## 2022-05-05 DIAGNOSIS — R73.03 PREDIABETES: ICD-10-CM

## 2022-05-05 DIAGNOSIS — Z23 NEED FOR VACCINATION: ICD-10-CM

## 2022-05-05 DIAGNOSIS — E78.2 COMBINED HYPERLIPIDEMIA: ICD-10-CM

## 2022-05-05 PROBLEM — R92.1 CALCIFICATION OF LEFT BREAST ON MAMMOGRAPHY: Status: RESOLVED | Noted: 2021-10-13 | Resolved: 2022-05-05

## 2022-05-05 PROCEDURE — 1036F TOBACCO NON-USER: CPT | Performed by: FAMILY MEDICINE

## 2022-05-05 PROCEDURE — 99396 PREV VISIT EST AGE 40-64: CPT | Performed by: FAMILY MEDICINE

## 2022-05-05 PROCEDURE — 90471 IMMUNIZATION ADMIN: CPT | Performed by: FAMILY MEDICINE

## 2022-05-05 PROCEDURE — 3008F BODY MASS INDEX DOCD: CPT | Performed by: FAMILY MEDICINE

## 2022-05-05 PROCEDURE — 90715 TDAP VACCINE 7 YRS/> IM: CPT | Performed by: FAMILY MEDICINE

## 2022-05-05 NOTE — ASSESSMENT & PLAN NOTE
History stage IA breast cancer  ER/AK positive, HER2 negative  Status post lumpectomy and radiation  Patient doing well    Continue follow-up with surgical oncologist and medical oncologist as directed

## 2022-05-05 NOTE — PROGRESS NOTES
[facility]      NAME: Fidelia Barcenas  AGE: 57 y o  SEX: female  : 1960   MRN: [id number]    DATE: 2022  TIME: 3:49 PM    Assessment and Plan     Problem List Items Addressed This Visit     Asthma     Doing well with p r n  Use of albuterol HFA         Benign essential hypertension     Well controlled on amlodipine 10 mg daily         Relevant Orders    TSH, 3rd generation with Free T4 reflex    Combined hyperlipidemia     Doing well on atorvastatin 10 mg daily  Patient is due for labs  Will call with results         Relevant Orders    Lipid Panel with Direct LDL reflex    Prediabetes     Discussed improving diet and increasing exercise  Patient is due for labs  Will call with results         Relevant Orders    Comprehensive metabolic panel    Hemoglobin A1C    Malignant neoplasm of lower-inner quadrant of left breast in female, estrogen receptor positive ([facility] [address] )     History stage IA breast cancer  ER/NV positive, HER2 negative  Status post lumpectomy and radiation  Patient doing well  Continue follow-up with surgical oncologist and medical oncologist as directed           Other Visit Diagnoses     Well adult exam    -  Primary    Need for vaccination        Relevant Orders    TDAP VACCINE GREATER THAN OR EQUAL TO 8YO IM (Completed)      Patient presents for 19-year-old physical examination today  Overall she is feeling well without complaints  Patient recently completed treatment for left breast cancer (surgery and radiation)  She is compliant with current meds including amlodipine for blood pressure, atorvastatin for cholesterol, albuterol p r n  For asthma  Patient is a nonsmoker  She does not drink alcohol  She drinks 1 cup of coffee per day  Her diet is fairly unhealthy  She does not exercise regularly  Family history is positive for breast cancer, cervical cancer, lung cancer (her mother) and prostate cancer (brother)  Exam today is unremarkable    Discussed improving diet and increasing exercise    Current with mammography and colonoscopy  Patient had COVID vaccination and booster  Adacel booster given today  Will discuss Shingrix at next visit  Prevnar at next visit      Rx given for routine fasting blood work  Will call with results      Return to office in: 6 mos    Chief Complaint     Chief Complaint   Patient presents with    Physical Exam       History of Present Illness     Patient presents for 58-year-old physical examination today  Overall she is feeling well without complaints  Patient recently completed treatment for left breast cancer (surgery and radiation)  She is compliant with current meds including amlodipine for blood pressure, atorvastatin for cholesterol, albuterol p r n  For asthma  Patient is a nonsmoker  She does not drink alcohol  She drinks 1 cup of coffee per day  Her diet is fairly unhealthy  She does not exercise regularly  Family history is positive for breast cancer, cervical cancer, lung cancer (her mother) and prostate cancer (brother)  The following portions of the patient's history were reviewed and updated as appropriate: allergies, current medications, past family history, past medical history, past social history, past surgical history and problem list     Review of Systems   Review of Systems   Constitutional: Negative for chills and fever  HENT: Negative for ear pain and sore throat  Eyes: Negative for pain and visual disturbance  Respiratory: Negative for cough and shortness of breath  Cardiovascular: Negative for chest pain and palpitations  Gastrointestinal: Negative for abdominal pain and vomiting  Genitourinary: Negative for dysuria and hematuria  Musculoskeletal: Negative for arthralgias and back pain  Skin: Negative for color change and rash  Neurological: Negative for seizures and syncope  All other systems reviewed and are negative        Active Problem List     Patient Active Problem List   Diagnosis    Anxiety    Asthma    Benign essential hypertension    Combined hyperlipidemia    Prediabetes    Vitamin D deficiency    Fibroid    Localized osteoporosis without current pathological fracture    Acute pain of right shoulder    Pain in both lower extremities    Neck pain    BMI 35 0-35 9,adult    Malignant neoplasm of lower-inner quadrant of left breast in female, estrogen receptor positive ([address] )    Family history of cancer    BRCA negative    GERD (gastroesophageal reflux disease)       Objective   /84 (BP Location: Left arm, Patient Position: Sitting, Cuff Size: Adult)   Pulse 88   Temp 97 8 °F (36 6 °C) (Temporal)   Resp 16   Ht 5' 3 78" (1 62 m)   Wt 94 3 kg (208 lb)   LMP 12/15/2003 (Within Years)   SpO2 97%   BMI 35 95 kg/m²     Physical Exam  Vitals and nursing note reviewed  Constitutional:       Appearance: She is well-developed  HENT:      Head: Normocephalic and atraumatic  Right Ear: External ear normal       Left Ear: External ear normal    Eyes:      Pupils: Pupils are equal, round, and reactive to light  Cardiovascular:      Rate and Rhythm: Normal rate and regular rhythm  Heart sounds: Normal heart sounds  Pulmonary:      Effort: Pulmonary effort is normal       Breath sounds: Normal breath sounds  Abdominal:      General: Bowel sounds are normal       Palpations: Abdomen is soft  Musculoskeletal:      Cervical back: Normal range of motion and neck supple  Neurological:      Mental Status: She is alert and oriented to person, place, and time  Deep Tendon Reflexes: Reflexes are normal and symmetric  Psychiatric:         Behavior: Behavior normal          Thought Content:  Thought content normal          Judgment: Judgment normal          Pertinent Laboratory/Diagnostic Studies:  Records reviewed    Current Medications     Current Outpatient Medications:     albuterol (ProAir HFA) 90 mcg/act inhaler, Inhale 2 puffs every 6 (six) hours as needed for wheezing, Disp: 18 g, Rfl: 0    amLODIPine (NORVASC) 10 mg tablet, Take 1 tablet (10 mg total) by mouth daily, Disp: 90 tablet, Rfl: 0    anastrozole (ARIMIDEX) 1 mg tablet, Take 1 tablet (1 mg total) by mouth daily, Disp: 90 tablet, Rfl: 1    atorvastatin (LIPITOR) 10 mg tablet, Take 1 tablet (10 mg total) by mouth daily, Disp: 90 tablet, Rfl: 0    calcium carbonate (OS-HALIMA) 1250 (500 Ca) MG tablet, Take 1,200 mg by mouth daily , Disp: , Rfl:     cholecalciferol (VITAMIN D3) 1,000 units tablet, Take 2,000 Units by mouth daily  , Disp: , Rfl:     multivitamin (THERAGRAN) TABS, Take 1 tablet by mouth daily  , Disp: , Rfl:     Health Maintenance     Health Maintenance   Topic Date Due    BMI: Followup Plan  01/16/2021    Pneumococcal Vaccine: Pediatrics (0 to 5 Years) and At-Risk Patients (6 to 59 Years) (1 of 2 - PPSV23) 01/16/2025 (Originally 7/5/1966)    Breast Cancer Screening: Mammogram  09/14/2022    Depression Screening  02/24/2023    BMI: Adult  05/05/2023    Annual Physical  05/05/2023    Colorectal Cancer Screening  05/23/2026    Cervical Cancer Screening  10/29/2026    DTaP,Tdap,and Td Vaccines (2 - Td or Tdap) 05/05/2032    HIV Screening  Completed    Hepatitis C Screening  Completed    Osteoporosis Screening  Completed    Influenza Vaccine  Completed    COVID-19 Vaccine  Completed    HIB Vaccine  Aged Out    Hepatitis B Vaccine  Aged Out    IPV Vaccine  Aged Out    Hepatitis A Vaccine  Aged Out    Meningococcal ACWY Vaccine  Aged Out    HPV Vaccine  Aged Out     Immunization History   Administered Date(s) Administered    COVID-19 MODERNA VACC 0 5 ML IM 03/23/2021, 04/22/2021, 01/05/2022    INFLUENZA 10/15/2018    Influenza Quadrivalent, 6-35 Months IM 10/19/2016, 10/11/2017    Influenza, recombinant, quadrivalent,injectable, preservative free 10/30/2019, 10/07/2020, 12/08/2021    Influenza, seasonal, injectable 1960, 10/01/2014, 11/05/2015, 11/01/2016    Influenza, seasonal, injectable, preservative free 10/15/2018    Tdap 05/05/2022       Latia Sher DO  [facility]

## 2022-05-06 ENCOUNTER — APPOINTMENT (OUTPATIENT)
Dept: LAB | Facility: CLINIC | Age: 62
End: 2022-05-06
Payer: COMMERCIAL

## 2022-05-06 DIAGNOSIS — E78.2 COMBINED HYPERLIPIDEMIA: ICD-10-CM

## 2022-05-06 DIAGNOSIS — I10 BENIGN ESSENTIAL HYPERTENSION: ICD-10-CM

## 2022-05-06 DIAGNOSIS — R73.03 PREDIABETES: ICD-10-CM

## 2022-05-06 LAB
ALBUMIN SERPL BCP-MCNC: 3.5 G/DL (ref 3.5–5)
ALP SERPL-CCNC: 108 U/L (ref 46–116)
ALT SERPL W P-5'-P-CCNC: 23 U/L (ref 12–78)
ANION GAP SERPL CALCULATED.3IONS-SCNC: 5 MMOL/L (ref 4–13)
AST SERPL W P-5'-P-CCNC: 18 U/L (ref 5–45)
BILIRUB SERPL-MCNC: 1 MG/DL (ref 0.2–1)
BUN SERPL-MCNC: 14 MG/DL (ref 5–25)
CALCIUM SERPL-MCNC: 9.8 MG/DL (ref 8.3–10.1)
CHLORIDE SERPL-SCNC: 109 MMOL/L (ref 100–108)
CHOLEST SERPL-MCNC: 170 MG/DL
CO2 SERPL-SCNC: 28 MMOL/L (ref 21–32)
CREAT SERPL-MCNC: 0.93 MG/DL (ref 0.6–1.3)
EST. AVERAGE GLUCOSE BLD GHB EST-MCNC: 111 MG/DL
GFR SERPL CREATININE-BSD FRML MDRD: 66 ML/MIN/1.73SQ M
GLUCOSE P FAST SERPL-MCNC: 87 MG/DL (ref 65–99)
HBA1C MFR BLD: 5.5 %
HDLC SERPL-MCNC: 67 MG/DL
LDLC SERPL CALC-MCNC: 95 MG/DL (ref 0–100)
POTASSIUM SERPL-SCNC: 3.8 MMOL/L (ref 3.5–5.3)
PROT SERPL-MCNC: 7.5 G/DL (ref 6.4–8.2)
SODIUM SERPL-SCNC: 142 MMOL/L (ref 136–145)
TRIGL SERPL-MCNC: 40 MG/DL
TSH SERPL DL<=0.05 MIU/L-ACNC: 0.91 UIU/ML (ref 0.45–4.5)

## 2022-05-06 PROCEDURE — 80061 LIPID PANEL: CPT

## 2022-05-06 PROCEDURE — 83036 HEMOGLOBIN GLYCOSYLATED A1C: CPT

## 2022-05-06 PROCEDURE — 80053 COMPREHEN METABOLIC PANEL: CPT

## 2022-05-06 PROCEDURE — 84443 ASSAY THYROID STIM HORMONE: CPT

## 2022-05-06 PROCEDURE — 36415 COLL VENOUS BLD VENIPUNCTURE: CPT

## 2022-05-10 DIAGNOSIS — E78.2 COMBINED HYPERLIPIDEMIA: ICD-10-CM

## 2022-05-10 RX ORDER — ATORVASTATIN CALCIUM 10 MG/1
10 TABLET, FILM COATED ORAL DAILY
Qty: 90 TABLET | Refills: 0 | Status: SHIPPED | OUTPATIENT
Start: 2022-05-10 | End: 2022-08-09 | Stop reason: SDUPTHER

## 2022-05-26 ENCOUNTER — TELEPHONE (OUTPATIENT)
Dept: SURGICAL ONCOLOGY | Facility: CLINIC | Age: 62
End: 2022-05-26

## 2022-05-29 DIAGNOSIS — I10 BENIGN ESSENTIAL HYPERTENSION: ICD-10-CM

## 2022-05-31 ENCOUNTER — TELEMEDICINE (OUTPATIENT)
Dept: RADIATION ONCOLOGY | Facility: CLINIC | Age: 62
End: 2022-05-31
Attending: RADIOLOGY

## 2022-05-31 DIAGNOSIS — Z17.0 MALIGNANT NEOPLASM OF LOWER-INNER QUADRANT OF LEFT BREAST IN FEMALE, ESTROGEN RECEPTOR POSITIVE (HCC): Primary | ICD-10-CM

## 2022-05-31 DIAGNOSIS — C50.312 MALIGNANT NEOPLASM OF LOWER-INNER QUADRANT OF LEFT BREAST IN FEMALE, ESTROGEN RECEPTOR POSITIVE (HCC): Primary | ICD-10-CM

## 2022-05-31 PROCEDURE — 99024 POSTOP FOLLOW-UP VISIT: CPT | Performed by: RADIOLOGY

## 2022-05-31 RX ORDER — AMLODIPINE BESYLATE 10 MG/1
10 TABLET ORAL DAILY
Qty: 90 TABLET | Refills: 0 | Status: SHIPPED | OUTPATIENT
Start: 2022-05-31 | End: 2022-08-09 | Stop reason: SDUPTHER

## 2022-05-31 NOTE — PROGRESS NOTES
Virtual Brief Visit    Patient is located in the following state in which I hold an active license [place]      Assessment/Plan:  The patient is 6 weeks post adjuvant radiation therapy for breast carcinoma  She was treated in the prone position  She states she no longer has any desquamation  Her coloration of her skin is close to her baseline  She has some sensitivity superior to the nipple area  She notes the nipple areas significantly improved  We did discuss breast massage to minimize breast fibrosis  We discussed skin care as well  She will return for follow-up visit in 6 months  Problem List Items Addressed This Visit        Other    Malignant neoplasm of lower-inner quadrant of left breast in female, estrogen receptor positive ([facility] [address] ) - Primary          Recent Visits  No visits were found meeting these conditions  Showing recent visits within past 7 days and meeting all other requirements  Today's Visits  Date Type Provider Dept   05/31/22 Tonja Clarke MD [facility]   Showing today's visits and meeting all other requirements  Future Appointments  No visits were found meeting these conditions    Showing future appointments within next 150 days and meeting all other requirements         I spent 15 minutes directly with the patient during this visit

## 2022-07-28 ENCOUNTER — TELEPHONE (OUTPATIENT)
Dept: HEMATOLOGY ONCOLOGY | Facility: CLINIC | Age: 62
End: 2022-07-28

## 2022-07-28 NOTE — TELEPHONE ENCOUNTER
Appointment Cancellation Or Reschedule     Person calling in Patient    Provider Dr Mehrdad Gooden   Office Visit Date and Time  08/25 @9AM    Office Visit Location [place]   Did patient want to reschedule their office appointment? If so, when was it scheduled to? Yes 08/29 @9AM   Is this patient calling to reschedule an infusion appointment? No   When is their next infusion appointment? N/A   Is this patient a Chemo patient? No   Reason for Cancellation or Reschedule Scheduling conflict     If the patient is a treatment patient, please route this to the office nurse  If the patient is not on treatment, please route to the office MA  If the patient is a surgical oncology patient, please route to surg/onc clinical pool

## 2022-08-05 ENCOUNTER — OFFICE VISIT (OUTPATIENT)
Dept: SURGICAL ONCOLOGY | Facility: CLINIC | Age: 62
End: 2022-08-05
Payer: COMMERCIAL

## 2022-08-05 VITALS
WEIGHT: 203 LBS | RESPIRATION RATE: 18 BRPM | BODY MASS INDEX: 34.66 KG/M2 | HEIGHT: 64 IN | HEART RATE: 92 BPM | SYSTOLIC BLOOD PRESSURE: 128 MMHG | DIASTOLIC BLOOD PRESSURE: 80 MMHG | OXYGEN SATURATION: 98 % | TEMPERATURE: 98.3 F

## 2022-08-05 DIAGNOSIS — Z17.0 MALIGNANT NEOPLASM OF LOWER-INNER QUADRANT OF LEFT BREAST IN FEMALE, ESTROGEN RECEPTOR POSITIVE (HCC): ICD-10-CM

## 2022-08-05 DIAGNOSIS — C50.312 MALIGNANT NEOPLASM OF LOWER-INNER QUADRANT OF LEFT BREAST IN FEMALE, ESTROGEN RECEPTOR POSITIVE (HCC): ICD-10-CM

## 2022-08-05 DIAGNOSIS — Z79.811 USE OF ANASTROZOLE: Primary | ICD-10-CM

## 2022-08-05 PROCEDURE — 99214 OFFICE O/P EST MOD 30 MIN: CPT

## 2022-08-05 NOTE — PROGRESS NOTES
Surgical Oncology Follow Up       [address] [facility]  [facility] [address]    Andee Schwab  1960  [id number]  [facility] SURGICAL ONCOLOGY [place]  [place] [address]    Chief Complaint   Patient presents with    Follow-up       Assessment/Plan:  1  Malignant neoplasm of lower-inner quadrant of left breast in female, estrogen receptor positive ([facility] [address] )  - 6 month follow up  - Mammo diagnostic bilateral w 3d & cad; Future  - Ambulatory referral to Physical Therapy; Future    2  Use of anastrozole  - continue use per medical oncology         Discussion/Summary:  Patient is a 21-year-old female presenting today for six-month follow-up for left breast cancer diagnosed in September 2021  Pathology revealed DCIS ER/VT positive  She underwent genetic testing which was negative but revealed a VUS of MSH6  She underwent a left breast lumpectomy with sentinel node biopsy with Dr Yoana Vigil  Pathology revealed invasive carcinoma  She underwent re-excision  She completed whole breast radiation therapy  She is currently on anastrozole  There were no concerns her clinical breast exam   Patient noticed that she has itchiness on her left breast an area of hardened skin  I attributed this change to post radiation skin changes  I encouraged her to keep the skin moisturized and possibly try hydrocortisone cream for itch  She does have slight axillary tightness in the left axilla so I have referred her to physical therapy at this time  I will have her see Alonso Sanchez in 6 months for a survivorship visit  She was instructed to call with any questions or concerns prior to this time  All questions were answered today        History of Present Illness:     Oncology History Overview Note      Malignant neoplasm of lower-inner quadrant of left breast in female, estrogen receptor positive ([facility] [address] )   9/28/2021 Initial Diagnosis    Ductal carcinoma in situ (DCIS) of left breast     9/28/2021 Biopsy    Final Diagnosis   A  Breast, Left, 7:00 location 2 cores w/ Calcs, Biopsy:  - Ductal carcinoma in situ   - No invasive carcinoma is seen  Architectural patterns: Solid, papillary       Nuclear grade of ductal carcinoma in situ: Grade 2 (II)  Necrosis:  Focal        Size of in-situ carcinoma: At least 4 mm   - Microcalcifications: Present in DCIS  - Breast cancer biomarker studies (paraffin block number: A1):           Estrogen, progesterone receptor studies pending, to be described in a separate receptor report  ER 90-95% positive  Pr90-95% positive  B  Breast, Left, 7:00 location 1 core w/ No Calcs, Biopsy:  - Atypical lobular hyperplasia  - Usual ductal hyperplasia involving intraductal papilloma  C  Breast, Left, 9:00 location 2 cores w/ Calcs, Biopsy:  - Non-proliferative fibrocystic changes, including microcysts, microcalcifications with stromal fibrosis  - Negative for atypia or carcinoma  D  Breast, Left, 9:00 location 1 core w/ No Calcs, Biopsy:  - Intraductal papilloma  - Negative for atypia or carcinoma           10/26/2021 -  Cancer Staged    Staging form: Breast, AJCC 8th Edition  - Clinical: Stage 0 (cTis (DCIS), cN0, cM0, ER+, AL+, HER2: Not Assessed) - Signed by Reno Solomon MD on 10/26/2021  Stage prefix: Initial diagnosis  Method of lymph node assessment: Clinical  Nuclear grade: G2       10/2021 Genetic Testing    Test: [employer] BRCAplus STAT Panel (8 genes): ZAKIA, BRCA1, BRCA2, CDH1, CHEK2, PALB2, PTEN, TP53   Result:   Negative - No Clinically Significant Variants Detected       Assessment:  Negative     Additional Testing:  Test(s): [employer] BRCAplus STAT Panel (8 genes): ZAKIA, BRCA1, BRCA2, CDH1, CHEK2, PALB2, PTEN, TP53 with reflex to [facility] [employer] CancerNext (28 additional genes): APC, ZAKIA, AXIN2 BARD1, BRCA1, BRCA2, BRIP1, BMPR1A, CDH1, CDK4, CDKN2A, CHEK2, DICER1, EPCAM, GREM1, HOXB13, MLH1, MSH2, MSH3, MSH6, MUTYH, NBN, NF1, NTHL1, PALB2, PMS2, POLD1, POLE, PTEN, RAD51C, RAD51D, RECQL SMAD4, SMARCA4, STK11, TP53      Result: Variant of uncertain significance     Variant: MSH6 Variant, Unknown Significance: p  T6P       Variant of uncertain significance (VUS)     11/19/2021 Surgery    Final Diagnosis   A  Breast, Left, Left breast lumpectomy, short superior, long lateral:  - Invasive mammary carcinoma of no special type (ductal), 8 mm in greatest dimension with    extensive adjacent ductal carcinoma in-situ and associated non-invasive lobular neoplasia  -- Jamie histologic grade 2 of 3 (total score: 7 of 9)        * Glandular (acinar) Tubular Differentiation < 10%, score 3         * Nuclear Pleomorphism 2 of 3, score 2         * Mitotic Rate 4-7/mm2, (8-14 mitoses/10HPF; 8 mitoses/10 HPF), score 2     -- Confirmed by tumor cell immunophenotype:        * Positive: E-cadherin, P120 - each in a membranous pattern  * Negative: p63, SMMHC   - Ductal carcinoma in-situ (DCIS): Present; not an extensive intraductal component associated with     invasive carcinoma (< 5% of total tumor) but extensive DCIS in adjacent tissue  -- Confirmed by positive E-cadherin, p120 (membranous), SMMHC, p63 and loss of epithelial         mosaicism on CK5/6 immunostains  -- Size and Extent: At least 48 mm in approximate extent; present in 12 sequential sections; 43 of 91 blocks  -- Architectural Patterns: Cribriform, micropapillary, solid, papillary  -- Nuclear grade: I-II (low to intermediate)    -- Necrosis: Present, focal and central necrosis identified  -- Associated prior biopsy site changes with twist clip and Aliyah  marker   - Multifocal non-invasive lobular neoplasia (focally florid lobular carcinoma in-situ/LCIS and atypical lobular     hyperplasia with pagetoid ductal spread) involving and adjacent to DCIS      -- Confirmed by negative E-cadherin; positive p120 (cytoplasmic), SMMHC, p63; and loss of epithelial        mosaicism on CK5/6 immunostains    - Margins uninvolved by invasive and in-situ carcinoma  -- Invasive carcinoma: 8 mm from nearest posterior margin  -- DCIS: 0 1 mm from nearest posterior margin; < 1 mm from nearest anterior margin; 1 5 mm from        nearest medial margin; > 2 mm from all remaining margins    - Benign skin  -- No dermal lymphovascular invasion    - Lymphovascular invasion: Not identified  - Microcalcifications: Present, associated with DCIS  - Benign fibrocystic changes with atypia (atypical and florid usual ductal hyperplasia, columnar cell     lesions with flat epithelial atypia/FEA, cystic and papillary apocrine metaplasia)  - Radial scar, 0 4 cm  B  Breast, Left, Left Breast Lumpectomy new superior margin - Short Superior, Long Lateral:  - Ductal carcinoma in-situ (DCIS)  -- Size and Extent: 16 mm in greatest approximate extent; present in 4 sequential sections; 7 of 78 blocks  -- Architectural Patterns: Cribriform, micropapillary, solid, papillary  -- Nuclear grade: I-II (low to intermediate)  -- Necrosis: Present, focal      -- Confirmed by positive E-cadherin, P120 (membranous) and loss of epithelial mosaicism on         CK5/6 immunostains  -- Associated prior biopsy site changes with biopsy clip and AJAY  marker   - No invasive carcinoma identified  - Margins uninvolved by DCIS  -- DCIS < 1 mm from nearest posterior margin; < 1-2 mm from medial margin; > 2 mm from all       remaining margins  - Lymphovascular invasion: Not identified  - Microcalcifications: Present, associated with DCIS, lobular neoplasia and non-neoplastic breast tissue     - Benign fibrocystic changes with atypia:    -- Multifocal/extensive non-invasive lobular neoplasia (focal lobular carcinoma in-situ and        predominantly atypical lobular hyperplasia with pagetoid ductal spread), focally involving        and adjacent to DCIS  ** Confirmed by negative E-cadherin; positive p120 (cytoplasmic) immunostains  -- Usual ductal hyperplasia, columnar cell change and hyperplasia with focal flat epithelial        atypia/FEA, sclerosing adenosis, fibroadenomatoid changes, cystic and papillary apocrine metaplasia  - Small intraductal papilloma without atypia, 0 1 cm          12/7/2021 -  Cancer Staged    Staging form: Breast, AJCC 8th Edition  - Pathologic stage from 12/7/2021: Stage IA (pT1b, pN0, cM0, G2, ER+, AZ+, HER2-) - Signed by Kiesha Garrison MD on 12/7/2021  Stage prefix: Initial diagnosis  Method of lymph node assessment: Clinical  Histologic grading system: 3 grade system       1/21/2022 Surgery    Final Diagnosis   A  Left breast, anterior margin:     - Histologic changes compatible with prior surgical procedure      - Skin with dermal cicatrix  - No invasive or in situ carcinoma identified      - Final anterior margin is negative for malignancy  B  Left breast, new posterior margin:     - Focus of atypical lobular hyperplasia (ALH)  - No invasive or in situ carcinoma identified      - Final posterior margin is negative for malignancy  C   Left breast, medial margin:     - Histologic changes compatible with prior surgical procedure  - No invasive or in situ carcinoma identified      - Final medial margin is negative for malignacny  D   Left axilla, sentinel lymph node #1:     - Single lymph node; negative for malignancy (0/1)  E   Left axilla, sentinel lymph node #2:     - Single lymph node; negative for malignancy (0/1)  F   Left axilla, sentinel lymph node #3:     - Single lymph node; negative for malignancy (0/1)       G   Left axilla, sentinel lymph node #4:     - Single lymph node; negative for malignancy (0/1)         3/23/2022 - 4/2022 Radiation    Prone L Boost 10X/6X 5 / 5 200 0 1,000 6   Prone L Br # 6X 16 / 16 266 0 4,256 21      Treatment dates:  3/23/2022 - 4/20/2022           -Interval History: Patient is a 67-year-old female presenting today for six-month follow-up for left breast cancer diagnosed in September 2021  Patient denies changes on her breast exam  She denies persistent headaches, bone pain, back pain, shortness of breath, or abdominal pain  Review of Systems:  Review of Systems   Constitutional: Negative for activity change, appetite change, fatigue and unexpected weight change  Respiratory: Negative for cough and shortness of breath  Cardiovascular: Negative for chest pain  Gastrointestinal: Negative for abdominal pain, diarrhea, nausea and vomiting  Endocrine: Negative for heat intolerance  Musculoskeletal: Negative for arthralgias, back pain and myalgias  Skin: Negative for rash  Neurological: Negative for weakness and headaches  Hematological: Negative for adenopathy         Patient Active Problem List   Diagnosis    Anxiety    Asthma    Benign essential hypertension    Combined hyperlipidemia    Prediabetes    Vitamin D deficiency    Fibroid    Localized osteoporosis without current pathological fracture    Acute pain of right shoulder    Pain in both lower extremities    Neck pain    BMI 35 0-35 9,adult    Malignant ne[facility] of lower-inner quadrant of left [facility] in female, [facility]    Family history of cancer    BRCA negative    GERD (gastroesophageal reflux disease)    Use of anastrozole     Past Medical History:   Diagnosis Date    Asthma     Last Assessed: 10/19/2016    Breast cancer ([facility] [address] )     GERD (gastroesophageal reflux disease)     controlled with diet    Hypercholesteremia     Hypertension     Wears glasses      Past Surgical History:   Procedure Laterality Date    BREAST BIOPSY Left 09/28/2021    multiple sites    BREAST LUMPECTOMY Left 11/19/2021    Procedure: LUMPECTOMY BREAST BRACKETED AJAY LOCALIZED;  Surgeon: Shivam Franco MD;  [facility]: [facility];  Service: Surgical Oncology    BREAST LUMPECTOMY Left 1/21/2022    Procedure: LUMPECTOMY BREAST, re-excision; 0800 NUC MED;  Surgeon: Shivam Franco MD;  [facility];  Service: Surgical Oncology    BUNIONECTOMY      BUNIONECTOMY Right 2009    BUNIONECTOMY Left 2007    COLONOSCOPY      FOOT SURGERY      LYMPH NODE BIOPSY Left 1/21/2022    Procedure: BIOPSY LYMPH NODE SENTINEL;  Surgeon: Shivam Franco MD;  Location: [facility];  Service: Surgical Oncology    MAMMO NEEDLE LOCALIZATION LEFT (ALL INC) Left 11/12/2021    MAMMO NEEDLE LOCALIZATION LEFT (ALL INC) EACH ADD Left 11/12/2021    MAMMO STEREOTACTIC BREAST BIOPSY LEFT (ALL INC) Left 9/28/2021    MAMMO STEREOTACTIC BREAST BIOPSY LEFT (ALL INC) EACH ADD Left 9/28/2021    NASAL POLYP EXCISION      OR COLONOSCOPY FLX DX W/COLLJ SPEC WHEN PFRMD N/A 5/23/2016    Procedure: COLONOSCOPY;  Surgeon: Mike Neal DO;  Location: [facility];   Service: Gastroenterology    SHOULDER ARTHROSCOPY Right 2012    SHOULDER ARTHROSCOPY W/ ROTATOR CUFF REPAIR Right 2012     Family History   Problem Relation Age of Onset    Hypertension Mother    Mavis Cousin Breast cancer Mother         age unknown; bilateral breast cancer    Lung cancer Mother     Cervical cancer Mother     Diabetes Father     Hypertension Father     Prostate cancer Father         age unknown    Hypertension Brother     Prostate cancer Brother 48    Breast cancer Maternal Grandmother 40    Diabetes Paternal Grandmother     Hypertension Maternal Aunt     No Known Problems Maternal Aunt     Diabetes Paternal Aunt     Diabetes Paternal Uncle     Hypertension Cousin     Breast cancer Cousin         under 48    Diabetes Cousin     Breast cancer Cousin         under 48    Lung cancer Maternal Uncle      Social History     Socioeconomic History    Marital status: /Civil Union     Spouse name: Not on file    Number of children: Not on file    Years of education: Not on file    Highest education level: Not on file   Occupational History    Not on file   Tobacco Use    Smoking status: Never Smoker    Smokeless tobacco: Never Used   Vaping Use    Vaping Use: Never used   Substance and Sexual Activity    Alcohol use: No    Drug use: Never    Sexual activity: Yes     Partners: Male     Birth control/protection: None, Post-menopausal   Other Topics Concern    Not on file   Social History Narrative    Not on file     Social Determinants of Health     Financial Resource Strain: Not on file   Food Insecurity: Not on file   Transportation Needs: Not on file   Physical Activity: Not on file   Stress: Not on file   Social Connections: Not on file   Intimate Partner Violence: Not on file   Housing Stability: Not on file       Current Outpatient Medications:     albuterol (ProAir HFA) 90 mcg/act inhaler, Inhale 2 puffs every 6 (six) hours as needed for wheezing, Disp: 18 g, Rfl: 0    amLODIPine (NORVASC) 10 mg tablet, Take 1 tablet (10 mg total) by mouth daily, Disp: 90 tablet, Rfl: 0    anastrozole (ARIMIDEX) 1 mg tablet, Take 1 tablet (1 mg total) by mouth daily, Disp: 90 tablet, Rfl: 1    atorvastatin (LIPITOR) 10 mg tablet, Take 1 tablet (10 mg total) by mouth daily, Disp: 90 tablet, Rfl: 0    calcium carbonate (OS-HALIMA) 1250 (500 Ca) MG tablet, Take 1,200 mg by mouth daily , Disp: , Rfl:     cholecalciferol (VITAMIN D3) 1,000 units tablet, Take 2,000 Units by mouth daily  , Disp: , Rfl:     multivitamin (THERAGRAN) TABS, Take 1 tablet by mouth daily  , Disp: , Rfl:   No Known Allergies  Vitals:    08/05/22 1002   Resp: 18       Physical Exam  Constitutional:       General: She is not in acute distress  Appearance: Normal appearance  Cardiovascular:      Rate and Rhythm: Normal rate and regular rhythm  Pulses: Normal pulses  Heart sounds: Normal heart sounds  Pulmonary:      Effort: Pulmonary effort is normal       Breath sounds: Normal breath sounds  Chest:      Chest wall: No mass     Breasts:      Right: No swelling, bleeding, inverted nipple, mass, nipple discharge, skin change, tenderness, axillary adenopathy or supraclavicular adenopathy  Left: Skin change and tenderness present  No swelling, bleeding, inverted nipple, mass, nipple discharge, axillary adenopathy or supraclavicular adenopathy  Comments: Left lumpectomy scar  S/p skin changes and tenderness  No masses, nodularity, skin changes, nipple changes or discharge, or adenopathy appreciated on physical exam      Abdominal:      General: Abdomen is flat  Palpations: Abdomen is soft  Lymphadenopathy:      Upper Body:      Right upper body: No supraclavicular, axillary or pectoral adenopathy  Left upper body: No supraclavicular, axillary or pectoral adenopathy  Skin:     General: Skin is warm  Neurological:      General: No focal deficit present  Mental Status: She is alert and oriented to person, place, and time  Psychiatric:         Mood and Affect: Mood normal          Behavior: Behavior normal            Results:    Imaging  No results found  I reviewed the above imaging data  Advance Care Planning/Advance Directives:  Discussed disease status, cancer treatment plans and/or cancer treatment goals with the patient

## 2022-08-09 DIAGNOSIS — E78.2 COMBINED HYPERLIPIDEMIA: ICD-10-CM

## 2022-08-09 DIAGNOSIS — I10 BENIGN ESSENTIAL HYPERTENSION: ICD-10-CM

## 2022-08-09 RX ORDER — AMLODIPINE BESYLATE 10 MG/1
10 TABLET ORAL DAILY
Qty: 90 TABLET | Refills: 0 | Status: SHIPPED | OUTPATIENT
Start: 2022-08-09

## 2022-08-09 RX ORDER — ATORVASTATIN CALCIUM 10 MG/1
10 TABLET, FILM COATED ORAL DAILY
Qty: 90 TABLET | Refills: 0 | Status: SHIPPED | OUTPATIENT
Start: 2022-08-09

## 2022-08-15 ENCOUNTER — EVALUATION (OUTPATIENT)
Dept: PHYSICAL THERAPY | Facility: REHABILITATION | Age: 62
End: 2022-08-15
Payer: COMMERCIAL

## 2022-08-15 DIAGNOSIS — C50.312 MALIGNANT NEOPLASM OF LOWER-INNER QUADRANT OF LEFT BREAST IN FEMALE, ESTROGEN RECEPTOR POSITIVE (HCC): ICD-10-CM

## 2022-08-15 DIAGNOSIS — Z17.0 MALIGNANT NEOPLASM OF LOWER-INNER QUADRANT OF LEFT BREAST IN FEMALE, ESTROGEN RECEPTOR POSITIVE (HCC): ICD-10-CM

## 2022-08-15 PROCEDURE — 97112 NEUROMUSCULAR REEDUCATION: CPT | Performed by: PHYSICAL THERAPIST

## 2022-08-15 PROCEDURE — 97161 PT EVAL LOW COMPLEX 20 MIN: CPT | Performed by: PHYSICAL THERAPIST

## 2022-08-15 NOTE — PROGRESS NOTES
PT Evaluation     Today's date: 8/15/2022  Patient name: Brayan Malik  : 1960  MRN: [id number]  Referring provider: Jw Ortiz*  Dx:   Encounter Diagnosis     ICD-10-CM    1  Malignant neoplasm of lower-inner quadrant of left breast in female, estrogen receptor positive ([facility] [address] )  C50 [id number] Ambulatory referral to Physical Therapy    Z17 [id number]        Start Time: 1159          Assessment  Assessment details: Patient has good ROM and strength and reviewed massage over scar areas and importance of moving tissue  She currently has a 8% difference in volumes left to right side  Goals  STG decrease pain 0-1/10  Independent stretching program  LTG I ADL's  I strength ABC program    Plan  Plan details: Continue with stretching and core next visit and progression to strengthening as tolerated    Patient would benefit from: skilled physical therapy  Planned therapy interventions: neuromuscular re-education, strengthening and stretching  Frequency: 1x week  Duration in weeks: 6  Treatment plan discussed with: patient        Subjective Evaluation    History of Present Illness  Mechanism of injury: Patient presents s/p lumpectomy left breast  and  and is here for strength abc program    Pain  Current pain ratin  At best pain ratin  At worst pain rating: 3  Quality: dull ache and tight    Patient Goals  Patient goal: keep self stretched out and strengthened        Objective     Active Range of Motion   Left Shoulder   Normal active range of motion    Right Shoulder   Normal active range of motion    Strength/Myotome Testing     Left Shoulder   Normal muscle strength    Right Shoulder   Normal muscle strength             Precautions: CA left breast, asthma, HBP, stomache    Manuals 8/15                                                                Neuro Re-Ed             Overview of strength ABC program 8 min            Bridges nv            crunch nv            superwoman nv Ther Ex             Chest stretch nv            Shoulder stretch nv            tricep stretch nv            Calf stretch nv            quadricep stretch nv            Butterfly stretch nv            Hamstring stretch nv            Back stretch nv            Chest press             squats             Standing rows             Dead lift             scaption             steps             tricep kickback             Heel raises             Bicep curls                          Ther Activity                                       Gait Training                                       Modalities

## 2022-08-29 ENCOUNTER — OFFICE VISIT (OUTPATIENT)
Dept: HEMATOLOGY ONCOLOGY | Facility: CLINIC | Age: 62
End: 2022-08-29
Payer: COMMERCIAL

## 2022-08-29 VITALS
SYSTOLIC BLOOD PRESSURE: 138 MMHG | RESPIRATION RATE: 18 BRPM | WEIGHT: 208 LBS | OXYGEN SATURATION: 96 % | TEMPERATURE: 96.9 F | HEART RATE: 85 BPM | DIASTOLIC BLOOD PRESSURE: 82 MMHG | BODY MASS INDEX: 35.51 KG/M2 | HEIGHT: 64 IN

## 2022-08-29 DIAGNOSIS — Z17.0 MALIGNANT NEOPLASM OF LOWER-INNER QUADRANT OF LEFT BREAST IN FEMALE, ESTROGEN RECEPTOR POSITIVE (HCC): Primary | ICD-10-CM

## 2022-08-29 DIAGNOSIS — C50.312 MALIGNANT NEOPLASM OF LOWER-INNER QUADRANT OF LEFT BREAST IN FEMALE, ESTROGEN RECEPTOR POSITIVE (HCC): Primary | ICD-10-CM

## 2022-08-29 PROCEDURE — 99214 OFFICE O/P EST MOD 30 MIN: CPT | Performed by: INTERNAL MEDICINE

## 2022-08-29 NOTE — PROGRESS NOTES
Hematology / Oncology Outpatient Follow Up Note    Eric Flank 60 y o  female [id number]         Date:  8/29/2022    Assessment / Plan:  A 11-year-old postmenopausal woman with stage I A left breast cancer, grade 2, ER 90% positive, CO 90% positive, HER2 negative disease  She underwent lumpectomy and sentinel lymph node biopsy, resulting in HAWK  Her tumor size was 8 mm  She is currently on adjuvant hormonal therapy with anastrozole with excellent tolerance  Clinically, she has no evidence recurrent disease  I recommended her to continue anastrozole 1 mg once a day  She is in agreement with my recommendations  I will see her again in a year for routine follow-up              Subjective:      HPI:  A 11-year-old postmenopausal woman who was found to have abnormality in her left breast, based on a screening mammography  Therefore, she underwent left breast biopsy in September 28, 2021  She was found to have ductal carcinoma in Situ, ER 90% positive, CO 90% positive  She was seen by Dr Blanka Chase  She has strong family history of breast cancer  Therefore, she underwent genetic testing which showed MSH-6 variant with uncertain significance  She subsequently underwent lumpectomy by Dr Blanka Chase in November 19, 2021 which showed 8 mm of invasive ductal carcinoma, grade 2  This was ER 90% positive, CO 90% positive, HER2 negative disease  Because of the invasive cancer was found, she underwent left axillary sentinel lymph node biopsy in January 21, 2021   4 sentinel lymph nodes were all negative for metastatic disease  She presents today with her  to discuss adjuvant treatment options  She feels well  She has minor discomfort in the left breast   She denied any pain  She has no respiratory symptoms  Her weight is stable  She has hypertension, hypercholesteremia as well as asthma  She has no other surgical history  Her mother and have her grandmother had breast cancer    She is a lifetime never smoker  Her performance status is normal            Interval History:  A 60year-old postmenopausal woman with stage I A left breast cancer, grade 2, ER 90% positive, WY 90% positive, HER2 negative disease  She underwent lumpectomy and sentinel lymph node biopsy, resulting in HAWK  Her tumor size was 8 mm  Since February 2022, she has been on adjuvant hormonal therapy with anastrozole  She presents today for routine follow-up  She has noticed some moderate hot flashes  Her musculoskeletal symptom is quite minimal   She denied any pain  Her weight is stable  She has no respiratory symptoms  Her performance status is normal            Objective:      Primary Diagnosis:     1  Left breast cancer, stage I A (pT1b, pN0, M0) grade 2, ER 90% positive, WY 90% positive, HER2 negative disease  Diagnosed in January 2022      2  MSH-6 variant with uncertain significance      Cancer Staging:  Cancer Staging  Malignant neoplasm of lower-inner quadrant of left breast in female, estrogen receptor positive ([facility] [address] )  Staging form: Breast, AJCC 8th Edition  - Clinical: Stage 0 (cTis (DCIS), cN0, cM0, ER+, WY+, HER2: Not Assessed) - Signed by Arianna Newell MD on 10/26/2021  Stage prefix: Initial diagnosis  Method of lymph node assessment: Clinical  Nuclear grade: G2  - Pathologic stage from 12/7/2021: Stage IA (pT1b, pN0, cM0, G2, ER+, WY+, HER2-) - Signed by Arianna Newell MD on 12/7/2021  Stage prefix: Initial diagnosis  Method of lymph node assessment: Clinical  Histologic grading system: 3 grade system           Previous Hematologic/ Oncologic Treatment:            Current Hematologic/ Oncologic Treatment:       Adjuvant hormonal therapy with anastrozole since February 2022      Disease Status:      HAWK status post lumpectomy and sentinel lymph node biopsy      Test Results:     Pathology:     8 mm of invasive ductal carcinoma, grade 2  No evidence of lymphovascular invasion    4 sentinel lymph nodes were all negative for metastatic disease  ER 90% positive, RI 90% positive, HER2 negative disease  Stage I A (pT1b, pN0, M0)     Radiology:     Chest x-ray was negative for pulmonary disease      DEXA scan in 2017 showed T-score-1 7, consistent with osteopenia      Laboratory:     See below      Physical Exam:        General Appearance:    Alert, oriented          Eyes:    PERRL   Ears:    Normal external ear canals, both ears   Nose:   Nares normal, septum midline   Throat:   Mucosa moist  Pharynx without injection  Neck:   Supple         Lungs:     Clear to auscultation bilaterally   Chest Wall:    No tenderness or deformity    Heart:    Regular rate and rhythm         Abdomen:     Soft, non-tender, bowel sounds +, no organomegaly               Extremities:   Extremities no cyanosis or edema         Skin:   no rash or icterus  Lymph nodes:   Cervical, supraclavicular, and axillary nodes normal   Neurologic:   CNII-XII intact, normal strength, sensation and reflexes     Throughout             Breast exam:   Lumpectomy scar at inner lower quadrant of her left breast with no palpable abnormalities  Right breast exam is negative  ROS: Review of Systems   All other systems reviewed and are negative  Imaging: No results found        Labs:   Lab Results   Component Value Date    WBC 5 13 03/30/2022    HGB 13 2 03/30/2022    HCT 41 6 03/30/2022    MCV 88 03/30/2022     03/30/2022     Lab Results   Component Value Date     03/05/2015    K 3 8 05/06/2022     (H) 05/06/2022    CO2 28 05/06/2022    ANIONGAP 10 03/05/2015    BUN 14 05/06/2022    CREATININE 0 93 05/06/2022    GLUCOSE 91 03/05/2015    GLUF 87 05/06/2022    CALCIUM 9 8 05/06/2022    CORRECTEDCA 10 1 12/08/2021    AST 18 05/06/2022    ALT 23 05/06/2022    ALKPHOS 108 05/06/2022    PROT 7 7 03/05/2015    BILITOT 0 81 03/05/2015    EGFR 66 05/06/2022         Current Medications: Reviewed  Allergies: Reviewed  PMH/FH/SH: Reviewed      Vital Sign:    Body surface area is 1 99 meters squared      Wt Readings from Last 3 Encounters:   08/29/22 94 3 kg (208 lb)   08/05/22 92 1 kg (203 lb)   05/05/22 94 3 kg (208 lb)        Temp Readings from Last 3 Encounters:   08/29/22 (!) 96 9 °F (36 1 °C) (Tympanic)   08/05/22 98 3 °F (36 8 °C) (Temporal)   05/05/22 97 8 °F (36 6 °C) (Temporal)        BP Readings from Last 3 Encounters:   08/29/22 138/82   08/05/22 128/80   05/05/22 124/84         Pulse Readings from Last 3 Encounters:   08/29/22 85   08/05/22 92   05/05/22 88     @LASTSAO2(3)@

## 2022-09-06 ENCOUNTER — OFFICE VISIT (OUTPATIENT)
Dept: PHYSICAL THERAPY | Facility: REHABILITATION | Age: 62
End: 2022-09-06
Payer: COMMERCIAL

## 2022-09-06 DIAGNOSIS — Z17.0 MALIGNANT NEOPLASM OF LOWER-INNER QUADRANT OF LEFT BREAST IN FEMALE, ESTROGEN RECEPTOR POSITIVE (HCC): Primary | ICD-10-CM

## 2022-09-06 DIAGNOSIS — C50.312 MALIGNANT NEOPLASM OF LOWER-INNER QUADRANT OF LEFT BREAST IN FEMALE, ESTROGEN RECEPTOR POSITIVE (HCC): Primary | ICD-10-CM

## 2022-09-06 PROCEDURE — 97112 NEUROMUSCULAR REEDUCATION: CPT | Performed by: PHYSICAL THERAPIST

## 2022-09-06 PROCEDURE — 97110 THERAPEUTIC EXERCISES: CPT | Performed by: PHYSICAL THERAPIST

## 2022-09-06 NOTE — PROGRESS NOTES
Daily Note     Today's date: 2022  Patient name: Rogerio Molina  : 1960  MRN: [id number]  Referring provider: Laurie Bueno*  Dx:   Encounter Diagnosis     ICD-10-CM    1  Malignant neoplasm of lower-inner quadrant of left breast in female, estrogen receptor positive ([facility] )  C50 312     Z17 0        Start Time: 09          Subjective: No problems since initial evaluation      Objective: See treatment diary below      Assessment: Tolerated treatment well  Patient exhibited good technique with therapeutic exercises      Plan: Continue per plan of care        Precautions: CA left breast, asthma, HBP, stomache    Manuals 8/15 9/6                                                               Neuro Re-Ed             Overview of strength ABC program 8 min            Bridges nv x5 with 5 sec hold           crunch nv x5 with 5 sechold           superwoman nv x5 with 5 sec hold                                                  Ther Ex             Chest stretch nv 15 sec hold           Shoulder stretch nv x3 with 15 sec hold           tricep stretch nv x3 with 15 sec hold           Calf stretch nv x3 with 15 sec hold           quadricep stretch on chair nv x3 with 15 sec           Butterfly stretch on chair nv x3 with 15 sec hold           Hamstring stretch nv x5 with 15 sec hold           Back stretch nv x3 with 15 sec hold           Chest press  nv           squats  nv           Standing rows  nv           Dead lift  nv           scaption  nv           steps  nv           tricep kickback  nv           Heel raises  nv           Bicep curls  nv                        Ther Activity                                       Gait Training                                       Modalities

## 2022-09-12 ENCOUNTER — OFFICE VISIT (OUTPATIENT)
Dept: PHYSICAL THERAPY | Facility: REHABILITATION | Age: 62
End: 2022-09-12
Payer: COMMERCIAL

## 2022-09-12 DIAGNOSIS — Z17.0 MALIGNANT NEOPLASM OF LOWER-INNER QUADRANT OF LEFT BREAST IN FEMALE, ESTROGEN RECEPTOR POSITIVE (HCC): Primary | ICD-10-CM

## 2022-09-12 DIAGNOSIS — C50.312 MALIGNANT NEOPLASM OF LOWER-INNER QUADRANT OF LEFT BREAST IN FEMALE, ESTROGEN RECEPTOR POSITIVE (HCC): Primary | ICD-10-CM

## 2022-09-12 PROCEDURE — 97110 THERAPEUTIC EXERCISES: CPT | Performed by: PHYSICAL THERAPIST

## 2022-09-12 NOTE — PROGRESS NOTES
Daily Note     Today's date: 2022  Patient name: Joseph Ornelas  : 1960  MRN: [id number]  Referring provider: Gavin Abad*  Dx:   Encounter Diagnosis     ICD-10-CM    1  Malignant neoplasm of lower-inner quadrant of left breast in female, estrogen receptor positive ([facility] )  C50 312     Z17 0        Start Time: 0959          Subjective: I was only able to do 3 of the 5 stretches      Objective: See treatment diary below      Assessment: Tolerated treatment well   Patient exhibited good technique with therapeutic exercises  Needed some verbal cues because of not having her papers with her    Plan: DC to [facility] with strength ABCs     Precautions: CA left breast, asthma, HBP, stomache    Manuals 8/15 9/6 9/12                                                              Neuro Re-Ed             Overview of strength ABC program 8 min            Bridges nv x5 with 5 sec hold           crunch nv x5 with 5 sechold           superwoman nv x5 with 5 sec hold                                                  Ther Ex             Chest stretch nv 15 sec hold x 3 1 with 30 sec hold          Shoulder stretch nv x3 with 15 sec hold x1 with 30 sec hold          tricep stretch nv x3 with 15 sec hold x1 with 30 sec hold          Calf stretch nv x3 with 15 sec hold x1 with 30 sec hold          quadricep stretch on chair nv x3 with 15 sec x1 with 30 sec hold          Butterfly stretch on chair nv x3 with 15 sec hold x1 with 30 sec hold          Hamstring stretch nv x5 with 15 sec hold x1 with 30 sec hold          Back stretch nv x3 with 15 sec hold x1 with 30 sec hold          Chest press  nv 1# 2 sets of 10          squats  nv 0# x 10          Standing rows  nv 1# 2 sets of 10          Dead lift  nv 0# x 10          scaption  nv 1# 2 sets 10          steps  nv 0#  2 sets of 10          tricep kickback  nv 1# 2 sets of 10          Heel raises  nv 2 set of 10          Bicep curls  nv 1# 2 sets of 10 Ther Activity                                       Gait Training                                       Modalities

## 2022-09-19 ENCOUNTER — APPOINTMENT (OUTPATIENT)
Dept: PHYSICAL THERAPY | Facility: REHABILITATION | Age: 62
End: 2022-09-19
Payer: COMMERCIAL

## 2022-10-05 ENCOUNTER — HOSPITAL ENCOUNTER (OUTPATIENT)
Dept: MAMMOGRAPHY | Facility: CLINIC | Age: 62
Discharge: HOME | End: 2022-10-05
Payer: COMMERCIAL

## 2022-10-05 VITALS — WEIGHT: 208 LBS | HEIGHT: 64 IN | BODY MASS INDEX: 35.51 KG/M2

## 2022-10-05 DIAGNOSIS — C50.312 MALIGNANT NEOPLASM OF LOWER-INNER QUADRANT OF LEFT BREAST IN FEMALE, ESTROGEN RECEPTOR POSITIVE (HCC): ICD-10-CM

## 2022-10-05 DIAGNOSIS — Z17.0 MALIGNANT NEOPLASM OF LOWER-INNER QUADRANT OF LEFT BREAST IN FEMALE, ESTROGEN RECEPTOR POSITIVE (HCC): ICD-10-CM

## 2022-10-05 PROCEDURE — G0279 TOMOSYNTHESIS, MAMMO: HCPCS

## 2022-10-05 PROCEDURE — 77066 DX MAMMO INCL CAD BI: CPT

## 2022-11-09 DIAGNOSIS — J45.909 MILD ASTHMA WITHOUT COMPLICATION, UNSPECIFIED WHETHER PERSISTENT: ICD-10-CM

## 2022-11-09 DIAGNOSIS — E78.2 COMBINED HYPERLIPIDEMIA: ICD-10-CM

## 2022-11-09 DIAGNOSIS — I10 BENIGN ESSENTIAL HYPERTENSION: ICD-10-CM

## 2022-11-09 RX ORDER — ATORVASTATIN CALCIUM 10 MG/1
10 TABLET, FILM COATED ORAL DAILY
Qty: 90 TABLET | Refills: 0 | Status: CANCELLED | OUTPATIENT
Start: 2022-11-09

## 2022-11-09 RX ORDER — AMLODIPINE BESYLATE 10 MG/1
10 TABLET ORAL DAILY
Qty: 90 TABLET | Refills: 0 | Status: CANCELLED | OUTPATIENT
Start: 2022-11-09

## 2022-11-10 ENCOUNTER — OFFICE VISIT (OUTPATIENT)
Dept: FAMILY MEDICINE CLINIC | Facility: CLINIC | Age: 62
End: 2022-11-10

## 2022-11-10 VITALS
HEART RATE: 88 BPM | OXYGEN SATURATION: 98 % | TEMPERATURE: 97.8 F | HEIGHT: 64 IN | BODY MASS INDEX: 35 KG/M2 | SYSTOLIC BLOOD PRESSURE: 130 MMHG | WEIGHT: 205 LBS | RESPIRATION RATE: 16 BRPM | DIASTOLIC BLOOD PRESSURE: 80 MMHG

## 2022-11-10 DIAGNOSIS — E04.9 ENLARGED THYROID: Primary | ICD-10-CM

## 2022-11-10 DIAGNOSIS — E78.2 COMBINED HYPERLIPIDEMIA: ICD-10-CM

## 2022-11-10 DIAGNOSIS — J45.909 MILD ASTHMA WITHOUT COMPLICATION, UNSPECIFIED WHETHER PERSISTENT: ICD-10-CM

## 2022-11-10 DIAGNOSIS — R73.03 PREDIABETES: ICD-10-CM

## 2022-11-10 DIAGNOSIS — C50.312 MALIGNANT NEOPLASM OF LOWER-INNER QUADRANT OF LEFT BREAST IN FEMALE, ESTROGEN RECEPTOR POSITIVE (HCC): ICD-10-CM

## 2022-11-10 DIAGNOSIS — Z23 NEED FOR VACCINATION: ICD-10-CM

## 2022-11-10 DIAGNOSIS — I10 BENIGN ESSENTIAL HYPERTENSION: ICD-10-CM

## 2022-11-10 DIAGNOSIS — Z17.0 MALIGNANT NEOPLASM OF LOWER-INNER QUADRANT OF LEFT BREAST IN FEMALE, ESTROGEN RECEPTOR POSITIVE (HCC): ICD-10-CM

## 2022-11-10 RX ORDER — ALBUTEROL SULFATE 90 UG/1
2 AEROSOL, METERED RESPIRATORY (INHALATION) EVERY 6 HOURS PRN
Qty: 18 G | Refills: 0 | Status: SHIPPED | OUTPATIENT
Start: 2022-11-10

## 2022-11-10 RX ORDER — AMLODIPINE BESYLATE 10 MG/1
10 TABLET ORAL DAILY
Qty: 90 TABLET | Refills: 1 | Status: SHIPPED | OUTPATIENT
Start: 2022-11-10

## 2022-11-10 RX ORDER — ATORVASTATIN CALCIUM 10 MG/1
10 TABLET, FILM COATED ORAL DAILY
Qty: 90 TABLET | Refills: 1 | Status: SHIPPED | OUTPATIENT
Start: 2022-11-10

## 2022-11-10 NOTE — ASSESSMENT & PLAN NOTE
History of stage I A breast cancer  Diagnosed in 2021 ER/HI positive, HER2 negative  Status post lumpectomy and radiation     Patient doing well    Continue follow-up with surgical oncologist and medical oncologist as directed

## 2022-11-10 NOTE — PROGRESS NOTES
Name: Lucian Long      : 1960      MRN: [id number]  Encounter Provider: Jenna Martinez DO  Encounter Date: 11/10/2022   Encounter department: [address] [place] [address]     1  Enlarged thyroid  Assessment & Plan: On palpation today, thyroid is somewhat enlarged  Will send ultrasound    Orders:  -     US thyroid; Future; Expected date: 11/10/2022  -     TSH, 3rd generation with Free T4 reflex; Future; Expected date: 2023    2  Combined hyperlipidemia  Assessment & Plan:  Cholesterol from May 6 was 170, LDL 95  HDL high at 67  Doing well on atorvastatin 10     Orders:  -     atorvastatin (LIPITOR) 10 mg tablet; Take 1 tablet (10 mg total) by mouth daily  -     Lipid Panel with Direct LDL reflex; Future; Expected date: 2023    3  Benign essential hypertension  Assessment & Plan:  Blood pressure reasonably controlled on amlodipine 10 mg daily  Orders:  -     amLODIPine (NORVASC) 10 mg tablet; Take 1 tablet (10 mg total) by mouth daily  -     CBC and differential; Future; Expected date: 2023    4  Mild asthma without complication, unspecified whether persistent  Assessment & Plan:  Doing well with rare use of albuterol HFA      5  Prediabetes  Assessment & Plan:  A1c from May 6 was 5 5%  Continue to work on diet and exercise    Orders:  -     Comprehensive metabolic panel; Future; Expected date: 2023  -     Hemoglobin A1C; Future; Expected date: 2023    6  Malignant neoplasm of lower-inner quadrant of left breast in female, estrogen receptor positive ([facility] [address] )  Assessment & Plan:  History of stage I A breast cancer  Diagnosed in  ER/ND positive, HER2 negative  Status post lumpectomy and radiation     Patient doing well  Continue follow-up with surgical oncologist and medical oncologist as directed      7   Need for vaccination  -     influenza vaccine, quadrivalent, recombinant, PF, 0 5 mL, for patients 18 yr+ (FLUBLOK)    Patient COVID vaccination  Flu shot given today  Discussed Shingrix (will possibly given next visit)    SIX MONTHS, FASTING BLOOD WORK PRIOR       Subjective     Patient presents recheck chronic medical problems today  She has been complaining of some postnasal drip recently, but does not feel sick  She is compliant with prescribed medications  Review of Systems   Respiratory: Negative  Cardiovascular: Negative  Gastrointestinal: Negative  Genitourinary: Negative  Past Medical History:   Diagnosis Date   • Asthma     Last Assessed: 10/19/2016   • Breast cancer ([facility] [address] )    • GERD (gastroesophageal reflux disease)     controlled with diet   • Hypercholesteremia    • Hypertension    • Wears glasses      Past Surgical History:   Procedure Laterality Date   • BREAST BIOPSY Left 09/28/2021    multiple sites   • BREAST LUMPECTOMY Left 11/19/2021    Procedure: LUMPECTOMY BREAST BRACKETED AJAY LOCALIZED;  Surgeon: Carmel Olivia MD;  Location: [facility];  Service: Surgical Oncology   • BREAST LUMPECTOMY Left 1/21/2022    Procedure: LUMPECTOMY BREAST, re-excision; 0800 NUC MED;  Surgeon: Carmel Olivia MD;  [facility];  Service: Surgical Oncology   • BUNIONECTOMY     • BUNIONECTOMY Right 2009   • BUNIONECTOMY Left 2007   • COLONOSCOPY     • FOOT SURGERY     • LYMPH NODE BIOPSY Left 1/21/2022    Procedure: BIOPSY LYMPH NODE SENTINEL;  Surgeon: Carmel Olivia MD;  Location: [facility];  Service: Surgical Oncology   • MAMMO NEEDLE LOCALIZATION LEFT (ALL INC) Left 11/12/2021   • MAMMO NEEDLE LOCALIZATION LEFT (ALL INC) EACH ADD Left 11/12/2021   • MAMMO STEREOTACTIC BREAST BIOPSY LEFT (ALL INC) Left 9/28/2021   • MAMMO STEREOTACTIC BREAST BIOPSY LEFT (ALL INC) EACH ADD Left 9/28/2021   • NASAL POLYP EXCISION     • VA COLONOSCOPY FLX DX W/COLLJ SPEC WHEN PFRMD N/A 5/23/2016    Procedure: COLONOSCOPY;  Surgeon: Baudilio Magaña DO;  Location: [facility];   Service: Gastroenterology   • SHOULDER ARTHROSCOPY Right 2012   • SHOULDER ARTHROSCOPY W/ ROTATOR CUFF REPAIR Right 2012     Family History   Problem Relation Age of Onset   • Hypertension Mother    • Breast cancer Mother         age unknown; bilateral breast cancer   • Lung cancer Mother    • Cervical cancer Mother    • Diabetes Father    • Hypertension Father    • Prostate cancer Father         age unknown   • Hypertension Brother    • Prostate cancer Brother 48   • Breast cancer Maternal Grandmother 40   • Diabetes Paternal Grandmother    • Hypertension Maternal Aunt    • No Known Problems Maternal Aunt    • Diabetes Paternal Aunt    • Diabetes Paternal Uncle    • Hypertension Cousin    • Breast cancer Cousin         under 48   • Diabetes Cousin    • Breast cancer Cousin         under 48   • Lung cancer Maternal Uncle      Social History     Socioeconomic History   • Marital status: /Civil Union     Spouse name: None   • Number of children: None   • Years of education: None   • Highest education level: None   Occupational History   • None   Tobacco Use   • Smoking status: Never Smoker   • Smokeless tobacco: Never Used   Vaping Use   • Vaping Use: Never used   Substance and Sexual Activity   • Alcohol use: No   • Drug use: Never   • Sexual activity: Yes     Partners: Male     Birth control/protection: None, Post-menopausal   Other Topics Concern   • None   Social History Narrative   • None     Social Determinants of Health     Financial Resource Strain: Not on file   Food Insecurity: Not on file   Transportation Needs: Not on file   Physical Activity: Not on file   Stress: Not on file   Social Connections: Not on file   Intimate Partner Violence: Not on file   Housing Stability: Not on file     Current Outpatient Medications on File Prior to Visit   Medication Sig   • albuterol (ProAir HFA) 90 mcg/act inhaler Inhale 2 puffs every 6 (six) hours as needed for wheezing   • anastrozole (ARIMIDEX) 1 mg tablet Take 1 tablet (1 mg total) by mouth daily   • calcium carbonate (OS-HALIMA) 1250 (500 Ca) MG tablet Take 1,200 mg by mouth daily    • cholecalciferol (VITAMIN D3) 1,000 units tablet Take 2,000 Units by mouth daily     • multivitamin (THERAGRAN) TABS Take 1 tablet by mouth daily  • [DISCONTINUED] amLODIPine (NORVASC) 10 mg tablet Take 1 tablet (10 mg total) by mouth daily   • [DISCONTINUED] atorvastatin (LIPITOR) 10 mg tablet Take 1 tablet (10 mg total) by mouth daily     No Known Allergies  Immunization History   Administered Date(s) Administered   • COVID-19 MODERNA VACC 0 5 ML IM 03/23/2021, 04/22/2021, 01/05/2022   • INFLUENZA 10/15/2018   • Influenza Quadrivalent, 6-35 Months IM 10/19/2016, 10/11/2017   • Influenza, recombinant, quadrivalent,injectable, preservative free 10/30/2019, 10/07/2020, 12/08/2021, 11/10/2022   • Influenza, seasonal, injectable 1960, 10/01/2014, 11/05/2015, 11/01/2016   • Influenza, seasonal, injectable, preservative free 10/15/2018   • Tdap 05/05/2022       Objective     /80 (BP Location: Right arm, Patient Position: Sitting, Cuff Size: Adult)   Pulse 88   Temp 97 8 °F (36 6 °C) (Temporal)   Resp 16   Ht 5' 3 78" (1 62 m)   Wt 93 kg (205 lb)   LMP 12/15/2003 (Within Years)   SpO2 98%   BMI 35 43 kg/m²     Physical Exam  Cardiovascular:      Rate and Rhythm: Normal rate and regular rhythm  Heart sounds: Normal heart sounds  Comments: Carotids: no bruits  Ext: no edema  Pulmonary:      Effort: Pulmonary effort is normal  No respiratory distress  Breath sounds: No wheezing or rales  Psychiatric:         Behavior: Behavior normal          Thought Content:  Thought content normal        Norma Greene DO

## 2022-11-28 ENCOUNTER — CLINICAL SUPPORT (OUTPATIENT)
Dept: RADIATION ONCOLOGY | Facility: CLINIC | Age: 62
End: 2022-11-28
Attending: RADIOLOGY

## 2022-11-28 ENCOUNTER — RADIATION ONCOLOGY FOLLOW-UP (OUTPATIENT)
Dept: RADIATION ONCOLOGY | Facility: CLINIC | Age: 62
End: 2022-11-28
Attending: RADIOLOGY

## 2022-11-28 VITALS
OXYGEN SATURATION: 98 % | HEART RATE: 104 BPM | BODY MASS INDEX: 34.55 KG/M2 | WEIGHT: 202.38 LBS | DIASTOLIC BLOOD PRESSURE: 79 MMHG | TEMPERATURE: 98.2 F | SYSTOLIC BLOOD PRESSURE: 123 MMHG | HEIGHT: 64 IN

## 2022-11-28 DIAGNOSIS — C50.312 MALIGNANT NEOPLASM OF LOWER-INNER QUADRANT OF LEFT BREAST IN FEMALE, ESTROGEN RECEPTOR POSITIVE (HCC): Primary | ICD-10-CM

## 2022-11-28 DIAGNOSIS — Z17.0 MALIGNANT NEOPLASM OF LOWER-INNER QUADRANT OF LEFT BREAST IN FEMALE, ESTROGEN RECEPTOR POSITIVE (HCC): Primary | ICD-10-CM

## 2022-11-28 NOTE — PROGRESS NOTES
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  She completed  a course of radiation to the L breast on 22  She was last seen 22 via telemedicine and presents today for a follow-up visit  22  Surgical Oncology  C/O itching in breast, advised moisturizer  Referral to PT given for tightness in left axilla    22 Juancho Amaral  Clinically [facility]  On anastrozole  F/U in 1 year    10/5/22 Diagnostic Mammogram  FINDINGS:   LEFT  C) POST-SURGICAL FINDING: There are post-surgical findings from a previous lumpectomy seen in the left breast     BILATERAL  There are no suspicious masses, grouped microcalcifications or areas of unexplained architectural distortion  The skin and nipple areolar complex are unremarkable  IMPRESSION:   No evidence of malignancy  ASSESSMENT/BI-RADS CATEGORY:  Left: 2 - Benign  Right: 2 - Benign  Overall: 2 - Benign   RECOMMENDATION:       - Diagnostic mammogram in 1 year for both breasts  Upcomin23  Surgical Oncology  23   Hem/Onc        Follow up visit     Oncology History   Malignant neoplasm of lower-inner quadrant of left breast in female, estrogen receptor positive ([facility] [address] )   2021 Initial Diagnosis    Ductal carcinoma in situ (DCIS) of left breast     2021 Biopsy    Final Diagnosis   A  Breast, Left, 7:00 location 2 cores w/ Calcs, Biopsy:  - Ductal carcinoma in situ   - No invasive carcinoma is seen  Architectural patterns: Solid, papillary       Nuclear grade of ductal carcinoma in situ: Grade 2 (II)         Necrosis:  Focal  Size of in-situ carcinoma: At least 4 mm   - Microcalcifications: Present in DCIS  - Breast cancer biomarker studies (paraffin block number: A1):           Estrogen, progesterone receptor studies pending, to be described in a separate receptor report  ER 90-95% positive  Pr90-95% positive  B  Breast, Left, 7:00 location 1 core w/ No Calcs, Biopsy:  - Atypical lobular hyperplasia  - Usual ductal hyperplasia involving intraductal papilloma  C  Breast, Left, 9:00 location 2 cores w/ Calcs, Biopsy:  - Non-proliferative fibrocystic changes, including microcysts, microcalcifications with stromal fibrosis  - Negative for atypia or carcinoma  D  Breast, Left, 9:00 location 1 core w/ No Calcs, Biopsy:  - Intraductal papilloma  - Negative for atypia or carcinoma  10/26/2021 -  Cancer Staged    Staging form: Breast, AJCC 8th Edition  - Clinical: Stage 0 (cTis (DCIS), cN0, cM0, ER+, UT+, HER2: Not Assessed) - Signed by Kim Connell MD on 10/26/2021  Stage prefix: Initial diagnosis  Method of lymph node assessment: Clinical  Nuclear grade: G2       10/2021 Genetic Testing    Test: [employer] BRCAplus STAT Panel (8 genes): ZAKIA, BRCA1, BRCA2, CDH1, CHEK2, PALB2, PTEN, TP53   Result:   Negative - No Clinically Significant Variants Detected       Assessment:  Negative     Additional Testing:  Test(s): [employer] BRCAplus STAT Panel (8 genes): ZAKIA, BRCA1, BRCA2, CDH1, CHEK2, PALB2, PTEN, TP53 with reflex to [facility] CancerNext (28 additional genes): APC, ZAKIA, AXIN2 BARD1, BRCA1, BRCA2, BRIP1, BMPR1A, CDH1, CDK4, CDKN2A, CHEK2, DICER1, EPCAM, GREM1, HOXB13, MLH1, MSH2, MSH3, MSH6, MUTYH, NBN, NF1, NTHL1, PALB2, PMS2, POLD1, POLE, PTEN, RAD51C, RAD51D, RECQL SMAD4, SMARCA4, STK11, TP53      Result: Variant of uncertain significance     Variant: MSH6 Variant, Unknown Significance: p  T6P       Variant of uncertain significance (VUS)     11/19/2021 Surgery    Final Diagnosis   A   Breast, Left, Left breast lumpectomy, short superior, long lateral:  - Invasive mammary carcinoma of no special type (ductal), 8 mm in greatest dimension with    extensive adjacent ductal carcinoma in-situ and associated non-invasive lobular neoplasia  -- Jamie histologic grade 2 of 3 (total score: 7 of 9)        * Glandular (acinar) Tubular Differentiation < 10%, score 3         * Nuclear Pleomorphism 2 of 3, score 2         * Mitotic Rate 4-7/mm2, (8-14 mitoses/10HPF; 8 mitoses/10 HPF), score 2     -- Confirmed by tumor cell immunophenotype:        * Positive: E-cadherin, P120 - each in a membranous pattern  * Negative: p63, SMMHC   - Ductal carcinoma in-situ (DCIS): Present; not an extensive intraductal component associated with     invasive carcinoma (< 5% of total tumor) but extensive DCIS in adjacent tissue  -- Confirmed by positive E-cadherin, p120 (membranous), SMMHC, p63 and loss of epithelial         mosaicism on CK5/6 immunostains  -- Size and Extent: At least 48 mm in approximate extent; present in 12 sequential sections; 43 of 91 blocks  -- Architectural Patterns: Cribriform, micropapillary, solid, papillary  -- Nuclear grade: I-II (low to intermediate)    -- Necrosis: Present, focal and central necrosis identified  -- Associated prior biopsy site changes with twist clip and Aliyah  marker   - Multifocal non-invasive lobular neoplasia (focally florid lobular carcinoma in-situ/LCIS and atypical lobular     hyperplasia with pagetoid ductal spread) involving and adjacent to DCIS  -- Confirmed by negative E-cadherin; positive p120 (cytoplasmic), SMMHC, p63; and loss of epithelial        mosaicism on CK5/6 immunostains    - Margins uninvolved by invasive and in-situ carcinoma  -- Invasive carcinoma: 8 mm from nearest posterior margin       -- DCIS: 0 1 mm from nearest posterior margin; < 1 mm from nearest anterior margin; 1 5 mm from        nearest medial margin; > 2 mm from all remaining margins    - Benign skin     -- No dermal lymphovascular invasion    - Lymphovascular invasion: Not identified  - Microcalcifications: Present, associated with DCIS  - Benign fibrocystic changes with atypia (atypical and florid usual ductal hyperplasia, columnar cell     lesions with flat epithelial atypia/FEA, cystic and papillary apocrine metaplasia)  - Radial scar, 0 4 cm  B  Breast, Left, Left Breast Lumpectomy new superior margin - Short Superior, Long Lateral:  - Ductal carcinoma in-situ (DCIS)  -- Size and Extent: 16 mm in greatest approximate extent; present in 4 sequential sections; 7 of 78 blocks  -- Architectural Patterns: Cribriform, micropapillary, solid, papillary  -- Nuclear grade: I-II (low to intermediate)  -- Necrosis: Present, focal      -- Confirmed by positive E-cadherin, P120 (membranous) and loss of epithelial mosaicism on         CK5/6 immunostains  -- Associated prior biopsy site changes with biopsy clip and AJAY  marker   - No invasive carcinoma identified  - Margins uninvolved by DCIS  -- DCIS < 1 mm from nearest posterior margin; < 1-2 mm from medial margin; > 2 mm from all       remaining margins  - Lymphovascular invasion: Not identified  - Microcalcifications: Present, associated with DCIS, lobular neoplasia and non-neoplastic breast tissue     - Benign fibrocystic changes with atypia:    -- Multifocal/extensive non-invasive lobular neoplasia (focal lobular carcinoma in-situ and        predominantly atypical lobular hyperplasia with pagetoid ductal spread), focally involving        and adjacent to DCIS  ** Confirmed by negative E-cadherin; positive p120 (cytoplasmic) immunostains  -- Usual ductal hyperplasia, columnar cell change and hyperplasia with focal flat epithelial        atypia/FEA, sclerosing adenosis, fibroadenomatoid changes, cystic and papillary apocrine metaplasia     - Small intraductal papilloma without atypia, 0 1 cm          12/7/2021 - Cancer Staged    Staging form: Breast, AJCC 8th Edition  - Pathologic stage from 12/7/2021: Stage IA (pT1b, pN0, cM0, G2, ER+, HI+, HER2-) - Signed by Adrien Hein MD on 12/7/2021  Stage prefix: Initial diagnosis  Method of lymph node assessment: Clinical  Histologic grading system: 3 grade system       1/21/2022 Surgery    Final Diagnosis   A  Left breast, anterior margin:     - Histologic changes compatible with prior surgical procedure      - Skin with dermal cicatrix  - No invasive or in situ carcinoma identified      - Final anterior margin is negative for malignancy  B  Left breast, new posterior margin:     - Focus of atypical lobular hyperplasia (ALH)  - No invasive or in situ carcinoma identified      - Final posterior margin is negative for malignancy  C   Left breast, medial margin:     - Histologic changes compatible with prior surgical procedure  - No invasive or in situ carcinoma identified      - Final medial margin is negative for malignacny  D   Left axilla, sentinel lymph node #1:     - Single lymph node; negative for malignancy (0/1)  E   Left axilla, sentinel lymph node #2:     - Single lymph node; negative for malignancy (0/1)  F   Left axilla, sentinel lymph node #3:     - Single lymph node; negative for malignancy (0/1)  G   Left axilla, sentinel lymph node #4:     - Single lymph node; negative for malignancy (0/1)         3/23/2022 - 4/2022 Radiation    Prone L Boost 10X/6X 5 / 5 200 0 1,000 6   Prone L Br # 6X 16 / 16 266 0 4,256 21      Treatment dates:  3/23/2022 - 4/20/2022          Review of Systems:  Review of Systems   Constitutional: Positive for fatigue (mild)  Negative for appetite change, fever and unexpected weight change  HENT: Positive for postnasal drip  Eyes: Negative  Wears glasses   Respiratory: Negative  Negative for shortness of breath  Cardiovascular: Negative  Negative for chest pain and leg swelling  Gastrointestinal: Positive for nausea  Negative for constipation and diarrhea  Endocrine: Positive for heat intolerance  Genitourinary: Negative  Negative for difficulty urinating  Musculoskeletal: Negative for arthralgias and gait problem  Skin: Negative for wound (left axilla and left breast incisions closed, healing )  Mild irritation under breast   Relieved with OTC cream   Allergic/Immunologic: Negative  Neurological: Negative  Negative for weakness and numbness (mild left upper inner arm, improving)  Hematological: Negative  Psychiatric/Behavioral: Negative for dysphoric mood and sleep disturbance  The patient is not nervous/anxious          Clinical Trial: no    Teaching completed    Covid Vaccine Status vaccinated x3    Health Maintenance   Topic Date Due   • Hepatitis B Vaccine (1 of 3 - 3-dose series) Never done   • BMI: Followup Plan  01/16/2021   • COVID-19 Vaccine (4 - Booster for Moderna series) 03/30/2022   • PT PLAN OF CARE  09/14/2022   • Depression Screening  02/24/2023   • Pneumococcal Vaccine: Pediatrics (0 to 5 Years) and At-Risk Patients (6 to 59 Years) (1 - PCV) 01/16/2025 (Originally 7/5/1966)   • Annual Physical  05/05/2023   • Breast Cancer Screening: Mammogram  10/05/2023   • BMI: Adult  11/10/2023   • Colorectal Cancer Screening  05/23/2026   • Cervical Cancer Screening  10/29/2026   • DTaP,Tdap,and Td Vaccines (2 - Td or Tdap) 05/05/2032   • HIV Screening  Completed   • Hepatitis C Screening  Completed   • Osteoporosis Screening  Completed   • Influenza Vaccine  Completed   • HIB Vaccine  Aged Out   • IPV Vaccine  Aged Out   • Hepatitis A Vaccine  Aged Out   • Meningococcal ACWY Vaccine  Aged Out   • HPV Vaccine  Aged Out     Patient Active Problem List   Diagnosis   • Anxiety   • Asthma   • Benign essential hypertension   • Combined hyperlipidemia   • Prediabetes   • Vitamin D deficiency   • Fibroid   • Localized osteoporosis without current pathological fracture   • Acute pain of right shoulder   • Pain in both lower extremities   • Neck pain   • BMI 35 0-35 9,adult   • Malignant neoplasm of lower-inner quadrant of left breast in female, estrogen receptor positive ([facility] [address] )   • Family history of cancer   • BRCA negative   • GERD (gastroesophageal reflux disease)   • Use of anastrozole   • Enlarged thyroid     Past Medical History:   Diagnosis Date   • Asthma     Last Assessed: 10/19/2016   • Breast cancer ([facility] [address] )    • GERD (gastroesophageal reflux disease)     controlled with diet   • Hypercholesteremia    • Hypertension    • Wears glasses      Past Surgical History:   Procedure Laterality Date   • BREAST BIOPSY Left 09/28/2021    multiple sites   • BREAST LUMPECTOMY Left 11/19/2021    Procedure: LUMPECTOMY BREAST BRACKETED AJAY LOCALIZED;  Surgeon: Elena Gomez MD;  Location: [facility];  Service: Surgical Oncology   • BREAST LUMPECTOMY Left 1/21/2022    Procedure: LUMPECTOMY BREAST, re-excision; 0800 NUC MED;  Surgeon: Elena Gomez MD;  Location: [facility];  Service: Surgical Oncology   • BUNIONECTOMY     • BUNIONECTOMY Right 2009   • BUNIONECTOMY Left 2007   • COLONOSCOPY     • FOOT SURGERY     • LYMPH NODE BIOPSY Left 1/21/2022    Procedure: BIOPSY LYMPH NODE SENTINEL;  Surgeon: Elena Gomez MD;  Location: [facility];  Service: Surgical Oncology   • MAMMO NEEDLE LOCALIZATION LEFT (ALL INC) Left 11/12/2021   • MAMMO NEEDLE LOCALIZATION LEFT (ALL INC) EACH ADD Left 11/12/2021   • MAMMO STEREOTACTIC BREAST BIOPSY LEFT (ALL INC) Left 9/28/2021   • MAMMO STEREOTACTIC BREAST BIOPSY LEFT (ALL INC) EACH ADD Left 9/28/2021   • NASAL POLYP EXCISION     • MN COLONOSCOPY FLX DX W/COLLJ SPEC WHEN PFRMD N/A 5/23/2016    Procedure: COLONOSCOPY;  Surgeon: Noemy Arango DO;  Location: [facility];   Service: Gastroenterology   • SHOULDER ARTHROSCOPY Right 2012   • SHOULDER ARTHROSCOPY W/ ROTATOR CUFF REPAIR Right 2012     Family History   Problem Relation Age of Onset   • Hypertension Mother    • Breast cancer Mother         age unknown; bilateral breast cancer   • Lung cancer Mother    • Cervical cancer Mother    • Diabetes Father    • Hypertension Father    • Prostate cancer Father         age unknown   • Hypertension Brother    • Prostate cancer Brother 48   • Breast cancer Maternal Grandmother 40   • Diabetes Paternal Grandmother    • Hypertension Maternal Aunt    • No Known Problems Maternal Aunt    • Diabetes Paternal Aunt    • Diabetes Paternal Uncle    • Hypertension Cousin    • Breast cancer Cousin         under 48   • Diabetes Cousin    • Breast cancer Cousin         under 48   • Lung cancer Maternal Uncle      Social History     Socioeconomic History   • Marital status: /Civil Union     Spouse name: Not on file   • Number of children: Not on file   • Years of education: Not on file   • Highest education level: Not on file   Occupational History   • Not on file   Tobacco Use   • Smoking status: Never   • Smokeless tobacco: Never   Vaping Use   • Vaping Use: Never used   Substance and Sexual Activity   • Alcohol use: No   • Drug use: Never   • Sexual activity: Yes     Partners: Male     Birth control/protection: None, Post-menopausal   Other Topics Concern   • Not on file   Social History Narrative   • Not on file     Social Determinants of Health     Financial Resource Strain: Not on file   Food Insecurity: Not on file   Transportation Needs: Not on file   Physical Activity: Not on file   Stress: Not on file   Social Connections: Not on file   Intimate Partner Violence: Not on file   Housing Stability: Not on file       Current Outpatient Medications:   •  albuterol (ProAir HFA) 90 mcg/act inhaler, Inhale 2 puffs every 6 (six) hours as needed for wheezing, Disp: 18 g, Rfl: 0  •  amLODIPine (NORVASC) 10 mg tablet, Take 1 tablet (10 mg total) by mouth daily, Disp: 90 tablet, Rfl: 1  •  anastrozole (ARIMIDEX) 1 mg tablet, Take 1 tablet (1 mg total) by mouth daily, Disp: 90 tablet, Rfl: 1  •  atorvastatin (LIPITOR) 10 mg tablet, Take 1 tablet (10 mg total) by mouth daily, Disp: 90 tablet, Rfl: 1  •  calcium carbonate (OS-HALIMA) 1250 (500 Ca) MG tablet, Take 1,200 mg by mouth daily , Disp: , Rfl:   •  cholecalciferol (VITAMIN D3) 1,000 units tablet, Take 2,000 Units by mouth daily  , Disp: , Rfl:   •  multivitamin (THERAGRAN) TABS, Take 1 tablet by mouth daily  , Disp: , Rfl:   No Known Allergies  There were no vitals filed for this visit

## 2022-11-28 NOTE — PROGRESS NOTES
Follow-up - Radiation Oncology   Sheralyn Kawasaki 1960 60 y o  female [id number]      History of Present Illness   Cancer Staging   Malignant neoplasm of lower-inner quadrant of left breast in female, estrogen receptor positive ([facility] [address] )  Staging form: Breast, AJCC 8th Edition  - Clinical: Stage 0 (cTis (DCIS), cN0, cM0, ER+, IA+, HER2: Not Assessed) - Signed by Coy Meyers MD on 10/26/2021  Stage prefix: Initial diagnosis  Method of lymph node assessment: Clinical  Nuclear grade: G2  - Pathologic stage from 12/7/2021: Stage IA (pT1b, pN0, cM0, G2, ER+, IA+, HER2-) - Signed by Coy Meyers MD on 12/7/2021  Stage prefix: Initial diagnosis  Method of lymph node assessment: Clinical  Histologic grading system: 3 grade system          Interval History:  Sheralyn Kawasaki 1960 is a 60 y o  female 60 y o female with  stage IA left breast cancer  She had a lumpectomy 11/19/21 which showed 8 mm invasive ductal carcinoma grade 2 ER 90% IA 90% HER2 negative  She had close margins in the anterior, posterior and medial aspects  She then had a re-excision and sentinel lymph node biopsy 1/21/21, 4 lymph nodes were negative for metastatic disease    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  She completed  a course of radiation to the L breast on 04/20/22  She was last seen 5/31/22 via telemedicine and presents today for a follow-up visit      8/5/22  Surgical Oncology  C/O itching in breast, advised moisturizer  Referral to PT given for tightness in left axilla     8/29/22 Yee Oseguera  Clinically [facility]  On anastrozole  F/U in 1 year     10/5/22 Diagnostic Mammogram  FINDINGS:   LEFT  C) POST-SURGICAL FINDING: There are post-surgical findings from a previous lumpectomy seen in the left breast     BILATERAL  There are no suspicious masses, grouped microcalcifications or areas of unexplained architectural distortion   The skin and nipple areolar complex are unremarkable     IMPRESSION:   No evidence of malignancy    ASSESSMENT/BI-RADS CATEGORY:  Left: 2 - Benign  Right: 2 - Benign  Overall: 2 - Benign   RECOMMENDATION:       - Diagnostic mammogram in 1 year for both breasts      Upcomin23  Surgical Oncology  23   Hem/Onc           Historical Information   Oncology History   Malignant neoplasm of lower-inner quadrant of left breast in female, estrogen receptor positive ([facility] [address] )   2021 Initial Diagnosis    Ductal carcinoma in situ (DCIS) of left breast     2021 Biopsy    Final Diagnosis   A  Breast, Left, 7:00 location 2 cores w/ Calcs, Biopsy:  - Ductal carcinoma in situ   - No invasive carcinoma is seen  Architectural patterns: Solid, papillary       Nuclear grade of ductal carcinoma in situ: Grade 2 (II)  Necrosis:  Focal        Size of in-situ carcinoma: At least 4 mm   - Microcalcifications: Present in DCIS  - Breast cancer biomarker studies (paraffin block number: A1):           Estrogen, progesterone receptor studies pending, to be described in a separate receptor report  ER 90-95% positive  Pr90-95% positive  B  Breast, Left, 7:00 location 1 core w/ No Calcs, Biopsy:  - Atypical lobular hyperplasia  - Usual ductal hyperplasia involving intraductal papilloma  C  Breast, Left, 9:00 location 2 cores w/ Calcs, Biopsy:  - Non-proliferative fibrocystic changes, including microcysts, microcalcifications with stromal fibrosis  - Negative for atypia or carcinoma  D  Breast, Left, 9:00 location 1 core w/ No Calcs, Biopsy:  - Intraductal papilloma  - Negative for atypia or carcinoma           10/26/2021 -  Cancer Staged    Staging form: Breast, AJCC 8th Edition  - Clinical: Stage 0 (cTis (DCIS), cN0, cM0, ER+, DC+, HER2: Not Assessed) - Signed by Tripp Cedillo MD on 10/26/2021  Stage prefix: Initial diagnosis  Method of lymph node assessment: Clinical  Nuclear grade: G2       10/2021 Genetic Testing    Test: [employer] BRCAplus STAT Panel (8 genes): ZAKIA, BRCA1, BRCA2, CDH1, CHEK2, PALB2, PTEN, TP53   Result:   Negative - No Clinically Significant Variants Detected       Assessment:  Negative     Additional Testing:  Test(s): [employer] BRCAplus STAT Panel (8 genes): ZAKIA, BRCA1, BRCA2, CDH1, CHEK2, PALB2, PTEN, TP53 with reflex to [facility] CancerNext (28 additional genes): APC, ZAKIA, AXIN2 BARD1, BRCA1, BRCA2, BRIP1, BMPR1A, CDH1, CDK4, CDKN2A, CHEK2, DICER1, EPCAM, GREM1, HOXB13, MLH1, MSH2, MSH3, MSH6, MUTYH, NBN, NF1, NTHL1, PALB2, PMS2, POLD1, POLE, PTEN, RAD51C, RAD51D, RECQL SMAD4, SMARCA4, STK11, TP53      Result: Variant of uncertain significance     Variant: MSH6 Variant, Unknown Significance: p  T6P       Variant of uncertain significance (VUS)     11/19/2021 Surgery    Final Diagnosis   A  Breast, Left, Left breast lumpectomy, short superior, long lateral:  - Invasive mammary carcinoma of no special type (ductal), 8 mm in greatest dimension with    extensive adjacent ductal carcinoma in-situ and associated non-invasive lobular neoplasia  -- Jamie histologic grade 2 of 3 (total score: 7 of 9)        * Glandular (acinar) Tubular Differentiation < 10%, score 3         * Nuclear Pleomorphism 2 of 3, score 2         * Mitotic Rate 4-7/mm2, (8-14 mitoses/10HPF; 8 mitoses/10 HPF), score 2     -- Confirmed by tumor cell immunophenotype:        * Positive: E-cadherin, P120 - each in a membranous pattern  * Negative: p63, SMMHC   - Ductal carcinoma in-situ (DCIS): Present; not an extensive intraductal component associated with     invasive carcinoma (< 5% of total tumor) but extensive DCIS in adjacent tissue  -- Confirmed by positive E-cadherin, p120 (membranous), SMMHC, p63 and loss of epithelial         mosaicism on CK5/6 immunostains  -- Size and Extent: At least 48 mm in approximate extent; present in 12 sequential sections; 43 of 91 blocks  -- Architectural Patterns: Cribriform, micropapillary, solid, papillary      -- Nuclear grade: I-II (low to intermediate) -- Necrosis: Present, focal and central necrosis identified  -- Associated prior biopsy site changes with twist clip and Aliyah  marker   - Multifocal non-invasive lobular neoplasia (focally florid lobular carcinoma in-situ/LCIS and atypical lobular     hyperplasia with pagetoid ductal spread) involving and adjacent to DCIS  -- Confirmed by negative E-cadherin; positive p120 (cytoplasmic), SMMHC, p63; and loss of epithelial        mosaicism on CK5/6 immunostains    - Margins uninvolved by invasive and in-situ carcinoma  -- Invasive carcinoma: 8 mm from nearest posterior margin  -- DCIS: 0 1 mm from nearest posterior margin; < 1 mm from nearest anterior margin; 1 5 mm from        nearest medial margin; > 2 mm from all remaining margins    - Benign skin  -- No dermal lymphovascular invasion    - Lymphovascular invasion: Not identified  - Microcalcifications: Present, associated with DCIS  - Benign fibrocystic changes with atypia (atypical and florid usual ductal hyperplasia, columnar cell     lesions with flat epithelial atypia/FEA, cystic and papillary apocrine metaplasia)  - Radial scar, 0 4 cm  B  Breast, Left, Left Breast Lumpectomy new superior margin - Short Superior, Long Lateral:  - Ductal carcinoma in-situ (DCIS)  -- Size and Extent: 16 mm in greatest approximate extent; present in 4 sequential sections; 7 of 78 blocks  -- Architectural Patterns: Cribriform, micropapillary, solid, papillary  -- Nuclear grade: I-II (low to intermediate)  -- Necrosis: Present, focal      -- Confirmed by positive E-cadherin, P120 (membranous) and loss of epithelial mosaicism on         CK5/6 immunostains  -- Associated prior biopsy site changes with biopsy clip and ALIYAH  marker   - No invasive carcinoma identified  - Margins uninvolved by DCIS  -- DCIS < 1 mm from nearest posterior margin; < 1-2 mm from medial margin; > 2 mm from all       remaining margins      - Lymphovascular invasion: Not identified  - Microcalcifications: Present, associated with DCIS, lobular neoplasia and non-neoplastic breast tissue     - Benign fibrocystic changes with atypia:    -- Multifocal/extensive non-invasive lobular neoplasia (focal lobular carcinoma in-situ and        predominantly atypical lobular hyperplasia with pagetoid ductal spread), focally involving        and adjacent to DCIS  ** Confirmed by negative E-cadherin; positive p120 (cytoplasmic) immunostains  -- Usual ductal hyperplasia, columnar cell change and hyperplasia with focal flat epithelial        atypia/FEA, sclerosing adenosis, fibroadenomatoid changes, cystic and papillary apocrine metaplasia  - Small intraductal papilloma without atypia, 0 1 cm          12/7/2021 -  Cancer Staged    Staging form: Breast, AJCC 8th Edition  - Pathologic stage from 12/7/2021: Stage IA (pT1b, pN0, cM0, G2, ER+, TX+, HER2-) - Signed by Isamar Jenkins MD on 12/7/2021  Stage prefix: Initial diagnosis  Method of lymph node assessment: Clinical  Histologic grading system: 3 grade system       1/21/2022 Surgery    Final Diagnosis   A  Left breast, anterior margin:     - Histologic changes compatible with prior surgical procedure      - Skin with dermal cicatrix  - No invasive or in situ carcinoma identified      - Final anterior margin is negative for malignancy  B  Left breast, new posterior margin:     - Focus of atypical lobular hyperplasia (ALH)  - No invasive or in situ carcinoma identified      - Final posterior margin is negative for malignancy  C   Left breast, medial margin:     - Histologic changes compatible with prior surgical procedure  - No invasive or in situ carcinoma identified      - Final medial margin is negative for malignacny  D   Left axilla, sentinel lymph node #1:     - Single lymph node; negative for malignancy (0/1)       E   Left axilla, sentinel lymph node #2:     - Single lymph node; negative for malignancy (0/1)  F   Left axilla, sentinel lymph node #3:     - Single lymph node; negative for malignancy (0/1)  G   Left axilla, sentinel lymph node #4:     - Single lymph node; negative for malignancy (0/1)         3/23/2022 - 4/2022 Radiation    Prone L Boost 10X/6X 5 / 5 200 0 1,000 6   Prone L Br # 6X 16 / 16 266 0 4,256 21      Treatment dates:  3/23/2022 - 4/20/2022          Past Medical History:   Diagnosis Date   • Asthma     Last Assessed: 10/19/2016   • Breast cancer ([facility] [address] )    • GERD (gastroesophageal reflux disease)     controlled with diet   • Hypercholesteremia    • Hypertension    • Wears glasses      Past Surgical History:   Procedure Laterality Date   • BREAST BIOPSY Left 09/28/2021    multiple sites   • BREAST LUMPECTOMY Left 11/19/2021    Procedure: LUMPECTOMY BREAST BRACKETED AJAY LOCALIZED;  Surgeon: Carmel Olivia MD;  Location: [facility];  Service: Surgical Oncology   • BREAST LUMPECTOMY Left 1/21/2022    Procedure: LUMPECTOMY BREAST, re-excision; 0800 NUC MED;  Surgeon: Carmel Olivia MD;  Location[facility];  Service: Surgical Oncology   • BUNIONECTOMY     • BUNIONECTOMY Right 2009   • BUNIONECTOMY Left 2007   • COLONOSCOPY     • FOOT SURGERY     • LYMPH NODE BIOPSY Left 1/21/2022    Procedure: BIOPSY LYMPH NODE SENTINEL;  Surgeon: Carmel Olivia MD;  Location: [facility];  Service: Surgical Oncology   • MAMMO NEEDLE LOCALIZATION LEFT (ALL INC) Left 11/12/2021   • MAMMO NEEDLE LOCALIZATION LEFT (ALL INC) EACH ADD Left 11/12/2021   • MAMMO STEREOTACTIC BREAST BIOPSY LEFT (ALL INC) Left 9/28/2021   • MAMMO STEREOTACTIC BREAST BIOPSY LEFT (ALL INC) EACH ADD Left 9/28/2021   • NASAL POLYP EXCISION     • AK COLONOSCOPY FLX DX W/COLLJ SPEC WHEN PFRMD N/A 5/23/2016    Procedure: COLONOSCOPY;  Surgeon: Baudilio Magaña DO;  Location: [facility];   Service: Gastroenterology   • SHOULDER ARTHROSCOPY Right 2012   • SHOULDER ARTHROSCOPY W/ ROTATOR CUFF REPAIR Right 2012       Social History   Social History     Substance and Sexual Activity   Alcohol Use No     Social History     Substance and Sexual Activity   Drug Use Never     Social History     Tobacco Use   Smoking Status Never   Smokeless Tobacco Never         Meds/Allergies     Current Outpatient Medications:   •  albuterol (ProAir HFA) 90 mcg/act inhaler, Inhale 2 puffs every 6 (six) hours as needed for wheezing, Disp: 18 g, Rfl: 0  •  amLODIPine (NORVASC) 10 mg tablet, Take 1 tablet (10 mg total) by mouth daily, Disp: 90 tablet, Rfl: 1  •  anastrozole (ARIMIDEX) 1 mg tablet, Take 1 tablet (1 mg total) by mouth daily, Disp: 90 tablet, Rfl: 1  •  atorvastatin (LIPITOR) 10 mg tablet, Take 1 tablet (10 mg total) by mouth daily, Disp: 90 tablet, Rfl: 1  •  calcium carbonate (OS-HALIMA) 1250 (500 Ca) MG tablet, Take 1,200 mg by mouth daily , Disp: , Rfl:   •  cholecalciferol (VITAMIN D3) 1,000 units tablet, Take 2,000 Units by mouth daily  , Disp: , Rfl:   •  multivitamin (THERAGRAN) TABS, Take 1 tablet by mouth daily  , Disp: , Rfl:   No Known Allergies      Review of Systems   Constitutional: Positive for fatigue (mild)  Negative for appetite change, fever and unexpected weight change  HENT: Positive for postnasal drip  Eyes: Negative  Wears glasses   Respiratory: Negative  Negative for shortness of breath  Cardiovascular: Negative  Negative for chest pain and leg swelling  Gastrointestinal: Positive for nausea  Negative for constipation and diarrhea  Endocrine: Positive for heat intolerance  Genitourinary: Negative  Negative for difficulty urinating  Musculoskeletal: Negative for arthralgias and gait problem  Skin: Negative for wound (left axilla and left breast incisions closed, healing )  Mild irritation under breast   Relieved with OTC cream   Allergic/Immunologic: Negative  Neurological: Negative  Negative for weakness and numbness (mild left upper inner arm, improving)  Hematological: Negative  Psychiatric/Behavioral: Negative for dysphoric mood and sleep disturbance  The patient is not nervous/anxious  OBJECTIVE:   /79 (BP Location: Right arm, Patient Position: Sitting, Cuff Size: Large)   Pulse 104   Temp 98 2 °F (36 8 °C) (Temporal)   Ht 5' 3 78" (1 62 m)   Wt 91 8 kg (202 lb 6 1 oz)   LMP 12/15/2003 (Within Years)   SpO2 98%   BMI 34 98 kg/m²   Pain Assessment:  0  ECOG/Zubrod/WHO: 0 - Asymptomatic    Physical Exam   There is no supraclavicular axillary adenopathy palpable  The skin in the radiated field is in good condition  No suspicious lesions palpable in either breast   No breast or arm edema  Abdomen soft nontender  Her breathing is unlabored  Ambulating independently  RESULTS    Lab Results: No results found for this or any previous visit (from the past 672 hour(s))  Imaging Studies:No results found  Assessment/Plan:  No orders of the defined types were placed in this encounter  Gurvinder Parikh is a 60y o  year old female who is 8 months post adjuvant radiation therapy for stage I A left breast carcinoma  She is no clinical evidence of recurrence  Mammogram from 10/5/2022 returned stable  She remains on anastrozole  She will return for follow-up visit in 1 year she will be seeing Surgical and Medical Oncology in the interim  Niya Monterroso MD  11/28/2022,9:14 AM    Portions of the record may have been created with voice recognition software   Occasional wrong word or "sound a like" substitutions may have occurred due to the inherent limitations of voice recognition software   Read the chart carefully and recognize, using context, where substitutions have occurred

## 2022-12-09 ENCOUNTER — TELEPHONE (OUTPATIENT)
Dept: RADIATION ONCOLOGY | Facility: CLINIC | Age: 62
End: 2022-12-09

## 2022-12-09 NOTE — TELEPHONE ENCOUNTER
patient called because of a continued irritation that she is having in her breast area since her last visit with Dr Jelani Thompson in May  She is presently using neosporin on the area but is now having a lot of pain when any item of clothing touches it  She was hoping that maybe something could be prescribed for her, like a cream to use on the irritated area  She would like a call

## 2022-12-09 NOTE — TELEPHONE ENCOUNTER
Returned call to patient  Describing red, itchy rash between breast   States she's had for a while and it comes and goes  Has been using neosporin without relief  Advised possibly fungal, try lotrisone BID x7 days    Call back if no improvement

## 2023-02-07 DIAGNOSIS — C50.312 MALIGNANT NEOPLASM OF LOWER-INNER QUADRANT OF LEFT BREAST IN FEMALE, ESTROGEN RECEPTOR POSITIVE (HCC): ICD-10-CM

## 2023-02-07 DIAGNOSIS — Z17.0 MALIGNANT NEOPLASM OF LOWER-INNER QUADRANT OF LEFT BREAST IN FEMALE, ESTROGEN RECEPTOR POSITIVE (HCC): ICD-10-CM

## 2023-02-07 RX ORDER — ANASTROZOLE 1 MG/1
1 TABLET ORAL DAILY
Qty: 90 TABLET | Refills: 0 | Status: SHIPPED | OUTPATIENT
Start: 2023-02-07

## 2023-02-22 ENCOUNTER — OFFICE VISIT (OUTPATIENT)
Dept: SURGICAL ONCOLOGY | Facility: CLINIC | Age: 63
End: 2023-02-22

## 2023-02-22 VITALS
TEMPERATURE: 98.1 F | DIASTOLIC BLOOD PRESSURE: 80 MMHG | RESPIRATION RATE: 16 BRPM | HEART RATE: 96 BPM | OXYGEN SATURATION: 97 % | BODY MASS INDEX: 34.49 KG/M2 | HEIGHT: 64 IN | WEIGHT: 202 LBS | SYSTOLIC BLOOD PRESSURE: 130 MMHG

## 2023-02-22 DIAGNOSIS — Z13.71 BRCA NEGATIVE: ICD-10-CM

## 2023-02-22 DIAGNOSIS — Z79.811 USE OF ANASTROZOLE: ICD-10-CM

## 2023-02-22 DIAGNOSIS — C50.312 MALIGNANT NEOPLASM OF LOWER-INNER QUADRANT OF LEFT BREAST IN FEMALE, ESTROGEN RECEPTOR POSITIVE (HCC): Primary | ICD-10-CM

## 2023-02-22 DIAGNOSIS — Z17.0 MALIGNANT NEOPLASM OF LOWER-INNER QUADRANT OF LEFT BREAST IN FEMALE, ESTROGEN RECEPTOR POSITIVE (HCC): Primary | ICD-10-CM

## 2023-02-22 NOTE — PROGRESS NOTES
Surgical Oncology Follow Up       [facility] SURGICAL ONCOLOGY [place]  [facility] [address]    Nunu Seo  1960  [id number]  [facility] SURGICAL ONCOLOGY [facility] [address]    Chief Complaint   Patient presents with   • Follow-up       Assessment/Plan   Diagnoses and all orders for this visit:    Malignant neoplasm of lower-inner quadrant of left breast in female, estrogen receptor positive ([facility] [place] [address] )    BRCA negative    Use of anastrozole        Advance Care Planning/Advance Directives:  Discussed disease status, cancer treatment plans and/or cancer treatment goals with the patient  Oncology History:    Oncology History   Malignant neoplasm of lower-inner quadrant of left breast in female, estrogen receptor positive ([facility] [address] )   9/28/2021 Initial Diagnosis    Ductal carcinoma in situ (DCIS) of left breast     9/28/2021 Biopsy    Final Diagnosis   A  Breast, Left, 7:00 location 2 cores w/ Calcs, Biopsy:  - Ductal carcinoma in situ   - No invasive carcinoma is seen  Architectural patterns: Solid, papillary       Nuclear grade of ductal carcinoma in situ: Grade 2 (II)  Necrosis:  Focal        Size of in-situ carcinoma: At least 4 mm   - Microcalcifications: Present in DCIS  - Breast cancer biomarker studies (paraffin block number: A1):           Estrogen, progesterone receptor studies pending, to be described in a separate receptor report  ER 90-95% positive  Pr90-95% positive  B  Breast, Left, 7:00 location 1 core w/ No Calcs, Biopsy:  - Atypical lobular hyperplasia  - Usual ductal hyperplasia involving intraductal papilloma  C  Breast, Left, 9:00 location 2 cores w/ Calcs, Biopsy:  - Non-proliferative fibrocystic changes, including microcysts, microcalcifications with stromal fibrosis  - Negative for atypia or carcinoma        D  Breast, Left, 9:00 location 1 core w/ No Calcs, Biopsy:  - Intraductal papilloma  - Negative for atypia or carcinoma  10/26/2021 -  Cancer Staged    Staging form: Breast, AJCC 8th Edition  - Clinical: Stage 0 (cTis (DCIS), cN0, cM0, ER+, MT+, HER2: Not Assessed) - Signed by Dilma Jones MD on 10/26/2021  Stage prefix: Initial diagnosis  Method of lymph node assessment: Clinical  Nuclear grade: G2       10/2021 Genetic Testing    Test: [facility] BRCAplus STAT Panel (8 genes): ZAKIA, BRCA1, BRCA2, CDH1, CHEK2, PALB2, PTEN, TP53   Result:   Negative - No Clinically Significant Variants Detected       Assessment:  Negative     Additional Testing:  Test(s): [facility] BRCAplus STAT Panel (8 genes): ZAKIA, BRCA1, BRCA2, CDH1, CHEK2, PALB2, PTEN, TP53 with reflex to [facility] CancerNext (28 additional genes): APC, ZAKIA, AXIN2 BARD1, BRCA1, BRCA2, BRIP1, BMPR1A, CDH1, CDK4, CDKN2A, CHEK2, DICER1, EPCAM, GREM1, HOXB13, MLH1, MSH2, MSH3, MSH6, MUTYH, NBN, NF1, NTHL1, PALB2, PMS2, POLD1, POLE, PTEN, RAD51C, RAD51D, RECQL SMAD4, SMARCA4, STK11, TP53      Result: Variant of uncertain significance     Variant: MSH6 Variant, Unknown Significance: p  T6P       Variant of uncertain significance (VUS)     11/19/2021 Surgery    Final Diagnosis   A  Breast, Left, Left breast lumpectomy, short superior, long lateral:  - Invasive mammary carcinoma of no special type (ductal), 8 mm in greatest dimension with    extensive adjacent ductal carcinoma in-situ and associated non-invasive lobular neoplasia  -- Jamie histologic grade 2 of 3 (total score: 7 of 9)        * Glandular (acinar) Tubular Differentiation < 10%, score 3         * Nuclear Pleomorphism 2 of 3, score 2         * Mitotic Rate 4-7/mm2, (8-14 mitoses/10HPF; 8 mitoses/10 HPF), score 2     -- Confirmed by tumor cell immunophenotype:        * Positive: E-cadherin, P120 - each in a membranous pattern          * Negative: p63, SMMHC   - Ductal carcinoma in-situ (DCIS): Present; not an extensive intraductal component associated with     invasive carcinoma (< 5% of total tumor) but extensive DCIS in adjacent tissue  -- Confirmed by positive E-cadherin, p120 (membranous), SMMHC, p63 and loss of epithelial         mosaicism on CK5/6 immunostains  -- Size and Extent: At least 48 mm in approximate extent; present in 12 sequential sections; 43 of 91 blocks  -- Architectural Patterns: Cribriform, micropapillary, solid, papillary  -- Nuclear grade: I-II (low to intermediate)    -- Necrosis: Present, focal and central necrosis identified  -- Associated prior biopsy site changes with twist clip and Aliyah  marker   - Multifocal non-invasive lobular neoplasia (focally florid lobular carcinoma in-situ/LCIS and atypical lobular     hyperplasia with pagetoid ductal spread) involving and adjacent to DCIS  -- Confirmed by negative E-cadherin; positive p120 (cytoplasmic), SMMHC, p63; and loss of epithelial        mosaicism on CK5/6 immunostains    - Margins uninvolved by invasive and in-situ carcinoma  -- Invasive carcinoma: 8 mm from nearest posterior margin  -- DCIS: 0 1 mm from nearest posterior margin; < 1 mm from nearest anterior margin; 1 5 mm from        nearest medial margin; > 2 mm from all remaining margins    - Benign skin  -- No dermal lymphovascular invasion    - Lymphovascular invasion: Not identified  - Microcalcifications: Present, associated with DCIS  - Benign fibrocystic changes with atypia (atypical and florid usual ductal hyperplasia, columnar cell     lesions with flat epithelial atypia/FEA, cystic and papillary apocrine metaplasia)  - Radial scar, 0 4 cm  B  Breast, Left, Left Breast Lumpectomy new superior margin - Short Superior, Long Lateral:  - Ductal carcinoma in-situ (DCIS)  -- Size and Extent: 16 mm in greatest approximate extent; present in 4 sequential sections; 7 of 78 blocks  -- Architectural Patterns: Cribriform, micropapillary, solid, papillary      -- Nuclear grade: I-II (low to intermediate)  -- Necrosis: Present, focal      -- Confirmed by positive E-cadherin, P120 (membranous) and loss of epithelial mosaicism on         CK5/6 immunostains  -- Associated prior biopsy site changes with biopsy clip and AJAY  marker   - No invasive carcinoma identified  - Margins uninvolved by DCIS  -- DCIS < 1 mm from nearest posterior margin; < 1-2 mm from medial margin; > 2 mm from all       remaining margins  - Lymphovascular invasion: Not identified  - Microcalcifications: Present, associated with DCIS, lobular neoplasia and non-neoplastic breast tissue     - Benign fibrocystic changes with atypia:    -- Multifocal/extensive non-invasive lobular neoplasia (focal lobular carcinoma in-situ and        predominantly atypical lobular hyperplasia with pagetoid ductal spread), focally involving        and adjacent to DCIS  ** Confirmed by negative E-cadherin; positive p120 (cytoplasmic) immunostains  -- Usual ductal hyperplasia, columnar cell change and hyperplasia with focal flat epithelial        atypia/FEA, sclerosing adenosis, fibroadenomatoid changes, cystic and papillary apocrine metaplasia  - Small intraductal papilloma without atypia, 0 1 cm          12/7/2021 -  Cancer Staged    Staging form: Breast, AJCC 8th Edition  - Pathologic stage from 12/7/2021: Stage IA (pT1b, pN0, cM0, G2, ER+, AZ+, HER2-) - Signed by Kelechi Ennis MD on 12/7/2021  Stage prefix: Initial diagnosis  Method of lymph node assessment: Clinical  Histologic grading system: 3 grade system       1/21/2022 Surgery    Final Diagnosis   A  Left breast, anterior margin:     - Histologic changes compatible with prior surgical procedure      - Skin with dermal cicatrix  - No invasive or in situ carcinoma identified      - Final anterior margin is negative for malignancy  B  Left breast, new posterior margin:     - Focus of atypical lobular hyperplasia (ALH)       - No invasive or in situ carcinoma identified      - Final posterior margin is negative for malignancy  C   Left breast, medial margin:     - Histologic changes compatible with prior surgical procedure  - No invasive or in situ carcinoma identified      - Final medial margin is negative for malignacny  D   Left axilla, sentinel lymph node #1:     - Single lymph node; negative for malignancy (0/1)  E   Left axilla, sentinel lymph node #2:     - Single lymph node; negative for malignancy (0/1)  F   Left axilla, sentinel lymph node #3:     - Single lymph node; negative for malignancy (0/1)  G   Left axilla, sentinel lymph node #4:     - Single lymph node; negative for malignancy (0/1)         3/23/2022 - 4/2022 Radiation    Prone L Boost 10X/6X 5 / 5 200 0 1,000 6   Prone L Br # 6X 16 / 16 266 0 4,256 21      Treatment dates:  3/23/2022 - 4/20/2022          History of Present Illness: Breast cancer follow-up, no breast referable concerns, continues on anastrozole  -Interval History: Benign mammogram    Review of Systems:  Review of Systems   Constitutional: Negative  Negative for appetite change and fever  Eyes: Negative  Respiratory: Negative for shortness of breath  Cardiovascular: Negative  Gastrointestinal: Negative  Endocrine: Negative  Genitourinary: Negative  Musculoskeletal: Negative  Negative for arthralgias and myalgias  Skin: Positive for rash ( Left antecubital area)  Allergic/Immunologic: Negative  Neurological: Negative  Hematological: Negative  Negative for adenopathy  Does not bruise/bleed easily  Psychiatric/Behavioral: Negative          Patient Active Problem List   Diagnosis   • Anxiety   • Asthma   • Benign essential hypertension   • Combined hyperlipidemia   • Prediabetes   • Vitamin D deficiency   • Fibroid   • Localized osteoporosis without current pathological fracture   • Acute pain of right shoulder   • Pain in both lower extremities   • Neck pain   • BMI 35 0-35 9,adult   • Malignant neoplasm of lower-inner quadrant of left breast in female, estrogen receptor positive ([facility] [address] )   • Family history of cancer   • BRCA negative   • GERD (gastroesophageal reflux disease)   • Use of anastrozole   • Enlarged thyroid     Past Medical History:   Diagnosis Date   • Asthma     Last Assessed: 10/19/2016   • GERD (gastroesophageal reflux disease)     controlled with diet   • Hypercholesteremia    • Hypertension    • Wears glasses      Past Surgical History:   Procedure Laterality Date   • BREAST BIOPSY Left 09/28/2021    multiple sites   • BREAST LUMPECTOMY Left 11/19/2021    Procedure: LUMPECTOMY BREAST BRACKETED AJAY LOCALIZED;  Surgeon: Ottoniel Hollignsworth MD;  Location: [facility];  Service: Surgical Oncology   • BREAST LUMPECTOMY Left 1/21/2022    Procedure: LUMPECTOMY BREAST, re-excision; 0800 NUC MED;  Surgeon: Ottoniel Hollingsworth MD;  Location: [facility];  Service: Surgical Oncology   • BUNIONECTOMY     • BUNIONECTOMY Right 2009   • BUNIONECTOMY Left 2007   • COLONOSCOPY     • FOOT SURGERY     • LYMPH NODE BIOPSY Left 1/21/2022    Procedure: BIOPSY LYMPH NODE SENTINEL;  Surgeon: Ottoniel Hollingsworth MD;  Location: [facility];  Service: Surgical Oncology   • MAMMO NEEDLE LOCALIZATION LEFT (ALL INC) Left 11/12/2021   • MAMMO NEEDLE LOCALIZATION LEFT (ALL INC) EACH ADD Left 11/12/2021   • MAMMO STEREOTACTIC BREAST BIOPSY LEFT (ALL INC) Left 9/28/2021   • MAMMO STEREOTACTIC BREAST BIOPSY LEFT (ALL INC) EACH ADD Left 9/28/2021   • NASAL POLYP EXCISION     • MI COLONOSCOPY FLX DX W/COLLJ SPEC WHEN PFRMD N/A 5/23/2016    Procedure: COLONOSCOPY;  Surgeon: Ngozi Mckeon DO;  Location: [facility];   Service: Gastroenterology   • SHOULDER ARTHROSCOPY Right 2012   • SHOULDER ARTHROSCOPY W/ ROTATOR CUFF REPAIR Right 2012     Family History   Problem Relation Age of Onset   • Hypertension Mother    • Breast cancer Mother         age unknown; bilateral breast cancer   • Lung cancer Mother • Cervical cancer Mother    • Diabetes Father    • Hypertension Father    • Prostate cancer Father         age unknown   • Hypertension Brother    • Prostate cancer Brother 48   • Breast cancer Maternal Grandmother 40   • Diabetes Paternal Grandmother    • Hypertension Maternal Aunt    • No Known Problems Maternal Aunt    • Diabetes Paternal Aunt    • Diabetes Paternal Uncle    • Hypertension Cousin    • Breast cancer Cousin         under 48   • Diabetes Cousin    • Breast cancer Cousin         under 48   • Lung cancer Maternal Uncle      Social History     Socioeconomic History   • Marital status: /Civil Union     Spouse name: Not on file   • Number of children: Not on file   • Years of education: Not on file   • Highest education level: Not on file   Occupational History   • Not on file   Tobacco Use   • Smoking status: Never   • Smokeless tobacco: Never   Vaping Use   • Vaping Use: Never used   Substance and Sexual Activity   • Alcohol use: No   • Drug use: Never   • Sexual activity: Yes     Partners: Male     Birth control/protection: None, Post-menopausal   Other Topics Concern   • Not on file   Social History Narrative   • Not on file     Social Determinants of Health     Financial Resource Strain: Not on file   Food Insecurity: Not on file   Transportation Needs: Not on file   Physical Activity: Not on file   Stress: Not on file   Social Connections: Not on file   Intimate Partner Violence: Not on file   Housing Stability: Not on file       Current Outpatient Medications:   •  albuterol (ProAir HFA) 90 mcg/act inhaler, Inhale 2 puffs every 6 (six) hours as needed for wheezing, Disp: 18 g, Rfl: 0  •  amLODIPine (NORVASC) 10 mg tablet, Take 1 tablet (10 mg total) by mouth daily, Disp: 90 tablet, Rfl: 1  •  anastrozole (ARIMIDEX) 1 mg tablet, Take 1 tablet (1 mg total) by mouth daily, Disp: 90 tablet, Rfl: 0  •  atorvastatin (LIPITOR) 10 mg tablet, Take 1 tablet (10 mg total) by mouth daily, Disp: 90 tablet, Rfl: 1  •  calcium carbonate (OS-HALIMA) 1250 (500 Ca) MG tablet, Take 1,200 mg by mouth daily , Disp: , Rfl:   •  cholecalciferol (VITAMIN D3) 1,000 units tablet, Take 2,000 Units by mouth daily  , Disp: , Rfl:   •  multivitamin (THERAGRAN) TABS, Take 1 tablet by mouth daily  , Disp: , Rfl:   No Known Allergies    The following portions of the patient's history were reviewed and updated as appropriate: allergies, current medications, past family history, past medical history, past social history, past surgical history and problem list         Vitals:    02/22/23 1352   BP: 130/80   Pulse: 96   Resp: 16   Temp: 98 1 °F (36 7 °C)   SpO2: 97%       Physical Exam  Constitutional:       General: She is not in acute distress  Appearance: She is well-developed  HENT:      Head: Normocephalic and atraumatic  Cardiovascular:      Heart sounds: Normal heart sounds  Pulmonary:      Breath sounds: Normal breath sounds  Chest:   Breasts:     Breasts are asymmetrical       Right: No inverted nipple, mass, nipple discharge, skin change or tenderness  Left: Skin change ( lumpectomy scar) present  No inverted nipple, mass, nipple discharge or tenderness  Abdominal:      Palpations: Abdomen is soft  Lymphadenopathy:      Upper Body:      Right upper body: No supraclavicular, axillary or pectoral adenopathy  Left upper body: No supraclavicular, axillary or pectoral adenopathy  Neurological:      Mental Status: She is alert and oriented to person, place, and time  Psychiatric:         Mood and Affect: Mood normal            Results:  Labs:      Imaging  10/5/22 bilateral 3D diagnostic mammogram was benign BI-RADS 2 with a density of 2    I reviewed the above imaging data  Discussion/Summary: 54-year-old female status post left breast conservation for early stage carcinoma  She had radiation therapy and continues on anastrozole  Her prior genetic testing was negative    There is no evidence of disease based on exam today  Her last mammogram was benign  We will see her again in 6 months or sooner should the need arise

## 2023-03-28 ENCOUNTER — TELEPHONE (OUTPATIENT)
Dept: FAMILY MEDICINE CLINIC | Facility: CLINIC | Age: 63
End: 2023-03-28

## 2023-03-28 NOTE — TELEPHONE ENCOUNTER
Patient is experiencing trigger finger in her middle finger on the left hand waking her up out of her sleep  Patient states her finger will bend and get stiff and she has to straighten it out with her opposite hand    She would like to know if she can receive a cortisone shot here in office or would she need to go to an orthopedist ?     Thank you

## 2023-03-29 ENCOUNTER — OFFICE VISIT (OUTPATIENT)
Dept: FAMILY MEDICINE CLINIC | Facility: CLINIC | Age: 63
End: 2023-03-29

## 2023-03-29 VITALS
OXYGEN SATURATION: 98 % | DIASTOLIC BLOOD PRESSURE: 70 MMHG | TEMPERATURE: 97.7 F | HEIGHT: 64 IN | WEIGHT: 205 LBS | BODY MASS INDEX: 35 KG/M2 | SYSTOLIC BLOOD PRESSURE: 110 MMHG | RESPIRATION RATE: 16 BRPM | HEART RATE: 88 BPM

## 2023-03-29 DIAGNOSIS — M65.332 TRIGGER MIDDLE FINGER OF LEFT HAND: Primary | ICD-10-CM

## 2023-03-29 RX ORDER — TRIAMCINOLONE ACETONIDE 40 MG/ML
20 INJECTION, SUSPENSION INTRA-ARTICULAR; INTRAMUSCULAR
Status: COMPLETED | OUTPATIENT
Start: 2023-03-29 | End: 2023-03-29

## 2023-03-29 RX ADMIN — TRIAMCINOLONE ACETONIDE 20 MG: 40 INJECTION, SUSPENSION INTRA-ARTICULAR; INTRAMUSCULAR at 13:45

## 2023-03-29 NOTE — PROGRESS NOTES
Hand/upper extremity injection: L long A3  Universal Protocol:  Consent: Verbal consent obtained  Consent given by: patient  Patient understanding: patient states understanding of the procedure being performed  Site marked: the operative site was marked  Patient identity confirmed: verbally with patient    Supporting Documentation  Indications: pain and tendon swelling   Procedure Details  Condition:trigger finger Location: long finger - L long A3   Needle size: 25 G  Ultrasound guidance: no  Approach: volar  Medications administered: 20 mg triamcinolone acetonide 40 mg/mL    Patient tolerance: patient tolerated the procedure well with no immediate complications    Trigger finger left third digit  Sterile prep  Tendon sheath was injected with 0 1 mL triamcinolone  Patient tolerated procedure well  Sterile dressing applied    Call further problems

## 2023-04-10 PROBLEM — S90.121A CONTUSION OF FIFTH TOE OF RIGHT FOOT: Status: ACTIVE | Noted: 2023-04-10

## 2023-04-10 PROBLEM — D22.9 MULTIPLE NEVI: Status: ACTIVE | Noted: 2023-04-10

## 2023-04-10 PROBLEM — M65.30 TRIGGER FINGER: Status: ACTIVE | Noted: 2023-04-10

## 2023-05-03 ENCOUNTER — OFFICE VISIT (OUTPATIENT)
Dept: PODIATRY | Facility: CLINIC | Age: 63
End: 2023-05-03

## 2023-05-03 VITALS — BODY MASS INDEX: 36.14 KG/M2 | WEIGHT: 204 LBS | HEIGHT: 63 IN

## 2023-05-03 DIAGNOSIS — S90.31XA CONTUSION OF RIGHT FOOT, INITIAL ENCOUNTER: Primary | ICD-10-CM

## 2023-05-03 DIAGNOSIS — M79.671 RIGHT FOOT PAIN: ICD-10-CM

## 2023-05-03 RX ORDER — MELOXICAM 15 MG/1
15 TABLET ORAL DAILY
Qty: 30 TABLET | Refills: 0 | Status: SHIPPED | OUTPATIENT
Start: 2023-05-03 | End: 2023-06-02

## 2023-05-03 NOTE — PROGRESS NOTES
Assessment/Plan:    Explained to patient that she likely suffered joint damage from the contusion experienced in 2021 at the fifth metatarsal phalangeal joint  Patient placed on meloxicam 15 mg daily for 30 days  Hopefully will be able to wean her off this medication after 30 days  She will be rescheduled in 4 weeks  No problem-specific Assessment & Plan notes found for this encounter  Diagnoses and all orders for this visit:    Contusion of right foot, initial encounter  -     meloxicam (MOBIC) 15 mg tablet; Take 1 tablet (15 mg total) by mouth daily    Right foot pain  -     Ambulatory Referral to Podiatry  -     meloxicam (MOBIC) 15 mg tablet; Take 1 tablet (15 mg total) by mouth daily          Subjective:      Patient ID: Prashanth Sousa is a 60 y o  female  HPI     Patient, a 76-year-old female complains of intermittent pain in her right foot  Patient states that the pain began in 2021 when she stubbed her right foot against a door  She states that the pain has come and gone ever since but in recent weeks has been more uncomfortable  Patient had recent x-rays read as negative for fracture but did note arthritis in the right great toe joint  Patient notes that she had bunion surgery years ago it may have caused the arthritic change in the joint  Patient states that the pain is present along the fifth ray and had a joint behind the right fifth toe  I personally reviewed x-rays of the right foot dated 4/12/2023  It reveals arthritic changes of the right great toe joint but no fifth ray pathology  The following portions of the patient's history were reviewed and updated as appropriate: allergies, current medications, past family history, past medical history, past social history, past surgical history and problem list     Review of Systems   Gastrointestinal:        GERD   Musculoskeletal: Positive for gait problem  Psychiatric/Behavioral: Negative                Objective:      Ht 5' "3\" (1 6 m)   Wt 92 5 kg (204 lb)   LMP 12/15/2003 (Within Years)   BMI 36 14 kg/m²          Physical Exam  Constitutional:       Appearance: Normal appearance  Cardiovascular:      Pulses: Normal pulses  Musculoskeletal:         General: Deformity present  Comments: Range of motion first MPJ bilateral restricted to 5 to 10 degrees of dorsiflexion  No pain with palpation right fifth ray or at the fifth metatarsal phalangeal joint right foot  No bruising or swelling noted  Skin:     General: Skin is warm  Neurological:      General: No focal deficit present  Mental Status: She is oriented to person, place, and time               "

## 2023-05-04 ENCOUNTER — HOSPITAL ENCOUNTER (OUTPATIENT)
Dept: ULTRASOUND IMAGING | Facility: MEDICAL CENTER | Age: 63
Discharge: HOME | End: 2023-05-04

## 2023-05-04 DIAGNOSIS — E04.9 ENLARGED THYROID: ICD-10-CM

## 2023-05-07 DIAGNOSIS — Z17.0 MALIGNANT NEOPLASM OF LOWER-INNER QUADRANT OF LEFT BREAST IN FEMALE, ESTROGEN RECEPTOR POSITIVE (HCC): ICD-10-CM

## 2023-05-07 DIAGNOSIS — C50.312 MALIGNANT NEOPLASM OF LOWER-INNER QUADRANT OF LEFT BREAST IN FEMALE, ESTROGEN RECEPTOR POSITIVE (HCC): ICD-10-CM

## 2023-05-07 DIAGNOSIS — I10 BENIGN ESSENTIAL HYPERTENSION: ICD-10-CM

## 2023-05-07 DIAGNOSIS — E78.2 COMBINED HYPERLIPIDEMIA: ICD-10-CM

## 2023-05-08 ENCOUNTER — TELEPHONE (OUTPATIENT)
Dept: FAMILY MEDICINE CLINIC | Facility: CLINIC | Age: 63
End: 2023-05-08

## 2023-05-08 DIAGNOSIS — E04.1 THYROID NODULE: Primary | ICD-10-CM

## 2023-05-08 RX ORDER — AMLODIPINE BESYLATE 10 MG/1
10 TABLET ORAL DAILY
Qty: 90 TABLET | Refills: 0 | Status: SHIPPED | OUTPATIENT
Start: 2023-05-08

## 2023-05-08 RX ORDER — ATORVASTATIN CALCIUM 10 MG/1
10 TABLET, FILM COATED ORAL DAILY
Qty: 90 TABLET | Refills: 0 | Status: SHIPPED | OUTPATIENT
Start: 2023-05-08

## 2023-05-08 RX ORDER — ANASTROZOLE 1 MG/1
1 TABLET ORAL DAILY
Qty: 90 TABLET | Refills: 0 | Status: SHIPPED | OUTPATIENT
Start: 2023-05-08

## 2023-05-30 ENCOUNTER — TELEPHONE (OUTPATIENT)
Dept: RADIOLOGY | Facility: HOSPITAL | Age: 63
End: 2023-05-30

## 2023-05-30 NOTE — NURSING NOTE
Call placed to pt to discuss upcoming appointment at [facility] Radiology Department and consultation completed  Pt is having a Thyroid Biopsy with Molecular Testing completed on 6/2/2023  Allergies reviewed and verified pt does not currently take any anticoagulant medications  Pre procedure instructions including diet and taking own medications discussed with pt  Pt instructed that she may eat normally and take medications as usual before the procedure  Pt verbalized understanding of instructions given  Reminded pt of the location, date and time of procedure  My number was given to call if any questions or concerns arise pre or post procedure

## 2023-06-02 ENCOUNTER — HOSPITAL ENCOUNTER (OUTPATIENT)
Dept: ULTRASOUND IMAGING | Facility: HOSPITAL | Age: 63
Discharge: HOME | End: 2023-06-02

## 2023-06-02 DIAGNOSIS — E04.1 THYROID NODULE: ICD-10-CM

## 2023-06-02 RX ORDER — LIDOCAINE HYDROCHLORIDE 10 MG/ML
5 INJECTION, SOLUTION EPIDURAL; INFILTRATION; INTRACAUDAL; PERINEURAL ONCE
Status: COMPLETED | OUTPATIENT
Start: 2023-06-02 | End: 2023-06-02

## 2023-06-02 RX ADMIN — LIDOCAINE HYDROCHLORIDE 5 ML: 10 INJECTION, SOLUTION EPIDURAL; INFILTRATION; INTRACAUDAL; PERINEURAL at 13:04

## 2023-06-09 DIAGNOSIS — E04.1 THYROID NODULE: Primary | ICD-10-CM

## 2023-07-24 DIAGNOSIS — I10 BENIGN ESSENTIAL HYPERTENSION: ICD-10-CM

## 2023-07-24 DIAGNOSIS — Z17.0 MALIGNANT NEOPLASM OF LOWER-INNER QUADRANT OF LEFT BREAST IN FEMALE, ESTROGEN RECEPTOR POSITIVE: ICD-10-CM

## 2023-07-24 DIAGNOSIS — E78.2 COMBINED HYPERLIPIDEMIA: ICD-10-CM

## 2023-07-24 DIAGNOSIS — C50.312 MALIGNANT NEOPLASM OF LOWER-INNER QUADRANT OF LEFT BREAST IN FEMALE, ESTROGEN RECEPTOR POSITIVE: ICD-10-CM

## 2023-07-24 RX ORDER — ATORVASTATIN CALCIUM 10 MG/1
10 TABLET, FILM COATED ORAL DAILY
Qty: 90 TABLET | Refills: 0 | Status: SHIPPED | OUTPATIENT
Start: 2023-07-24

## 2023-07-24 RX ORDER — AMLODIPINE BESYLATE 10 MG/1
10 TABLET ORAL DAILY
Qty: 90 TABLET | Refills: 0 | Status: SHIPPED | OUTPATIENT
Start: 2023-07-24

## 2023-07-24 RX ORDER — ANASTROZOLE 1 MG/1
1 TABLET ORAL DAILY
Qty: 90 TABLET | Refills: 1 | Status: SHIPPED | OUTPATIENT
Start: 2023-07-24

## 2023-07-31 NOTE — ASSESSMENT & PLAN NOTE
A1c from July 2019 was 5 7%  Discussed diet and exercise    Will recheck today Airway       Patient location during procedure: OR       Procedure Start/Stop Times: 7/31/2023 7:39 AM  Staff -        CRNA: Mesha Martin APRN CRNA       Performed By: CRNA  Consent for Airway        Urgency: elective  Indications and Patient Condition       Indications for airway management: miles-procedural       Induction type:intravenous       Mask difficulty assessment: 1 - vent by mask    Final Airway Details       Final airway type: endotracheal airway       Successful airway: ETT - single  Endotracheal Airway Details        ETT size (mm): 6.5       Cuffed: yes       Successful intubation technique: direct laryngoscopy       DL Blade Type: Goodwin 2       Grade View of Cords: 1       Adjucts: stylet       Position: Right       Measured from: gums/teeth       Secured at (cm): 20       Bite block used: None    Post intubation assessment        Placement verified by: capnometry, equal breath sounds and chest rise        Number of attempts at approach: 1       Secured with: pink tape       Ease of procedure: easy       Dentition: Intact and Unchanged    Medication(s) Administered   Medication Administration Time: 7/31/2023 7:39 AM

## 2023-08-01 ENCOUNTER — OFFICE VISIT (OUTPATIENT)
Dept: OBGYN CLINIC | Facility: MEDICAL CENTER | Age: 63
End: 2023-08-01
Payer: COMMERCIAL

## 2023-08-01 VITALS
SYSTOLIC BLOOD PRESSURE: 122 MMHG | DIASTOLIC BLOOD PRESSURE: 70 MMHG | WEIGHT: 199.6 LBS | HEIGHT: 63 IN | BODY MASS INDEX: 35.37 KG/M2

## 2023-08-01 DIAGNOSIS — Z12.4 ENCOUNTER FOR SCREENING FOR CERVICAL CANCER: ICD-10-CM

## 2023-08-01 DIAGNOSIS — Z01.419 ENCOUNTER FOR WELL WOMAN EXAM WITH ROUTINE GYNECOLOGICAL EXAM: Primary | ICD-10-CM

## 2023-08-01 PROCEDURE — G0145 SCR C/V CYTO,THINLAYER,RESCR: HCPCS | Performed by: OBSTETRICS & GYNECOLOGY

## 2023-08-01 PROCEDURE — G0476 HPV COMBO ASSAY CA SCREEN: HCPCS | Performed by: OBSTETRICS & GYNECOLOGY

## 2023-08-01 PROCEDURE — S0612 ANNUAL GYNECOLOGICAL EXAMINA: HCPCS | Performed by: OBSTETRICS & GYNECOLOGY

## 2023-08-02 NOTE — PROGRESS NOTES
ASSESSMENT & PLAN: Merlene Schlatter is a 58 y.o.  with normal gynecologic exam.    1.  Routine well woman exam done today  2. Pap and HPV:  The patient's Pap was done today. Current ASCCP Guidelines reviewed. Per patient's request, mother had cervical cancer  3. Mammogram: personal history of breast cancer  4. Colonoscopy   5. The following were reviewed in today's visit: breast self exam, mammography screening ordered and osteoporosis      CC:  Annual Gynecologic Examination    HPI: Merlene Schlatter is a 58 y.o. Jose Fuchs who presents for annual gynecologic examination. She has the following concerns:  No GYN complaints    Health Maintenance:    She wears her seatbelt routinely. She does perform regular monthly self breast exams. She feels safe at home.      Pap:   Last mammogram:   Last colonoscopy:     Past Medical History:   Diagnosis Date   • Asthma     Last Assessed: 10/19/2016   • Cancer ([address]) 10/2021   • Fibroid    • GERD (gastroesophageal reflux disease)     controlled with diet   • Hypercholesteremia    • Hypertension    • Wears glasses        Past Surgical History:   Procedure Laterality Date   • BREAST BIOPSY Left 2021    multiple sites   • BREAST LUMPECTOMY Left 2021    Procedure: LUMPECTOMY BREAST BRACKETED AJAY LOCALIZED;  Surgeon: Jose Alejandro Coast MD;  Location: [facility];  Service: Surgical Oncology   • BREAST LUMPECTOMY Left 2022    Procedure: LUMPECTOMY BREAST, re-excision; 0[address];  Surgeon: Jose Alejandro Coats MD;  Location: [facility];  Service: Surgical Oncology   • BUNIONECTOMY     • BUNIONECTOMY Right    • BUNIONECTOMY Left    • COLONOSCOPY     • FOOT SURGERY     • LYMPH NODE BIOPSY Left 2022    Procedure: BIOPSY LYMPH NODE SENTINEL;  Surgeon: Jose Alejandro Coats MD;  Location: [facility];  Service: Surgical Oncology   • MAMMO NEEDLE LOCALIZATION LEFT (ALL INC) Left 2021   • MAMMO NEEDLE LOCALIZATION LEFT (ALL INC) EACH ADD Left 2021   • MAMMO STEREOTACTIC BREAST BIOPSY LEFT (ALL INC) Left 2021   • MAMMO STEREOTACTIC BREAST BIOPSY LEFT (ALL INC) EACH ADD Left 2021   • NASAL POLYP EXCISION     • TN COLONOSCOPY FLX DX W/COLLJ SPEC WHEN PFRMD N/A 2016    Procedure: COLONOSCOPY;  Surgeon: Luci Gonzalez DO;  Location: [facility]; Service: Gastroenterology   • SHOULDER ARTHROSCOPY Right    • SHOULDER ARTHROSCOPY W/ ROTATOR CUFF REPAIR Right    • US GUIDED THYROID BIOPSY  2023       Past OB/Gyn History:  OB History        1    Para   0    Term   0       0    AB   0    Living   0       SAB   0    IAB   0    Ectopic   0    Multiple   0    Live Births   0           Obstetric Comments   Age at menarche 14  Age at first pregnancy 28's, no live birth  Hx of BCP use  and IUD  HRT use: no           Pt does not have menstrual issues. History of sexually transmitted infection: No.  History of abnormal pap smears: No .    Patient  currently sexually active. heterosexual   The current method of family planning is post menopausal status.     Family History   Problem Relation Age of Onset   • Hypertension Mother    • Breast cancer Mother         age unknown; bilateral breast cancer   • Lung cancer Mother    • Cervical cancer Mother    • Cancer Mother    • Lung disease Mother    • Diabetes Father    • Hypertension Father    • Prostate cancer Father         age unknown   • Hypertension Brother    • Prostate cancer Brother 48   • Breast cancer Maternal Grandmother    • Diabetes Paternal Grandmother    • Hypertension Maternal Aunt    • No Known Problems Maternal Aunt    • Diabetes Paternal Aunt    • Diabetes Paternal Uncle    • Hypertension Cousin    • Breast cancer Cousin         under 48   • Diabetes Cousin    • Breast cancer Cousin         under 48   • Lung cancer Maternal Uncle    • Heart disease Brother    • Hypertension Brother        Social History:  Social History     Socioeconomic History   • Marital status: /Civil Union     Spouse name: Not on file   • Number of children: Not on file   • Years of education: Not on file   • Highest education level: Not on file   Occupational History   • Not on file   Tobacco Use   • Smoking status: Never   • Smokeless tobacco: Never   Vaping Use   • Vaping Use: Never used   Substance and Sexual Activity   • Alcohol use: No   • Drug use: Never   • Sexual activity: Yes     Partners: Male     Birth control/protection: Post-menopausal, None   Other Topics Concern   • Not on file   Social History Narrative   • Not on file     Social Determinants of Health     Financial Resource Strain: Not on file   Food Insecurity: Not on file   Transportation Needs: Not on file   Physical Activity: Not on file   Stress: Not on file   Social Connections: Not on file   Intimate Partner Violence: Not on file   Housing Stability: Not on file     No Known Allergies    Current Outpatient Medications:   •  albuterol (ProAir HFA) 90 mcg/act inhaler, Inhale 2 puffs every 6 (six) hours as needed for wheezing, Disp: 18 g, Rfl: 0  •  amLODIPine (NORVASC) 10 mg tablet, Take 1 tablet (10 mg total) by mouth daily, Disp: 90 tablet, Rfl: 0  •  anastrozole (ARIMIDEX) 1 mg tablet, Take 1 tablet (1 mg total) by mouth daily, Disp: 90 tablet, Rfl: 1  •  atorvastatin (LIPITOR) 10 mg tablet, Take 1 tablet (10 mg total) by mouth daily, Disp: 90 tablet, Rfl: 0  •  calcium carbonate (OS-HALIMA) 1250 (500 Ca) MG tablet, Take 1,200 mg by mouth daily , Disp: , Rfl:   •  cholecalciferol (VITAMIN D3) 1,000 units tablet, Take 2,000 Units by mouth daily  , Disp: , Rfl:   •  multivitamin (THERAGRAN) TABS, Take 1 tablet by mouth daily. , Disp: , Rfl:       Review of Systems  Constitutional :no fever, feels well, no tiredness, no recent weight gain or loss  ENT: no ear ache, no loss of hearing, no nosebleeds or nasal discharge, no sore throat or hoarseness.   Cardiovascular: no complaints of slow or fast heart beat, no chest pain, no palpitations, no leg claudication or lower extremity edema. Respiratory: no complaints of shortness of shortness of breath, no CASTANON  Breasts:no complaints of breast pain, breast lump, or nipple discharge  Gastrointestinal: no complaints of abdominal pain, constipation, nausea, vomiting, or diarrhea or bloody stools  Genitourinary : no complaints of dysuria, incontinence, pelvic pain, no dysmenorrhea, vaginal discharge or abnormal vaginal bleeding and as noted in HPI. Musculoskeletal: no complaints of arthralgia, no myalgia, no joint swelling or stiffness, no limb pain or swelling. Integumentary: no complaints of skin rash or lesion, itching or dry skin  Neurological: no complaints of headache, no confusion, no numbness or tingling, no dizziness or fainting    Objective      /70   Ht 5' 3" (1.6 m)   Wt 90.5 kg (199 lb 9.6 oz)   LMP 12/15/2003 (Within Years)   BMI 35.36 kg/m²     General:   appears stated age, cooperative, alert normal mood and affect   Neck: normal, supple,trachea midline, no masses   Heart: regular rate and rhythm, S1, S2 normal, no murmur, click, rub or gallop   Lungs: clear to auscultation bilaterally   Breasts: normal appearance, no masses or tenderness, Inspection negative, No nipple retraction or dimpling, No nipple discharge or bleeding, No axillary or supraclavicular adenopathy, Normal to palpation without dominant masses   Abdomen: soft, non-tender, without masses or organomegaly   Vulva: normal, normal female genitalia, Bartholin's, Urethra, [place] normal, no lesions   Vagina: normal vagina, no discharge, exudate, lesion, or erythema   Urethra: normal   Cervix: Normal, no discharge. PAP done. Uterus: normal size, contour, position, consistency, mobility, non-tender   Adnexa: normal adnexa   Lymphatic palpation of lymph nodes in neck, axilla, groin and/or other locations: no lymphadenopathy or masses noted   Skin normal skin turgor and no rashes.    Psychiatric orientation to person, place, and time: normal. mood and affect: normal

## 2023-08-03 LAB
HPV HR 12 DNA CVX QL NAA+PROBE: POSITIVE
HPV16 DNA CVX QL NAA+PROBE: NEGATIVE
HPV18 DNA CVX QL NAA+PROBE: NEGATIVE

## 2023-08-08 LAB
LAB AP GYN PRIMARY INTERPRETATION: NORMAL
Lab: NORMAL

## 2023-08-31 ENCOUNTER — OFFICE VISIT (OUTPATIENT)
Dept: SURGICAL ONCOLOGY | Facility: CLINIC | Age: 63
End: 2023-08-31
Payer: COMMERCIAL

## 2023-08-31 ENCOUNTER — OFFICE VISIT (OUTPATIENT)
Dept: HEMATOLOGY ONCOLOGY | Facility: CLINIC | Age: 63
End: 2023-08-31
Payer: COMMERCIAL

## 2023-08-31 VITALS
SYSTOLIC BLOOD PRESSURE: 118 MMHG | OXYGEN SATURATION: 98 % | TEMPERATURE: 97.6 F | HEART RATE: 76 BPM | HEIGHT: 63 IN | WEIGHT: 201 LBS | DIASTOLIC BLOOD PRESSURE: 68 MMHG | BODY MASS INDEX: 35.61 KG/M2

## 2023-08-31 VITALS
SYSTOLIC BLOOD PRESSURE: 118 MMHG | HEART RATE: 76 BPM | HEIGHT: 63 IN | BODY MASS INDEX: 35.61 KG/M2 | WEIGHT: 201 LBS | OXYGEN SATURATION: 98 % | DIASTOLIC BLOOD PRESSURE: 68 MMHG | TEMPERATURE: 97.6 F

## 2023-08-31 DIAGNOSIS — Z17.0 MALIGNANT NEOPLASM OF LOWER-INNER QUADRANT OF LEFT BREAST IN FEMALE, ESTROGEN RECEPTOR POSITIVE (HCC): Primary | ICD-10-CM

## 2023-08-31 DIAGNOSIS — C50.312 MALIGNANT NEOPLASM OF LOWER-INNER QUADRANT OF LEFT BREAST IN FEMALE, ESTROGEN RECEPTOR POSITIVE (HCC): Primary | ICD-10-CM

## 2023-08-31 DIAGNOSIS — Z79.811 USE OF ANASTROZOLE: ICD-10-CM

## 2023-08-31 PROCEDURE — 99214 OFFICE O/P EST MOD 30 MIN: CPT

## 2023-08-31 PROCEDURE — 99214 OFFICE O/P EST MOD 30 MIN: CPT | Performed by: INTERNAL MEDICINE

## 2023-08-31 NOTE — PROGRESS NOTES
her grandmother had breast cancer. She is a lifetime never smoker. Her performance status is normal.           Interval History:  A 58year-old postmenopausal woman with stage I A left breast cancer, grade 2, ER 90% positive, MA 90% positive, HER2 negative disease. She underwent lumpectomy and sentinel lymph node biopsy, resulting in HAWK. Her tumor size was 8 mm. Since February 2022, she has been on adjuvant hormonal therapy with anastrozole. She presents today for routine follow-up. She has chronic mild musculoskeletal symptoms and hot flashes. Otherwise, she feels very well. She denied any pain. Her weight is stable. She has no respiratory symptoms. Her performance status is normal.            Objective:      Primary Diagnosis:     1. Left breast cancer, stage I A (pT1b, pN0, M0) grade 2, ER 90% positive, MA 90% positive, HER2 negative disease. Diagnosed in January 2022.     2. MSH-6 variant with uncertain significance.     Cancer Staging:  Cancer Staging  Malignant neoplasm of lower-inner quadrant of left breast in female, estrogen receptor positive ([address])  Staging form: Breast, AJCC 8th Edition  - Clinical: Stage 0 (cTis (DCIS), cN0, cM0, ER+, MA+, HER2: Not Assessed) - Signed by Marija Hunter MD on 10/26/2021  Stage prefix: Initial diagnosis  Method of lymph node assessment: Clinical  Nuclear grade: G2  - Pathologic stage from 12/7/2021: Stage IA (pT1b, pN0, cM0, G2, ER+, MA+, HER2-) - Signed by Marija Hunter MD on 12/7/2021  Stage prefix: Initial diagnosis  Method of lymph node assessment: Clinical  Histologic grading system: 3 grade system           Previous Hematologic/ Oncologic Treatment:            Current Hematologic/ Oncologic Treatment:       Adjuvant hormonal therapy with anastrozole since February 2022.     Disease Status:      HAWK status post lumpectomy and sentinel lymph node biopsy.     Test Results:     Pathology:     8 mm of invasive ductal carcinoma, grade 2.   No evidence of lymphovascular invasion. 4 sentinel lymph nodes were all negative for metastatic disease. ER 90% positive, WA 90% positive, HER2 negative disease. Stage I A (pT1b, pN0, M0)     Radiology:     Mammography in October 2022 was benign. BI-RADS 2.     DEXA scan in 2017 showed T-score-1.7, consistent with osteopenia.     Laboratory:     See below.     Physical Exam:        General Appearance:    Alert, oriented          Eyes:    PERRL   Ears:    Normal external ear canals, both ears   Nose:   Nares normal, septum midline   Throat:   Mucosa moist. Pharynx without injection. Neck:   Supple         Lungs:     Clear to auscultation bilaterally   Chest Wall:    No tenderness or deformity    Heart:    Regular rate and rhythm         Abdomen:     Soft, non-tender, bowel sounds +, no organomegaly               Extremities:   Extremities no cyanosis or edema         Skin:   no rash or icterus. Lymph nodes:   Cervical, supraclavicular, and axillary nodes normal   Neurologic:   CNII-XII intact, normal strength, sensation and reflexes     Throughout             Breast exam:   Lumpectomy scar at inner lower quadrant of her left breast with no palpable abnormalities. Right breast exam is negative. ROS: Review of Systems   All other systems reviewed and are negative. Imaging: No results found.       Labs:   Lab Results   Component Value Date    WBC 5.13 03/30/2022    HGB 13.2 03/30/2022    HCT 41.6 03/30/2022    MCV 88 03/30/2022     03/30/2022     Lab Results   Component Value Date     03/05/2015    K 3.8 05/06/2022     (H) 05/06/2022    CO2 28 05/06/2022    ANIONGAP 10 03/05/2015    BUN 14 05/06/2022    CREATININE 0.93 05/06/2022    GLUCOSE 91 03/05/2015    GLUF 87 05/06/2022    CALCIUM 9.8 05/06/2022    CORRECTEDCA 10.1 12/08/2021    AST 18 05/06/2022    ALT 23 05/06/2022    ALKPHOS 108 05/06/2022    PROT 7.7 03/05/2015    BILITOT 0.81 03/05/2015    EGFR 66 05/06/2022         Current Medications: Reviewed  Allergies: Reviewed  PMH/FH/SH:  Reviewed      Vital Sign:    Body surface area is 1.94 meters squared.     Wt Readings from Last 3 Encounters:   08/31/23 91.2 kg (201 lb)   08/31/23 91.2 kg (201 lb)   08/01/23 90.5 kg (199 lb 9.6 oz)        Temp Readings from Last 3 Encounters:   08/31/23 97.6 °F (36.4 °C) (Tympanic)   08/31/23 97.6 °F (36.4 °C) (Temporal)   04/10/23 97.7 °F (36.5 °C) (Temporal)        BP Readings from Last 3 Encounters:   08/31/23 118/68   08/31/23 118/68   08/01/23 122/70         Pulse Readings from Last 3 Encounters:   08/31/23 76   08/31/23 76   04/10/23 88     @LASTSAO2(3)@

## 2023-08-31 NOTE — PROGRESS NOTES
Surgical Oncology Follow Up       [facility] SURGICAL ONCOLOGY [place]  [facility] [id number]    Shara Carr  1960  [id number]  [facility] SURGICAL ONCOLOGY [place] [id number]    Chief Complaint   Patient presents with   • Follow-up       Assessment/Plan:  1. Malignant neoplasm of lower-inner quadrant of left breast in female, estrogen receptor positive ([address])  - 6 mo follow up  - Mammo diagnostic bilateral w 3d & cad; Future    2. Use of anastrozole  - continue use per medical oncology     Discussion/Summary:   Patient is a 58-year-old female presenting today for six-month follow-up for left breast cancer diagnosed in September 2021. Pathology revealed DCIS ER/OR positive. She underwent genetic testing which was negative but revealed a VUS of MSH6. She had a left breast lumpectomy with sentinel node biopsy with Dr. Shalom Martinez. Pathology revealed invasive carcinoma. She underwent re-excision. She completed whole breast radiation therapy. She is currently on anastrozole. There were no concerns her clinical breast exam.  Patient noted dull soreness on the left chest wall and in the left breast.  I attributed this to status post radiation changes. I recommended she wear a nice supporting bra. I will get her scheduled for annual mammogram in October. I will see the patient back in 6 months or sooner should the need arise. She was instructed to call with any questions or concerns prior to this time. All questions were answered today. History of Present Illness:     Oncology History   Malignant neoplasm of lower-inner quadrant of left breast in female, estrogen receptor positive ([address])   9/28/2021 Initial Diagnosis    Ductal carcinoma in situ (DCIS) of left breast     9/28/2021 Biopsy    Final Diagnosis   A.  Breast, Left, 7:00 location 2 cores w/ Calcs, Biopsy:  - Ductal carcinoma in situ.  - No invasive carcinoma is seen. Architectural patterns: Solid, papillary       Nuclear grade of ductal carcinoma in situ: Grade 2 (II). Necrosis:  Focal.       Size of in-situ carcinoma: At least 4 mm.  - Microcalcifications: Present in DCIS. - Breast cancer biomarker studies (paraffin block number: A1):           Estrogen, progesterone receptor studies pending, to be described in a separate receptor report. ER 90-95% positive  Pr90-95% positive  B. Breast, Left, 7:00 location 1 core w/ No Calcs, Biopsy:  - Atypical lobular hyperplasia. - Usual ductal hyperplasia involving intraductal papilloma. C. Breast, Left, 9:00 location 2 cores w/ Calcs, Biopsy:  - Non-proliferative fibrocystic changes, including microcysts, microcalcifications with stromal fibrosis. - Negative for atypia or carcinoma. D. Breast, Left, 9:00 location 1 core w/ No Calcs, Biopsy:  - Intraductal papilloma. - Negative for atypia or carcinoma.          10/26/2021 -  Cancer Staged    Staging form: Breast, AJCC 8th Edition  - Clinical: Stage 0 (cTis (DCIS), cN0, cM0, ER+, CO+, HER2: Not Assessed) - Signed by Sheliah Primrose, MD on 10/26/2021  Stage prefix: Initial diagnosis  Method of lymph node assessment: Clinical  Nuclear grade: G2       10/2021 Genetic Testing    Test: [employer] BRCAplus STAT Panel (8 genes): ZAKIA, BRCA1, BRCA2, CDH1, CHEK2, PALB2, PTEN, TP53   Result:   Negative - No Clinically Significant Variants Detected       Assessment:  Negative     Additional Testing:  Test(s): [employer] BRCAplus STAT Panel (8 genes): ZAKIA, BRCA1, BRCA2, CDH1, CHEK2, PALB2, PTEN, TP53 with reflex to [facility] CancerNext (28 additional genes): APC, ZAKIA, AXIN2 BARD1, BRCA1, BRCA2, BRIP1, BMPR1A, CDH1, CDK4, CDKN2A, CHEK2, DICER1, EPCAM, GREM1, HOXB13, MLH1, MSH2, MSH3, MSH6, MUTYH, NBN, NF1, NTHL1, PALB2, PMS2, POLD1, POLE, PTEN, RAD51C, RAD51D, RECQL SMAD4, SMARCA4, STK11, TP53      Result: Variant of uncertain significance     Variant: MSH6 Variant, Unknown Significance: p. T6P       Variant of uncertain significance (VUS)     11/19/2021 Surgery    Final Diagnosis   A. Breast, Left, Left breast lumpectomy, short superior, long lateral:  - Invasive mammary carcinoma of no special type (ductal), 8 mm in greatest dimension with    extensive adjacent ductal carcinoma in-situ and associated non-invasive lobular neoplasia. -- [place] histologic grade 2 of 3 (total score: 7 of 9)        * Glandular (acinar) Tubular Differentiation < 10%, score 3.        * Nuclear Pleomorphism 2 of 3, score 2.        * Mitotic Rate 4-7/mm2, (8-14 mitoses/10HPF; 8 mitoses/10 HPF), score 2.    -- Confirmed by tumor cell immunophenotype:        * Positive: E-cadherin, P120 - each in a membranous pattern. * Negative: p63, SMMHC.  - Ductal carcinoma in-situ (DCIS): Present; not an extensive intraductal component associated with     invasive carcinoma (< 5% of total tumor) but extensive DCIS in adjacent tissue. -- Confirmed by positive E-cadherin, p120 (membranous), SMMHC, p63 and loss of epithelial         mosaicism on CK5/6 immunostains. -- Size and Extent: At least 48 mm in approximate extent; present in 12 sequential sections; 43 of 91 blocks. -- Architectural Patterns: Cribriform, micropapillary, solid, papillary. -- Nuclear grade: I-II (low to intermediate)    -- Necrosis: Present, focal and central necrosis identified. -- Associated prior biopsy site changes with twist clip and Aliyah  marker.  - Multifocal non-invasive lobular neoplasia (focally florid lobular carcinoma in-situ/LCIS and atypical lobular     hyperplasia with pagetoid ductal spread) involving and adjacent to DCIS. -- Confirmed by negative E-cadherin; positive p120 (cytoplasmic), SMMHC, p63; and loss of epithelial        mosaicism on CK5/6 immunostains.   - Margins uninvolved by invasive and in-situ carcinoma. -- Invasive carcinoma: 8 mm from nearest posterior margin. -- DCIS: 0.1 mm from nearest posterior margin; < 1 mm from nearest anterior margin; 1.5 mm from        nearest medial margin; > 2 mm from all remaining margins.   - Benign skin. -- No dermal lymphovascular invasion.   - Lymphovascular invasion: Not identified. - Microcalcifications: Present, associated with DCIS. - Benign fibrocystic changes with atypia (atypical and florid usual ductal hyperplasia, columnar cell     lesions with flat epithelial atypia/FEA, cystic and papillary apocrine metaplasia). - Radial scar, 0.4 cm. B. Breast, Left, Left Breast Lumpectomy new superior margin - Short Superior, Long Lateral:  - Ductal carcinoma in-situ (DCIS). -- Size and Extent: 16 mm in greatest approximate extent; present in 4 sequential sections; 7 of 78 blocks. -- Architectural Patterns: Cribriform, micropapillary, solid, papillary. -- Nuclear grade: I-II (low to intermediate). -- Necrosis: Present, focal.     -- Confirmed by positive E-cadherin, P120 (membranous) and loss of epithelial mosaicism on         CK5/6 immunostains. -- Associated prior biopsy site changes with biopsy clip and AJAY  marker.  - No invasive carcinoma identified. - Margins uninvolved by DCIS. -- DCIS < 1 mm from nearest posterior margin; < 1-2 mm from medial margin; > 2 mm from all       remaining margins. - Lymphovascular invasion: Not identified. - Microcalcifications: Present, associated with DCIS, lobular neoplasia and non-neoplastic breast tissue.    - Benign fibrocystic changes with atypia:    -- Multifocal/extensive non-invasive lobular neoplasia (focal lobular carcinoma in-situ and        predominantly atypical lobular hyperplasia with pagetoid ductal spread), focally involving        and adjacent to DCIS. ** Confirmed by negative E-cadherin; positive p120 (cytoplasmic) immunostains.      -- Usual ductal hyperplasia, columnar cell change and hyperplasia with focal flat epithelial atypia/FEA, sclerosing adenosis, fibroadenomatoid changes, cystic and papillary apocrine metaplasia. - Small intraductal papilloma without atypia, 0.1 cm.         12/7/2021 -  Cancer Staged    Staging form: Breast, AJCC 8th Edition  - Pathologic stage from 12/7/2021: Stage IA (pT1b, pN0, cM0, G2, ER+, NM+, HER2-) - Signed by Dorothea Pereira MD on 12/7/2021  Stage prefix: Initial diagnosis  Method of lymph node assessment: Clinical  Histologic grading system: 3 grade system       1/21/2022 Surgery    Final Diagnosis   A. Left breast, anterior margin:     - Histologic changes compatible with prior surgical procedure.     - Skin with dermal cicatrix. - No invasive or in situ carcinoma identified.     - Final anterior margin is negative for malignancy. B. Left breast, new posterior margin:     - Focus of atypical lobular hyperplasia (ALH). - No invasive or in situ carcinoma identified.     - Final posterior margin is negative for malignancy. C.  Left breast, medial margin:     - Histologic changes compatible with prior surgical procedure. - No invasive or in situ carcinoma identified.     - Final medial margin is negative for malignacny. D.  Left axilla, sentinel lymph node #1:     - Single lymph node; negative for malignancy (0/1). E.  Left axilla, sentinel lymph node #2:     - Single lymph node; negative for malignancy (0/1). F.  Left axilla, sentinel lymph node #3:     - Single lymph node; negative for malignancy (0/1). G.  Left axilla, sentinel lymph node #4:     - Single lymph node; negative for malignancy (0/1).        3/23/2022 - 4/2022 Radiation    Prone L Boost 10X/6X 5 / 5 200 0 1,000 6   Prone L Br # 6X 16 / 16 266 0 4,256 21      Treatment dates:  3/23/2022 - 4/20/2022           -Interval History: Patient is a 59-year-old female presenting today for six-month follow-up for left breast cancer diagnosed in September 2021. She is currently on anastrozole.  Patient noted dull soreness on the left chest wall and in the left breast. Patient denies changes on her breast exam. She denies persistent headaches, bone pain, back pain, shortness of breath, or abdominal pain. Review of Systems:  Review of Systems   Constitutional: Negative for activity change, appetite change, fatigue and unexpected weight change. Respiratory: Negative for cough and shortness of breath. Cardiovascular: Negative for chest pain. Gastrointestinal: Negative for abdominal pain, diarrhea, nausea and vomiting. Endocrine: Negative for heat intolerance. Musculoskeletal: Negative for arthralgias, back pain and myalgias. Skin: Negative for rash. Neurological: Negative for weakness and headaches. Hematological: Negative for adenopathy.        Patient Active Problem List   Diagnosis   • Anxiety   • Asthma   • Benign essential hypertension   • Combined hyperlipidemia   • Prediabetes   • Vitamin D deficiency   • Fibroid   • Localized osteoporosis without current pathological fracture   • Acute pain of right shoulder   • Pain in both lower extremities   • Neck pain   • BMI 35.0-35.9,adult   • Malignant neoplasm of lower-inner quadrant of left breast in female, estrogen receptor positive ([address])   • Family history of cancer   • BRCA negative   • GERD (gastroesophageal reflux disease)   • Use of anastrozole   • Enlarged thyroid   • Trigger finger   • Multiple nevi   • Contusion of fifth toe of right foot     Past Medical History:   Diagnosis Date   • Asthma     Last Assessed: 10/19/2016   • Cancer ([address]) 10/2021   • Fibroid    • GERD (gastroesophageal reflux disease)     controlled with diet   • Hypercholesteremia    • Hypertension    • Wears glasses      Past Surgical History:   Procedure Laterality Date   • BREAST BIOPSY Left 09/28/2021    multiple sites   • BREAST LUMPECTOMY Left 11/19/2021    Procedure: LUMPECTOMY BREAST BRACKETED AJAY LOCALIZED;  Surgeon: Dorothea Pereira MD;  Location: [facility];  Service: Surgical Oncology   • BREAST LUMPECTOMY Left 1/21/2022    Procedure: LUMPECTOMY BREAST, re-excision; [address];  Surgeon: Cindy Rice MD;  Location: [facility];  Service: Surgical Oncology   • BUNIONECTOMY     • BUNIONECTOMY Right 2009   • BUNIONECTOMY Left 2007   • COLONOSCOPY     • FOOT SURGERY     • LYMPH NODE BIOPSY Left 1/21/2022    Procedure: BIOPSY LYMPH NODE SENTINEL;  Surgeon: Cindy Rice MD;  Location: [facility];  Service: Surgical Oncology   • MAMMO NEEDLE LOCALIZATION LEFT (ALL INC) Left 11/12/2021   • MAMMO NEEDLE LOCALIZATION LEFT (ALL INC) EACH ADD Left 11/12/2021   • MAMMO STEREOTACTIC BREAST BIOPSY LEFT (ALL INC) Left 9/28/2021   • MAMMO STEREOTACTIC BREAST BIOPSY LEFT (ALL INC) EACH ADD Left 9/28/2021   • NASAL POLYP EXCISION     • AR COLONOSCOPY FLX DX W/COLLJ SPEC WHEN PFRMD N/A 5/23/2016    Procedure: COLONOSCOPY;  Surgeon: Amelia Clayton DO;  Location: [facility];   Service: Gastroenterology   • SHOULDER ARTHROSCOPY Right 2012   • SHOULDER ARTHROSCOPY W/ ROTATOR CUFF REPAIR Right 2012   • US GUIDED THYROID BIOPSY  6/2/2023     Family History   Problem Relation Age of Onset   • Hypertension Mother    • Breast cancer Mother         age unknown; bilateral breast cancer   • Lung cancer Mother    • Cervical cancer Mother    • Cancer Mother    • Lung disease Mother    • Diabetes Father    • Hypertension Father    • Prostate cancer Father         age unknown   • Hypertension Brother    • Prostate cancer Brother 48   • Breast cancer Maternal Grandmother    • Diabetes Paternal Grandmother    • Hypertension Maternal Aunt    • No Known Problems Maternal Aunt    • Diabetes Paternal Aunt    • Diabetes Paternal Uncle    • Hypertension Cousin    • Breast cancer Cousin         under 48   • Diabetes Cousin    • Breast cancer Cousin         under 48   • Lung cancer Maternal Uncle    • Heart disease Brother    • Hypertension Brother      Social History     Socioeconomic History   • Marital status: /Civil [employer]     Spouse name: Not on file   • Number of children: Not on file   • Years of education: Not on file   • Highest education level: Not on file   Occupational History   • Not on file   Tobacco Use   • Smoking status: Never   • Smokeless tobacco: Never   Vaping Use   • Vaping Use: Never used   Substance and Sexual Activity   • Alcohol use: No   • Drug use: Never   • Sexual activity: Yes     Partners: Male     Birth control/protection: Post-menopausal, None   Other Topics Concern   • Not on file   Social History Narrative   • Not on file     Social Determinants of Health     Financial Resource Strain: Not on file   Food Insecurity: Not on file   Transportation Needs: Not on file   Physical Activity: Not on file   Stress: Not on file   Social Connections: Not on file   Intimate Partner Violence: Not on file   Housing Stability: Not on file       Current Outpatient Medications:   •  albuterol (ProAir HFA) 90 mcg/act inhaler, Inhale 2 puffs every 6 (six) hours as needed for wheezing, Disp: 18 g, Rfl: 0  •  amLODIPine (NORVASC) 10 mg tablet, Take 1 tablet (10 mg total) by mouth daily, Disp: 90 tablet, Rfl: 0  •  anastrozole (ARIMIDEX) 1 mg tablet, Take 1 tablet (1 mg total) by mouth daily, Disp: 90 tablet, Rfl: 1  •  atorvastatin (LIPITOR) 10 mg tablet, Take 1 tablet (10 mg total) by mouth daily, Disp: 90 tablet, Rfl: 0  •  calcium carbonate (OS-HALIMA) 1250 (500 Ca) MG tablet, Take 1,200 mg by mouth daily , Disp: , Rfl:   •  cholecalciferol (VITAMIN D3) 1,000 units tablet, Take 2,000 Units by mouth daily  , Disp: , Rfl:   •  multivitamin (THERAGRAN) TABS, Take 1 tablet by mouth daily. , Disp: , Rfl:   No Known Allergies  Vitals:    08/31/23 0827   BP: 118/68   Pulse: 76   Temp: 97.6 °F (36.4 °C)   SpO2: 98%       Physical Exam  Constitutional:       General: She is not in acute distress. Appearance: Normal appearance. Cardiovascular:      Rate and Rhythm: Normal rate and regular rhythm.       Pulses: Normal pulses. Heart sounds: Normal heart sounds. Pulmonary:      Effort: Pulmonary effort is normal.      Breath sounds: Normal breath sounds. Chest:      Chest wall: No mass. Breasts:     Right: No swelling, bleeding, inverted nipple, mass, nipple discharge, skin change or tenderness. Left: Tenderness present. No swelling, bleeding, inverted nipple, mass, nipple discharge or skin change. Abdominal:      General: Abdomen is flat. Palpations: Abdomen is soft. Lymphadenopathy:      Upper Body:      Right upper body: No supraclavicular, axillary or pectoral adenopathy. Left upper body: No supraclavicular, axillary or pectoral adenopathy. Skin:     General: Skin is warm. Neurological:      General: No focal deficit present. Mental Status: She is alert and oriented to person, place, and time. Psychiatric:         Mood and Affect: Mood normal.         Behavior: Behavior normal.           Results:    Imaging  No results found. I reviewed the above imaging data. Advance Care Planning/Advance Directives:  Discussed disease status, cancer treatment plans and/or cancer treatment goals with the patient.

## 2023-10-12 ENCOUNTER — OFFICE VISIT (OUTPATIENT)
Dept: FAMILY MEDICINE CLINIC | Facility: CLINIC | Age: 63
End: 2023-10-12
Payer: COMMERCIAL

## 2023-10-12 ENCOUNTER — TELEPHONE (OUTPATIENT)
Dept: FAMILY MEDICINE CLINIC | Facility: CLINIC | Age: 63
End: 2023-10-12

## 2023-10-12 VITALS
TEMPERATURE: 97.3 F | RESPIRATION RATE: 20 BRPM | BODY MASS INDEX: 35.25 KG/M2 | SYSTOLIC BLOOD PRESSURE: 130 MMHG | WEIGHT: 199 LBS | DIASTOLIC BLOOD PRESSURE: 78 MMHG | OXYGEN SATURATION: 97 % | HEART RATE: 102 BPM

## 2023-10-12 DIAGNOSIS — E78.2 COMBINED HYPERLIPIDEMIA: ICD-10-CM

## 2023-10-12 DIAGNOSIS — L30.9 DERMATITIS: ICD-10-CM

## 2023-10-12 DIAGNOSIS — D22.9 MULTIPLE NEVI: ICD-10-CM

## 2023-10-12 DIAGNOSIS — E04.1 THYROID NODULE: ICD-10-CM

## 2023-10-12 DIAGNOSIS — C50.312 MALIGNANT NEOPLASM OF LOWER-INNER QUADRANT OF LEFT BREAST IN FEMALE, ESTROGEN RECEPTOR POSITIVE: ICD-10-CM

## 2023-10-12 DIAGNOSIS — Z00.00 WELL ADULT EXAM: Primary | ICD-10-CM

## 2023-10-12 DIAGNOSIS — S90.121D CONTUSION OF FIFTH TOE OF RIGHT FOOT, SUBSEQUENT ENCOUNTER: ICD-10-CM

## 2023-10-12 DIAGNOSIS — Z23 ENCOUNTER FOR IMMUNIZATION: ICD-10-CM

## 2023-10-12 DIAGNOSIS — R73.03 PREDIABETES: ICD-10-CM

## 2023-10-12 DIAGNOSIS — Z17.0 MALIGNANT NEOPLASM OF LOWER-INNER QUADRANT OF LEFT BREAST IN FEMALE, ESTROGEN RECEPTOR POSITIVE: ICD-10-CM

## 2023-10-12 DIAGNOSIS — R06.83 SNORING: ICD-10-CM

## 2023-10-12 DIAGNOSIS — R13.10 DYSPHAGIA, UNSPECIFIED TYPE: Primary | ICD-10-CM

## 2023-10-12 DIAGNOSIS — J45.909 MILD ASTHMA WITHOUT COMPLICATION, UNSPECIFIED WHETHER PERSISTENT: ICD-10-CM

## 2023-10-12 PROCEDURE — 90472 IMMUNIZATION ADMIN EACH ADD: CPT | Performed by: FAMILY MEDICINE

## 2023-10-12 PROCEDURE — 90686 IIV4 VACC NO PRSV 0.5 ML IM: CPT | Performed by: FAMILY MEDICINE

## 2023-10-12 PROCEDURE — 99396 PREV VISIT EST AGE 40-64: CPT | Performed by: FAMILY MEDICINE

## 2023-10-12 PROCEDURE — 90471 IMMUNIZATION ADMIN: CPT | Performed by: FAMILY MEDICINE

## 2023-10-12 PROCEDURE — 90750 HZV VACC RECOMBINANT IM: CPT | Performed by: FAMILY MEDICINE

## 2023-10-12 NOTE — ASSESSMENT & PLAN NOTE
Patient contused right foot (fifth MTP region). X-rays performed April 2023 were negative. Patient subsequently was seen by podiatry.   She states that symptoms persist.  I asked her to contact her podiatrist

## 2023-10-12 NOTE — ASSESSMENT & PLAN NOTE
History of stage IA breast cancer diagnosed in 2021. ER/MT positive, HER2 negative. Status post lumpectomy and radiation. Currently on anastrozole. Doing well.   Continue regular follow-up with medical and surgical oncology teams

## 2023-10-12 NOTE — ASSESSMENT & PLAN NOTE
Patient with thyroid nodule. She had FNA biopsy performed June 2023, which was negative. Recommendation is to repeat ultrasound again June 2024 (order has been placed).

## 2023-10-12 NOTE — ASSESSMENT & PLAN NOTE
Patient states she has been snoring for quite some time and does experience some daytime fatigue.   We will send for home sleep study

## 2023-10-12 NOTE — PROGRESS NOTES
Name: Hailey Plaza      : 1960      MRN: [id number]  Encounter Provider: Jaren Simon DO  Encounter Date: 10/12/2023   Encounter department: [address] [place] [address]     1. Well adult exam    2. Encounter for immunization  -     influenza vaccine, quadrivalent, 0.5 mL, preservative-free, for adult and pediatric patients 6 mos+ (AFLURIA, FLUARIX, FLULAVAL, FLUZONE)  -     Zoster Vaccine Recombinant IM    3. Thyroid nodule  Assessment & Plan:  Patient with thyroid nodule. She had FNA biopsy performed 2023, which was negative. Recommendation is to repeat ultrasound again 2024 (order has been placed). 4. Combined hyperlipidemia  Assessment & Plan:  Doing well on atorvastatin 10 mg daily. Patient is due for fasting blood work. We will call with results      5. Mild asthma without complication, unspecified whether persistent  Assessment & Plan:  Stable with occasional use of albuterol HFA      6. Prediabetes  Assessment & Plan:  History of mildly elevated blood sugars. Patient is due for fasting blood work. I encouraged her to get this done in near future. We will call with results      7. Malignant neoplasm of lower-inner quadrant of left breast in female, estrogen receptor positive   Assessment & Plan:  History of stage IA breast cancer diagnosed in . ER/WA positive, HER2 negative. Status post lumpectomy and radiation. Currently on anastrozole. Doing well. Continue regular follow-up with medical and surgical oncology teams      8. Multiple nevi  Assessment & Plan:  Patient with multiple nevi. Given number for dermatology      9. Snoring  Assessment & Plan:  Patient states she has been snoring for quite some time and does experience some daytime fatigue. We will send for home sleep study    Orders:  -     Home Study; Future    10. Dermatitis  -     hydrocortisone 2.5 % cream; Apply topically 3 (three) times a day prn    11.  Contusion of fifth toe of right foot, subsequent encounter  Assessment & Plan:  Patient contused right foot (fifth MTP region). X-rays performed April 2023 were negative. Patient subsequently was seen by podiatry. She states that symptoms persist.  I asked her to contact her podiatrist           Depression Screening and Follow-up Plan: Patient was screened for depression during today's encounter. They screened negative with a PHQ-2 score of 0. Patient has mammogram scheduled  Last colonoscopy 2016    Flu shot given today  Shingrix #1 given today  Discussed COVID booster  Discussed RSV vaccine  Last tetanus booster 2022    Patient states she will get fasting blood work done in near future. We will call with results    6 months, will probably need labs before hand (CMP/A1c). Subjective     Patient presents for recheck of chronic medical problems. She has been complaining of snoring lately. She is also complaining of ongoing right foot pain. She is compliant with prescribed medications. Review of Systems   Respiratory: Negative. Cardiovascular: Negative. Gastrointestinal: Negative. Genitourinary: Negative. Psychiatric/Behavioral:  Positive for sleep disturbance.         Past Medical History:   Diagnosis Date   • Asthma     Last Assessed: 10/19/2016   • Cancer ([address]) 10/2021   • Fibroid    • GERD (gastroesophageal reflux disease)     controlled with diet   • Hypercholesteremia    • Hypertension    • Wears glasses      Past Surgical History:   Procedure Laterality Date   • BREAST BIOPSY Left 09/28/2021    multiple sites   • BREAST LUMPECTOMY Left 11/19/2021    Procedure: LUMPECTOMY BREAST BRACKETED AJAY LOCALIZED;  Surgeon: Marija Hunter MD;  Location: [facility];  Service: Surgical Oncology   • BREAST LUMPECTOMY Left 1/21/2022    Procedure: LUMPECTOMY BREAST, re-excision; [address];  Surgeon: Marija Hunter MD;  Location: [facility];  Service: Surgical Oncology   • BUNIONECTOMY     • BUNIONECTOMY Right 2009 • BUNIONECTOMY Left 2007   • COLONOSCOPY     • FOOT SURGERY     • LYMPH NODE BIOPSY Left 1/21/2022    Procedure: BIOPSY LYMPH NODE SENTINEL;  Surgeon: Jeimy Dolan MD;  Location: [facility];  Service: Surgical Oncology   • MAMMO NEEDLE LOCALIZATION LEFT (ALL INC) Left 11/12/2021   • MAMMO NEEDLE LOCALIZATION LEFT (ALL INC) EACH ADD Left 11/12/2021   • MAMMO STEREOTACTIC BREAST BIOPSY LEFT (ALL INC) Left 9/28/2021   • MAMMO STEREOTACTIC BREAST BIOPSY LEFT (ALL INC) EACH ADD Left 9/28/2021   • NASAL POLYP EXCISION     • [place] COLONOSCOPY FLX DX W/COLLJ SPEC WHEN PFRMD N/A 5/23/2016    Procedure: COLONOSCOPY;  Surgeon: Tawanda Cameron DO;  Location: [facility];   Service: Gastroenterology   • SHOULDER ARTHROSCOPY Right 2012   • SHOULDER ARTHROSCOPY W/ ROTATOR CUFF REPAIR Right 2012   • US GUIDED THYROID BIOPSY  6/2/2023     Family History   Problem Relation Age of Onset   • Hypertension Mother    • Breast cancer Mother         age unknown; bilateral breast cancer   • Lung cancer Mother    • Cervical cancer Mother    • Cancer Mother    • Lung disease Mother    • Diabetes Father    • Hypertension Father    • Prostate cancer Father         age unknown   • Hypertension Brother    • Prostate cancer Brother 48   • Breast cancer Maternal Grandmother    • Diabetes Paternal Grandmother    • Hypertension Maternal Aunt    • No Known Problems Maternal Aunt    • Diabetes Paternal Aunt    • Diabetes Paternal Uncle    • Hypertension Cousin    • Breast cancer Cousin         under 48   • Diabetes Cousin    • Breast cancer Cousin         under 48   • Lung cancer Maternal Uncle    • Heart disease Brother    • Hypertension Brother      Social History     Socioeconomic History   • Marital status: /Civil Union     Spouse name: None   • Number of children: None   • Years of education: None   • Highest education level: None   Occupational History   • None   Tobacco Use   • Smoking status: Never   • Smokeless tobacco: Never   Vaping Use • Vaping Use: Never used   Substance and Sexual Activity   • Alcohol use: No   • Drug use: Never   • Sexual activity: Yes     Partners: Male     Birth control/protection: Post-menopausal, None   Other Topics Concern   • None   Social History Narrative   • None     Social Determinants of Health     Financial Resource Strain: Not on file   Food Insecurity: Not on file   Transportation Needs: Not on file   Physical Activity: Not on file   Stress: Not on file   Social Connections: Not on file   Intimate Partner Violence: Not on file   Housing Stability: Not on file     Current Outpatient Medications on File Prior to Visit   Medication Sig   • albuterol (ProAir HFA) 90 mcg/act inhaler Inhale 2 puffs every 6 (six) hours as needed for wheezing   • amLODIPine (NORVASC) 10 mg tablet Take 1 tablet (10 mg total) by mouth daily   • anastrozole (ARIMIDEX) 1 mg tablet Take 1 tablet (1 mg total) by mouth daily   • atorvastatin (LIPITOR) 10 mg tablet Take 1 tablet (10 mg total) by mouth daily   • calcium carbonate (OS-HALIMA) 1250 (500 Ca) MG tablet Take 1,200 mg by mouth daily    • cholecalciferol (VITAMIN D3) 1,000 units tablet Take 2,000 Units by mouth daily     • multivitamin (THERAGRAN) TABS Take 1 tablet by mouth daily.      No Known Allergies  Immunization History   Administered Date(s) Administered   • COVID-19 MODERNA VACC 0.5 ML IM 03/23/2021, 04/22/2021, 01/05/2022   • INFLUENZA 10/15/2018   • Influenza Quadrivalent, 6-35 Months IM 10/19/2016, 10/11/2017   • Influenza, injectable, quadrivalent, preservative free 0.5 mL 10/12/2023   • Influenza, recombinant, quadrivalent,injectable, preservative free 10/30/2019, 10/07/2020, 12/08/2021, 11/10/2022   • Influenza, seasonal, injectable 1960, 10/01/2014, 11/05/2015, 11/01/2016   • Influenza, seasonal, injectable, preservative free 10/15/2018   • Tdap 05/05/2022   • Zoster Vaccine Recombinant 10/12/2023       Objective     /78 (BP Location: Left arm, Patient Position: Sitting, Cuff Size: Large)   Pulse 102   Temp (!) 97.3 °F (36.3 °C) (Temporal)   Resp 20   Wt 90.3 kg (199 lb)   LMP 12/15/2003 (Within Years)   SpO2 97%   BMI 35.25 kg/m²     Physical Exam  Cardiovascular:      Rate and Rhythm: Normal rate and regular rhythm. Heart sounds: Normal heart sounds. Comments: Carotids: no bruits  Ext: no edema  Pulmonary:      Effort: Pulmonary effort is normal. No respiratory distress. Breath sounds: No wheezing or rales. Psychiatric:         Behavior: Behavior normal.         Thought Content:  Thought content normal.       Jamie Baca, DO

## 2023-10-12 NOTE — ASSESSMENT & PLAN NOTE
History of mildly elevated blood sugars. Patient is due for fasting blood work. I encouraged her to get this done in near future.   We will call with results

## 2023-10-12 NOTE — ASSESSMENT & PLAN NOTE
Doing well on atorvastatin 10 mg daily. Patient is due for fasting blood work.   We will call with results

## 2023-10-12 NOTE — TELEPHONE ENCOUNTER
Pt came back stating that she forgot to mention that food gets stuck in chest area.     Please advise [phone]

## 2023-10-12 NOTE — TELEPHONE ENCOUNTER
Please notify patient that I would like to order a please notify patient that I would like to order an esophagram barium study.   Have patient call central scheduling to set this up

## 2023-10-17 ENCOUNTER — TELEPHONE (OUTPATIENT)
Dept: SLEEP CENTER | Facility: CLINIC | Age: 63
End: 2023-10-17

## 2023-10-17 NOTE — TELEPHONE ENCOUNTER
----- Message from Mariana Coughlin MD sent at 10/16/2023 10:22 PM EDT -----  approved  ----- Message -----  From: Ani Boggs  Sent: 10/16/2023   8:38 AM EDT  To: Sleep Medicine [facility] Provider    This home sleep study needs approval.     If approved please sign and return to clerical pool. If denied please include reasons why. Also provide alternative testing if warranted. Please sign and return to clerical pool.

## 2023-10-23 ENCOUNTER — HOSPITAL ENCOUNTER (OUTPATIENT)
Dept: RADIOLOGY | Facility: HOSPITAL | Age: 63
Discharge: HOME | End: 2023-10-23
Payer: COMMERCIAL

## 2023-10-23 DIAGNOSIS — R13.10 DYSPHAGIA, UNSPECIFIED TYPE: ICD-10-CM

## 2023-10-23 PROCEDURE — 74221 X-RAY XM ESOPHAGUS 2CNTRST: CPT

## 2023-10-25 ENCOUNTER — HOSPITAL ENCOUNTER (OUTPATIENT)
Dept: MAMMOGRAPHY | Facility: CLINIC | Age: 63
Discharge: HOME | End: 2023-10-25
Payer: COMMERCIAL

## 2023-10-25 VITALS — WEIGHT: 199 LBS | BODY MASS INDEX: 35.26 KG/M2 | HEIGHT: 63 IN

## 2023-10-25 DIAGNOSIS — Z17.0 MALIGNANT NEOPLASM OF LOWER-INNER QUADRANT OF LEFT BREAST IN FEMALE, ESTROGEN RECEPTOR POSITIVE: ICD-10-CM

## 2023-10-25 DIAGNOSIS — C50.312 MALIGNANT NEOPLASM OF LOWER-INNER QUADRANT OF LEFT BREAST IN FEMALE, ESTROGEN RECEPTOR POSITIVE: ICD-10-CM

## 2023-10-25 PROCEDURE — 77066 DX MAMMO INCL CAD BI: CPT

## 2023-10-25 PROCEDURE — G0279 TOMOSYNTHESIS, MAMMO: HCPCS

## 2023-11-07 DIAGNOSIS — J45.909 MILD ASTHMA WITHOUT COMPLICATION, UNSPECIFIED WHETHER PERSISTENT: ICD-10-CM

## 2023-11-07 DIAGNOSIS — I10 BENIGN ESSENTIAL HYPERTENSION: ICD-10-CM

## 2023-11-07 DIAGNOSIS — E78.2 COMBINED HYPERLIPIDEMIA: ICD-10-CM

## 2023-11-07 RX ORDER — ATORVASTATIN CALCIUM 10 MG/1
10 TABLET, FILM COATED ORAL DAILY
Qty: 90 TABLET | Refills: 0 | Status: SHIPPED | OUTPATIENT
Start: 2023-11-07

## 2023-11-07 RX ORDER — AMLODIPINE BESYLATE 10 MG/1
10 TABLET ORAL DAILY
Qty: 90 TABLET | Refills: 0 | Status: SHIPPED | OUTPATIENT
Start: 2023-11-07

## 2023-11-07 RX ORDER — ALBUTEROL SULFATE 90 UG/1
2 AEROSOL, METERED RESPIRATORY (INHALATION) EVERY 6 HOURS PRN
Qty: 18 G | Refills: 0 | Status: SHIPPED | OUTPATIENT
Start: 2023-11-07

## 2023-11-21 ENCOUNTER — HOSPITAL ENCOUNTER (OUTPATIENT)
Dept: SLEEP CENTER | Facility: CLINIC | Age: 63
Discharge: HOME | End: 2023-11-21
Payer: COMMERCIAL

## 2023-11-21 DIAGNOSIS — R06.83 SNORING: ICD-10-CM

## 2023-11-21 PROCEDURE — G0399 HOME SLEEP TEST/TYPE 3 PORTA: HCPCS

## 2023-11-22 NOTE — PROGRESS NOTES
Home Sleep Study Documentation    HOME STUDY DEVICE: Noxturnal yes                                           Rosemary G3 no      Pre-Sleep Home Study:    Set-up and instructions performed by: LUIS Fraser, NEVIN    Technician performed demonstration for Patient: yes    Return demonstration performed by Patient: yes    Written instructions provided to Patient: yes    Patient signed consent form: yes        Post-Sleep Home Study:    Additional comments by Patient:     Home Sleep Study Failed:no:    Failure reason: N/A    Reported or Detected: N/A    Scored by: LUIS Chilel

## 2023-11-23 DIAGNOSIS — J45.909 MILD ASTHMA WITHOUT COMPLICATION, UNSPECIFIED WHETHER PERSISTENT: ICD-10-CM

## 2023-11-24 RX ORDER — ALBUTEROL SULFATE 90 UG/1
2 AEROSOL, METERED RESPIRATORY (INHALATION) EVERY 6 HOURS PRN
Qty: 18 G | Refills: 0 | Status: SHIPPED | OUTPATIENT
Start: 2023-11-24

## 2023-11-25 PROBLEM — G47.33 OSA (OBSTRUCTIVE SLEEP APNEA): Status: ACTIVE | Noted: 2023-11-25

## 2023-11-27 ENCOUNTER — CLINICAL SUPPORT (OUTPATIENT)
Dept: RADIATION ONCOLOGY | Facility: CLINIC | Age: 63
End: 2023-11-27
Attending: RADIOLOGY
Payer: COMMERCIAL

## 2023-11-27 VITALS
SYSTOLIC BLOOD PRESSURE: 142 MMHG | OXYGEN SATURATION: 96 % | DIASTOLIC BLOOD PRESSURE: 84 MMHG | HEART RATE: 74 BPM | WEIGHT: 205 LBS | BODY MASS INDEX: 36.31 KG/M2 | TEMPERATURE: 98.5 F

## 2023-11-27 DIAGNOSIS — C50.312 MALIGNANT NEOPLASM OF LOWER-INNER QUADRANT OF LEFT BREAST IN FEMALE, ESTROGEN RECEPTOR POSITIVE: Primary | ICD-10-CM

## 2023-11-27 DIAGNOSIS — Z17.0 MALIGNANT NEOPLASM OF LOWER-INNER QUADRANT OF LEFT BREAST IN FEMALE, ESTROGEN RECEPTOR POSITIVE: Primary | ICD-10-CM

## 2023-11-27 PROCEDURE — 99211 OFF/OP EST MAY X REQ PHY/QHP: CPT | Performed by: RADIOLOGY

## 2023-11-27 PROCEDURE — 99214 OFFICE O/P EST MOD 30 MIN: CPT | Performed by: RADIOLOGY

## 2023-11-27 NOTE — PROGRESS NOTES
Follow-up - Radiation Oncology   Griselda Dooley 1960 58 y.o. female [id number]      History of Present Illness   Cancer Staging   Malignant neoplasm of lower-inner quadrant of left breast in female, estrogen receptor positive   Staging form: Breast, AJCC 8th Edition  - Clinical: Stage 0 (cTis (DCIS), cN0, cM0, ER+, CO+, HER2: Not Assessed) - Signed by Delvis Pearl MD on 10/26/2021  Stage prefix: Initial diagnosis  Method of lymph node assessment: Clinical  Nuclear grade: G2  - Pathologic stage from 12/7/2021: Stage IA (pT1b, pN0, cM0, G2, ER+, CO+, HER2-) - Signed by Delvis Pearl MD on 12/7/2021  Stage prefix: Initial diagnosis  Method of lymph node assessment: Clinical  Histologic grading system: 3 grade system  - Pathologic: No stage assigned - Unsigned          Interval History:  Griselda Dooley 1960 is a 58 y.o. female with stage IA left breast cancer. She had a lumpectomy 11/19/21 which showed 8 mm invasive ductal carcinoma grade 2 ER 90% CO 90% HER2 negative. She had close margins in the anterior, posterior and medial aspects. She then had a re-excision and sentinel lymph node biopsy 1/21/21, 4 lymph nodes were negative for metastatic disease. 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. She completed  a course of radiation to the L breast on 04/20/22. She was last seen 12/9/22 and presents today for a follow-up visit. 2/22/23 Surg Onc Dr. Jayla Turner on anastrozole. There is no evidence of disease based on exam today. Last mammogram was benign. F/u in 6 months.      8/31/23 Surg Onc  No concerns on CBE. Patient noted dull soreness on the left chest wall and in the left breast.  Post radiation changes. Recommended she wear a nice supporting bra. Mammogram in October, F/u in 6 months.     8/31/23 Dr. Emily Fuller  She is currently on adjuvant hormonal therapy with anastrozole with excellent tolerance. She has no evidence of recurrent disease.  To continue anastrazole. F/u in 1 year. 10/25/23 Bilateral Mammogram  FINDINGS:   Bilateral  There are no suspicious masses, grouped microcalcifications or areas of unexplained architectural distortion. The skin and nipple areolar complex are unremarkable. Post surgical changes are present in the left breast.  IMPRESSION:   No findings of malignancy in either breast.  Expected postoperative changes are present in the left breast.  ASSESSMENT/BI-RADS CATEGORY:  Overall: 2 - Benign  RECOMMENDATION:       - Diagnostic mammogram in 1 year for both breasts. Upcoming:  3/1/24 Surg Onc  9/16/24 Hem Onc/Dr. nOi Diamond      Historical Information   Oncology History   Malignant neoplasm of lower-inner quadrant of left breast in female, estrogen receptor positive    9/28/2021 Initial Diagnosis    Ductal carcinoma in situ (DCIS) of left breast     9/28/2021 Biopsy    Final Diagnosis   A. Breast, Left, 7:00 location 2 cores w/ Calcs, Biopsy:  - Ductal carcinoma in situ.  - No invasive carcinoma is seen. Architectural patterns: Solid, papillary       Nuclear grade of ductal carcinoma in situ: Grade 2 (II). Necrosis:  Focal.       Size of in-situ carcinoma: At least 4 mm.  - Microcalcifications: Present in DCIS. - Breast cancer biomarker studies (paraffin block number: A1):           Estrogen, progesterone receptor studies pending, to be described in a separate receptor report. ER 90-95% positive  Pr90-95% positive  B. Breast, Left, 7:00 location 1 core w/ No Calcs, Biopsy:  - Atypical lobular hyperplasia. - Usual ductal hyperplasia involving intraductal papilloma. C. Breast, Left, 9:00 location 2 cores w/ Calcs, Biopsy:  - Non-proliferative fibrocystic changes, including microcysts, microcalcifications with stromal fibrosis. - Negative for atypia or carcinoma. D. Breast, Left, 9:00 location 1 core w/ No Calcs, Biopsy:  - Intraductal papilloma. - Negative for atypia or carcinoma.          10/26/2021 - Cancer Staged    Staging form: Breast, AJCC 8th Edition  - Clinical: Stage 0 (cTis (DCIS), cN0, cM0, ER+, OR+, HER2: Not Assessed) - Signed by Delvis Pearl MD on 10/26/2021  Stage prefix: Initial diagnosis  Method of lymph node assessment: Clinical  Nuclear grade: G2       10/2021 Genetic Testing    Test: [facility] BRCAplus STAT Panel (8 genes): ZAKIA, BRCA1, BRCA2, CDH1, CHEK2, PALB2, PTEN, TP53   Result:   Negative - No Clinically Significant Variants Detected       Assessment:  Negative     Additional Testing:  Test(s): ZMP BRCAplus STAT Panel (8 genes): ZAKIA, BRCA1, BRCA2, CDH1, CHEK2, PALB2, PTEN, TP53 with reflex to [facility] CancerNext (28 additional genes): APC, ZAKIA, AXIN2 BARD1, BRCA1, BRCA2, BRIP1, BMPR1A, CDH1, CDK4, CDKN2A, CHEK2, DICER1, EPCAM, GREM1, HOXB13, MLH1, MSH2, MSH3, MSH6, MUTYH, NBN, NF1, NTHL1, PALB2, PMS2, POLD1, POLE, PTEN, RAD51C, RAD51D, RECQL SMAD4, SMARCA4, STK11, TP53      Result: Variant of uncertain significance     Variant: MSH6 Variant, Unknown Significance: p. T6P       Variant of uncertain significance (VUS)     11/19/2021 Surgery    Final Diagnosis   A. Breast, Left, Left breast lumpectomy, short superior, long lateral:  - Invasive mammary carcinoma of no special type (ductal), 8 mm in greatest dimension with    extensive adjacent ductal carcinoma in-situ and associated non-invasive lobular neoplasia. -- Jamie histologic grade 2 of 3 (total score: 7 of 9)        * Glandular (acinar) Tubular Differentiation < 10%, score 3.        * Nuclear Pleomorphism 2 of 3, score 2.        * Mitotic Rate 4-7/mm2, (8-14 mitoses/10HPF; 8 mitoses/10 HPF), score 2.    -- Confirmed by tumor cell immunophenotype:        * Positive: E-cadherin, P120 - each in a membranous pattern. * Negative: p63, SMMHC.  - Ductal carcinoma in-situ (DCIS): Present; not an extensive intraductal component associated with     invasive carcinoma (< 5% of total tumor) but extensive DCIS in adjacent tissue.      -- Confirmed by positive E-cadherin, p120 (membranous), SMMHC, p63 and loss of epithelial         mosaicism on CK5/6 immunostains. -- Size and Extent: At least 48 mm in approximate extent; present in 12 sequential sections; 43 of 91 blocks. -- Architectural Patterns: Cribriform, micropapillary, solid, papillary. -- Nuclear grade: I-II (low to intermediate)    -- Necrosis: Present, focal and central necrosis identified. -- Associated prior biopsy site changes with twist clip and Aliyah  marker.  - Multifocal non-invasive lobular neoplasia (focally florid lobular carcinoma in-situ/LCIS and atypical lobular     hyperplasia with pagetoid ductal spread) involving and adjacent to DCIS. -- Confirmed by negative E-cadherin; positive p120 (cytoplasmic), SMMHC, p63; and loss of epithelial        mosaicism on CK5/6 immunostains.   - Margins uninvolved by invasive and in-situ carcinoma. -- Invasive carcinoma: 8 mm from nearest posterior margin. -- DCIS: 0.1 mm from nearest posterior margin; < 1 mm from nearest anterior margin; 1.5 mm from        nearest medial margin; > 2 mm from all remaining margins.   - Benign skin. -- No dermal lymphovascular invasion.   - Lymphovascular invasion: Not identified. - Microcalcifications: Present, associated with DCIS. - Benign fibrocystic changes with atypia (atypical and florid usual ductal hyperplasia, columnar cell     lesions with flat epithelial atypia/FEA, cystic and papillary apocrine metaplasia). - Radial scar, 0.4 cm. B. Breast, Left, Left Breast Lumpectomy new superior margin - Short Superior, Long Lateral:  - Ductal carcinoma in-situ (DCIS). -- Size and Extent: 16 mm in greatest approximate extent; present in 4 sequential sections; 7 of 78 blocks. -- Architectural Patterns: Cribriform, micropapillary, solid, papillary. -- Nuclear grade: I-II (low to intermediate).      -- Necrosis: Present, focal.     -- Confirmed by positive E-cadherin, P120 (membranous) and loss of epithelial mosaicism on         CK5/6 immunostains. -- Associated prior biopsy site changes with biopsy clip and AJAY  marker.  - No invasive carcinoma identified. - Margins uninvolved by DCIS. -- DCIS < 1 mm from nearest posterior margin; < 1-2 mm from medial margin; > 2 mm from all       remaining margins. - Lymphovascular invasion: Not identified. - Microcalcifications: Present, associated with DCIS, lobular neoplasia and non-neoplastic breast tissue.    - Benign fibrocystic changes with atypia:    -- Multifocal/extensive non-invasive lobular neoplasia (focal lobular carcinoma in-situ and        predominantly atypical lobular hyperplasia with pagetoid ductal spread), focally involving        and adjacent to DCIS. ** Confirmed by negative E-cadherin; positive p120 (cytoplasmic) immunostains. -- Usual ductal hyperplasia, columnar cell change and hyperplasia with focal flat epithelial        atypia/FEA, sclerosing adenosis, fibroadenomatoid changes, cystic and papillary apocrine metaplasia. - Small intraductal papilloma without atypia, 0.1 cm.         12/7/2021 -  Cancer Staged    Staging form: Breast, AJCC 8th Edition  - Pathologic stage from 12/7/2021: Stage IA (pT1b, pN0, cM0, G2, ER+, NH+, HER2-) - Signed by Nemesio Stanton MD on 12/7/2021  Stage prefix: Initial diagnosis  Method of lymph node assessment: Clinical  Histologic grading system: 3 grade system       1/21/2022 Surgery    Final Diagnosis   A. Left breast, anterior margin:     - Histologic changes compatible with prior surgical procedure.     - Skin with dermal cicatrix. - No invasive or in situ carcinoma identified.     - Final anterior margin is negative for malignancy. B. Left breast, new posterior margin:     - Focus of atypical lobular hyperplasia (ALH). - No invasive or in situ carcinoma identified.     - Final posterior margin is negative for malignancy.      C. Left breast, medial margin:     - Histologic changes compatible with prior surgical procedure. - No invasive or in situ carcinoma identified.     - Final medial margin is negative for malignacny. D.  Left axilla, sentinel lymph node #1:     - Single lymph node; negative for malignancy (0/1). E.  Left axilla, sentinel lymph node #2:     - Single lymph node; negative for malignancy (0/1). F.  Left axilla, sentinel lymph node #3:     - Single lymph node; negative for malignancy (0/1).      G.  Left axilla, sentinel lymph node #4:     - Single lymph node; negative for malignancy (0/1).        3/23/2022 - 4/2022 Radiation    Prone L Boost 10X/6X 5 / 5 200 0 1,000 6   Prone L Br # 6X 16 / 16 266 0 4,256 21      Treatment dates:  3/23/2022 - 4/20/2022          Past Medical History:   Diagnosis Date    Asthma     Last Assessed: 10/19/2016    Breast cancer ([address])     Left breast 2021    Cancer ([address]) 10/2021    Fibroid     GERD (gastroesophageal reflux disease)     controlled with diet    Hypercholesteremia     Hypertension     Wears glasses      Past Surgical History:   Procedure Laterality Date    BREAST BIOPSY Left 09/28/2021    multiple sites    BREAST LUMPECTOMY Left 11/19/2021    Procedure: LUMPECTOMY BREAST BRACKETED AJAY LOCALIZED;  Surgeon: Jamie Manning MD;  Location: [facility];  Service: Surgical Oncology    BREAST LUMPECTOMY Left 1/21/2022    Procedure: LUMPECTOMY BREAST, re-excision; [address];  Surgeon: Jamie Manning MD;  Location: [facility];  Service: Surgical Oncology    BUNIONECTOMY      BUNIONECTOMY Right 2009    BUNIONECTOMY Left 2007    COLONOSCOPY      FOOT SURGERY      LYMPH NODE BIOPSY Left 1/21/2022    Procedure: BIOPSY LYMPH NODE SENTINEL;  Surgeon: Jamie Manning MD;  Location: [facility];  Service: Surgical Oncology    MAMMO NEEDLE LOCALIZATION LEFT (ALL INC) Left 11/12/2021    MAMMO NEEDLE LOCALIZATION LEFT (ALL INC) EACH ADD Left 11/12/2021    MAMMO STEREOTACTIC BREAST BIOPSY LEFT (ALL INC) Left 9/28/2021    MAMMO STEREOTACTIC BREAST BIOPSY LEFT (ALL INC) EACH ADD Left 9/28/2021    NASAL POLYP EXCISION      UT COLONOSCOPY FLX DX W/COLLJ SPEC WHEN PFRMD N/A 5/23/2016    Procedure: COLONOSCOPY;  Surgeon: Velma Deras DO;  Location: [facility]; Service: Gastroenterology    SHOULDER ARTHROSCOPY Right 2012    SHOULDER ARTHROSCOPY W/ ROTATOR CUFF REPAIR Right 2012    US GUIDED THYROID BIOPSY  6/2/2023       Social History   Social History     Substance and Sexual Activity   Alcohol Use No     Social History     Substance and Sexual Activity   Drug Use Never     Social History     Tobacco Use   Smoking Status Never   Smokeless Tobacco Never         Meds/Allergies     Current Outpatient Medications:     albuterol (PROVENTIL HFA,VENTOLIN HFA) 90 mcg/act inhaler, INHALE 2 PUFFS BY MOUTH EVERY 6 HOURS AS NEEDED FOR WHEEZING, Disp: 18 g, Rfl: 0    amLODIPine (NORVASC) 10 mg tablet, Take 1 tablet (10 mg total) by mouth daily, Disp: 90 tablet, Rfl: 0    anastrozole (ARIMIDEX) 1 mg tablet, Take 1 tablet (1 mg total) by mouth daily, Disp: 90 tablet, Rfl: 1    atorvastatin (LIPITOR) 10 mg tablet, Take 1 tablet (10 mg total) by mouth daily, Disp: 90 tablet, Rfl: 0    calcium carbonate (OS-HALIMA) 1250 (500 Ca) MG tablet, Take 1,200 mg by mouth daily , Disp: , Rfl:     cholecalciferol (VITAMIN D3) 1,000 units tablet, Take 2,000 Units by mouth daily  , Disp: , Rfl:     hydrocortisone 2.5 % cream, Apply topically 3 (three) times a day prn, Disp: 28 g, Rfl: 2    multivitamin (THERAGRAN) TABS, Take 1 tablet by mouth daily. , Disp: , Rfl:   No Known Allergies      Review of Systems  Constitutional: Negative. HENT:  Positive for sore throat. Eyes: Negative. Glasses   Respiratory: Negative. Cardiovascular: Negative. Gastrointestinal: Negative. Endocrine: Negative. Negative for heat intolerance. Genitourinary: Negative. Negative for dysuria.    Musculoskeletal:  Positive for arthralgias (occasional bilateral legs) and myalgias (c/o left breast pain when lying down). Breast skin is healed   Skin: Negative. Allergic/Immunologic: Negative. Neurological: Negative. Hematological: Negative. Psychiatric/Behavioral: Negative. Negative for sleep disturbance. OBJECTIVE:   /84 (BP Location: Right arm, Patient Position: Sitting, Cuff Size: Large)   Pulse 74   Temp 98.5 °F (36.9 °C) (Temporal)   Wt 93 kg (205 lb)   LMP 12/15/2003 (Within Years)   SpO2 96%   BMI 36.31 kg/m²   Pain Assessment:  0  ECOG/Zubrod/WHO: 0 - Asymptomatic    Physical Exam   There is no supraclavicular adenopathy palpable. No suspicious lesions palpable in either breast.  Mild fibrosis at the primary site. No breast or arm edema. Abdomen soft nontender. Her breathing is unlabored. Ambulating independently. RESULTS    Lab Results: No results found for this or any previous visit (from the past 672 hour(s)). Imaging Studies:Home Study    Result Date: 11/27/2023  Narrative: Table formatting from the original result was not included. Images from the original result were not included. Respiration Report Patient Information Full Name: Desirae Ba YOB: 1960 Patient ID: [id number] Age: 58 Address:  Height: 63.0 in ZIP:  Weight: 199.0 lbs City:  BMI: 35.3 Phone:    Home Sleep Testing Interpretation Report STUDY FORMAT: The patient was admitted to the sleep laboratory at the [address] [place] [address] and oriented to the home sleep study testing procedure and equipment. The home sleep testing study was performed using the [employer] Nox-T3 Recorder which is a Type III monitoring system. A return demonstration by the patient helped to assure correct usage. The following parameters were monitored: p-flow via nasal cannula, body position, oximetry, pulse rate and respiratory effort via thoracic and abdominal RIP belts.   The sleep study was scored following the rules established by the American Academy of Sleep Medicine (AASM). Hypopneas are defined as a =30% drop in flow for =10 seconds that are associated with =4% desaturations. MICHAEL is the Respiratory Event Index (number of respiratory events per hour) and is a surrogate for the apnea/hypopnea index (AHI). PATIENT HISTORY: Home sleep testing was undertaken to evaluate this patient with suggestive symptoms and /or risk factors for sleep  disordered breathing. TESTING RESULTS: The test results are from 1 Night. The total time in bed (analysis time) was 6h 59m minutes. The patient had a total of 224 respiratory events made up of 7 obstructive apneas, 0 central apneas,  0 mixed apneas and 217 hypopneas resulting in a respiratory event index (MICHAEL) of 33.2 . The lowest SPO2 recorded is 80.0% and 21 minutes during the study was spent with saturations below 90%. The snore index was 40.2%. . Additional comments by patient: She complained of sensation of mucus stuck in the upper airway. Impression: Severe obstructive sleep apnea Significant hypoxic burden Limitations of home sleep testing compromises accuracy. Based on the results of this study, a follow-up study to titrate positive airway pressure is strongly recommended. Clinical correlation is advised.   Board Certified Sleep Physician DATA REPORT Recording Information Recording Date: 11/21/2023  Analysis Start Time: 10:00 PM Recording Tags:   Analysis Stop Time: 4:59 AM Device Type: T3  Analysis Duration (TRT): 6h 59m    Est. Total Sleep Time: 6h 44m Overview AHI: 33.2 /h LANE: 33.6 /h Snore Percentage: 40.2 % Respiratory Indices Index    Total  Supine  Non-supine Count Apneas + Hypopneas (AH): 33.2 /h 33.2 /h 0 /h 224 Apneas: 1.0 /h 1.0 /h 0 /h 7  Obstructive (OA): 1.0 /h 1.0 /h 0 /h 7  Mixed (MA): 0 /h 0 /h 0 /h 0  Central (CA): 0 /h 0 /h 0 /h 0 Hypopneas:  32.2 /h 32.2 /h 0 /h 217  Obstructive (OH): 0 /h 0 /h 0 /h 0  Central (OC): 0 /h 0 /h 0 /h 0 Obstructive Apnea Hypopnea (OA + MA + OH): 1.0 /h 1.0 /h 0 /h 7 Central Apnea Hypopnea (CA + CH): 0 /h 0 /h 0 /h 0 Hypoventilation: 0 /h 0 /h 0 /h 0 Respiration Rate (per m): 14.2 /m 14.2 /m 22.5 /m   Percentage of Sleep Duration Snore: 40.2 % 40.2 % 0 % 162.7 m Flow Limitation: 2.7 % 2.7 % 0 % 3.3 m Cheyne-Barroso Breathin % 0 % 0 % 0 m Paradoxical Breathin.0 % 18.0 % 0 % 72.8 m Oxygen Saturation (SpO2) Total Supine    Non-supine Oxygen Desaturation Index (LANE): 33.6 /h  33.6 /h 0 /h Average SpO2: 92.5 %  92.5 % 94.4 % Minimum SpO2: 80.0 %  80.0 % 90.0 % SpO2 Duration < 90% 5.2 % (21m) 5.2 % 0 % SpO2 Duration = 88% 2.3 % (9.3m) 2.3 % 0 % SpO2 Duration < 85% 0.1 % (0.5m) 0.1 % 0 % Average Desat Drop: 5.0 %  5.0 %  % Position and Analysis Time Duration Percentage Supine (in TST): 404.8 m 100.0 % Non-Supine (in TST): 0.1 m 0 %  Left (in TST): 0 m 0 %  Prone (in TST): 0 m 0 %  Right (in TST): 0.1 m 0 %  Unknown (in TST): 0 m 0 % Upright (in TRT): 14.8 m 3.5 % Movement (in TST): 85.3 m 21.1 % Invalid Data (Excluded): 0 m 0 % Pulse   Quality   Average: 82.6 bpm Oximeter: 99.7 % Maximum: 107.0 bpm Nasal Cannula: 100.0 % Minimum: 62.0 bpm RIP Belts: 100.0 % Duration < 40 bpm: 0 m    Duration > 100 bpm: 0.7 m    Duration > 90 bpm: 23.3 m    Cardiac Events Index Count Bradycardia 0 /h 0 Asystole 0 /h 0 Tachycardia 0 /h 0 Atrial Fibrillation 0 /h 0 Trend Overview           Assessment/Plan:  No orders of the defined types were placed in this encounter. Abdias Benitez is a 58y.o. year old female who is 1-1/2 years post radiation therapy for left breast carcinoma. She has no clinical evidence of recurrence. She remains on anastrozole. Mammogram from last month returned stable. Follow-up in 1 year as she sees medical and surgical oncology in the interim      Oralia Mejia MD  2023,10:51 AM    Portions of the record may have been created with voice recognition software.   Occasional wrong word or "sound a like" substitutions may have occurred due to the inherent limitations of voice recognition software. Read the chart carefully and recognize, using context, where substitutions have occurred.

## 2023-11-27 NOTE — PROGRESS NOTES
Manju Gupta 1960 is a 58 y.o. female with stage IA left breast cancer. She had a lumpectomy 11/19/21 which showed 8 mm invasive ductal carcinoma grade 2 ER 90% WA 90% HER2 negative. She had close margins in the anterior, posterior and medial aspects. She then had a re-excision and sentinel lymph node biopsy 1/21/21, 4 lymph nodes were negative for metastatic disease. 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. She completed  a course of radiation to the L breast on 04/20/22. She was last seen 12/9/22 and presents today for a follow-up visit. 2/22/23 Surg Onc Dr. Dorinda Bagley on anastrozole. There is no evidence of disease based on exam today. Last mammogram was benign. F/u in 6 months.     8/31/23 Surg Onc  No concerns on CBE. Patient noted dull soreness on the left chest wall and in the left breast.  Post radiation changes. Recommended she wear a nice supporting bra. Mammogram in October, F/u in 6 months.    8/31/23 Dr. Tyler Vizcarra  She is currently on adjuvant hormonal therapy with anastrozole with excellent tolerance. She has no evidence of recurrent disease. To continue anastrazole. F/u in 1 year. 10/25/23 Bilateral Mammogram  FINDINGS:   Bilateral  There are no suspicious masses, grouped microcalcifications or areas of unexplained architectural distortion. The skin and nipple areolar complex are unremarkable. Post surgical changes are present in the left breast.  IMPRESSION:   No findings of malignancy in either breast.  Expected postoperative changes are present in the left breast.  ASSESSMENT/BI-RADS CATEGORY:  Overall: 2 - Benign  RECOMMENDATION:       - Diagnostic mammogram in 1 year for both breasts.        Upcoming:  3/1/24 Surg Onc  9/16/24 Hem Onc/Dr. Ophelia العلي      Follow up visit     Oncology History   Malignant neoplasm of lower-inner quadrant of left breast in female, estrogen receptor positive    9/28/2021 Initial Diagnosis    Ductal carcinoma in situ (DCIS) of left breast     9/28/2021 Biopsy    Final Diagnosis   A. Breast, Left, 7:00 location 2 cores w/ Calcs, Biopsy:  - Ductal carcinoma in situ.  - No invasive carcinoma is seen. Architectural patterns: Solid, papillary       Nuclear grade of ductal carcinoma in situ: Grade 2 (II). Necrosis:  Focal.       Size of in-situ carcinoma: At least 4 mm.  - Microcalcifications: Present in DCIS. - Breast cancer biomarker studies (paraffin block number: A1):           Estrogen, progesterone receptor studies pending, to be described in a separate receptor report. ER 90-95% positive  Pr90-95% positive  B. Breast, Left, 7:00 location 1 core w/ No Calcs, Biopsy:  - Atypical lobular hyperplasia. - Usual ductal hyperplasia involving intraductal papilloma. C. Breast, Left, 9:00 location 2 cores w/ Calcs, Biopsy:  - Non-proliferative fibrocystic changes, including microcysts, microcalcifications with stromal fibrosis. - Negative for atypia or carcinoma. D. Breast, Left, 9:00 location 1 core w/ No Calcs, Biopsy:  - Intraductal papilloma. - Negative for atypia or carcinoma.          10/26/2021 -  Cancer Staged    Staging form: Breast, AJCC 8th Edition  - Clinical: Stage 0 (cTis (DCIS), cN0, cM0, ER+, KY+, HER2: Not Assessed) - Signed by Sheliah Primrose, MD on 10/26/2021  Stage prefix: Initial diagnosis  Method of lymph node assessment: Clinical  Nuclear grade: G2       10/2021 Genetic Testing    Test: [employer] BRCAplus STAT Panel (8 genes): ZAKIA, BRCA1, BRCA2, CDH1, CHEK2, PALB2, PTEN, TP53   Result:   Negative - No Clinically Significant Variants Detected       Assessment:  Negative     Additional Testing:  Test(s): [employer] BRCAplus STAT Panel (8 genes): ZAKIA, BRCA1, BRCA2, CDH1, CHEK2, PALB2, PTEN, TP53 with reflex to [employer] CancerNext (28 additional genes): APC, ZAKIA, AXIN2 BARD1, BRCA1, BRCA2, BRIP1, BMPR1A, CDH1, CDK4, CDKN2A, CHEK2, DICER1, EPCAM, GREM1, HOXB13, MLH1, MSH2, MSH3, MSH6, MUTYH, NBN, NF1, NTHL1, PALB2, PMS2, POLD1, POLE, PTEN, RAD51C, RAD51D, RECQL SMAD4, SMARCA4, STK11, TP53      Result: Variant of uncertain significance     Variant: MSH6 Variant, Unknown Significance: p. T6P       Variant of uncertain significance (VUS)     11/19/2021 Surgery    Final Diagnosis   A. Breast, Left, Left breast lumpectomy, short superior, long lateral:  - Invasive mammary carcinoma of no special type (ductal), 8 mm in greatest dimension with    extensive adjacent ductal carcinoma in-situ and associated non-invasive lobular neoplasia. -- [place] histologic grade 2 of 3 (total score: 7 of 9)        * Glandular (acinar) Tubular Differentiation < 10%, score 3.        * Nuclear Pleomorphism 2 of 3, score 2.        * Mitotic Rate 4-7/mm2, (8-14 mitoses/10HPF; 8 mitoses/10 HPF), score 2.    -- Confirmed by tumor cell immunophenotype:        * Positive: E-cadherin, P120 - each in a membranous pattern. * Negative: p63, SMMHC.  - Ductal carcinoma in-situ (DCIS): Present; not an extensive intraductal component associated with     invasive carcinoma (< 5% of total tumor) but extensive DCIS in adjacent tissue. -- Confirmed by positive E-cadherin, p120 (membranous), SMMHC, p63 and loss of epithelial         mosaicism on CK5/6 immunostains. -- Size and Extent: At least 48 mm in approximate extent; present in 12 sequential sections; 43 of 91 blocks. -- Architectural Patterns: Cribriform, micropapillary, solid, papillary. -- Nuclear grade: I-II (low to intermediate)    -- Necrosis: Present, focal and central necrosis identified. -- Associated prior biopsy site changes with twist clip and Aliyah  marker.  - Multifocal non-invasive lobular neoplasia (focally florid lobular carcinoma in-situ/LCIS and atypical lobular     hyperplasia with pagetoid ductal spread) involving and adjacent to DCIS.     -- Confirmed by negative E-cadherin; positive p120 (cytoplasmic), SMMHC, p63; and loss of epithelial mosaicism on CK5/6 immunostains.   - Margins uninvolved by invasive and in-situ carcinoma. -- Invasive carcinoma: 8 mm from nearest posterior margin. -- DCIS: 0.1 mm from nearest posterior margin; < 1 mm from nearest anterior margin; 1.5 mm from        nearest medial margin; > 2 mm from all remaining margins.   - Benign skin. -- No dermal lymphovascular invasion.   - Lymphovascular invasion: Not identified. - Microcalcifications: Present, associated with DCIS. - Benign fibrocystic changes with atypia (atypical and florid usual ductal hyperplasia, columnar cell     lesions with flat epithelial atypia/FEA, cystic and papillary apocrine metaplasia). - Radial scar, 0.4 cm. B. Breast, Left, Left Breast Lumpectomy new superior margin - Short Superior, Long Lateral:  - Ductal carcinoma in-situ (DCIS). -- Size and Extent: 16 mm in greatest approximate extent; present in 4 sequential sections; 7 of 78 blocks. -- Architectural Patterns: Cribriform, micropapillary, solid, papillary. -- Nuclear grade: I-II (low to intermediate). -- Necrosis: Present, focal.     -- Confirmed by positive E-cadherin, P120 (membranous) and loss of epithelial mosaicism on         CK5/6 immunostains. -- Associated prior biopsy site changes with biopsy clip and AJAY  marker.  - No invasive carcinoma identified. - Margins uninvolved by DCIS. -- DCIS < 1 mm from nearest posterior margin; < 1-2 mm from medial margin; > 2 mm from all       remaining margins. - Lymphovascular invasion: Not identified. - Microcalcifications: Present, associated with DCIS, lobular neoplasia and non-neoplastic breast tissue.    - Benign fibrocystic changes with atypia:    -- Multifocal/extensive non-invasive lobular neoplasia (focal lobular carcinoma in-situ and        predominantly atypical lobular hyperplasia with pagetoid ductal spread), focally involving        and adjacent to DCIS.         ** Confirmed by negative E-cadherin; positive p120 (cytoplasmic) immunostains. -- Usual ductal hyperplasia, columnar cell change and hyperplasia with focal flat epithelial        atypia/FEA, sclerosing adenosis, fibroadenomatoid changes, cystic and papillary apocrine metaplasia. - Small intraductal papilloma without atypia, 0.1 cm.         12/7/2021 -  Cancer Staged    Staging form: Breast, AJCC 8th Edition  - Pathologic stage from 12/7/2021: Stage IA (pT1b, pN0, cM0, G2, ER+, OK+, HER2-) - Signed by Nemesio Stanton MD on 12/7/2021  Stage prefix: Initial diagnosis  Method of lymph node assessment: Clinical  Histologic grading system: 3 grade system       1/21/2022 Surgery    Final Diagnosis   A. Left breast, anterior margin:     - Histologic changes compatible with prior surgical procedure.     - Skin with dermal cicatrix. - No invasive or in situ carcinoma identified.     - Final anterior margin is negative for malignancy. B. Left breast, new posterior margin:     - Focus of atypical lobular hyperplasia (ALH). - No invasive or in situ carcinoma identified.     - Final posterior margin is negative for malignancy. C.  Left breast, medial margin:     - Histologic changes compatible with prior surgical procedure. - No invasive or in situ carcinoma identified.     - Final medial margin is negative for malignacny. D.  Left axilla, sentinel lymph node #1:     - Single lymph node; negative for malignancy (0/1). E.  Left axilla, sentinel lymph node #2:     - Single lymph node; negative for malignancy (0/1). F.  Left axilla, sentinel lymph node #3:     - Single lymph node; negative for malignancy (0/1).      G.  Left axilla, sentinel lymph node #4:     - Single lymph node; negative for malignancy (0/1).        3/23/2022 - 4/2022 Radiation    Prone L Boost 10X/6X 5 / 5 200 0 1,000 6   Prone L Br # 6X 16 / 16 266 0 4,256 21      Treatment dates:  3/23/2022 - 4/20/2022          Review of Systems:  Review of Systems Constitutional: Negative. HENT:  Positive for sore throat. Eyes: Negative. Glasses   Respiratory: Negative. Cardiovascular: Negative. Gastrointestinal: Negative. Endocrine: Negative. Negative for heat intolerance. Genitourinary: Negative. Negative for dysuria. Musculoskeletal:  Positive for arthralgias (occasional bilateral legs) and myalgias (c/o left breast pain when lying down). Breast skin is healed   Skin: Negative. Allergic/Immunologic: Negative. Neurological: Negative. Hematological: Negative. Psychiatric/Behavioral: Negative. Negative for sleep disturbance.         Clinical Trial: no    Teaching completed    Health Maintenance   Topic Date Due    BMI: Followup Plan  01/16/2021    COVID-19 Vaccine (4 - Booster for Moderna series) 03/02/2022    PT PLAN OF CARE  09/14/2022    Pneumococcal Vaccine: Pediatrics (0 to 5 Years) and At-Risk Patients (6 to 59 Years) (1 - PCV) 01/16/2025 (Originally 7/5/1966)    Depression Screening  10/12/2024    Annual Physical  10/12/2024    BMI: Adult  10/25/2024    Breast Cancer Screening: Mammogram  10/25/2024    Colorectal Cancer Screening  05/23/2026    Cervical Cancer Screening  08/01/2028    DTaP,Tdap,and Td Vaccines (2 - Td or Tdap) 05/05/2032    HIV Screening  Completed    Hepatitis C Screening  Completed    Osteoporosis Screening  Completed    Influenza Vaccine  Completed    HIB Vaccine  Aged Out    IPV Vaccine  Aged Out    Hepatitis A Vaccine  Aged Out    Meningococcal ACWY Vaccine  Aged Out    HPV Vaccine  Aged Out     Patient Active Problem List   Diagnosis    Anxiety    Asthma    Benign essential hypertension    Combined hyperlipidemia    Prediabetes    Vitamin D deficiency    Fibroid    Localized osteoporosis without current pathological fracture    Acute pain of right shoulder    Pain in both lower extremities    Neck pain    BMI 35.0-35.9,adult    Malignant neoplasm of lower-inner quadrant of left breast in female, estrogen receptor positive     Family history of cancer    BRCA negative    GERD (gastroesophageal reflux disease)    Use of anastrozole    Thyroid nodule    Trigger finger    Multiple nevi    Contusion of fifth toe of right foot    Snoring    SONIYA (obstructive sleep apnea)     Past Medical History:   Diagnosis Date    Asthma     Last Assessed: 10/19/2016    Breast cancer ([address])     Left breast 2021    Cancer ([address]) 10/2021    Fibroid     GERD (gastroesophageal reflux disease)     controlled with diet    Hypercholesteremia     Hypertension     Wears glasses      Past Surgical History:   Procedure Laterality Date    BREAST BIOPSY Left 09/28/2021    multiple sites    BREAST LUMPECTOMY Left 11/19/2021    Procedure: LUMPECTOMY BREAST BRACKETED AJAY LOCALIZED;  Surgeon: Trey He MD;  Location: [facility];  Service: Surgical Oncology    BREAST LUMPECTOMY Left 1/21/2022    Procedure: LUMPECTOMY BREAST, re-excision; 0800 NUC MED;  Surgeon: Trey He MD;  Location: [facility];  Service: Surgical Oncology    BUNIONECTOMY      BUNIONECTOMY Right 2009    BUNIONECTOMY Left 2007    COLONOSCOPY      FOOT SURGERY      LYMPH NODE BIOPSY Left 1/21/2022    Procedure: BIOPSY LYMPH NODE SENTINEL;  Surgeon: Trey He MD;  Location: [facility];  Service: Surgical Oncology    MAMMO NEEDLE LOCALIZATION LEFT (ALL INC) Left 11/12/2021    MAMMO NEEDLE LOCALIZATION LEFT (ALL INC) EACH ADD Left 11/12/2021    MAMMO STEREOTACTIC BREAST BIOPSY LEFT (ALL INC) Left 9/28/2021    MAMMO STEREOTACTIC BREAST BIOPSY LEFT (ALL INC) EACH ADD Left 9/28/2021    NASAL POLYP EXCISION      IA COLONOSCOPY FLX DX W/COLLJ SPEC WHEN PFRMD N/A 5/23/2016    Procedure: COLONOSCOPY;  Surgeon: Ulysses Anton, DO;  Location: [facility];   Service: Gastroenterology    SHOULDER ARTHROSCOPY Right 2012    SHOULDER ARTHROSCOPY W/ ROTATOR CUFF REPAIR Right 2012    US GUIDED THYROID BIOPSY  6/2/2023     Family History   Problem Relation Age of Onset    Hypertension Mother     Breast cancer Mother         age unknown; bilateral breast cancer    Lung cancer Mother     Cervical cancer Mother     Cancer Mother     Lung disease Mother     Diabetes Father     Hypertension Father     Prostate cancer Father         age unknown    Breast cancer Maternal Grandmother     Diabetes Paternal Grandmother     Hypertension Brother     Prostate cancer Brother 48    Heart disease Brother     Hypertension Brother     Hypertension Maternal Aunt     No Known Problems Maternal Aunt     Lung cancer Maternal Uncle     Diabetes Paternal Aunt     Diabetes Paternal Uncle     Hypertension Cousin     Breast cancer Cousin         under 48    Diabetes Cousin     Breast cancer Cousin         under 48     Social History     Socioeconomic History    Marital status: /Civil Union     Spouse name: Not on file    Number of children: Not on file    Years of education: Not on file    Highest education level: Not on file   Occupational History    Not on file   Tobacco Use    Smoking status: Never    Smokeless tobacco: Never   Vaping Use    Vaping Use: Never used   Substance and Sexual Activity    Alcohol use: No    Drug use: Never    Sexual activity: Yes     Partners: Male     Birth control/protection: Post-menopausal, None   Other Topics Concern    Not on file   Social History Narrative    Not on file     Social Determinants of Health     Financial Resource Strain: Not on file   Food Insecurity: Not on file   Transportation Needs: Not on file   Physical Activity: Not on file   Stress: Not on file   Social Connections: Not on file   Intimate Partner Violence: Not on file   Housing Stability: Not on file       Current Outpatient Medications:     albuterol (PROVENTIL HFA,VENTOLIN HFA) 90 mcg/act inhaler, INHALE 2 PUFFS BY MOUTH EVERY 6 HOURS AS NEEDED FOR WHEEZING, Disp: 18 g, Rfl: 0    amLODIPine (NORVASC) 10 mg tablet, Take 1 tablet (10 mg total) by mouth daily, Disp: 90 tablet, Rfl: 0    anastrozole (ARIMIDEX) 1 mg tablet, Take 1 tablet (1 mg total) by mouth daily, Disp: 90 tablet, Rfl: 1    atorvastatin (LIPITOR) 10 mg tablet, Take 1 tablet (10 mg total) by mouth daily, Disp: 90 tablet, Rfl: 0    calcium carbonate (OS-HALIMA) 1250 (500 Ca) MG tablet, Take 1,200 mg by mouth daily , Disp: , Rfl:     cholecalciferol (VITAMIN D3) 1,000 units tablet, Take 2,000 Units by mouth daily  , Disp: , Rfl:     hydrocortisone 2.5 % cream, Apply topically 3 (three) times a day prn, Disp: 28 g, Rfl: 2    multivitamin (THERAGRAN) TABS, Take 1 tablet by mouth daily. , Disp: , Rfl:   No Known Allergies  There were no vitals filed for this visit.

## 2023-11-29 DIAGNOSIS — G47.33 OSA (OBSTRUCTIVE SLEEP APNEA): Primary | ICD-10-CM

## 2023-12-08 ENCOUNTER — OFFICE VISIT (OUTPATIENT)
Dept: SLEEP CENTER | Facility: CLINIC | Age: 63
End: 2023-12-08
Payer: COMMERCIAL

## 2023-12-08 ENCOUNTER — OFFICE VISIT (OUTPATIENT)
Dept: URGENT CARE | Facility: CLINIC | Age: 63
End: 2023-12-08
Payer: COMMERCIAL

## 2023-12-08 ENCOUNTER — TELEPHONE (OUTPATIENT)
Age: 63
End: 2023-12-08

## 2023-12-08 VITALS
RESPIRATION RATE: 16 BRPM | WEIGHT: 203 LBS | HEIGHT: 63 IN | HEART RATE: 82 BPM | DIASTOLIC BLOOD PRESSURE: 80 MMHG | BODY MASS INDEX: 35.97 KG/M2 | SYSTOLIC BLOOD PRESSURE: 136 MMHG | OXYGEN SATURATION: 99 %

## 2023-12-08 VITALS
RESPIRATION RATE: 20 BRPM | SYSTOLIC BLOOD PRESSURE: 136 MMHG | OXYGEN SATURATION: 97 % | HEART RATE: 72 BPM | DIASTOLIC BLOOD PRESSURE: 80 MMHG | TEMPERATURE: 97 F

## 2023-12-08 DIAGNOSIS — G47.33 OSA (OBSTRUCTIVE SLEEP APNEA): ICD-10-CM

## 2023-12-08 DIAGNOSIS — M65.332 TRIGGER MIDDLE FINGER OF LEFT HAND: Primary | ICD-10-CM

## 2023-12-08 PROCEDURE — 99212 OFFICE O/P EST SF 10 MIN: CPT | Performed by: PHYSICIAN ASSISTANT

## 2023-12-08 PROCEDURE — 99204 OFFICE O/P NEW MOD 45 MIN: CPT

## 2023-12-08 RX ORDER — METHYLPREDNISOLONE 4 MG/1
TABLET ORAL
Qty: 1 EACH | Refills: 0 | Status: SHIPPED | OUTPATIENT
Start: 2023-12-08

## 2023-12-08 NOTE — PATIENT INSTRUCTIONS
PAP Therapy Initiation    I will have a prescription sent for auto-CPAP 5-15 cmH2O with a nasal mask. You will ultimately receive your machine and regular supplies from a [employer] (durable medical equipment) company. You should schedule the initial set up as well as the follow-up appointment after you receive the device at the  before you leave today. You should be eligible for new supplies approximately every 3-6 months, depending on your insurance coverage. If your mask doesn't fit well, call our office to schedule a formal mask fitting. Insurance requires regular usage and periodic office follow ups for PAP therapy, to continue to cover supplies. Insurance requires a follow-up in the office within 90 days of starting your new PAP device. As stated above, this should be scheduled before you leave today. CMS/Insurance Requirements    Your insurance requires a face-to-face follow up visit within a 31-90 day period after starting CPAP. Your insurance requires compliance with CPAP, which is at least 4 hours per night for 70% of the time. This must be done over a 30 day period and must occur within the initial 31-90 day period after starting CPAP. Your insurance also requires at least yearly follow ups to continue to pay for CPAP supplies. PAP Supply Guidelines    Below are the guidelines for reordering your supplies. You will be responsible for your deductible, co payments, and out of pocket expenses.     Item Frequency   Nasal Mask (no headgear) 1 every 3 months   Nasal Mask Cushion 1 every 2 weeks   Full Face Mask (no headgear) 1 every 3 months   Full Face Mask Cushion 1 every month   Nasal Pillows 1 every 2 weeks   Headgear 1 every 6 months   hin Strap 1 every 6 months   cody 1 every 3 months   Filters: Reusable 1 every 6 months   Filters: Disposable 1 every 2 weeks   Humidifier Chamber(disposable) 1 every 6 months       About Your PAP Therapy    Continuous Positive Airway Pressure/Bilevel Therapy  You have been prescribed Positive Airway Pressure (PAP) therapy to treat sleep apnea. The most common type of sleep apnea is obstructive sleep apnea (SONIYA), a condition in which the upper airway collapses during sleep. This obstruction keeps air from getting into your lungs. The prescribed PAP machine (CPAP or Bilevel or ASV) will smoothly blow air into your airway to prevent it from closing. The machine will use a mask to deliver the air through your nose and/or mouth. These devices are available in various sizes and styles. It will take some time for you to get used to the new equipment and it may take a while for you to begin to feel the benefits of PAP therapy. Please be patient. If you are having problems adjusting to your machine, please contact us for help. Beginning your PAP Therapy  Assembly:  Place your PAP machine on a level surface near your bed. To prevent injury, do not place the machine higher than your head. Keep the machine at least 12 inches away from anything that may block the vents. Plug the machine into a properly grounded electrical outlet. It is better to avoid using an extension cord. If necessary, use a heavy-duty one. If you are NOT using a humidifier with you PAP equipment:  Attach one end of your 6-foot tubing to the PAP unit outlet and the other end to your mask. (If your mask does not have a built-in exhalation port, a special exhalation valve should be used between the mask and the 6-foot tubing.)  Attach the headgear to your mask. If you are using a humidifier with your PAP equipment:  Fill the humidifier with DISTILLED water to the maximum fill line. Attach the humidifier to the PAP unit's outlet as instructed by the respiratory therapist with your home care company. Attach one end of your 6-foot tubing to the humidifier outlet and the other end to your mask.  (If your mask does not have a built-in exhalation port, a special exhalation valve should be used between the mask and the 6-foot tubing.)  If you have been prescribed oxygen to be used with the PAP machine, you will be instructed on how to attach your oxygen tubing. Always turn off the oxygen tank before turning off your PAP machine. Getting Started:  Jimmy Grim your face and apply the mask and headgear as instructed by the sleep technologist or respiratory therapist. The mask should fit snugly to prevent air leaks, but not so tight as to cause skin irritation or discomfort. Turn the PAP power switch to the ON position. You should feel air blowing through the mask. Breathe normally and adjust the mask gently if you feel an air leak. If there are no leaks, activate the "ramp" feature on your PAP machine. The ramp allows a lower pressure to be delivered for a designated period of time (usually 5 to 45 minutes) to allow you to relax and  fall asleep more easily. The pressure will then gradually increase to the prescribed pressure that controls your apnea. Remember to turn OFF your PAP unit when it is not in use. Care and Maintenance    Headgear should be washed as needed. Daily inspection and weekly washings are recommended. Do not disassemble the straps. Machine wash in warm water, making sure to attach Velcro hooks and tabs before washing. Line dry or machine dry on a low setting. Masks should be washed every day. Daily inspection is recommended. Leave the mask and tubing attached. Gently wash the mask with a soft cloth using warm water and mild detergent, concentrating on the mask cushion flaps. DO NOT use alcohol or bleach. Rinse thoroughly and air dry. Tubing and headgear should be washed weekly. Daily inspection is recommended. Wash in warm water and mild detergent and rinse thoroughly. Hook the tubing to the machine and blow until dry. Humidifier should be washed daily and filled with DISTILLED water before use. Wash with warm water and mild detergent.  Disinfect weekly by soaking with a solution of 1 part white vinegar and 3 parts water for 30 minutes. Rinse thoroughly and air dry. Disposable filters should be replaced once a month. Wash reusable foam filters with warm water and mild detergent at least once a month. Rinse thoroughly and dry with paper towels. Avoid  that contain fragrance or conditioners, as these will leave a residue. NEVER iron any soft goods. Troubleshooting    If the machine fails to turn on:  Make sure that the PAP machine is plugged into a grounded outlet. Make sure that the ON/OFF switch is in the ON position. Make sure that the wall outlet has power. If there is no pressure coming from your machine:  Make sure that the inlet filter is clean and unblocked. Make sure that the cooling fan is unblocked and that air is flowing freely. Make sure that all tubing is securely connected. If your PAP machine still fails to operate, please call your DME for assistance. What You Should Know About Insurance    There are many different insurance policies and coverage for PAP equipment will vary. Find out what your coverage provides and what your responsibilities are as a consumer. PAP therapy equipment is considered Durable Medical Equipment (DME). Here are a few questions to ask your insurance provider:  What benefits do I have for DME? Do I have a deductible and/or co pay for DME? Is there a repair or replacement plan for durable medical equipment? How often can I receive a replacement mask, tubing, headgear and filters? Do I have to demonstrate that I am using my PAP device?  o How do I do that? Important Information    It is very important to keep your PAP equipment and supplies clean. The life of the supplies depends on the care they receive. Under normal circumstances, expect the mask and headgear to last 9-12 months. Refer to the owner's manual for more information. Important Safety Reminders    Keep equipment free from obstruction.   Use your PAP equipment as directed. DO NOT try to adjust your pressure setting. DO NOT block the exhalation port or valve. Keep filters clean to prevent overheating. If a humidifier is being used, place it at a level lower than your head. If using a heated humidifier, allow unit to cool before cleaning or refilling. Follow safety guidelines regarding oxygen equipment. DO NOT operate multiple electrical devices from one outlet. If you have a medical emergency, contact the Emergency Medical Services.

## 2023-12-08 NOTE — PATIENT INSTRUCTIONS
Take prednisone as instructed. While on prednisone do not take any ibuprofen or ibuprofen like products. May safely take Tylenol if needed. Finger splint for bedtime for protection. Ortho referral placed. You may call the orthopedic scheduling office at [phone] to inquire about scheduling an appointment.

## 2023-12-08 NOTE — PROGRESS NOTES
[facility]  Sleep Medicine New Patient Evaluation    PATIENT NAME: Griselda Dooley  DATE OF SERVICE: December 8, 2023    CONSULTING PROVIDER:  Shreya Gonzalez DO  [address]  [address]  [facility],  [address]    ASSESSMENT/PLAN:  Griselda Dooley is a 58 y.o. female with PMHx of breast CA, SONIYA, GERD, asthma, HTN, HLD, prediabetes, anxiety and obesity who presents for SONIYA on recent HSAT. SONIYA (Obstructive Sleep Apnea)  - The patient presents today with EDS, loud snoring, frequent awakenings and nocturia and has a recent HSAT which showed severe SONIYA (MICHAEL 33.2, O2 clive 80%). - Discussed with the patient the possible diagnosis, causes and conditions associated with SDB along with potential treatments. She has decided to proceed with CPAP. - Will start APAP 5-15 cmH2O with a nasal mask, and informed the patient they can change out their mask for free within the first 30 days of having their machine.  - Explained the importance of keeping the machine clean, and that they should be eligible for refills of supplies every 3-6 months depending on insurance. We also discussed the insurance compliance requirements. - Encouraged healthy lifestyle with adequate sleep (7-9 hours per night), healthy balanced diet and routine exercise. Explained the importance of avoiding driving while drowsy. - Follow-up in 6-8 weeks for compliance visit. -     Ambulatory Referral to Sleep Medicine  -     CPAP Auto New DME    Thank you for allowing me to participate in the care of your patient. If there are any questions regarding evaluation please feel free to reach out.   ________________________________________________________________________________________________    HPI: Griselda Dooley is a 58 y.o. female with PMHx of breast CA, SONIYA, GERD, asthma, HTN, HLD, prediabetes, anxiety and obesity who presents for SONIYA on recent HSAT.   Sleep-Related History: HSAT from 11/25/2023 showed MICHAEL of 33.2 with O2 clive of 80% and time < SpO2 90% of 21 min. The patient was referred by her PCP due to an HSAT which showed severe SONIYA. She states she has snored her entire life, but her  denies witnessed apneas. She does wake up multiple times a night to use the bathroom and during the day she often feels sleepy. At times she will nap for up to 2-3 hours, but she doesn't always feel completely refreshed. She has multiple family members with SONIYA who have used CPAP before. ESS: Total score: 8/24  Greater or equal to 10 is positive for excessive daytime sleepiness  Pertinent Meds: None    Sleep-Wake Schedule:  She is self-described as a morning person. Bedtime: 8795-3499  Wake Time: 2856-4468  Difficulty Falling Asleep: No  Avg Number of Awakenings: 4-5x  Cause of Awakenings: nocturia and for unclear reason  Weekend Sleep Schedule: Unchanged  Naps: 4x a week for up to 2-3 hours    Avg TST per 24 hours: 5-8 hours    Sleep-Related Details:  Preferred Sleep Position: supine  SDB Symptoms: snoring, multiple awakenings, morning headaches, and dry mouth/nose  Bruxism: No  Nocturnal GERD: Yes, occasionally depending on what she eats/drinks before bed  Nocturnal Palpitations: No  Nocturnal Anxiety or Rumination: No  Sleep-Related Hallucinations: No   Sleep Paralysis: No   Cataplexy: No     She denies having an urge to move the legs in the evening (when resting) nor uncomfortable and/or unpleasant sensations in the legs. She has not been told that she kicks her legs during sleep. She denies any parasomnia activity. Wake-Related Details  Daytime Sleepiness: Yes, feels like she can nap often    Work Schedule: Retired, previously worked for [employer] [address]: No  Caffeine: Yes, 1 cup of coffee in the morning and will drink 2-3 sodas every other day  Alcohol Use: No  Substance Use: No  Tobacco Use: No  Weight Change:  Thinks she has gained ~3-5lbs in the last few months    She does not have difficulty with memory or concentration. Past Treatments:  None    Prior Sleep Studies:  HSAT --11/25/2023  TESTING RESULTS:  The test results are from 1 Night. The total time in bed (analysis time) was 6h 59m minutes. The patient had a total of 224 respiratory events made up of 7 obstructive apneas, 0 central apneas,  0 mixed apneas and 217 hypopneas resulting in a respiratory event index (MICHAEL) of 33.2 . The lowest SPO2 recorded is 80.0% and 21 minutes during the study was spent with saturations below 90%. The snore index was 40.2%. .     Additional comments by patient: She complained of sensation of mucus stuck in the upper airway. IMPRESSION:  Severe obstructive sleep apnea  Significant hypoxic burden     Limitations of home sleep testing compromises accuracy. Based on the results of this study, a follow-up study to titrate positive airway pressure is strongly recommended. Clinical correlation is advised.     Other Relevant Labs and Studies:  None    Past Medical History:   Diagnosis Date    Asthma     Last Assessed: 10/19/2016    Breast cancer ([address])     Left breast 2021    Cancer ([address]) 10/2021    Fibroid     GERD (gastroesophageal reflux disease)     controlled with diet    Hypercholesteremia     Hypertension     Wears glasses      Past Surgical History:   Procedure Laterality Date    BREAST BIOPSY Left 09/28/2021    multiple sites    BREAST LUMPECTOMY Left 11/19/2021    Procedure: LUMPECTOMY BREAST BRACKETED AJAY LOCALIZED;  Surgeon: Milagros Guzman MD;  Location: [facility];  Service: Surgical Oncology    BREAST LUMPECTOMY Left 1/21/2022    Procedure: LUMPECTOMY BREAST, re-excision; [address];  Surgeon: Milagros Guzman MD;  Location[facility];  Service: Surgical Oncology    BUNIONECTOMY      BUNIONECTOMY Right 2009    BUNIONECTOMY Left 2007    COLONOSCOPY      FOOT SURGERY      LYMPH NODE BIOPSY Left 1/21/2022    Procedure: BIOPSY LYMPH NODE SENTINEL;  Surgeon: Milagros Guzman MD;  Location: [facility];  Service: Surgical Oncology    MAMMO NEEDLE LOCALIZATION LEFT (ALL INC) Left 11/12/2021    MAMMO NEEDLE LOCALIZATION LEFT (ALL INC) EACH ADD Left 11/12/2021    MAMMO STEREOTACTIC BREAST BIOPSY LEFT (ALL INC) Left 9/28/2021    MAMMO STEREOTACTIC BREAST BIOPSY LEFT (ALL INC) EACH ADD Left 9/28/2021    NASAL POLYP EXCISION      WY COLONOSCOPY FLX DX W/COLLJ SPEC WHEN PFRMD N/A 5/23/2016    Procedure: COLONOSCOPY;  Surgeon: Moe Hopkins DO;  Location: [facility]; Service: Gastroenterology    SHOULDER ARTHROSCOPY Right 2012    SHOULDER ARTHROSCOPY W/ ROTATOR CUFF REPAIR Right 2012    US GUIDED THYROID BIOPSY  6/2/2023     Patient Active Problem List   Diagnosis    Anxiety    Asthma    Benign essential hypertension    Combined hyperlipidemia    Prediabetes    Vitamin D deficiency    Fibroid    Localized osteoporosis without current pathological fracture    Acute pain of right shoulder    Pain in both lower extremities    Neck pain    BMI 35.0-35.9,adult    Malignant neoplasm of lower-inner quadrant of left breast in female, estrogen receptor positive     Family history of cancer    BRCA negative    GERD (gastroesophageal reflux disease)    Use of anastrozole    Thyroid nodule    Trigger finger    Multiple nevi    Contusion of fifth toe of right foot    Snoring    SONIYA (obstructive sleep apnea)     Allergies as of 12/08/2023    (No Known Allergies)     REVIEW OF SYSTEMS:  Review of Systems  10-point system review completed, all of which are negative except as mentioned above.     CURRENT MEDICATIONS:  Current Outpatient Medications   Medication Instructions    albuterol (PROVENTIL HFA,VENTOLIN HFA) 90 mcg/act inhaler 2 puffs, Inhalation, Every 6 hours PRN    amLODIPine (NORVASC) 10 mg, Oral, Daily    anastrozole (ARIMIDEX) 1 mg, Oral, Daily    atorvastatin (LIPITOR) 10 mg, Oral, Daily    calcium carbonate (OS-HALIMA) 1,200 mg, Oral, Daily    cholecalciferol (VITAMIN D3) 2,000 Units, Oral, Daily    hydrocortisone 2.5 % cream Topical, 3 times daily, prn    multivitamin (THERAGRAN) TABS 1 tablet, Oral, Daily     SOCIAL HISTORY:  Social History     Tobacco Use    Smoking status: Never    Smokeless tobacco: Never   Vaping Use    Vaping Use: Never used   Substance Use Topics    Alcohol use: No    Drug use: Never     FAMILY HISTORY:  Family History   Problem Relation Age of Onset    Hypertension Mother     Breast cancer Mother         age unknown; bilateral breast cancer    Lung cancer Mother     Cervical cancer Mother     Cancer Mother     Lung disease Mother     Diabetes Father     Hypertension Father     Prostate cancer Father         age unknown    Breast cancer Maternal Grandmother     Diabetes Paternal Grandmother     Hypertension Brother     Prostate cancer Brother 48    Heart disease Brother     Hypertension Brother     Hypertension Maternal Aunt     No Known Problems Maternal Aunt     Lung cancer Maternal Uncle     Diabetes Paternal Aunt     Diabetes Paternal Uncle     Hypertension Cousin     Breast cancer Cousin         under 48    Diabetes Cousin     Breast cancer Cousin         under 48      Family History of Sleep Disorders: mother, brother and niece all have sleep apnea    PHYSICAL EXAMINATION:  Vital Signs:  /80   Pulse 82   Resp 16   Ht 5' 3" (1.6 m)   Wt 92.1 kg (203 lb)   LMP 12/15/2003 (Within Years)   SpO2 99%   BMI 35.96 kg/m²   Body mass index is 35.96 kg/m². Constitutional: NAD, well appearing   Mental Status: AAOx3  Neck Circumference: Neck Circumference: 14 inches  Skin: Warm, dry, no rashes noted   Eyes: PERRL, normal conjunctiva  ENT: Nasal congestion absent, nasal valve incompetence absent.   Posterior Airspace:   Anand Tongue Position: 3  Retrognathia: absent  Overbite: absent  High Arched Palate: absent  Tongue Scalloping/Ridging: present  Uvula: normal  Chest: RRR, +S1/S2, CTA B/L, no W/R/R   Abdomen: Soft, NT/ND  Extremities: No digital clubbing or pedal edema  Neuro: Strength 5/5 throughout, sensation grossly intact    I have spent a total time of 40 minutes on 12/08/23 in caring for this patient including Diagnostic results, Risks and benefits of tx options, Instructions for management, Importance of tx compliance, and Counseling / Coordination of care. Nomi Montoya MD  Pulmonary-Critical Care and Sleep Medicine  12/08/23    Portions of the record may have been created with voice recognition software. Occasional wrong word or "sound a like" substitutions may have occurred due to the inherent limitations of voice recognition software. Please read the chart carefully and recognize, using context, where substitutions have occurred.

## 2023-12-08 NOTE — TELEPHONE ENCOUNTER
Patient is being referred to a orthopedics. Please schedule accordingly.    [facility] Orthopedic [facility]   [phone]

## 2023-12-08 NOTE — PROGRESS NOTES
[facility]    NAME: Marcy Gonzalez is a 58 y.o. female  : 1960    MRN: [id number]  DATE: 2023  TIME: 2:39 PM    Assessment and Plan   Trigger middle finger of left hand [M65.332]  1. Trigger middle finger of left hand  Ambulatory referral to Orthopedic Surgery    methylPREDNISolone 4 MG tablet therapy pack    Splint        Nurse applied aluminum static finger splint to patient's left middle finger. Patient Instructions     Patient Instructions   Take prednisone as instructed. While on prednisone do not take any ibuprofen or ibuprofen like products. May safely take Tylenol if needed. Finger splint for bedtime for protection. Ortho referral placed. You may call the orthopedic scheduling office at [phone] to inquire about scheduling an appointment. Chief Complaint     Chief Complaint   Patient presents with    Hand Pain     Pt C/O left 3rd finger pain. Pt saw PCP back in March for trigger finger pain, that has now returned. History of Present Illness   Marcy Gonzalez presents to the clinic c/o  58-year-old female comes in with complaint of finger pain -left middle finger with limited range of motion and popping and snapping and catching sensation. Hand dominance: Right-hand-dominant  Started: Restarted over a month ago. Associated signs and symptoms: Pain with certain movements and locking. Modifying factors: Taping to try and keep it from moving. Gentle range of motion. Aleve. Tylenol. Comes in wanting shot like her doctor's office gave her earlier in the year. Referral was also placed for orthopedic evaluation at that time but she never did follow-up with Ortho as she said she was doing better. Review of Systems   Review of Systems   Constitutional:  Positive for activity change. Negative for appetite change, chills and fever. Musculoskeletal:  Positive for arthralgias.         Stiffness and pain with range of motion left middle finger and into her hand.        Current Medications     Long-Term Medications   Medication Sig Dispense Refill    amLODIPine (NORVASC) 10 mg tablet Take 1 tablet (10 mg total) by mouth daily 90 tablet 0    anastrozole (ARIMIDEX) 1 mg tablet Take 1 tablet (1 mg total) by mouth daily 90 tablet 1    atorvastatin (LIPITOR) 10 mg tablet Take 1 tablet (10 mg total) by mouth daily 90 tablet 0    calcium carbonate (OS-HALIMA) 1250 (500 Ca) MG tablet Take 1,200 mg by mouth daily       cholecalciferol (VITAMIN D3) 1,000 units tablet Take 2,000 Units by mouth daily        hydrocortisone 2.5 % cream Apply topically 3 (three) times a day prn 28 g 2       Current Allergies     Allergies as of 12/08/2023    (No Known Allergies)          The following portions of the patient's history were reviewed and updated as appropriate: allergies, current medications, past family history, past medical history, past social history, past surgical history and problem list.  Past Medical History:   Diagnosis Date    Asthma     Last Assessed: 10/19/2016    Breast cancer ([address])     Left breast 2021    Cancer ([address]) 10/2021    Fibroid     GERD (gastroesophageal reflux disease)     controlled with diet    Hypercholesteremia     Hypertension     Wears glasses      Past Surgical History:   Procedure Laterality Date    BREAST BIOPSY Left 09/28/2021    multiple sites    BREAST LUMPECTOMY Left 11/19/2021    Procedure: LUMPECTOMY BREAST BRACKETED AJAY LOCALIZED;  Surgeon: Trey He MD;  [facility];  Service: Surgical Oncology    BREAST LUMPECTOMY Left 1/21/2022    Procedure: LUMPECTOMY BREAST, re-excision; 0[address];  Surgeon: Trey He MD;  [facility];  Service: Surgical Oncology    BUNIONECTOMY      BUNIONECTOMY Right 2009    BUNIONECTOMY Left 2007    COLONOSCOPY      FOOT SURGERY      LYMPH NODE BIOPSY Left 1/21/2022    Procedure: BIOPSY LYMPH NODE SENTINEL;  Surgeon: Trey He MD;  [facility];  Service: Surgical Oncology    MAMMO NEEDLE LOCALIZATION LEFT (ALL INC) Left 11/12/2021    MAMMO NEEDLE LOCALIZATION LEFT (ALL INC) EACH ADD Left 11/12/2021    MAMMO STEREOTACTIC BREAST BIOPSY LEFT (ALL INC) Left 9/28/2021    MAMMO STEREOTACTIC BREAST BIOPSY LEFT (ALL INC) EACH ADD Left 9/28/2021    NASAL POLYP EXCISION      CT COLONOSCOPY FLX DX W/COLLJ SPEC WHEN PFRMD N/A 5/23/2016    Procedure: COLONOSCOPY;  Surgeon: Nicki Pemberton DO;  Location: [facility]; Service: Gastroenterology    SHOULDER ARTHROSCOPY Right 2012    SHOULDER ARTHROSCOPY W/ ROTATOR CUFF REPAIR Right 2012    US GUIDED THYROID BIOPSY  6/2/2023     Family History   Problem Relation Age of Onset    Hypertension Mother     Breast cancer Mother         age unknown; bilateral breast cancer    Lung cancer Mother     Cervical cancer Mother     Cancer Mother     Lung disease Mother     Diabetes Father     Hypertension Father     Prostate cancer Father         age unknown    Breast cancer Maternal Grandmother     Diabetes Paternal Grandmother     Hypertension Brother     Prostate cancer Brother 48    Heart disease Brother     Hypertension Brother     Hypertension Maternal Aunt     No Known Problems Maternal Aunt     Lung cancer Maternal Uncle     Diabetes Paternal Aunt     Diabetes Paternal Uncle     Hypertension Cousin     Breast cancer Cousin         under 48    Diabetes Cousin     Breast cancer Cousin         under 50       Objective   /80 (BP Location: Right arm, Patient Position: Sitting, Cuff Size: Large)   Pulse 72   Temp (!) 97 °F (36.1 °C) (Tympanic)   Resp 20   LMP 12/15/2003 (Within Years)   SpO2 97%   Patient's last menstrual period was 12/15/2003 (within years). Physical Exam     Physical Exam  Vitals and nursing note reviewed. Constitutional:       General: She is not in acute distress. Appearance: Normal appearance. She is well-developed. She is not ill-appearing, toxic-appearing or diaphoretic.    Cardiovascular:      Rate and Rhythm: Normal rate. Pulmonary:      Effort: Pulmonary effort is normal. No respiratory distress. Musculoskeletal:         General: Tenderness present. No swelling, deformity or signs of injury. Comments: Left middle finger into hand without gross swelling redness heat. Tender to palpation palmar aspect just proximal to MCP joint. Limited range of motion with patient hesitancy on range of motion due to pain. Skin:     General: Skin is warm and dry. Findings: No bruising, erythema, lesion or rash. Neurological:      Mental Status: She is alert and oriented to person, place, and time.    Psychiatric:         Mood and Affect: Mood normal.         Behavior: Behavior normal.

## 2023-12-11 ENCOUNTER — TELEPHONE (OUTPATIENT)
Dept: SLEEP CENTER | Facility: CLINIC | Age: 63
End: 2023-12-11

## 2023-12-11 NOTE — TELEPHONE ENCOUNTER
Set up 12/22/23 [facility]    Rx and clinicals for DME setup sent to [facility] [employer] via [employer].

## 2023-12-13 LAB
DME PARACHUTE DELIVERY DATE REQUESTED: NORMAL
DME PARACHUTE DELIVERY NOTE: NORMAL
DME PARACHUTE ITEM DESCRIPTION: NORMAL
DME PARACHUTE ORDER STATUS: NORMAL
DME PARACHUTE SUPPLIER NAME: NORMAL
DME PARACHUTE SUPPLIER PHONE: NORMAL

## 2023-12-22 LAB
DME PARACHUTE DELIVERY DATE ACTUAL: NORMAL
DME PARACHUTE DELIVERY DATE EXPECTED: NORMAL
DME PARACHUTE DELIVERY DATE REQUESTED: NORMAL
DME PARACHUTE DELIVERY NOTE: NORMAL
DME PARACHUTE ITEM DESCRIPTION: NORMAL
DME PARACHUTE ORDER STATUS: NORMAL
DME PARACHUTE SUPPLIER NAME: NORMAL
DME PARACHUTE SUPPLIER PHONE: NORMAL

## 2024-01-03 ENCOUNTER — OFFICE VISIT (OUTPATIENT)
Dept: OBGYN CLINIC | Facility: MEDICAL CENTER | Age: 64
End: 2024-01-03
Payer: COMMERCIAL

## 2024-01-03 VITALS
DIASTOLIC BLOOD PRESSURE: 77 MMHG | HEIGHT: 63 IN | SYSTOLIC BLOOD PRESSURE: 136 MMHG | BODY MASS INDEX: 36.71 KG/M2 | HEART RATE: 79 BPM | WEIGHT: 207.2 LBS

## 2024-01-03 DIAGNOSIS — M65.332 TRIGGER MIDDLE FINGER OF LEFT HAND: Primary | ICD-10-CM

## 2024-01-03 PROCEDURE — 99213 OFFICE O/P EST LOW 20 MIN: CPT | Performed by: ORTHOPAEDIC SURGERY

## 2024-01-03 PROCEDURE — 20550 NJX 1 TENDON SHEATH/LIGAMENT: CPT | Performed by: ORTHOPAEDIC SURGERY

## 2024-01-03 RX ORDER — LIDOCAINE HYDROCHLORIDE 10 MG/ML
1 INJECTION, SOLUTION INFILTRATION; PERINEURAL
Status: COMPLETED | OUTPATIENT
Start: 2024-01-03 | End: 2024-01-03

## 2024-01-03 RX ORDER — BETAMETHASONE SODIUM PHOSPHATE AND BETAMETHASONE ACETATE 3; 3 MG/ML; MG/ML
6 INJECTION, SUSPENSION INTRA-ARTICULAR; INTRALESIONAL; INTRAMUSCULAR; SOFT TISSUE
Status: COMPLETED | OUTPATIENT
Start: 2024-01-03 | End: 2024-01-03

## 2024-01-03 RX ADMIN — BETAMETHASONE SODIUM PHOSPHATE AND BETAMETHASONE ACETATE 6 MG: 3; 3 INJECTION, SUSPENSION INTRA-ARTICULAR; INTRALESIONAL; INTRAMUSCULAR; SOFT TISSUE at 09:00

## 2024-01-03 RX ADMIN — LIDOCAINE HYDROCHLORIDE 1 ML: 10 INJECTION, SOLUTION INFILTRATION; PERINEURAL at 09:00

## 2024-01-03 NOTE — PROGRESS NOTES
The HAND & UPPER EXTREMITY OFFICE VISIT   Referred By:  Celeste Dozier Pa[facility]  [address]  [address]  [place] [address]      Chief Complaint:     Trigger finger of left long finger      History of Present Illness:   63 y.o.,  female presents with trigger finger of the left long finger. Previously seen and evaluated at  [facility] on 12/8/23. Note reviewed in detail today. Provided aluminum finger splint which she has been wearing at note. She notes a previous CSI of the long finger on 3/29/23 by her PCP which provided relief for about 6 months. She reports clicking and locking of the long finger daily and occasionally has to use the contralateral hand to unlock the finger.       ADLs: Community ambulator  Smoke: denies ETOH: denies   Drugs:  denies        Past Medical History:  Past Medical History:   Diagnosis Date    Asthma     Last Assessed: 10/19/2016    Breast cancer ([facility])     Left breast 2021    Cancer ([facility]) 10/2021    Fibroid     GERD (gastroesophageal reflux disease)     controlled with diet    Hypercholesteremia     Hypertension     Wears glasses      Past Surgical History:   Procedure Laterality Date    BREAST BIOPSY Left 09/28/2021    multiple sites    BREAST LUMPECTOMY Left 11/19/2021    Procedure: LUMPECTOMY BREAST BRACKETED AJAY LOCALIZED;  Surgeon: Audrey Lacey MD;  [facility];  Service: Surgical Oncology    BREAST LUMPECTOMY Left 1/21/2022    Procedure: LUMPECTOMY BREAST, re-excision; [address];  Surgeon: Audrey Lacey MD;  [facility];  Service: Surgical Oncology    BUNIONECTOMY      BUNIONECTOMY Right 2009    BUNIONECTOMY Left 2007    COLONOSCOPY      FOOT SURGERY      LYMPH NODE BIOPSY Left 1/21/2022    Procedure: BIOPSY LYMPH NODE SENTINEL;  Surgeon: Audrey Lacey MD;  [facility];  Service: Surgical Oncology    MAMMO NEEDLE LOCALIZATION LEFT (ALL INC) Left 11/12/2021    MAMMO NEEDLE LOCALIZATION LEFT (ALL INC) EACH ADD Left 11/12/2021    MAMMO STEREOTACTIC  BREAST BIOPSY LEFT (ALL INC) Left 9/28/2021    MAMMO STEREOTACTIC BREAST BIOPSY LEFT (ALL INC) EACH ADD Left 9/28/2021    NASAL POLYP EXCISION      NH COLONOSCOPY FLX DX W/COLLJ SPEC WHEN PFRMD N/A 5/23/2016    Procedure: COLONOSCOPY;  Surgeon: Adriana Westfall DO;  Location:  [facility];  Service: Gastroenterology    SHOULDER ARTHROSCOPY Right 2012    SHOULDER ARTHROSCOPY W/ ROTATOR CUFF REPAIR Right 2012    US GUIDED THYROID BIOPSY  6/2/2023     Family History   Problem Relation Age of Onset    Hypertension Mother     Breast cancer Mother         age unknown; bilateral breast cancer    Lung cancer Mother     Cervical cancer Mother     Cancer Mother     Lung disease Mother     Diabetes Father     Hypertension Father     Prostate cancer Father         age unknown    Breast cancer Maternal Grandmother 44    Diabetes Paternal Grandmother     Hypertension Brother     Prostate cancer Brother 50    Heart disease Brother     Hypertension Brother     Hypertension Maternal Aunt     No Known Problems Maternal Aunt     Lung cancer Maternal Uncle     Diabetes Paternal Aunt     Diabetes Paternal Uncle     Hypertension Cousin     Breast cancer Cousin         under 50    Diabetes Cousin     Breast cancer Cousin         under 50     Social History     Socioeconomic History    Marital status: /Civil Union     Spouse name: Not on file    Number of children: Not on file    Years of education: Not on file    Highest education level: Not on file   Occupational History    Not on file   Tobacco Use    Smoking status: Never    Smokeless tobacco: Never   Vaping Use    Vaping status: Never Used   Substance and Sexual Activity    Alcohol use: No    Drug use: Never    Sexual activity: Yes     Partners: Male     Birth control/protection: Post-menopausal, None   Other Topics Concern    Not on file   Social History Narrative    Not on file     Social Determinants of Health     Financial Resource Strain: Not on file   Food Insecurity: Not on  "file   Transportation Needs: Not on file   Physical Activity: Not on file   Stress: Not on file   Social Connections: Not on file   Intimate Partner Violence: Not on file   Housing Stability: Not on file     Scheduled Meds:  Continuous Infusions:No current facility-administered medications for this visit.    PRN Meds:.  No Known Allergies        Physical Examination:    /77   Pulse 79   Ht 5' 3\" (1.6 m)   Wt 94 kg (207 lb 3.2 oz)   LMP 12/15/2003 (Within Years)   BMI 36.70 kg/m²     Gen: A&Ox3, NAD  Cardiac: regular rate  Chest: non labored breathing  Abdomen: Non-distended      Right Upper Extremity:  Skin CDI  No obvious deformity of the shoulder, arm, elbow, forearm, wrist, hand  Sensation intact to light touch in the axillary median, ulnar, and radial nerve distributions  Motor function grossly intact  2+RP      Left Upper Extremity:  Skin CDI  No obvious deformity of the shoulder, arm, elbow, forearm, wrist, hand  Negative swelling  Sensation intact to light touch in the axillary median, ulnar, and radial nerve distributions  Full ROM, able to form tight composite fist  2+RP    Left Long Finger:   TTP A1 pulley , palpable flexor tendon nodule.  Negative clicking or locking with passive ROM, but visible triggering with active motion  ~10 deg flexion contracture at PIP joint. Unable to fully flatten on table.       Studies:  No new imaging to review      Assessment and Plan:  1. Trigger middle finger of left hand  Ambulatory referral to Orthopedic Surgery    Hand/upper extremity injection: L long A1    Ambulatory Referral to PT/OT Hand Therapy          63 y.o. female presents with signs and symptoms consistent with the above diagnosis.  We discussed the natural history of this condition and its pathogenesis.  We discussed operative and nonoperative treatment options. Repeat CSI was discussed including evidence for proceeding with a second injection and offered for the patient today. Risks, benefits and " alternative treatments were discussed with the patient. These risks of injection includ but are not limited to: bleeding, infection, damage to nerves, vessels or tendons, allergic reaction to agents, possible increase in pain, tendon or ligament rupture, weakening of bone or soft tissues, and/or elevation in blood sugar. Patient understands and would like to proceed with the proposed procedure. Injection was tolerated well. Please see procedure note for details. She may take NSAIDs/Tylenol or apply ice to the area as needed for post-injection discomfort.     She may discontinue use of the aluminum splint following the injection. She is also interested in attending hand therapy for  her PIP joint contracture. Referral placed for tendon glide exercises and joint stretches.     It is recommended she return to the office in 6 weeks, or sooner should symptoms worsen for repeat evaluation of her symptoms following injection.    she expressed understanding of the plan and agreed. We encouraged them to contact our office with any questions or concerns.         Papito Gaspar MD  Hand and Upper Extremity Surgery        *This note was dictated using Dragon voice recognition software. Please excuse any word substitutions or errors.*    Hand/upper extremity injection: L long A1  Universal Protocol:  Consent: Verbal consent obtained.  Risks and benefits: risks, benefits and alternatives were discussed  Consent given by: patient  Patient understanding: patient states understanding of the procedure being performed  Patient consent: the patient's understanding of the procedure matches consent given  Site marked: the operative site was marked  Required items: required blood products, implants, devices, and special equipment available  Patient identity confirmed: verbally with patient  Supporting Documentation  Indications: pain   Procedure Details  Condition:trigger finger Location: long finger - L long A1   Needle size: 25  G  Ultrasound guidance: no  Approach: volar  Medications administered: 1 mL lidocaine 1 %; 6 mg betamethasone acetate-betamethasone sodium phosphate 6 (3-3) mg/mL  Patient tolerance: patient tolerated the procedure well with no immediate complications  Dressing:  Sterile dressing applied

## 2024-01-15 DIAGNOSIS — Z17.0 MALIGNANT NEOPLASM OF LOWER-INNER QUADRANT OF LEFT BREAST IN FEMALE, ESTROGEN RECEPTOR POSITIVE: ICD-10-CM

## 2024-01-15 DIAGNOSIS — C50.312 MALIGNANT NEOPLASM OF LOWER-INNER QUADRANT OF LEFT BREAST IN FEMALE, ESTROGEN RECEPTOR POSITIVE: ICD-10-CM

## 2024-01-15 RX ORDER — ANASTROZOLE 1 MG/1
1 TABLET ORAL DAILY
Qty: 90 TABLET | Refills: 3 | Status: SHIPPED | OUTPATIENT
Start: 2024-01-15

## 2024-02-02 ENCOUNTER — TELEPHONE (OUTPATIENT)
Dept: SLEEP CENTER | Facility: CLINIC | Age: 64
End: 2024-02-02

## 2024-02-02 ENCOUNTER — OFFICE VISIT (OUTPATIENT)
Dept: SLEEP CENTER | Facility: CLINIC | Age: 64
End: 2024-02-02
Payer: COMMERCIAL

## 2024-02-02 VITALS
HEART RATE: 82 BPM | RESPIRATION RATE: 20 BRPM | DIASTOLIC BLOOD PRESSURE: 80 MMHG | HEIGHT: 63 IN | OXYGEN SATURATION: 99 % | WEIGHT: 207 LBS | BODY MASS INDEX: 36.68 KG/M2 | SYSTOLIC BLOOD PRESSURE: 142 MMHG

## 2024-02-02 DIAGNOSIS — G47.33 OSA (OBSTRUCTIVE SLEEP APNEA): Primary | ICD-10-CM

## 2024-02-02 DIAGNOSIS — I10 BENIGN ESSENTIAL HYPERTENSION: ICD-10-CM

## 2024-02-02 DIAGNOSIS — E78.2 COMBINED HYPERLIPIDEMIA: ICD-10-CM

## 2024-02-02 PROCEDURE — 99214 OFFICE O/P EST MOD 30 MIN: CPT

## 2024-02-02 RX ORDER — ATORVASTATIN CALCIUM 10 MG/1
10 TABLET, FILM COATED ORAL DAILY
Qty: 90 TABLET | Refills: 0 | Status: SHIPPED | OUTPATIENT
Start: 2024-02-02

## 2024-02-02 RX ORDER — AMLODIPINE BESYLATE 10 MG/1
10 TABLET ORAL DAILY
Qty: 90 TABLET | Refills: 1 | Status: SHIPPED | OUTPATIENT
Start: 2024-02-02

## 2024-02-02 NOTE — PATIENT INSTRUCTIONS
Continue PAP Therapy  - Continue APAP at 5-15 cmH2O.  - Prescription for new mask fitting.  Remember to clean your mask and equipment regularly, as directed.  You should be eligible for new supplies approximately every 3-6 months, depending on your insurance coverage. Contact your Durable Medical Equipment (DME) company for new supplies as needed.  Follow up in 3 months    Care and Maintenance  Headgear should be washed as needed. Daily inspection and weekly washings are recommended. Do not disassemble the straps. Machine wash in warm water, making sure to attach Velcro hooks and tabs before washing. Line dry or machine dry on a low setting.  Masks should be washed every day. Daily inspection is recommended. Leave the mask and tubing attached. Gently wash the mask with a soft cloth using warm water and mild detergent, concentrating on the mask cushion flaps. DO NOT use alcohol or bleach. Rinse thoroughly and air dry.  Tubing and headgear should be washed weekly. Daily inspection is recommended. Wash in warm water and mild detergent and rinse thoroughly. Hook the tubing to the machine and blow until dry.  Humidifier should be washed daily and filled with DISTILLED water before use. Wash with warm water and mild detergent. Disinfect weekly by soaking with a solution of 1 part white vinegar and 3 parts water for 30 minutes. Rinse thoroughly and air dry.  Disposable filters should be replaced once a month. Wash reusable foam filters with warm water and mild detergent at least once a month. Rinse thoroughly and dry with paper towels.  Avoid  that contain fragrance or conditioners, as these will leave a residue.  NEVER iron any soft goods.    Insurance Requirements  Your insurance requires a face-to-face follow up visit within a 31-90 day period after starting PAP therapy.  Your insurance requires compliance with your PAP device, which is at least 4 hours per night for 70% of the time. This must be done over a 30  day period and must occur within the initial 31-90 day period after starting CPAP.  Your insurance also requires at least yearly follow ups to continue to pay for CPAP supplies.     PAP Supply Guidelines  Below are the guidelines for reordering your supplies. You will be responsible for your deductible, co payments, and out of pocket expenses.    Item Frequency   Nasal Mask (no headgear) 1 every 3 months   Nasal Mask Cushion 1 every 2 weeks   Full Face Mask (no headgear) 1 every 3 months   Full Face Mask Cushion 1 every month   Nasal Pillows 1 every 2 weeks   Headgear 1 every 6 months   hin Strap 1 every 6 months   cody 1 every 3 months   Filters: Reusable 1 every 6 months   Filters: Disposable 1 every 2 weeks   Humidifier Chamber(disposable) 1 every 6 months

## 2024-02-02 NOTE — PROGRESS NOTES
[facility]  Sleep Medicine Follow Up/Established Patient    PATIENT NAME: Nehal Bryant  DATE OF SERVICE: February 4, 2024  DATE OF LAST VISIT: 12/8/2023    ASSESSMENT/PLAN:  Nehal Bryant is a 63 y.o. female with PMHx of breast cancer, SONIYA, GERD, asthma, HTN, HLD, prediabetes, anxiety and obesity  who returns to the office for initial CPAP compliance visit.    SONIYA (Obstructive Sleep Apnea)  Excessive Daytime Sleepiness  -The patient has a history of severe SONIYA (MICHAEL 33.2, O2 clive 80%), and is currently on auto CPAP 5-15 cmH2O.  She is currently having difficulties with her nasal pillows interface as she feels she likes breathing through her mouth at night.  She does note some benefit from her initial use of CPAP; however, she states she is still getting very sleepy in the afternoons.  Would hold on further evaluation for her daytime sleepiness as she has had improvement so far with CPAP and there are still adjustments that can be made particularly with her mask, and her ESS is <12.  Will reassess need for further evaluation for daytime sleepiness at next office visit.  We also discussed that it can take a few months to begin truly feeling the benefits of CPAP use.  -Therapy and compliance data reviewed: Compliance 100%, residual AHI < 5 and no significant mask leak noted.  - Will continue APAP 5-15 cmH2O with a mask refitting, order was sent to her DME.  Would recommend fitting for a fullface or hybrid interface given her reports of needing to breathe through her mouth at night.  - Explained the importance of keeping the machine clean, and that they should be eligible for refills of supplies every 3-6 months depending on insurance.  - Encouraged healthy lifestyle with adequate sleep (7-9 hours per night), healthy balanced diet and routine exercise.  Explained the importance of avoiding driving while drowsy.  - Follow-up 3 months.  -     Mask fitting only;  "Future  ________________________________________________________________________________________________    Interval History: Nehal Bryant is a 63 y.o. female with PMHx of breast cancer, SONIYA, GERD, asthma, HTN, HLD, prediabetes, anxiety and obesity  who returns to the office for initial CPAP compliance visit.  She reports that she has been having issues with her mask.  She is currently using a nasal pillows interface; however, states she feels as if she needs to breathe through her mouth at night when she sleeping.  She reports when using the nasal pillows she will occasionally have a sensation of a \"bubble\" in her throat which improves when she starts breathing through her mouth.  She does note dry mouth and throat most mornings, but when her device is at higher humidity settings she was experiencing rainout in her mask and the DME decreased her humidity.  She does feel like she is getting some benefit from her machine as she feels less sleepy first thing in the morning; however, by the afternoon she reports feeling sleepy again.  She has had less nocturnal awakenings and less napping since her last visit.  She continues to deny any symptoms of sleep onset insomnia, HH, sleep paralysis, cataplexy or RLS at this time.    PAP History  Sleep Apnea Type: SONIYA  Most Recent AHI: 33.2 on 11/25/2023  Treatment: APAP    DME: [employer]    Uses APAP for 7.5 hours per night, 7 nights per week.  Current PAP Settings: 5-15 cmH2O  Compliance Data: 100%  Residual AHI 1.5  95th percentile pressure 11.1 cmH2O  Median pressure 7.9 cmH2O  95th percentile leak 17.4 L/min    Mask Type: nasal pillows  PAP Issues: dry mouth/throat  Uses Chin Strap: No  Uses Ramp Function: Yes   Uses Humidity: Yes     There is a perceived benefit by the patient: does feel improved first things in the morning, but will have sleepiness develop throughout the day  Observers report no longer has snoring with APAP use.    Prior History: The patient for started " following with [facility] sleep medicine in 12/2023 due to an HSAT that showed severe SONIYA.  Recommendation was for her to begin on auto CPAP 5-15 cmH2O.  On her initial visit she did note multiple nocturnal awakenings due to nocturia and for unclear reasons, but denied symptoms of sleep onset insomnia or RLS.  She did note significant daytime sleepiness with napping 4 times a week up to 2 to 3 hours at a time.  She returns today for her initial compliance visit.    ESS: Total score: 3/24  Greater or equal to 10 is positive for excessive daytime sleepiness  Pertinent Meds: None    Sleep-Wake Schedule:  Bedtime: 7292-4464  Wake Time: 9995-2892  Difficulty Falling Asleep: No  Avg Number of Awakenings: 1-2x  Cause of Awakenings: bathroom and unclear -- less than prior  Weekend Sleep Schedule: unchanged  Naps: 1-2x a week, 30-60 min    Avg TST per 24 hours: 7-7.5 hours    Past Treatments:  Auto-CPAP 5-15 cmH2O    Prior Sleep Studies:  HSAT -- 11/25/2023 (Wt 199.0 lbs)  MICHAEL - 33.2  Sup MICHAEL - 33.2  O2 [place] - 80.0%  TST < 90% - 21 min    Other Relevant Labs and Studies:  None    Past Medical History:   Diagnosis Date    Asthma     Last Assessed: 10/19/2016    Breast cancer (HCC)     Left breast 2021    Cancer (HCC) 10/2021    Fibroid     GERD (gastroesophageal reflux disease)     controlled with diet    Hypercholesteremia     Hypertension     Wears glasses      Past Surgical History:   Procedure Laterality Date    BREAST BIOPSY Left 09/28/2021    multiple sites    BREAST LUMPECTOMY Left 11/19/2021    Procedure: LUMPECTOMY BREAST BRACKETED AJAY LOCALIZED;  Surgeon: Audrey Lacey MD;  Location: [facility];  Service: Surgical Oncology    BREAST LUMPECTOMY Left 1/21/2022    Procedure: LUMPECTOMY BREAST, re-excision; 0800 NUC MED;  Surgeon: Audrey Lacey MD;  Location: [facility];  Service: Surgical Oncology    BUNIONECTOMY      BUNIONECTOMY Right 2009    BUNIONECTOMY Left 2007    COLONOSCOPY      FOOT SURGERY      LYMPH NODE  BIOPSY Left 1/21/2022    Procedure: BIOPSY LYMPH NODE SENTINEL;  Surgeon: Audrey Lacey MD;  Location: [facility];  Service: Surgical Oncology    MAMMO NEEDLE LOCALIZATION LEFT (ALL INC) Left 11/12/2021    MAMMO NEEDLE LOCALIZATION LEFT (ALL INC) EACH ADD Left 11/12/2021    MAMMO STEREOTACTIC BREAST BIOPSY LEFT (ALL INC) Left 9/28/2021    MAMMO STEREOTACTIC BREAST BIOPSY LEFT (ALL INC) EACH ADD Left 9/28/2021    NASAL POLYP EXCISION      NJ COLONOSCOPY FLX DX W/COLLJ SPEC WHEN PFRMD N/A 5/23/2016    Procedure: COLONOSCOPY;  Surgeon: Adriana Westfall DO;  Location: [facility];  Service: Gastroenterology    SHOULDER ARTHROSCOPY Right 2012    SHOULDER ARTHROSCOPY W/ ROTATOR CUFF REPAIR Right 2012    US GUIDED THYROID BIOPSY  6/2/2023     Patient Active Problem List   Diagnosis    Anxiety    Asthma    Benign essential hypertension    Combined hyperlipidemia    Prediabetes    Vitamin D deficiency    Fibroid    Localized osteoporosis without current pathological fracture    Acute pain of right shoulder    Pain in both lower extremities    Neck pain    BMI 35.0-35.9,adult    Malignant neoplasm of lower-inner quadrant of left breast in female, estrogen receptor positive     Family history of cancer    BRCA negative    GERD (gastroesophageal reflux disease)    Use of anastrozole    Thyroid nodule    Trigger finger    Multiple nevi    Contusion of fifth toe of right foot    Snoring    SONIYA (obstructive sleep apnea)    Trigger middle finger of left hand     Allergies as of 02/02/2024    (No Known Allergies)     REVIEW OF SYSTEMS:  Review of Systems  10-point system review completed, all of which are negative except as mentioned above.    CURRENT MEDICATIONS:  Current Outpatient Medications   Medication Instructions    albuterol (PROVENTIL HFA,VENTOLIN HFA) 90 mcg/act inhaler 2 puffs, Inhalation, Every 6 hours PRN    amLODIPine (NORVASC) 10 mg, Oral, Daily    anastrozole (ARIMIDEX) 1 mg, Oral, Daily    atorvastatin (LIPITOR) 10  "mg, Oral, Daily    calcium carbonate (OS-HALIMA) 1,200 mg, Oral, Daily    cholecalciferol (VITAMIN D3) 2,000 Units, Oral, Daily    hydrocortisone 2.5 % cream Topical, 3 times daily, prn    methylPREDNISolone 4 MG tablet therapy pack Use as directed on package    multivitamin (THERAGRAN) TABS 1 tablet, Oral, Daily     SOCIAL HISTORY:  Social History     Tobacco Use    Smoking status: Never    Smokeless tobacco: Never   Vaping Use    Vaping status: Never Used   Substance Use Topics    Alcohol use: No    Drug use: Never     FAMILY HISTORY:  Family History   Problem Relation Age of Onset    Hypertension Mother     Breast cancer Mother         age unknown; bilateral breast cancer    Lung cancer Mother     Cervical cancer Mother     Cancer Mother     Lung disease Mother     Diabetes Father     Hypertension Father     Prostate cancer Father         age unknown    Breast cancer Maternal Grandmother 44    Diabetes Paternal Grandmother     Hypertension Brother     Prostate cancer Brother 50    Heart disease Brother     Hypertension Brother     Hypertension Maternal Aunt     No Known Problems Maternal Aunt     Lung cancer Maternal Uncle     Diabetes Paternal Aunt     Diabetes Paternal Uncle     Hypertension Cousin     Breast cancer Cousin         under 50    Diabetes Cousin     Breast cancer Cousin         under 50     PHYSICAL EXAMINATION:  Vital Signs:  /80   Pulse 82   Resp 20   Ht 5' 3\" (1.6 m)   Wt 93.9 kg (207 lb)   LMP 12/15/2003 (Within Years)   SpO2 99%   BMI 36.67 kg/m²   Body mass index is 36.67 kg/m².  Constitutional: NAD, well appearing   Mental Status: AAOx3  Skin: Warm, dry, no rashes noted   Eyes: PERRL, normal conjunctiva  ENT: Nasal congestion absent, nasal valve incompetence absent.  Posterior Airspace:   Anand Tongue Position: 3  Retrognathia: absent  Overbite: absent  High Arched Palate: absent  Tongue Scalloping/Ridging: present  Tonsils: 1+  Uvula: normal  Chest: RRR, +S1/S2, CTA B/L, no " "W/R/R   Abdomen: Soft, NT/ND  Extremities: No digital clubbing or pedal edema    I have spent a total time of 30 minutes on 02/04/24 in caring for this patient including Instructions for management, Patient and family education, Importance of tx compliance, Counseling / Coordination of care, Documenting in the medical record, Reviewing / ordering tests, medicine, procedures  , and Obtaining or reviewing history  .    Virginia Mercer MD  Pulmonary-Critical Care and Sleep Medicine  02/04/24    Portions of the record may have been created with voice recognition software. Occasional wrong word or \"sound a like\" substitutions may have occurred due to the inherent limitations of voice recognition software. Please read the chart carefully and recognize, using context, where substitutions have occurred.   "

## 2024-02-05 ENCOUNTER — TELEPHONE (OUTPATIENT)
Dept: SLEEP CENTER | Facility: CLINIC | Age: 64
End: 2024-02-05

## 2024-02-05 NOTE — TELEPHONE ENCOUNTER
Mask fitting completed von 2/2/24.    Rx for PAP resupply sent to [facility] via [employer].

## 2024-02-06 LAB

## 2024-03-01 ENCOUNTER — OFFICE VISIT (OUTPATIENT)
Dept: SURGICAL ONCOLOGY | Facility: CLINIC | Age: 64
End: 2024-03-01
Payer: COMMERCIAL

## 2024-03-01 VITALS
SYSTOLIC BLOOD PRESSURE: 122 MMHG | OXYGEN SATURATION: 98 % | TEMPERATURE: 97.9 F | HEIGHT: 63 IN | DIASTOLIC BLOOD PRESSURE: 80 MMHG | WEIGHT: 208 LBS | HEART RATE: 86 BPM | BODY MASS INDEX: 36.86 KG/M2

## 2024-03-01 DIAGNOSIS — C50.312 MALIGNANT NEOPLASM OF LOWER-INNER QUADRANT OF LEFT BREAST IN FEMALE, ESTROGEN RECEPTOR POSITIVE: Primary | ICD-10-CM

## 2024-03-01 DIAGNOSIS — Z13.71 BRCA NEGATIVE: ICD-10-CM

## 2024-03-01 DIAGNOSIS — Z79.811 USE OF ANASTROZOLE: ICD-10-CM

## 2024-03-01 DIAGNOSIS — Z17.0 MALIGNANT NEOPLASM OF LOWER-INNER QUADRANT OF LEFT BREAST IN FEMALE, ESTROGEN RECEPTOR POSITIVE: Primary | ICD-10-CM

## 2024-03-01 PROCEDURE — 99214 OFFICE O/P EST MOD 30 MIN: CPT

## 2024-03-01 NOTE — PROGRESS NOTES
Surgical Oncology Follow Up       [address]  [facility] SURGICAL ONCOLOGY [place]  [address]  [facility] [id number]    Nehal VASQUEZ Annette  1960  [id number]  [address] [facility] SURGICAL ONCOLOGY [place]  [address] [place] [address]    Chief Complaint   Patient presents with   • Follow-up       Assessment/Plan:  1. Malignant neoplasm of lower-inner quadrant of left breast in female, estrogen receptor positive   - 6 mo follow up    2. Use of anastrozole  - continue use per medical oncology    3. BRCA negative       Discussion/Summary: Patient is a 63-year-old female presenting today for six-month follow-up for left breast cancer diagnosed in September 2021. Pathology revealed DCIS ER/MT positive. She underwent genetic testing which was negative but revealed a VUS of MSH6. She had a left breast lumpectomy with sentinel node biopsy with Dr. Lacey. Surgical pathology revealed invasive carcinoma. She underwent re-excision. She completed whole breast radiation therapy.  She is currently on anastrozole. She had a b/l dx mammogram on 10/25/23 which was BIRADS 2 category 2 density. There were no concerns her clinical breast exam. She notes numbness of the left upper arm and I informed her that this is r/t the nerves from the sln bx. She has hyperpigmentation on the left breast and scar tissue present. I will see the patient back in 6 months or sooner should the need arise. She was instructed to call with any questions or concerns prior to this time. All questions were answered today.     History of Present Illness:     Oncology History   Malignant neoplasm of lower-inner quadrant of left breast in female, estrogen receptor positive    9/28/2021 Initial Diagnosis    Ductal carcinoma in situ (DCIS) of left breast     9/28/2021 Biopsy    Final Diagnosis   A. Breast, Left, 7:00 location 2 cores w/ Calcs, Biopsy:  - Ductal carcinoma in situ.  - No invasive  carcinoma is seen.       Architectural patterns: Solid, papillary       Nuclear grade of ductal carcinoma in situ: Grade 2 (II).       Necrosis:  Focal.       Size of in-situ carcinoma: At least 4 mm.  - Microcalcifications: Present in DCIS.  - Breast cancer biomarker studies (paraffin block number: A1):           Estrogen, progesterone receptor studies pending, to be described in a separate receptor report.      ER 90-95% positive  Pr90-95% positive  B. Breast, Left, 7:00 location 1 core w/ No Calcs, Biopsy:  - Atypical lobular hyperplasia.  - Usual ductal hyperplasia involving intraductal papilloma.      C. Breast, Left, 9:00 location 2 cores w/ Calcs, Biopsy:  - Non-proliferative fibrocystic changes, including microcysts, microcalcifications with stromal fibrosis.  - Negative for atypia or carcinoma.      D. Breast, Left, 9:00 location 1 core w/ No Calcs, Biopsy:  - Intraductal papilloma.  - Negative for atypia or carcinoma.       10/26/2021 -  Cancer Staged    Staging form: Breast, AJCC 8th Edition  - Clinical: Stage 0 (cTis (DCIS), cN0, cM0, ER+, SC+, HER2: Not Assessed) - Signed by Audrey Lacey MD on 10/26/2021  Stage prefix: Initial diagnosis  Method of lymph node assessment: Clinical  Nuclear grade: G2       10/2021 Genetic Testing    Test: [employer] BRCAplus STAT Panel (8 genes): ZAKIA, BRCA1, BRCA2, CDH1, CHEK2, PALB2, PTEN, TP53   Result:   Negative - No Clinically Significant Variants Detected       Assessment:  Negative     Additional Testing:  Test(s): [employer] BRCAplus STAT Panel (8 genes): ZAKIA, BRCA1, BRCA2, CDH1, CHEK2, PALB2, PTEN, TP53 with reflex to [facility] (28 additional genes): APC, ZAKIA, AXIN2 BARD1, BRCA1, BRCA2, BRIP1, BMPR1A, CDH1, CDK4, CDKN2A, CHEK2, DICER1, EPCAM, GREM1, HOXB13, MLH1, MSH2, MSH3, MSH6, MUTYH, NBN, NF1, NTHL1, PALB2, PMS2, POLD1, POLE, PTEN, RAD51C, RAD51D, RECQL SMAD4, SMARCA4, STK11, TP53      Result: Variant of uncertain significance     Variant: MSH6 Variant, Unknown  Significance: p.T6P       Variant of uncertain significance (VUS)     11/19/2021 Surgery    Final Diagnosis   A. Breast, Left, Left breast lumpectomy, short superior, long lateral:  - Invasive mammary carcinoma of no special type (ductal), 8 mm in greatest dimension with    extensive adjacent ductal carcinoma in-situ and associated non-invasive lobular neoplasia.    -- Kim histologic grade 2 of 3 (total score: 7 of 9)        * Glandular (acinar) Tubular Differentiation < 10%, score 3.        * Nuclear Pleomorphism 2 of 3, score 2.        * Mitotic Rate 4-7/mm2, (8-14 mitoses/10HPF; 8 mitoses/10 HPF), score 2.    -- Confirmed by tumor cell immunophenotype:        * Positive: E-cadherin, P120 - each in a membranous pattern.        * Negative: p63, SMMHC.  - Ductal carcinoma in-situ (DCIS): Present; not an extensive intraductal component associated with     invasive carcinoma (< 5% of total tumor) but extensive DCIS in adjacent tissue.     -- Confirmed by positive E-cadherin, p120 (membranous), SMMHC, p63 and loss of epithelial         mosaicism on CK5/6 immunostains.     -- Size and Extent:  At least 48 mm in approximate extent; present in 12 sequential sections; 43 of 91 blocks.     -- Architectural Patterns: Cribriform, micropapillary, solid, papillary.    -- Nuclear grade: I-II (low to intermediate)    -- Necrosis: Present, focal and central necrosis identified.     -- Associated prior biopsy site changes with twist clip and Aliyah  marker.  - Multifocal non-invasive lobular neoplasia (focally florid lobular carcinoma in-situ/LCIS and atypical lobular     hyperplasia with pagetoid ductal spread) involving and adjacent to DCIS.    -- Confirmed by negative E-cadherin; positive p120 (cytoplasmic), SMMHC, p63; and loss of epithelial        mosaicism on CK5/6 immunostains.   - Margins uninvolved by invasive and in-situ carcinoma.    -- Invasive carcinoma: 8 mm from nearest posterior margin.     -- DCIS: 0.1 mm  from nearest posterior margin; < 1 mm from nearest anterior margin; 1.5 mm from        nearest medial margin; > 2 mm from all remaining margins.   - Benign skin.    -- No dermal lymphovascular invasion.   - Lymphovascular invasion: Not identified.  - Microcalcifications: Present, associated with DCIS.   - Benign fibrocystic changes with atypia (atypical and florid usual ductal hyperplasia, columnar cell     lesions with flat epithelial atypia/FEA, cystic and papillary apocrine metaplasia).  - Radial scar, 0.4 cm.      B. Breast, Left, Left Breast Lumpectomy new superior margin - Short Superior, Long Lateral:  - Ductal carcinoma in-situ (DCIS).     -- Size and Extent: 16 mm in greatest approximate extent; present in 4 sequential sections; 7 of 78 blocks.     -- Architectural Patterns: Cribriform, micropapillary, solid, papillary.    -- Nuclear grade: I-II (low to intermediate).     -- Necrosis: Present, focal.     -- Confirmed by positive E-cadherin, P120 (membranous) and loss of epithelial mosaicism on         CK5/6 immunostains.    -- Associated prior biopsy site changes with biopsy clip and AJAY  marker.  - No invasive carcinoma identified.  - Margins uninvolved by DCIS.     -- DCIS < 1 mm from nearest posterior margin; < 1-2 mm from medial margin; > 2 mm from all       remaining margins.    - Lymphovascular invasion: Not identified.  - Microcalcifications: Present, associated with DCIS, lobular neoplasia and non-neoplastic breast tissue.    - Benign fibrocystic changes with atypia:    -- Multifocal/extensive non-invasive lobular neoplasia (focal lobular carcinoma in-situ and        predominantly atypical lobular hyperplasia with pagetoid ductal spread), focally involving        and adjacent to DCIS.        ** Confirmed by negative E-cadherin; positive p120 (cytoplasmic) immunostains.     -- Usual ductal hyperplasia, columnar cell change and hyperplasia with focal flat epithelial        atypia/FEA, sclerosing  adenosis, fibroadenomatoid changes, cystic and papillary apocrine metaplasia.   - Small intraductal papilloma without atypia, 0.1 cm.       12/7/2021 -  Cancer Staged    Staging form: Breast, AJCC 8th Edition  - Pathologic stage from 12/7/2021: Stage IA (pT1b, pN0, cM0, G2, ER+, UT+, HER2-) - Signed by Audrey Lacey MD on 12/7/2021  Stage prefix: Initial diagnosis  Method of lymph node assessment: Clinical  Histologic grading system: 3 grade system       1/21/2022 Surgery    Final Diagnosis   A.  Left breast, anterior margin:     - Histologic changes compatible with prior surgical procedure.     - Skin with dermal cicatrix.     - No invasive or in situ carcinoma identified.     - Final anterior margin is negative for malignancy.     B.  Left breast, new posterior margin:     - Focus of atypical lobular hyperplasia (ALH).     - No invasive or in situ carcinoma identified.     - Final posterior margin is negative for malignancy.     C.  Left breast, medial margin:     - Histologic changes compatible with prior surgical procedure.     - No invasive or in situ carcinoma identified.     - Final medial margin is negative for malignacny.     D.  Left axilla, sentinel lymph node #1:     - Single lymph node; negative for malignancy (0/1).     E.  Left axilla, sentinel lymph node #2:     - Single lymph node; negative for malignancy (0/1).     F.  Left axilla, sentinel lymph node #3:     - Single lymph node; negative for malignancy (0/1).     G.  Left axilla, sentinel lymph node #4:     - Single lymph node; negative for malignancy (0/1).      3/23/2022 - 4/2022 Radiation    Prone L Boost 10X/6X 5 / 5 200 0 1,000 6   Prone L Br # 6X 16 / 16 266 0 4,256 21      Treatment dates:  3/23/2022 - 4/20/2022           -Interval History: Patient is a 63-year-old female presenting today for six-month follow-up for left breast cancer diagnosed in September 2021. She is currently on anastrozole. She had a b/l dx mammogram on 10/25/23 which  was BIRADS 2 category 2 density. She notes numbness of the left upper arm and I informed her that this is r/t the nerves from the sln bx. She has hyperpigmentation on the left breast and scar tissue present. Patient denies changes on her breast exam. She denies persistent headaches, bone pain, back pain, shortness of breath, or abdominal pain.      Review of Systems:  Review of Systems   Constitutional:  Negative for activity change, appetite change, fatigue and unexpected weight change.   Respiratory:  Negative for cough and shortness of breath.    Cardiovascular:  Negative for chest pain.   Gastrointestinal:  Negative for abdominal pain, diarrhea, nausea and vomiting.   Endocrine: Negative for heat intolerance.   Musculoskeletal:  Negative for arthralgias, back pain and myalgias.   Skin:  Negative for rash.   Neurological:  Negative for weakness and headaches.   Hematological:  Negative for adenopathy.       Patient Active Problem List   Diagnosis   • Anxiety   • Asthma   • Benign essential hypertension   • Combined hyperlipidemia   • Prediabetes   • Vitamin D deficiency   • Fibroid   • Localized osteoporosis without current pathological fracture   • Acute pain of right shoulder   • Pain in both lower extremities   • Neck pain   • BMI 35.0-35.9,adult   • Malignant neoplasm of lower-inner quadrant of left breast in female, estrogen receptor positive    • Family history of cancer   • BRCA negative   • GERD (gastroesophageal reflux disease)   • Use of anastrozole   • Thyroid nodule   • Trigger finger   • Multiple nevi   • Contusion of fifth toe of right foot   • Snoring   • SONIYA (obstructive sleep apnea)   • Trigger middle finger of left hand     Past Medical History:   Diagnosis Date   • Asthma     Last Assessed: 10/19/2016   • Breast cancer (HCC)     Left breast 2021   • Cancer (HCC) 10/2021   • Fibroid    • GERD (gastroesophageal reflux disease)     controlled with diet   • Hypercholesteremia    • Hypertension    •  Wears glasses      Past Surgical History:   Procedure Laterality Date   • BREAST BIOPSY Left 09/28/2021    multiple sites   • BREAST LUMPECTOMY Left 11/19/2021    Procedure: LUMPECTOMY BREAST BRACKETED AJAY LOCALIZED;  Surgeon: Audrey Lacey MD;  Location: [facility];  Service: Surgical Oncology   • BREAST LUMPECTOMY Left 1/21/2022    Procedure: LUMPECTOMY BREAST, re-excision; 0800 NUC MED;  Surgeon: Audrey Lacey MD;  Location: [facility];  Service: Surgical Oncology   • BUNIONECTOMY     • BUNIONECTOMY Right 2009   • BUNIONECTOMY Left 2007   • COLONOSCOPY     • FOOT SURGERY     • LYMPH NODE BIOPSY Left 1/21/2022    Procedure: BIOPSY LYMPH NODE SENTINEL;  Surgeon: Audrey Lacey MD;  Location: [facility];  Service: Surgical Oncology   • MAMMO NEEDLE LOCALIZATION LEFT (ALL INC) Left 11/12/2021   • MAMMO NEEDLE LOCALIZATION LEFT (ALL INC) EACH ADD Left 11/12/2021   • MAMMO STEREOTACTIC BREAST BIOPSY LEFT (ALL INC) Left 9/28/2021   • MAMMO STEREOTACTIC BREAST BIOPSY LEFT (ALL INC) EACH ADD Left 9/28/2021   • NASAL POLYP EXCISION     • GA COLONOSCOPY FLX DX W/COLLJ SPEC WHEN PFRMD N/A 5/23/2016    Procedure: COLONOSCOPY;  Surgeon: Adriana Westfall DO;  Location: [facility];  Service: Gastroenterology   • SHOULDER ARTHROSCOPY Right 2012   • SHOULDER ARTHROSCOPY W/ ROTATOR CUFF REPAIR Right 2012   • US GUIDED THYROID BIOPSY  6/2/2023     Family History   Problem Relation Age of Onset   • Hypertension Mother    • Breast cancer Mother         age unknown; bilateral breast cancer   • Lung cancer Mother    • Cervical cancer Mother    • Cancer Mother    • Lung disease Mother    • Diabetes Father    • Hypertension Father    • Prostate cancer Father         age unknown   • Breast cancer Maternal Grandmother 44   • Diabetes Paternal Grandmother    • Hypertension Brother    • Prostate cancer Brother 50   • Heart disease Brother    • Hypertension Brother    • Hypertension Maternal Aunt    • No Known Problems Maternal Aunt    • Lung  cancer Maternal Uncle    • Diabetes Paternal Aunt    • Diabetes Paternal Uncle    • Hypertension Cousin    • Breast cancer Cousin         under 50   • Diabetes Cousin    • Breast cancer Cousin         under 50     Social History     Socioeconomic History   • Marital status: /Civil Union     Spouse name: Not on file   • Number of children: Not on file   • Years of education: Not on file   • Highest education level: Not on file   Occupational History   • Not on file   Tobacco Use   • Smoking status: Never   • Smokeless tobacco: Never   Vaping Use   • Vaping status: Never Used   Substance and Sexual Activity   • Alcohol use: No   • Drug use: Never   • Sexual activity: Yes     Partners: Male     Birth control/protection: Post-menopausal, None   Other Topics Concern   • Not on file   Social History Narrative   • Not on file     Social Determinants of Health     Financial Resource Strain: Not on file   Food Insecurity: Not on file   Transportation Needs: Not on file   Physical Activity: Not on file   Stress: Not on file   Social Connections: Not on file   Intimate Partner Violence: Not on file   Housing Stability: Not on file       Current Outpatient Medications:   •  albuterol (PROVENTIL HFA,VENTOLIN HFA) 90 mcg/act inhaler, INHALE 2 PUFFS BY MOUTH EVERY 6 HOURS AS NEEDED FOR WHEEZING, Disp: 18 g, Rfl: 0  •  amLODIPine (NORVASC) 10 mg tablet, Take 1 tablet (10 mg total) by mouth daily, Disp: 90 tablet, Rfl: 1  •  anastrozole (ARIMIDEX) 1 mg tablet, Take 1 tablet by mouth once daily, Disp: 90 tablet, Rfl: 3  •  atorvastatin (LIPITOR) 10 mg tablet, Take 1 tablet (10 mg total) by mouth daily, Disp: 90 tablet, Rfl: 0  •  calcium carbonate (OS-HALIMA) 1250 (500 Ca) MG tablet, Take 1,200 mg by mouth daily , Disp: , Rfl:   •  cholecalciferol (VITAMIN D3) 1,000 units tablet, Take 2,000 Units by mouth daily  , Disp: , Rfl:   •  multivitamin (THERAGRAN) TABS, Take 1 tablet by mouth daily., Disp: , Rfl:   •  hydrocortisone 2.5  % cream, Apply topically 3 (three) times a day prn (Patient not taking: Reported on 1/3/2024), Disp: 28 g, Rfl: 2  •  methylPREDNISolone 4 MG tablet therapy pack, Use as directed on package (Patient not taking: Reported on 1/3/2024), Disp: 1 each, Rfl: 0  No Known Allergies  Vitals:    03/01/24 0859   BP: 122/80   Pulse: 86   Temp: 97.9 °F (36.6 °C)   SpO2: 98%       Physical Exam  Constitutional:       General: She is not in acute distress.     Appearance: Normal appearance.   Cardiovascular:      Rate and Rhythm: Normal rate and regular rhythm.      Pulses: Normal pulses.      Heart sounds: Normal heart sounds.   Pulmonary:      Effort: Pulmonary effort is normal.      Breath sounds: Normal breath sounds.   Chest:      Chest wall: No mass.   Breasts:     Right: No swelling, bleeding, inverted nipple, mass, nipple discharge, skin change or tenderness.      Left: Skin change (s/p RT) present. No swelling, bleeding, inverted nipple, mass, nipple discharge or tenderness.       Abdominal:      General: Abdomen is flat.      Palpations: Abdomen is soft.   Lymphadenopathy:      Upper Body:      Right upper body: No supraclavicular, axillary or pectoral adenopathy.      Left upper body: No supraclavicular, axillary or pectoral adenopathy.   Skin:     General: Skin is warm.   Neurological:      General: No focal deficit present.      Mental Status: She is alert and oriented to person, place, and time.   Psychiatric:         Mood and Affect: Mood normal.         Behavior: Behavior normal.           Results:    Imaging  No results found.    I reviewed the above imaging data.      Advance Care Planning/Advance Directives:  Discussed disease status, cancer treatment plans and/or cancer treatment goals with the patient.

## 2024-03-07 DIAGNOSIS — L30.9 DERMATITIS: ICD-10-CM

## 2024-04-09 PROBLEM — R06.83 SNORING: Status: RESOLVED | Noted: 2023-10-12 | Resolved: 2024-04-09

## 2024-04-15 ENCOUNTER — OFFICE VISIT (OUTPATIENT)
Dept: FAMILY MEDICINE CLINIC | Facility: CLINIC | Age: 64
End: 2024-04-15
Payer: COMMERCIAL

## 2024-04-15 VITALS
SYSTOLIC BLOOD PRESSURE: 134 MMHG | WEIGHT: 207.8 LBS | HEART RATE: 84 BPM | DIASTOLIC BLOOD PRESSURE: 78 MMHG | BODY MASS INDEX: 35.48 KG/M2 | OXYGEN SATURATION: 99 % | TEMPERATURE: 98.5 F | HEIGHT: 64 IN

## 2024-04-15 DIAGNOSIS — E04.1 THYROID NODULE: ICD-10-CM

## 2024-04-15 DIAGNOSIS — D22.9 MULTIPLE NEVI: ICD-10-CM

## 2024-04-15 DIAGNOSIS — Z23 ENCOUNTER FOR IMMUNIZATION: ICD-10-CM

## 2024-04-15 DIAGNOSIS — G47.33 OSA (OBSTRUCTIVE SLEEP APNEA): ICD-10-CM

## 2024-04-15 DIAGNOSIS — Z17.0 MALIGNANT NEOPLASM OF LOWER-INNER QUADRANT OF LEFT BREAST IN FEMALE, ESTROGEN RECEPTOR POSITIVE (HCC): ICD-10-CM

## 2024-04-15 DIAGNOSIS — M65.332 TRIGGER MIDDLE FINGER OF LEFT HAND: ICD-10-CM

## 2024-04-15 DIAGNOSIS — Z13.0 SCREENING FOR DEFICIENCY ANEMIA: ICD-10-CM

## 2024-04-15 DIAGNOSIS — J45.909 MILD ASTHMA WITHOUT COMPLICATION, UNSPECIFIED WHETHER PERSISTENT: ICD-10-CM

## 2024-04-15 DIAGNOSIS — R73.03 PREDIABETES: Primary | ICD-10-CM

## 2024-04-15 DIAGNOSIS — Z11.59 NEED FOR HEPATITIS C SCREENING TEST: ICD-10-CM

## 2024-04-15 DIAGNOSIS — C50.312 MALIGNANT NEOPLASM OF LOWER-INNER QUADRANT OF LEFT BREAST IN FEMALE, ESTROGEN RECEPTOR POSITIVE (HCC): ICD-10-CM

## 2024-04-15 DIAGNOSIS — E78.2 COMBINED HYPERLIPIDEMIA: ICD-10-CM

## 2024-04-15 PROCEDURE — 90750 HZV VACC RECOMBINANT IM: CPT

## 2024-04-15 PROCEDURE — 90471 IMMUNIZATION ADMIN: CPT

## 2024-04-15 PROCEDURE — 99214 OFFICE O/P EST MOD 30 MIN: CPT | Performed by: FAMILY MEDICINE

## 2024-04-15 NOTE — ASSESSMENT & PLAN NOTE
History of stage Ia breast cancer diagnosed in 2021.  ER/IA positive.  HER2 negative.  Status postlumpectomy and radiation.  Currently on anastrozole.  Doing well.  Continue follow-up with medical and surgical oncology teams

## 2024-04-15 NOTE — ASSESSMENT & PLAN NOTE
History of trigger finger left middle finger.  Injected once in our office.  Injected again at hand specialist.  Will give another injection today of triamcinolone 0.1 cc.  Call further problems

## 2024-04-15 NOTE — ASSESSMENT & PLAN NOTE
History of thyroid nodule.  Patient had FNA biopsy June 2023 which was negative.  Patient has yearly ultrasound scheduled in June

## 2024-04-15 NOTE — ASSESSMENT & PLAN NOTE
Patient received CPAP machine December 2023.  She states she has been having problems adjusting to this.  She feels that the pressure setting is too high.  She also has been using her albuterol inhaler more often lately.  Will send for PFT.  If this is normal, I would recommend contacting her sleep medicine team for further advice

## 2024-04-15 NOTE — ASSESSMENT & PLAN NOTE
Doing well on atorvastatin 10 mg daily.  Continue diet and exercise.  Patient due for labs in near future

## 2024-04-15 NOTE — ASSESSMENT & PLAN NOTE
Patient uses albuterol HFA as needed.  Since she received her CPAP machine, she has been using this more frequently.  Pulse ox today 98%.  Lungs are clear.  Will send for PFT.  If normal, I would recommend contacting her sleep medicine team for further recommendations (possibly adjust air pressure in her CPAP)

## 2024-04-15 NOTE — PROGRESS NOTES
Name: Nehal Bryant      : 1960      MRN: [id number]  Encounter Provider: Shankar Knight DO  Encounter Date: 4/15/2024   Encounter department: [facility]    Assessment & Plan     1. Prediabetes  Assessment & Plan:  Continue reduced carb diet and exercise.  Patient is due for labs.  Will call with results    Orders:  -     Comprehensive metabolic panel; Future  -     Hemoglobin A1C; Future    2. SONIYA (obstructive sleep apnea)  Assessment & Plan:  Patient received CPAP machine 2023.  She states she has been having problems adjusting to this.  She feels that the pressure setting is too high.  She also has been using her albuterol inhaler more often lately.  Will send for PFT.  If this is normal, I would recommend contacting her sleep medicine team for further advice      3. Malignant neoplasm of lower-inner quadrant of left breast in female, estrogen receptor positive (HCC)  Assessment & Plan:  History of stage Ia breast cancer diagnosed in .  ER/CA positive.  HER2 negative.  Status postlumpectomy and radiation.  Currently on anastrozole.  Doing well.  Continue follow-up with medical and surgical oncology teams      4. Combined hyperlipidemia  Assessment & Plan:  Doing well on atorvastatin 10 mg daily.  Continue diet and exercise.  Patient due for labs in near future    Orders:  -     Lipid Panel with Direct LDL reflex; Future    5. Thyroid nodule  Assessment & Plan:  History of thyroid nodule.  Patient had FNA biopsy 2023 which was negative.  Patient has yearly ultrasound scheduled in     Orders:  -     TSH, 3rd generation with Free T4 reflex; Future    6. Multiple nevi    7. Mild asthma without complication, unspecified whether persistent  Assessment & Plan:  Patient uses albuterol HFA as needed.  Since she received her CPAP machine, she has been using this more frequently.  Pulse ox today 98%.  Lungs are clear.  Will send for PFT.  If normal, I would recommend  contacting her sleep medicine team for further recommendations (possibly adjust air pressure in her CPAP)      8. Screening for deficiency anemia  -     CBC and differential; Future    9. Need for hepatitis C screening test  -     Hepatitis C antibody; Future    10. Encounter for immunization  -     Zoster Vaccine Recombinant IM    11. Trigger middle finger of left hand  Assessment & Plan:  History of trigger finger left middle finger.  Injected once in our office.  Injected again at hand specialist.  Will give another injection today of triamcinolone 0.1 cc.  Call further problems    Orders:  -     Complete PFT with post bronchodilator; Future         Second Shingrix given today    Mammogram scheduled  Thyroid ultrasound scheduled    Patient overdue for fasting blood work.  New order placed again today.  Will call with results    6 months  Subjective     Patient presents for recheck of chronic medical problems today.  She has been complaining of ongoing trigger finger left middle finger.  She also has been needing to use her albuterol inhaler recently.  She thinks this might be coming from her CPAP machine (wrong setting).      Review of Systems   Respiratory: Negative.     Cardiovascular: Negative.    Gastrointestinal: Negative.    Genitourinary: Negative.        Past Medical History:   Diagnosis Date   • Asthma     Last Assessed: 10/19/2016   • Breast cancer (HCC)     Left breast 2021   • Cancer (HCC) 10/2021   • Fibroid    • GERD (gastroesophageal reflux disease)     controlled with diet   • Hypercholesteremia    • Hypertension    • Wears glasses      Past Surgical History:   Procedure Laterality Date   • BREAST BIOPSY Left 09/28/2021    multiple sites   • BREAST LUMPECTOMY Left 11/19/2021    Procedure: LUMPECTOMY BREAST BRACKETED AJAY LOCALIZED;  Surgeon: Audrey Lacey MD;  [facility];  Service: Surgical Oncology   • BREAST LUMPECTOMY Left 1/21/2022    Procedure: LUMPECTOMY BREAST, re-excision; 0800  NUC MED;  Surgeon: Audrey Lacey MD;  [facility];  Service: Surgical Oncology   • BUNIONECTOMY     • BUNIONECTOMY Right 2009   • BUNIONECTOMY Left 2007   • COLONOSCOPY     • FOOT SURGERY     • LYMPH NODE BIOPSY Left 1/21/2022    Procedure: BIOPSY LYMPH NODE SENTINEL;  Surgeon: Audrey Lacey MD;  [facility];  Service: Surgical Oncology   • MAMMO NEEDLE LOCALIZATION LEFT (ALL INC) Left 11/12/2021   • MAMMO NEEDLE LOCALIZATION LEFT (ALL INC) EACH ADD Left 11/12/2021   • MAMMO STEREOTACTIC BREAST BIOPSY LEFT (ALL INC) Left 9/28/2021   • MAMMO STEREOTACTIC BREAST BIOPSY LEFT (ALL INC) EACH ADD Left 9/28/2021   • NASAL POLYP EXCISION     • MD COLONOSCOPY FLX DX W/COLLJ SPEC WHEN PFRMD N/A 5/23/2016    Procedure: COLONOSCOPY;  Surgeon: Adriana Westfall DO;  [facility]: [facility];  Service: Gastroenterology   • SHOULDER ARTHROSCOPY Right 2012   • SHOULDER ARTHROSCOPY W/ ROTATOR CUFF REPAIR Right 2012   • US GUIDED THYROID BIOPSY  6/2/2023     Family History   Problem Relation Age of Onset   • Hypertension Mother    • Breast cancer Mother         age unknown; bilateral breast cancer   • Lung cancer Mother    • Cervical cancer Mother    • Cancer Mother    • Lung disease Mother    • Diabetes Father    • Hypertension Father    • Prostate cancer Father         age unknown   • Breast cancer Maternal Grandmother 44   • Diabetes Paternal Grandmother    • Hypertension Brother    • Prostate cancer Brother 50   • Heart disease Brother    • Hypertension Brother    • Prostate cancer Brother    • Hypertension Maternal Aunt    • No Known Problems Maternal Aunt    • Lung cancer Maternal Uncle    • Diabetes Paternal Aunt    • Diabetes Paternal Uncle    • Hypertension Cousin    • Breast cancer Cousin         under 50   • Diabetes Cousin    • Breast cancer Cousin    • Breast cancer Cousin         under 50   • Hypertension Brother    • Hypertension Maternal Aunt      Social History     Socioeconomic History   • Marital  status: /Civil Union     Spouse name: None   • Number of children: None   • Years of education: None   • Highest education level: None   Occupational History   • None   Tobacco Use   • Smoking status: Never     Passive exposure: Never   • Smokeless tobacco: Never   Vaping Use   • Vaping status: Never Used   Substance and Sexual Activity   • Alcohol use: No   • Drug use: Never   • Sexual activity: Yes     Partners: Male     Birth control/protection: Post-menopausal, None   Other Topics Concern   • None   Social History Narrative   • None     Social Determinants of Health     Financial Resource Strain: Not on file   Food Insecurity: Not on file   Transportation Needs: Not on file   Physical Activity: Not on file   Stress: Not on file   Social Connections: Not on file   Intimate Partner Violence: Not on file   Housing Stability: Not on file     Current Outpatient Medications on File Prior to Visit   Medication Sig   • albuterol (PROVENTIL HFA,VENTOLIN HFA) 90 mcg/act inhaler INHALE 2 PUFFS BY MOUTH EVERY 6 HOURS AS NEEDED FOR WHEEZING   • amLODIPine (NORVASC) 10 mg tablet Take 1 tablet (10 mg total) by mouth daily   • anastrozole (ARIMIDEX) 1 mg tablet Take 1 tablet by mouth once daily   • atorvastatin (LIPITOR) 10 mg tablet Take 1 tablet (10 mg total) by mouth daily   • calcium carbonate (OS-HALIMA) 1250 (500 Ca) MG tablet Take 1,200 mg by mouth daily    • cholecalciferol (VITAMIN D3) 1,000 units tablet Take 2,000 Units by mouth daily     • hydrocortisone 2.5 % cream Apply topically 3 (three) times a day prn   • multivitamin (THERAGRAN) TABS Take 1 tablet by mouth daily.   • methylPREDNISolone 4 MG tablet therapy pack Use as directed on package (Patient not taking: Reported on 1/3/2024)     No Known Allergies  Immunization History   Administered Date(s) Administered   • COVID-19 MODERNA VACC 0.5 ML IM 03/23/2021, 04/22/2021, 01/05/2022   • INFLUENZA 10/15/2018, 10/12/2023   • Influenza Quadrivalent, 6-35 Months IM  "10/19/2016, 10/11/2017   • Influenza, injectable, quadrivalent, preservative free 0.5 mL 10/12/2023   • Influenza, recombinant, quadrivalent,injectable, preservative free 10/30/2019, 10/07/2020, 12/08/2021, 11/10/2022   • Influenza, seasonal, injectable 1960, 10/01/2014, 11/05/2015, 11/01/2016   • Influenza, seasonal, injectable, preservative free 10/15/2018   • Tdap 05/05/2022   • Zoster Vaccine Recombinant 10/12/2023, 10/12/2023, 04/15/2024       Objective     /78   Pulse 84   Temp 98.5 °F (36.9 °C)   Ht 5' 3.5\" (1.613 m)   Wt 94.3 kg (207 lb 12.8 oz)   LMP 12/15/2003 (Within Years)   SpO2 99%   BMI 36.23 kg/m²     Physical Exam  Cardiovascular:      Rate and Rhythm: Normal rate and regular rhythm.      Heart sounds: Normal heart sounds.      Comments: Carotids: no bruits  Ext: no edema  Pulmonary:      Effort: Pulmonary effort is normal. No respiratory distress.      Breath sounds: No wheezing or rales.   Psychiatric:         Behavior: Behavior normal.         Thought Content: Thought content normal.       Shankar Knight, DO    "

## 2024-04-26 DIAGNOSIS — E78.2 COMBINED HYPERLIPIDEMIA: ICD-10-CM

## 2024-04-29 RX ORDER — ATORVASTATIN CALCIUM 10 MG/1
10 TABLET, FILM COATED ORAL DAILY
Qty: 90 TABLET | Refills: 1 | Status: SHIPPED | OUTPATIENT
Start: 2024-04-29

## 2024-05-06 ENCOUNTER — OFFICE VISIT (OUTPATIENT)
Dept: SLEEP CENTER | Facility: CLINIC | Age: 64
End: 2024-05-06
Payer: COMMERCIAL

## 2024-05-06 VITALS
WEIGHT: 206 LBS | BODY MASS INDEX: 36.5 KG/M2 | SYSTOLIC BLOOD PRESSURE: 130 MMHG | HEIGHT: 63 IN | OXYGEN SATURATION: 99 % | DIASTOLIC BLOOD PRESSURE: 65 MMHG | HEART RATE: 93 BPM

## 2024-05-06 DIAGNOSIS — R53.83 FATIGUE, UNSPECIFIED TYPE: ICD-10-CM

## 2024-05-06 DIAGNOSIS — G47.33 OSA (OBSTRUCTIVE SLEEP APNEA): Primary | ICD-10-CM

## 2024-05-06 PROCEDURE — 99213 OFFICE O/P EST LOW 20 MIN: CPT

## 2024-05-06 NOTE — PATIENT INSTRUCTIONS
Continue PAP Therapy  - Continue APAP at 5-15 cmH2O.  - Please increase the humidity settings on your device.  - Script to have mask refit.  Remember to clean your mask and equipment regularly, as directed.  You should be eligible for new supplies approximately every 3-6 months, depending on your insurance coverage. Contact your Durable Medical Equipment (DME) company for new supplies as needed.  Follow up in 4 months    Care and Maintenance  Headgear should be washed as needed. Daily inspection and weekly washings are recommended. Do not disassemble the straps. Machine wash in warm water, making sure to attach Velcro hooks and tabs before washing. Line dry or machine dry on a low setting.  Masks should be washed every day. Daily inspection is recommended. Leave the mask and tubing attached. Gently wash the mask with a soft cloth using warm water and mild detergent, concentrating on the mask cushion flaps. DO NOT use alcohol or bleach. Rinse thoroughly and air dry.  Tubing and headgear should be washed weekly. Daily inspection is recommended. Wash in warm water and mild detergent and rinse thoroughly. Hook the tubing to the machine and blow until dry.  Humidifier should be washed daily and filled with DISTILLED water before use. Wash with warm water and mild detergent. Disinfect weekly by soaking with a solution of 1 part white vinegar and 3 parts water for 30 minutes. Rinse thoroughly and air dry.  Disposable filters should be replaced once a month. Wash reusable foam filters with warm water and mild detergent at least once a month. Rinse thoroughly and dry with paper towels.  Avoid  that contain fragrance or conditioners, as these will leave a residue.  NEVER iron any soft goods.    Insurance Requirements  Your insurance requires a face-to-face follow up visit within a 31-90 day period after starting PAP therapy.  Your insurance requires compliance with your PAP device, which is at least 4 hours per night  for 70% of the time. This must be done over a 30 day period and must occur within the initial 31-90 day period after starting CPAP.  Your insurance also requires at least yearly follow ups to continue to pay for CPAP supplies.     PAP Supply Guidelines  Below are the guidelines for reordering your supplies. You will be responsible for your deductible, co payments, and out of pocket expenses.    Item Frequency   Nasal Mask (no headgear) 1 every 3 months   Nasal Mask Cushion 1 every 2 weeks   Full Face Mask (no headgear) 1 every 3 months   Full Face Mask Cushion 1 every month   Nasal Pillows 1 every 2 weeks   Headgear 1 every 6 months   hin Strap 1 every 6 months   cody 1 every 3 months   Filters: Reusable 1 every 6 months   Filters: Disposable 1 every 2 weeks   Humidifier Chamber(disposable) 1 every 6 months

## 2024-05-06 NOTE — PROGRESS NOTES
[facility]  Sleep Medicine Follow Up/Established Patient    PATIENT NAME: Nehal Bryant  DATE OF SERVICE: May 6, 2024  DATE OF LAST VISIT: 2/2/2024    ASSESSMENT/PLAN:  Nehal Bryant is a 63 y.o. female with PMHx of breast cancer, SONIYA on CPAP, GERD, asthma, HTN, HLD, prediabetes, anxiety and obesity  who returns to the office for follow up.    Obstructive Sleep Apnea  Class 2 Obesity  -The patient has a history of severe SONIYA diagnosed in 2023 on HSAT (MICHAEL 33.2, supine MICHAEL 33.2, O2 clive 80%, TST <90% 21 min) is currently prescribed auto CPAP 5 and 15 cmH2O.  Currently she is continue to have difficulties with tolerating the device although she remains compliant.  - Therapy and compliance data reviewed: residual AHI elevated at 7.5, compliance 100% and mask leak is borderline elevated.  - Given the patient notes pressure intolerance will keep APAP 5-15 cmH2O.  Will plan to redownload her data in 4 weeks to reevaluate as she is getting another mask refitting today.  Would consider increasing to 8-20 cmH2O if residual AHI remains elevated.  - Patient met with [employer] after today's visit for mask fitting and to increase humidity settings.  - Refill of supplies will be sent to the patient's DME.  - Explained the importance of keeping the machine clean, and that they should be eligible for refills of supplies every 3-6 months depending on insurance.  We also discussed the insurance compliance requirements.  - Encouraged healthy lifestyle with adequate sleep (7-9 hours per night), healthy balanced diet and routine exercise.  Explained the importance of avoiding driving while drowsy.  - Follow-up in 6 months  -     PAP DME Resupply/Reorder  -     Mask fitting only; Future    Fatigue  - The patient was previously noting daytime sleepiness; however, on further discussion today it appears this is more fatigued than true sleepiness.  ESS is 6 today and the patient denies feeling as if she can fall  asleep during the day, but rather states it is a general sense of lack of energy.  - She has lab work pending with her PCP currently, if this lab work is negative could consider checking vitamin B-12, folate and vitamin D levels.  We did discuss that her fatigue may not be from her sleep apnea.  ________________________________________________________________________________________________    Interval History: Nehal Bryant is a 63 y.o. female with PMHx of breast cancer, SONIYA on CPAP, GERD, asthma, HTN, HLD, prediabetes, anxiety and obesity  who returns to the office for follow up.  She reports she is continue to have difficulties with tolerating the CPAP.  She did initially try a fullface mask, but states this was too uncomfortable and that she then switched to a hybrid style mask.  She notes the hybrid mask is much more tolerable; however, she thinks the size she was given is too small.  She notes the hybrid places a lot of pressure on her upper lip and is causing soreness as a result.  We discussed trying a nasal pillows or nasal interface with a chinstrap, but she states the nasal only interfaces are too difficult to tolerate.  She is not sure if she is getting any benefit from the device, and notes she continues to feel unrested during the day.  Upon further discussion she describes her daytime symptoms as lack of energy, and denies actual true sleepiness.  She is additionally having issues with dry mouth in the morning, but was unable to turn up the humidity after her last visit.    PAP History  Sleep Apnea Type: SONIYA  Most Recent AHI: 33.2 in 2023  Treatment: APAP    DME: [facility]    Uses APAP for 7.5 hours per night, 7 nights per week.  Current PAP Settings: 5-15 cmH2O  Compliance Data: 100% between 4/1/2024 and 4/30/2024  - Residual AHI 7.5 (obstructive 4.7, hypopnea 1.1, central 0.3)  - Median pressure 8.4 cmH2O  - 95th percentile pressure 13.0 cmH2O  - 95th percentile leak 27.1 LPM    Mask Type:  hybrid  PAP Issues: mask discomfort and she feels that the pressure on her teeth is too high  Uses Chin Strap: No  Uses Ramp Function: Yes   Uses Humidity: Yes     There is not a perceived benefit by the patient: doesn't think she has noticed much different  Observers report no longer has snoring with APAP use.    Prior History: The patient for started following with [facility] sleep medicine in 12/2023 due to an HSAT that showed severe SONIYA. Recommendation was for her to begin on auto CPAP 5-15 cmH2O. On her initial visit she did note multiple nocturnal awakenings due to nocturia and for unclear reasons, but denied symptoms of sleep onset insomnia or RLS. She did report significant daytime sleepiness with napping 4 times a week up to 2 to 3 hours at a time.  At her initial compliance visit she noted benefit with using the device, but felt as if she was breathing through her mouth at times while wearing a nasal pillow interface.  She was still having some daytime sleepiness as well and recommendation was for mask refitting and follow-up in 3 months.    ESS: Total score: 6/24  Greater or equal to 10 is positive for excessive daytime sleepiness  Pertinent Meds: None    Sleep-Wake Schedule:  Bedtime: 0465-5149  Wake Time: 0577-5463  Difficulty Falling Asleep: No  Avg Number of Awakenings: 0-2x  Cause of Awakenings: bathroom and for unclear reasons  Weekend Sleep Schedule: unchanged  Naps: 2x a week for 30-60 min    Avg TST per 24 hours: 7-7.5 hours    Past Treatments:  APAP 5-15 cmH2O    Prior Sleep Studies:  HSAT -- 11/25/2023 (Wt 199.0 lbs)  MICHAEL - 33.2  Sup MICHAEL - 33.2  O2 [place] - 80.0%  TST < 90% - 21 min    Other Relevant Labs and Studies:  None    Past Medical History:   Diagnosis Date    Asthma     Last Assessed: 10/19/2016    Breast cancer (HCC)     Left breast 2021    Cancer (HCC) 10/2021    Fibroid     GERD (gastroesophageal reflux disease)     controlled with diet    Hypercholesteremia     Hypertension      Wears glasses      Past Surgical History:   Procedure Laterality Date    BREAST BIOPSY Left 09/28/2021    multiple sites    BREAST LUMPECTOMY Left 11/19/2021    Procedure: LUMPECTOMY BREAST BRACKETED AJAY LOCALIZED;  Surgeon: Audrey Lacey MD;  [facility];  Service: Surgical Oncology    BREAST LUMPECTOMY Left 1/21/2022    Procedure: LUMPECTOMY BREAST, re-excision; 0800 NUC MED;  Surgeon: Audrey Lacey MD;  [facility];  Service: Surgical Oncology    BUNIONECTOMY      BUNIONECTOMY Right 2009    BUNIONECTOMY Left 2007    COLONOSCOPY      FOOT SURGERY      LYMPH NODE BIOPSY Left 1/21/2022    Procedure: BIOPSY LYMPH NODE SENTINEL;  Surgeon: Audrey Lacey MD;  [facility];  Service: Surgical Oncology    MAMMO NEEDLE LOCALIZATION LEFT (ALL INC) Left 11/12/2021    MAMMO NEEDLE LOCALIZATION LEFT (ALL INC) EACH ADD Left 11/12/2021    MAMMO STEREOTACTIC BREAST BIOPSY LEFT (ALL INC) Left 9/28/2021    MAMMO STEREOTACTIC BREAST BIOPSY LEFT (ALL INC) EACH ADD Left 9/28/2021    NASAL POLYP EXCISION      OK COLONOSCOPY FLX DX W/COLLJ SPEC WHEN PFRMD N/A 5/23/2016    Procedure: COLONOSCOPY;  Surgeon: Adriana Westfall DO;  [facility];  Service: Gastroenterology    SHOULDER ARTHROSCOPY Right 2012    SHOULDER ARTHROSCOPY W/ ROTATOR CUFF REPAIR Right 2012    US GUIDED THYROID BIOPSY  6/2/2023     Patient Active Problem List   Diagnosis    Anxiety    Asthma    Benign essential hypertension    Combined hyperlipidemia    Prediabetes    Vitamin D deficiency    Fibroid    Localized osteoporosis without current pathological fracture    Acute pain of right shoulder    Pain in both lower extremities    Neck pain    BMI 35.0-35.9,adult    Malignant neoplasm of lower-inner quadrant of left breast in female, estrogen receptor positive (HCC)    Family history of cancer    BRCA negative    GERD (gastroesophageal reflux disease)    Use of anastrozole    Thyroid nodule    Trigger finger    Multiple nevi     Contusion of fifth toe of right foot    SONIYA (obstructive sleep apnea)    Trigger middle finger of left hand     Allergies as of 05/06/2024    (No Known Allergies)     REVIEW OF SYSTEMS:  Review of Systems  10-point system review completed, all of which are negative except as mentioned above.    CURRENT MEDICATIONS:  Current Outpatient Medications   Medication Instructions    albuterol (PROVENTIL HFA,VENTOLIN HFA) 90 mcg/act inhaler 2 puffs, Inhalation, Every 6 hours PRN    amLODIPine (NORVASC) 10 mg, Oral, Daily    anastrozole (ARIMIDEX) 1 mg, Oral, Daily    atorvastatin (LIPITOR) 10 mg, Oral, Daily    calcium carbonate (OS-HALIMA) 1,200 mg, Oral, Daily    cholecalciferol (VITAMIN D3) 2,000 Units, Oral, Daily    hydrocortisone 2.5 % cream Topical, 3 times daily, prn    methylPREDNISolone 4 MG tablet therapy pack Use as directed on package    multivitamin (THERAGRAN) TABS 1 tablet, Oral, Daily     SOCIAL HISTORY:  Social History     Tobacco Use    Smoking status: Never     Passive exposure: Never    Smokeless tobacco: Never   Vaping Use    Vaping status: Never Used   Substance Use Topics    Alcohol use: No    Drug use: Never     FAMILY HISTORY:  Family History   Problem Relation Age of Onset    Hypertension Mother     Breast cancer Mother         age unknown; bilateral breast cancer    Lung cancer Mother     Cervical cancer Mother     Cancer Mother     Lung disease Mother     Diabetes Father     Hypertension Father     Prostate cancer Father         age unknown    Breast cancer Maternal Grandmother 44    Diabetes Paternal Grandmother     Hypertension Brother     Prostate cancer Brother 50    Heart disease Brother     Hypertension Brother     Prostate cancer Brother     Hypertension Maternal Aunt     No Known Problems Maternal Aunt     Lung cancer Maternal Uncle     Diabetes Paternal Aunt     Diabetes Paternal Uncle     Hypertension Cousin     Breast cancer Cousin         under 50    Diabetes Cousin     Breast cancer  "Cousin     Breast cancer Cousin         under 50    Hypertension Brother     Hypertension Maternal Aunt      PHYSICAL EXAMINATION:  Vital Signs:  /65 (BP Location: Left arm, Patient Position: Sitting, Cuff Size: Large)   Pulse 93   Ht 5' 3\" (1.6 m)   Wt 93.4 kg (206 lb)   LMP 12/15/2003 (Within Years)   SpO2 99%   BMI 36.49 kg/m²   Body mass index is 36.49 kg/m².  Constitutional: NAD, well appearing, obese   Mental Status: AAOx3  Skin: Warm, dry, no rashes noted   Eyes: PERRL, normal conjunctiva  ENT: Nasal congestion absent, nasal valve incompetence absent.  Posterior Airspace:   Anand Tongue Position: 3  Retrognathia: absent  Overbite: absent  High Arched Palate: absent  Tongue Scalloping/Ridging: present  Tonsils: 1+  Uvula: normal  Chest: RRR, +S1/S2, CTA B/L, no W/R/R, no M/R/G   Abdomen: Soft, NT/ND  Extremities: No digital clubbing or pedal edema    I have spent a total time of 25 minutes on 05/06/24 in caring for this patient including Instructions for management, Patient and family education, Importance of tx compliance, Counseling / Coordination of care, Documenting in the medical record, Reviewing / ordering tests, medicine, procedures  , and Obtaining or reviewing history  .    Virginia Mercer MD  Pulmonary-Critical Care and Sleep Medicine  05/06/24    Portions of the record may have been created with voice recognition software. Occasional wrong word or \"sound a like\" substitutions may have occurred due to the inherent limitations of voice recognition software. Please read the chart carefully and recognize, using context, where substitutions have occurred.   "

## 2024-05-07 ENCOUNTER — TELEPHONE (OUTPATIENT)
Dept: SLEEP CENTER | Facility: CLINIC | Age: 64
End: 2024-05-07

## 2024-05-08 LAB

## 2024-06-03 ENCOUNTER — HOSPITAL ENCOUNTER (OUTPATIENT)
Dept: ULTRASOUND IMAGING | Facility: MEDICAL CENTER | Age: 64
Discharge: HOME | End: 2024-06-03
Payer: COMMERCIAL

## 2024-06-03 ENCOUNTER — TELEPHONE (OUTPATIENT)
Dept: SLEEP CENTER | Facility: CLINIC | Age: 64
End: 2024-06-03

## 2024-06-03 DIAGNOSIS — E04.1 THYROID NODULE: ICD-10-CM

## 2024-06-03 PROCEDURE — 76536 US EXAM OF HEAD AND NECK: CPT

## 2024-06-03 NOTE — TELEPHONE ENCOUNTER
Compliance obtained from [employer] and uploaded to media for review.    Dr. Mercer notified.

## 2024-06-03 NOTE — TELEPHONE ENCOUNTER
----- Message from Virginia Mercer MD sent at 6/3/2024  8:24 AM EDT -----  Hello,    Can you please obtain a 30 day download for this patient, I'm not able to pull her up in [employer].    Thanks,  Virginia

## 2024-06-04 DIAGNOSIS — J45.909 MILD ASTHMA WITHOUT COMPLICATION, UNSPECIFIED WHETHER PERSISTENT: ICD-10-CM

## 2024-06-04 RX ORDER — ALBUTEROL SULFATE 90 UG/1
2 AEROSOL, METERED RESPIRATORY (INHALATION) EVERY 6 HOURS PRN
Qty: 18 G | Refills: 0 | Status: SHIPPED | OUTPATIENT
Start: 2024-06-04

## 2024-06-07 ENCOUNTER — TELEPHONE (OUTPATIENT)
Dept: HEMATOLOGY ONCOLOGY | Facility: CLINIC | Age: 64
End: 2024-06-07

## 2024-06-07 NOTE — TELEPHONE ENCOUNTER
Pt's appt needs to be rescheduled d/t provider not being in the office. Attempted to reach patient about need of appointment change with no success. Appointment canceled and detailed VM left with [employer] number [phone] for patient to return call to reschedule.     Additional message sent Via [employer].

## 2024-06-11 DIAGNOSIS — E04.1 THYROID NODULE: Primary | ICD-10-CM

## 2024-06-12 ENCOUNTER — HOSPITAL ENCOUNTER (OUTPATIENT)
Dept: PULMONOLOGY | Facility: HOSPITAL | Age: 64
Discharge: HOME | End: 2024-06-12
Payer: COMMERCIAL

## 2024-06-12 DIAGNOSIS — M65.332 TRIGGER MIDDLE FINGER OF LEFT HAND: ICD-10-CM

## 2024-06-12 PROCEDURE — 94060 EVALUATION OF WHEEZING: CPT

## 2024-06-12 PROCEDURE — 94726 PLETHYSMOGRAPHY LUNG VOLUMES: CPT

## 2024-06-12 PROCEDURE — 94729 DIFFUSING CAPACITY: CPT

## 2024-06-12 PROCEDURE — 94726 PLETHYSMOGRAPHY LUNG VOLUMES: CPT | Performed by: INTERNAL MEDICINE

## 2024-06-12 PROCEDURE — 94729 DIFFUSING CAPACITY: CPT | Performed by: INTERNAL MEDICINE

## 2024-06-12 PROCEDURE — 94760 N-INVAS EAR/PLS OXIMETRY 1: CPT

## 2024-06-12 PROCEDURE — 94060 EVALUATION OF WHEEZING: CPT | Performed by: INTERNAL MEDICINE

## 2024-06-12 RX ORDER — ALBUTEROL SULFATE 2.5 MG/3ML
2.5 SOLUTION RESPIRATORY (INHALATION) ONCE AS NEEDED
Status: COMPLETED | OUTPATIENT
Start: 2024-06-12 | End: 2024-06-12

## 2024-06-12 RX ADMIN — ALBUTEROL SULFATE 2.5 MG: 2.5 SOLUTION RESPIRATORY (INHALATION) at 09:55

## 2024-07-15 ENCOUNTER — TELEPHONE (OUTPATIENT)
Age: 64
End: 2024-07-15

## 2024-07-15 NOTE — TELEPHONE ENCOUNTER
Patient calling because her mask is leaking at night and needs assistance.  Patient wants to know if her humidity level could be changed.  Please assist and call back.

## 2024-07-17 NOTE — TELEPHONE ENCOUNTER
Returned call from patient.  Patient has already spoken with a representative from [facility] regarding the mask leak and humidity issue.    Patient was advised by [employer] representative in [place] to have a mask fitting at the [facility] due to there being more products available.    [facility] representative was able to assist patient with humidity setting.

## 2024-07-19 DIAGNOSIS — J45.909 MILD ASTHMA WITHOUT COMPLICATION, UNSPECIFIED WHETHER PERSISTENT: ICD-10-CM

## 2024-07-19 RX ORDER — ALBUTEROL SULFATE 90 UG/1
2 AEROSOL, METERED RESPIRATORY (INHALATION) EVERY 6 HOURS PRN
Qty: 18 G | Refills: 0 | Status: SHIPPED | OUTPATIENT
Start: 2024-07-19

## 2024-07-24 DIAGNOSIS — I10 BENIGN ESSENTIAL HYPERTENSION: ICD-10-CM

## 2024-07-24 RX ORDER — AMLODIPINE BESYLATE 10 MG/1
10 TABLET ORAL DAILY
Qty: 100 TABLET | Refills: 1 | Status: SHIPPED | OUTPATIENT
Start: 2024-07-24

## 2024-08-11 DIAGNOSIS — J45.909 MILD ASTHMA WITHOUT COMPLICATION, UNSPECIFIED WHETHER PERSISTENT: ICD-10-CM

## 2024-08-12 RX ORDER — ALBUTEROL SULFATE 90 UG/1
2 AEROSOL, METERED RESPIRATORY (INHALATION) EVERY 6 HOURS PRN
Qty: 18 G | Refills: 0 | Status: SHIPPED | OUTPATIENT
Start: 2024-08-12

## 2024-09-09 ENCOUNTER — OFFICE VISIT (OUTPATIENT)
Dept: SLEEP CENTER | Facility: CLINIC | Age: 64
End: 2024-09-09
Payer: MEDICARE

## 2024-09-09 VITALS
WEIGHT: 210 LBS | DIASTOLIC BLOOD PRESSURE: 71 MMHG | BODY MASS INDEX: 37.21 KG/M2 | OXYGEN SATURATION: 99 % | SYSTOLIC BLOOD PRESSURE: 149 MMHG | HEIGHT: 63 IN | HEART RATE: 84 BPM

## 2024-09-09 DIAGNOSIS — G47.33 OSA (OBSTRUCTIVE SLEEP APNEA): Primary | ICD-10-CM

## 2024-09-09 PROCEDURE — 99213 OFFICE O/P EST LOW 20 MIN: CPT

## 2024-09-09 NOTE — PATIENT INSTRUCTIONS
Continue PAP Therapy  - Continue APAP at 5-15 cmH2O.  - Script for mask refitting  Remember to clean your mask and equipment regularly, as directed.  You should be eligible for new supplies approximately every 3-6 months, depending on your insurance coverage. Contact your Durable Medical Equipment (DME) company for new supplies as needed.  Follow up in 5 months    Care and Maintenance  Headgear should be washed as needed. Daily inspection and weekly washings are recommended. Do not disassemble the straps. Machine wash in warm water, making sure to attach Velcro hooks and tabs before washing. Line dry or machine dry on a low setting.  Masks should be washed every day. Daily inspection is recommended. Leave the mask and tubing attached. Gently wash the mask with a soft cloth using warm water and mild detergent, concentrating on the mask cushion flaps. DO NOT use alcohol or bleach. Rinse thoroughly and air dry.  Tubing and headgear should be washed weekly. Daily inspection is recommended. Wash in warm water and mild detergent and rinse thoroughly. Hook the tubing to the machine and blow until dry.  Humidifier should be washed daily and filled with DISTILLED water before use. Wash with warm water and mild detergent. Disinfect weekly by soaking with a solution of 1 part white vinegar and 3 parts water for 30 minutes. Rinse thoroughly and air dry.  Disposable filters should be replaced once a month. Wash reusable foam filters with warm water and mild detergent at least once a month. Rinse thoroughly and dry with paper towels.  Avoid  that contain fragrance or conditioners, as these will leave a residue.  NEVER iron any soft goods.    Insurance Requirements  Your insurance requires a face-to-face follow up visit within a 31-90 day period after starting PAP therapy.  Your insurance requires compliance with your PAP device, which is at least 4 hours per night for 70% of the time. This must be done over a 30 day  period and must occur within the initial 31-90 day period after starting CPAP.  Your insurance also requires at least yearly follow ups to continue to pay for CPAP supplies.     PAP Supply Guidelines  Below are the guidelines for reordering your supplies. You will be responsible for your deductible, co payments, and out of pocket expenses.    Item Frequency   Nasal Mask (no headgear) 1 every 3 months   Nasal Mask Cushion 1 every 2 weeks   Full Face Mask (no headgear) 1 every 3 months   Full Face Mask Cushion 1 every month   Nasal Pillows 1 every 2 weeks   Headgear 1 every 6 months   hin Strap 1 every 6 months   cody 1 every 3 months   Filters: Reusable 1 every 6 months   Filters: Disposable 1 every 2 weeks   Humidifier Chamber(disposable) 1 every 6 months

## 2024-09-09 NOTE — PROGRESS NOTES
[facility]  Sleep Medicine Follow Up/Established Patient    PATIENT NAME: Nehal Bryant  DATE OF SERVICE: September 9, 2024  DATE OF LAST VISIT: 5/6/2024    ASSESSMENT/PLAN:  Nehal Bryant is a 64 y.o. female with PMHx of breast cancer, SONIYA on CPAP, GERD, asthma, HTN, HLD, prediabetes, anxiety and obesity who returns to the office for follow up.    SONIYA (Obstructive Sleep Apnea)  - The patient has a history of severe SONIYA diagnosed in 2023 on HSAT (MICHAEL 33.2, supine MICHAEL 33.2, O2 clive 80%, TST <90% 21 min) is currently prescribed auto CPAP 5-15 cmH2O.  She continues to have issues with mask leak.  - Therapy and compliance data reviewed: residual AHI 5.5, compliance 100% and mask leak is significantly elevated.  - Continue with APAP 5-15 cmH2O.  - Recommend mask refitting for fullface style mask as she has tried 2 different sizes of hybrid mask and has had difficulty with soreness on her upper lip with both soreness on her upper lip with both sizes.  - Refill of supplies will be sent to the patient's DME.  - Explained the importance of keeping the machine clean, and that they should be eligible for refills of supplies every 3-6 months depending on insurance.  We also discussed the insurance compliance requirements.  - Encouraged healthy lifestyle with adequate sleep (7-9 hours per night), healthy balanced diet and routine exercise.  Explained the importance of avoiding driving while drowsy.  - Follow-up in 5 months  -     Mask fitting only; Future  -     PAP DME Resupply/Reorder    ________________________________________________________________________________________________    Interval History: Nehal Bryant is a 64 y.o. female with PMHx of breast cancer, SONIYA on CPAP, GERD, asthma, HTN, HLD, prediabetes, anxiety and obesity who returns to the office for follow up.  The patient reports that she did not have the mask refitting completed after last visit although it does appear that  "she might of switched to a different size of the hybrid interface after the visit.  She then called the office stating that it felt worse and recommendation was to go back to the medium size hybrid mask.  She continues to have difficulty with soreness on her upper lip from the mask and feels that the fullface interface might have been more comfortable.  She did switch her device to manual controlled humidity and has had some improvement in her dry mouth with this.  However, she continues to feel no significant benefit from CPAP use.  She denies aerophagia and rainout, but in the morning she feels as if she got \"too much air.\"  She does report she is receiving supply refills and is keeping the device clean.    PAP History  Sleep Apnea Type: SONIYA  Most Recent AHI: 33.2 in 2023  Treatment: APAP     DME: [facility]    Uses APAP for 6.5-7 hours per night, 7 nights per week.  Current PAP Settings: 5-15 cmH2O  Compliance Data: 100%, see below    Mask Type: hybrid  PAP Issues: mask leaks   Uses Chin Strap: No  Uses Ramp Function: Yes   Uses Humidity: Yes     There is not a perceived benefit by the patient: hasn't noticed a difference with CPAP  Observers report no longer has snoring with APAP use.    Prior History: The patient for started following with [facility] sleep medicine in 12/2023 due to an HSAT that showed severe SONIYA (MICHAEL 33.2, supine MICHAEL 33.2, O2 clive 80%, TST <90% 21 min). Recommendation was for her to begin on auto CPAP 5-15 cmH2O. On her initial visit she did note multiple nocturnal awakenings due to nocturia and for unclear reasons, but denied symptoms of sleep onset insomnia or RLS. She did report significant daytime sleepiness with napping 4 times a week up to 2 to 3 hours at a time.  At her initial compliance visit she noted benefit with using the device, but felt as if she was breathing through her mouth at times while wearing a nasal pillow interface.  She ultimately switched to a hybrid interface, but " has continued to have issues with this and additional refitting was recommended.    ESS: Total score: (P) 5/24  Greater or equal to 10 is positive for excessive daytime sleepiness  Pertinent Meds: None    Sleep-Wake Schedule:  Bedtime: 9544-3073  Wake Time: 1925-1701  Difficulty Falling Asleep: No  Avg Number of Awakenings: 2-3x  Cause of Awakenings: mask leak  Weekend Sleep Schedule: unchanged  Naps: 2x a week for ~30 min while watching TV    Avg TST per 24 hours: 7-7.5 hours    Past Treatments:  APAP 5-15    Prior Sleep Studies:  HSAT -- 11/25/2023 (Wt 199.0 lbs)  MICHAEL - 33.2  Sup MICHAEL - 33.2  O2 [place] - 80.0%  TST < 90% - 21 min    Other Relevant Labs and Studies:  Therapy and Compliance Data -- 8/10/2024 - 9/8/2024      Past Medical History:   Diagnosis Date    Asthma     Last Assessed: 10/19/2016    Breast cancer (HCC)     Left breast 2021    Cancer (HCC) 10/2021    Fibroid     GERD (gastroesophageal reflux disease)     controlled with diet    Hypercholesteremia     Hypertension     Wears glasses      Past Surgical History:   Procedure Laterality Date    BREAST BIOPSY Left 09/28/2021    multiple sites    BREAST LUMPECTOMY Left 11/19/2021    Procedure: LUMPECTOMY BREAST BRACKETED AJAY LOCALIZED;  Surgeon: Audrey Lacey MD;  Location: [facility];  Service: Surgical Oncology    BREAST LUMPECTOMY Left 1/21/2022    Procedure: LUMPECTOMY BREAST, re-excision; 0800 NUC MED;  Surgeon: Audrey Lacey MD;  Location: [facility];  Service: Surgical Oncology    BUNIONECTOMY      BUNIONECTOMY Right 2009    BUNIONECTOMY Left 2007    COLONOSCOPY      FOOT SURGERY      LYMPH NODE BIOPSY Left 1/21/2022    Procedure: BIOPSY LYMPH NODE SENTINEL;  Surgeon: Audrey Lacey MD;  Location: [facility];  Service: Surgical Oncology    MAMMO NEEDLE LOCALIZATION LEFT (ALL INC) Left 11/12/2021    MAMMO NEEDLE LOCALIZATION LEFT (ALL INC) EACH ADD Left 11/12/2021    MAMMO STEREOTACTIC BREAST BIOPSY LEFT (ALL INC) Left 9/28/2021    MAMMO  STEREOTACTIC BREAST BIOPSY LEFT (ALL INC) EACH ADD Left 9/28/2021    NASAL POLYP EXCISION      MS COLONOSCOPY FLX DX W/COLLJ SPEC WHEN PFRMD N/A 5/23/2016    Procedure: COLONOSCOPY;  Surgeon: Adriana Westfall DO;  Location: [facility];  Service: Gastroenterology    SHOULDER ARTHROSCOPY Right 2012    SHOULDER ARTHROSCOPY W/ ROTATOR CUFF REPAIR Right 2012    US GUIDED THYROID BIOPSY  6/2/2023     Patient Active Problem List   Diagnosis    Anxiety    Asthma    Benign essential hypertension    Combined hyperlipidemia    Prediabetes    Vitamin D deficiency    Fibroid    Localized osteoporosis without current pathological fracture    Acute pain of right shoulder    Pain in both lower extremities    Neck pain    BMI 35.0-35.9,adult    Malignant neoplasm of lower-inner quadrant of left breast in female, estrogen receptor positive (HCC)    Family history of cancer    BRCA negative    GERD (gastroesophageal reflux disease)    Use of anastrozole    Thyroid nodule    Trigger finger    Multiple nevi    Contusion of fifth toe of right foot    SONIYA (obstructive sleep apnea)    Trigger middle finger of left hand     Allergies as of 09/09/2024    (No Known Allergies)     REVIEW OF SYSTEMS:  Review of Systems  10-point system review completed, all of which are negative except as mentioned above.    CURRENT MEDICATIONS:  Current Outpatient Medications   Medication Instructions    albuterol (PROVENTIL HFA,VENTOLIN HFA) 90 mcg/act inhaler 2 puffs, Inhalation, Every 6 hours PRN    amLODIPine (NORVASC) 10 mg, Oral, Daily    anastrozole (ARIMIDEX) 1 mg, Oral, Daily    atorvastatin (LIPITOR) 10 mg, Oral, Daily    calcium carbonate (OS-HALIMA) 1,200 mg, Oral, Daily    cholecalciferol (VITAMIN D3) 2,000 Units, Oral, Daily    hydrocortisone 2.5 % cream Topical, 3 times daily, prn    methylPREDNISolone 4 MG tablet therapy pack Use as directed on package    multivitamin (THERAGRAN) TABS 1 tablet, Oral, Daily     SOCIAL HISTORY:  Social History  "    Tobacco Use    Smoking status: Never     Passive exposure: Never    Smokeless tobacco: Never   Vaping Use    Vaping status: Never Used   Substance Use Topics    Alcohol use: No    Drug use: Never     FAMILY HISTORY:  Family History   Problem Relation Age of Onset    Hypertension Mother     Breast cancer Mother         age unknown; bilateral breast cancer    Lung cancer Mother     Cervical cancer Mother     Cancer Mother     Lung disease Mother     Diabetes Father     Hypertension Father     Prostate cancer Father         age unknown    Breast cancer Maternal Grandmother 44    Diabetes Paternal Grandmother     Hypertension Brother     Prostate cancer Brother 50    Heart disease Brother     Hypertension Brother     Prostate cancer Brother     Hypertension Maternal Aunt     No Known Problems Maternal Aunt     Lung cancer Maternal Uncle     Diabetes Paternal Aunt     Diabetes Paternal Uncle     Hypertension Cousin     Breast cancer Cousin         under 50    Diabetes Cousin     Breast cancer Cousin     Breast cancer Cousin         under 50    Hypertension Brother     Hypertension Maternal Aunt      PHYSICAL EXAMINATION:  Vital Signs:  /71 (BP Location: Left arm, Patient Position: Sitting, Cuff Size: Large)   Pulse 84   Ht 5' 3\" (1.6 m)   Wt 95.3 kg (210 lb)   LMP 12/15/2003 (Within Years)   SpO2 99%   BMI 37.20 kg/m²   Body mass index is 37.2 kg/m².  Constitutional: NAD, well appearing, obese   Mental Status: AAOx3  Skin: Warm, dry, no rashes noted   Eyes: PERRL, normal conjunctiva  ENT: Nasal congestion absent, nasal valve incompetence absent.  Posterior Airspace:   Anand Tongue Position: 3  Retrognathia: absent  Overbite: absent  High Arched Palate: absent  Tongue Scalloping/Ridging: present  Tonsils: 1+  Uvula: normal  Chest: RRR, +S1/S2, CTA B/L, no W/R/R, no M/R/G   Abdomen: Soft, NT/ND  Extremities: No digital clubbing or pedal edema      Virginia Mercer MD  Pulmonary-Critical Care and Sleep " "Medicine  09/09/24    Portions of the record may have been created with voice recognition software. Occasional wrong word or \"sound a like\" substitutions may have occurred due to the inherent limitations of voice recognition software. Please read the chart carefully and recognize, using context, where substitutions have occurred.   "

## 2024-09-19 ENCOUNTER — TELEPHONE (OUTPATIENT)
Dept: SLEEP CENTER | Facility: CLINIC | Age: 64
End: 2024-09-19

## 2024-09-19 LAB
DME PARACHUTE DELIVERY DATE REQUESTED: NORMAL
DME PARACHUTE ITEM DESCRIPTION: NORMAL
DME PARACHUTE ORDER STATUS: NORMAL
DME PARACHUTE SUPPLIER NAME: NORMAL
DME PARACHUTE SUPPLIER PHONE: NORMAL

## 2024-09-23 ENCOUNTER — TELEPHONE (OUTPATIENT)
Dept: SURGICAL ONCOLOGY | Facility: CLINIC | Age: 64
End: 2024-09-23

## 2024-09-23 ENCOUNTER — ANNUAL EXAM (OUTPATIENT)
Dept: OBGYN CLINIC | Facility: MEDICAL CENTER | Age: 64
End: 2024-09-23
Payer: MEDICARE

## 2024-09-23 VITALS
WEIGHT: 206.4 LBS | HEIGHT: 63 IN | SYSTOLIC BLOOD PRESSURE: 138 MMHG | BODY MASS INDEX: 36.57 KG/M2 | DIASTOLIC BLOOD PRESSURE: 84 MMHG

## 2024-09-23 DIAGNOSIS — Z01.419 ENCNTR FOR GYN EXAM (GENERAL) (ROUTINE) W/O ABN FINDINGS: Primary | ICD-10-CM

## 2024-09-23 PROCEDURE — G0101 CA SCREEN;PELVIC/BREAST EXAM: HCPCS | Performed by: OBSTETRICS & GYNECOLOGY

## 2024-09-23 NOTE — TELEPHONE ENCOUNTER
Attempted call mobile phone 2x unable leave msg. Called home phone and left msg on answering machine that appt tomorrow w/ provider is being cancelled due to provider being out of the office sick. Please call [facility] phone# [phone] to reschedule

## 2024-09-23 NOTE — PROGRESS NOTES
Hematology/Oncology Outpatient Office Note    Date of Service: 2024    [facility] [address]  [place] [id number]  [phone]    Reason for Consultation:   Chief Complaint   Patient presents with    Follow-up     Cancer Stage at diagnosis: 1A    Referral Physician: No ref. provider found    Primary Care Physician:  Shankar Knight DO     Nickname: Nehal    Spouse: Annette Chaparro    Original ECO    Today's ECO    Goals and Barriers:  Current Goal: Minimize effects of disease burden, extend life.   Barriers to accomplishing this: None    Patient's Capacity to Self Care:  Patient is able to self care    Code Status: Not assessed    Advanced directives: Not assessed    ASSESSMENT & PLAN      Diagnosis ICD-10-CM Associated Orders   1. Malignant neoplasm of lower-inner quadrant of left breast in female, estrogen receptor positive (HCC)  C50.312 DXA bone density spine hip and pelvis    Z17.0       2. Use of anastrozole  Z79.811 DXA bone density spine hip and pelvis      3. Obesity, morbid (HCC)  E66.01             This is a 64 y.o. c PMHx notable for hypertension, SONIYA, asthma, GERD, osteoporosis, being seen in consultation for stage Ia hormone positive, HER2 negative breast cancer      Discussion of decision making  Oncology history updated, accordingly, during this visit  Goals of care/patient communication  I discussed with the patient the clinical course leading up to their cancer diagnosis. I reviewed relevant office notes, imaging reports and pathology result as well.  I told the patient that this is a case of curable disease and what this means. We discussed that the goal of anti-cancer therapy is to provide best quality of life, extend overall survival, and progression free survival as shown in clinical trials. We also discussed that there might be a point when the cancer will no longer respond to this anti-neoplastic therapy.    I explained the risks/benefits of the proposed cancer therapy: Anastrozole and after discussion including understanding risks of possible life-threatening complications and therapy-related malignancy development  TNM/Staging At Diagnosis  pT1b, pN0, M0   Cancer Staging   Malignant neoplasm of lower-inner quadrant of left breast in female, estrogen receptor positive (HCC)  Staging form: Breast, AJCC 8th Edition  - Clinical: Stage 0 (cTis (DCIS), cN0, cM0, ER+, CO+, HER2: Not Assessed) - Signed by Audrey Lacey MD on 10/26/2021  Stage prefix: Initial diagnosis  Method of lymph node assessment: Clinical  Nuclear grade: G2  - Pathologic stage from 12/7/2021: Stage IA (pT1b, pN0, cM0, G2, ER+, CO+, HER2-) - Signed by Audrey Lacey MD on 12/7/2021  Stage prefix: Initial diagnosis  Method of lymph node assessment: Clinical  Histologic grading system: 3 grade system  - Pathologic: No stage assigned - Unsigned    Disease Features/Tumor Markers/Genetics  Tumor Marker: N/A  Notable Path Features:   8 mm of invasive ductal carcinoma, grade 2. No evidence of lymphovascular invasion.  4 sentinel lymph nodes were all negative for metastatic disease.  ER 90% positive, CO 90% positive, HER2 negative disease.  Stage I A (pT1b, pN0, M0)   MSH-6 variant with uncertain significance   Treatment: Adjuvant hormonal therapy with anastrozole since February 2022.   Other Supportive care: NA  Treatment Team Members  Surgeon: Audrey Lacey  Rad Onc: iXn Weber  Palliative  Labs  Diagnostics  10/25/2023 diagnostic bilateral mammogram benign BI-RADS 2  5/28/2019: DEXA scan indicates osteoporosis at the spine area.      Discussion of decision making    I personally reviewed the following lab results, the image studies, pathology, other specialty/physicians consult notes and recommendations, and outside medical records. I had a lengthy discussion with the patient and shared the work-up findings. We discussed the diagnosis and management plan as  below. I spent 422 minutes reviewing the records (labs, clinician notes, outside records, medical history, ordering medicine/tests/procedures, monitoring of anti-neoplastic toxicities, interpreting the imaging/labs previously done) and coordination of care as well as direct time with the patient today, of which greater than 50% of the time was spent in counseling and coordination of care with the patient/family.      Plan/Labs  Ordered DEXA scan  Continue anastrozole 1 mg daily until February 2027, discuss BCI as we get closer  Cont to f/u with Surg-Onc, do yearly mammogram follow-up        Follow Up 1 month to discuss DEXA results    All questions were answered to the patient's satisfaction during this encounter. The patient knows the contact information for our office and knows to reach out for any relevant concerns related to this encounter. They are to call for any temperature 100.4 or higher, new symptoms including but not restricted to shaking chills, decreased appetite, nausea, vomiting, diarrhea, increased fatigue, shortness of breath or chest pain, confusion, and not feeling the strength to come to the clinic. For all other listed problems and medical diagnosis in their chart - they are managed by PCP and/or other specialists, which the patient acknowledges. Thank you very much for your consultation and making us a part of this patient's care. We are continuing to follow closely with you. Please do not hesitate to reach out to me with any additional questions or concerns.    Mary Alaniz MD  Hematology & Medical Oncology Staff Physician             Disclaimer: This document was prepared using [employer] technology. If a word or phrase is confusing, or does not make sense, this is likely due to recognition error which was not discovered during this clinician's review. If you believe an error has occurred, please contact me through [facility] service line for [email or web address]?cation.      ONCOLOGY HISTORY  OF PRESENT ILLNESS        Oncology History   Malignant neoplasm of lower-inner quadrant of left breast in female, estrogen receptor positive (HCC)   9/28/2021 Initial Diagnosis    Ductal carcinoma in situ (DCIS) of left breast     9/28/2021 Biopsy    Final Diagnosis   A. Breast, Left, 7:00 location 2 cores w/ Calcs, Biopsy:  - Ductal carcinoma in situ.  - No invasive carcinoma is seen.       Architectural patterns: Solid, papillary       Nuclear grade of ductal carcinoma in situ: Grade 2 (II).       Necrosis:  Focal.       Size of in-situ carcinoma: At least 4 mm.  - Microcalcifications: Present in DCIS.  - Breast cancer biomarker studies (paraffin block number: A1):           Estrogen, progesterone receptor studies pending, to be described in a separate receptor report.      ER 90-95% positive  Pr90-95% positive  B. Breast, Left, 7:00 location 1 core w/ No Calcs, Biopsy:  - Atypical lobular hyperplasia.  - Usual ductal hyperplasia involving intraductal papilloma.      C. Breast, Left, 9:00 location 2 cores w/ Calcs, Biopsy:  - Non-proliferative fibrocystic changes, including microcysts, microcalcifications with stromal fibrosis.  - Negative for atypia or carcinoma.      D. Breast, Left, 9:00 location 1 core w/ No Calcs, Biopsy:  - Intraductal papilloma.  - Negative for atypia or carcinoma.         10/26/2021 -  Cancer Staged    Staging form: Breast, AJCC 8th Edition  - Clinical: Stage 0 (cTis (DCIS), cN0, cM0, ER+, PA+, HER2: Not Assessed) - Signed by Audrey Lacey MD on 10/26/2021  Stage prefix: Initial diagnosis  Method of lymph node assessment: Clinical  Nuclear grade: G2       10/2021 Genetic Testing    Test: [employer] BRCAplus STAT Panel (8 genes): ZAKIA, BRCA1, BRCA2, CDH1, CHEK2, PALB2, PTEN, TP53   Result:   Negative - No Clinically Significant Variants Detected       Assessment:  Negative     Additional Testing:  Test(s): [employer] BRCAplus STAT Panel (8 genes): ZAKIA, BRCA1, BRCA2, CDH1, CHEK2, PALB2, PTEN, TP53 with  reflex to [facility]Next (28 additional genes): APC, ZAKIA, AXIN2 BARD1, BRCA1, BRCA2, BRIP1, BMPR1A, CDH1, CDK4, CDKN2A, CHEK2, DICER1, EPCAM, GREM1, HOXB13, MLH1, MSH2, MSH3, MSH6, MUTYH, NBN, NF1, NTHL1, PALB2, PMS2, POLD1, POLE, PTEN, RAD51C, RAD51D, RECQL SMAD4, SMARCA4, STK11, TP53      Result: Variant of uncertain significance     Variant: MSH6 Variant, Unknown Significance: p.T6P       Variant of uncertain significance (VUS)     11/19/2021 Surgery    Final Diagnosis   A. Breast, Left, Left breast lumpectomy, short superior, long lateral:  - Invasive mammary carcinoma of no special type (ductal), 8 mm in greatest dimension with    extensive adjacent ductal carcinoma in-situ and associated non-invasive lobular neoplasia.    -- [place] histologic grade 2 of 3 (total score: 7 of 9)        * Glandular (acinar) Tubular Differentiation < 10%, score 3.        * Nuclear Pleomorphism 2 of 3, score 2.        * Mitotic Rate 4-7/mm2, (8-14 mitoses/10HPF; 8 mitoses/10 HPF), score 2.    -- Confirmed by tumor cell immunophenotype:        * Positive: E-cadherin, P120 - each in a membranous pattern.        * Negative: p63, SMMHC.  - Ductal carcinoma in-situ (DCIS): Present; not an extensive intraductal component associated with     invasive carcinoma (< 5% of total tumor) but extensive DCIS in adjacent tissue.     -- Confirmed by positive E-cadherin, p120 (membranous), SMMHC, p63 and loss of epithelial         mosaicism on CK5/6 immunostains.     -- Size and Extent:  At least 48 mm in approximate extent; present in 12 sequential sections; 43 of 91 blocks.     -- Architectural Patterns: Cribriform, micropapillary, solid, papillary.    -- Nuclear grade: I-II (low to intermediate)    -- Necrosis: Present, focal and central necrosis identified.     -- Associated prior biopsy site changes with twist clip and Aliyah  marker.  - Multifocal non-invasive lobular neoplasia (focally florid lobular carcinoma in-situ/LCIS and  atypical lobular     hyperplasia with pagetoid ductal spread) involving and adjacent to DCIS.    -- Confirmed by negative E-cadherin; positive p120 (cytoplasmic), SMMHC, p63; and loss of epithelial        mosaicism on CK5/6 immunostains.   - Margins uninvolved by invasive and in-situ carcinoma.    -- Invasive carcinoma: 8 mm from nearest posterior margin.     -- DCIS: 0.1 mm from nearest posterior margin; < 1 mm from nearest anterior margin; 1.5 mm from        nearest medial margin; > 2 mm from all remaining margins.   - Benign skin.    -- No dermal lymphovascular invasion.   - Lymphovascular invasion: Not identified.  - Microcalcifications: Present, associated with DCIS.   - Benign fibrocystic changes with atypia (atypical and florid usual ductal hyperplasia, columnar cell     lesions with flat epithelial atypia/FEA, cystic and papillary apocrine metaplasia).  - Radial scar, 0.4 cm.      B. Breast, Left, Left Breast Lumpectomy new superior margin - Short Superior, Long Lateral:  - Ductal carcinoma in-situ (DCIS).     -- Size and Extent: 16 mm in greatest approximate extent; present in 4 sequential sections; 7 of 78 blocks.     -- Architectural Patterns: Cribriform, micropapillary, solid, papillary.    -- Nuclear grade: I-II (low to intermediate).     -- Necrosis: Present, focal.     -- Confirmed by positive E-cadherin, P120 (membranous) and loss of epithelial mosaicism on         CK5/6 immunostains.    -- Associated prior biopsy site changes with biopsy clip and AJAY  marker.  - No invasive carcinoma identified.  - Margins uninvolved by DCIS.     -- DCIS < 1 mm from nearest posterior margin; < 1-2 mm from medial margin; > 2 mm from all       remaining margins.    - Lymphovascular invasion: Not identified.  - Microcalcifications: Present, associated with DCIS, lobular neoplasia and non-neoplastic breast tissue.    - Benign fibrocystic changes with atypia:    -- Multifocal/extensive non-invasive lobular  neoplasia (focal lobular carcinoma in-situ and        predominantly atypical lobular hyperplasia with pagetoid ductal spread), focally involving        and adjacent to DCIS.        ** Confirmed by negative E-cadherin; positive p120 (cytoplasmic) immunostains.     -- Usual ductal hyperplasia, columnar cell change and hyperplasia with focal flat epithelial        atypia/FEA, sclerosing adenosis, fibroadenomatoid changes, cystic and papillary apocrine metaplasia.   - Small intraductal papilloma without atypia, 0.1 cm.         12/7/2021 -  Cancer Staged    Staging form: Breast, AJCC 8th Edition  - Pathologic stage from 12/7/2021: Stage IA (pT1b, pN0, cM0, G2, ER+, CA+, HER2-) - Signed by Audrey Lacey MD on 12/7/2021  Stage prefix: Initial diagnosis  Method of lymph node assessment: Clinical  Histologic grading system: 3 grade system       1/21/2022 Surgery    Final Diagnosis   A.  Left breast, anterior margin:     - Histologic changes compatible with prior surgical procedure.     - Skin with dermal cicatrix.     - No invasive or in situ carcinoma identified.     - Final anterior margin is negative for malignancy.     B.  Left breast, new posterior margin:     - Focus of atypical lobular hyperplasia (ALH).     - No invasive or in situ carcinoma identified.     - Final posterior margin is negative for malignancy.     C.  Left breast, medial margin:     - Histologic changes compatible with prior surgical procedure.     - No invasive or in situ carcinoma identified.     - Final medial margin is negative for malignacny.     D.  Left axilla, sentinel lymph node #1:     - Single lymph node; negative for malignancy (0/1).     E.  Left axilla, sentinel lymph node #2:     - Single lymph node; negative for malignancy (0/1).     F.  Left axilla, sentinel lymph node #3:     - Single lymph node; negative for malignancy (0/1).     G.  Left axilla, sentinel lymph node #4:     - Single lymph node; negative for malignancy (0/1).         3/23/2022 - 4/2022 Radiation    Prone L Boost 10X/6X 5 / 5 200 0 1,000 6   Prone L Br # 6X 16 / 16 266 0 4,256 21      Treatment dates:  3/23/2022 - 4/20/2022            SUBJECTIVE  (INTERVAL HISTORY)      Clotting History  none   Bleeding History  none   Cancer History  Early stage HR+ breast cancer   Family Cancer History  Mother , grandmother breast cancer   H/O Blood/Plt Transfusion  none   Tobacco/etoh/drug abuse nonw       Cancer Screening history Up to date mammograms, uptodate with roseanna , 2016 colonoscopy   Occupation Retired - worked for [employer]   New medications in the last month: none  Pain: none    64-year-old postmenopausal woman with stage I A left breast cancer, grade 2, ER 90% positive, [place] 90% positive, HER2 negative disease.  She underwent lumpectomy and sentinel lymph node biopsy, resulting in HAWK.  Her tumor size was 8 mm.   Since February 2022, she has been on adjuvant hormonal therapy with anastrozole.  She presents today for routine follow-up.  She has chronic mild musculoskeletal symptoms and hot flashes.  Otherwise, she feels very well.  She denied any pain.  Her weight is stable.  She has no respiratory symptoms.  Her performance status is normal.   She was started on anastrozole in February 2022 which will be continued till February 2027.  Last DEXA scan was in 2017 which showed osteoporosis of the spine.  We need to repeat DEXA scan.      I have reviewed the relevant past medical, surgical, social and family history. I have also reviewed allergies and medications for this patient.    Review of Systems  ROS      A 10-point review of system was performed, pertinent positive and negative were detailed as above. Otherwise, the 10-point review of system was negative.      Past Medical History:   Diagnosis Date    Asthma     Last Assessed: 10/19/2016    Breast cancer (HCC)     Left breast 2021    Cancer (HCC) 10/2021    Fibroid     GERD (gastroesophageal reflux disease)      controlled with diet    Hypercholesteremia     Hypertension     Wears glasses        Past Surgical History:   Procedure Laterality Date    BREAST BIOPSY Left 09/28/2021    multiple sites    BREAST LUMPECTOMY Left 11/19/2021    Procedure: LUMPECTOMY BREAST BRACKETED AJAY LOCALIZED;  Surgeon: Audrey Lacey MD;  Location: [facility];  Service: Surgical Oncology    BREAST LUMPECTOMY Left 1/21/2022    Procedure: LUMPECTOMY BREAST, re-excision; 0800 NUC MED;  Surgeon: Audrey Lacey MD;  Location: [facility];  Service: Surgical Oncology    BUNIONECTOMY      BUNIONECTOMY Right 2009    BUNIONECTOMY Left 2007    COLONOSCOPY      FOOT SURGERY      LYMPH NODE BIOPSY Left 1/21/2022    Procedure: BIOPSY LYMPH NODE SENTINEL;  Surgeon: Audrey Lacey MD;  Location: [facility];  Service: Surgical Oncology    MAMMO NEEDLE LOCALIZATION LEFT (ALL INC) Left 11/12/2021    MAMMO NEEDLE LOCALIZATION LEFT (ALL INC) EACH ADD Left 11/12/2021    MAMMO STEREOTACTIC BREAST BIOPSY LEFT (ALL INC) Left 9/28/2021    MAMMO STEREOTACTIC BREAST BIOPSY LEFT (ALL INC) EACH ADD Left 9/28/2021    NASAL POLYP EXCISION      IA COLONOSCOPY FLX DX W/COLLJ SPEC WHEN PFRMD N/A 5/23/2016    Procedure: COLONOSCOPY;  Surgeon: Adriana Westfall DO;  Location: [facility];  Service: Gastroenterology    SHOULDER ARTHROSCOPY Right 2012    SHOULDER ARTHROSCOPY W/ ROTATOR CUFF REPAIR Right 2012    US GUIDED THYROID BIOPSY  6/2/2023       Family History   Problem Relation Age of Onset    Hypertension Mother     Breast cancer Mother         age unknown; bilateral breast cancer    Lung cancer Mother     Cervical cancer Mother     Cancer Mother     Lung disease Mother     Diabetes Father     Hypertension Father     Prostate cancer Father         age unknown    Breast cancer Maternal Grandmother 44    Diabetes Paternal Grandmother     Hypertension Brother     Prostate cancer Brother 50    Heart disease Brother     Hypertension Brother     Prostate cancer Brother     Hypertension  Maternal Aunt     No Known Problems Maternal Aunt     Lung cancer Maternal Uncle     Diabetes Paternal Aunt     Diabetes Paternal Uncle     Hypertension Cousin     Breast cancer Cousin         under 50    Diabetes Cousin     Breast cancer Cousin     Breast cancer Cousin         under 50    Hypertension Brother     Hypertension Maternal Aunt        Social History     Socioeconomic History    Marital status: /Civil Union     Spouse name: Not on file    Number of children: Not on file    Years of education: Not on file    Highest education level: Not on file   Occupational History    Not on file   Tobacco Use    Smoking status: Never     Passive exposure: Never    Smokeless tobacco: Never   Vaping Use    Vaping status: Never Used   Substance and Sexual Activity    Alcohol use: No    Drug use: Never    Sexual activity: Yes     Partners: Male     Birth control/protection: Post-menopausal, None   Other Topics Concern    Not on file   Social History Narrative    Not on file     Social Determinants of Health     Financial Resource Strain: Not on file   Food Insecurity: Not on file   Transportation Needs: Not on file   Physical Activity: Not on file   Stress: Not on file   Social Connections: Not on file   Intimate Partner Violence: Not on file   Housing Stability: Not on file       No Known Allergies    Current Outpatient Medications   Medication Sig Dispense Refill    albuterol (PROVENTIL HFA,VENTOLIN HFA) 90 mcg/act inhaler INHALE 2 PUFFS BY MOUTH EVERY 6 HOURS AS NEEDED FOR WHEEZING 18 g 0    amLODIPine (NORVASC) 10 mg tablet Take 1 tablet by mouth once daily 100 tablet 1    anastrozole (ARIMIDEX) 1 mg tablet Take 1 tablet by mouth once daily 90 tablet 3    atorvastatin (LIPITOR) 10 mg tablet Take 1 tablet by mouth once daily 90 tablet 1    calcium carbonate (OS-HALIMA) 1250 (500 Ca) MG tablet Take 1,200 mg by mouth daily       cholecalciferol (VITAMIN D3) 1,000 units tablet Take 2,000 Units by mouth daily         hydrocortisone 2.5 % cream Apply topically 3 (three) times a day prn 28 g 2    multivitamin (THERAGRAN) TABS Take 1 tablet by mouth daily.       No current facility-administered medications for this visit.       (Not in a hospital admission)      Objective:     24 Hour Vitals Assessment:     Vitals:    09/24/24 0844   BP: 132/68   Pulse: 76   Resp: 16   Temp: 97.9 °F (36.6 °C)   SpO2: 98%       PHYSICIAN EXAM (8 organ systems needed):    General: Appearance: alert, cooperative, no distress.  HEENT: Normocephalic, atraumatic. No scleral icterus. conjunctivae clear. EOMI.  Chest: No tenderness to palpation. No open wound noted.  Lungs: Clear to auscultation bilaterally, Respirations unlabored.  Cardiac: Regular rate and rhythm, +S1and S2, -r/m/g  Abdomen: Soft, non-tender, non-distended. Bowel sounds are normal.   Extremities:  No edema, cyanosis, clubbing.  Skin: Skin color, turgor are normal. No rashes.  Breast:  Lumpectomy scar at inner lower quadrant of her left breast with no palpable abnormalities.  Right breast exam is negative.  Lymphatics: no palpable supra-cervical, axillary  Neurologic: Awake, Alert, and oriented, no gross focal deficits noted b/l.       DATA REVIEW:    Pathology Result:    Final Diagnosis   Date Value Ref Range Status   06/02/2023   Final    A. and B. Thyroid, Left Mid, FNA (ThinPrep and Smear Preparations):  - Benign ([place] Category II). See Note.  - Monolayered sheets of benign follicular cells and oncocytes, ample colloid, and macrophages.  - Consistent with benign follicular nodule.     01/21/2022   Final    A.  Left breast, anterior margin:     - Histologic changes compatible with prior surgical procedure.     - Skin with dermal cicatrix.     - No invasive or in situ carcinoma identified.     - Final anterior margin is negative for malignancy.    B.  Left breast, new posterior margin:     - Focus of atypical lobular hyperplasia (ALH).     - No invasive or in situ carcinoma  identified.     - Final posterior margin is negative for malignancy.    C.  Left breast, medial margin:     - Histologic changes compatible with prior surgical procedure.     - No invasive or in situ carcinoma identified.     - Final medial margin is negative for malignacny.    D.  Left axilla, sentinel lymph node #1:     - Single lymph node; negative for malignancy (0/1).    E.  Left axilla, sentinel lymph node #2:     - Single lymph node; negative for malignancy (0/1).    F.  Left axilla, sentinel lymph node #3:     - Single lymph node; negative for malignancy (0/1).    G.  Left axilla, sentinel lymph node #4:     - Single lymph node; negative for malignancy (0/1).       11/19/2021   Final    A. Breast, Left, Left breast lumpectomy, short superior, long lateral:  - Invasive mammary carcinoma of no special type (ductal), 8 mm in greatest dimension with    extensive adjacent ductal carcinoma in-situ and associated non-invasive lobular neoplasia.    -- [place] histologic grade 2 of 3 (total score: 7 of 9)        * Glandular (acinar) Tubular Differentiation < 10%, score 3.        * Nuclear Pleomorphism 2 of 3, score 2.        * Mitotic Rate 4-7/mm2, (8-14 mitoses/10HPF; 8 mitoses/10 HPF), score 2.    -- Confirmed by tumor cell immunophenotype:        * Positive: E-cadherin, P120 - each in a membranous pattern.        * Negative: p63, SMMHC.  - Ductal carcinoma in-situ (DCIS): Present; not an extensive intraductal component associated with     invasive carcinoma (< 5% of total tumor) but extensive DCIS in adjacent tissue.     -- Confirmed by positive E-cadherin, p120 (membranous), SMMHC, p63 and loss of epithelial         mosaicism on CK5/6 immunostains.     -- Size and Extent:  At least 48 mm in approximate extent; present in 12 sequential sections; 43 of 91 blocks.     -- Architectural Patterns: Cribriform, micropapillary, solid, papillary.    -- Nuclear grade: I-II (low to intermediate)    -- Necrosis: Present,  focal and central necrosis identified.     -- Associated prior biopsy site changes with twist clip and Aliyah  marker.  - Multifocal non-invasive lobular neoplasia (focally florid lobular carcinoma in-situ/LCIS and atypical lobular     hyperplasia with pagetoid ductal spread) involving and adjacent to DCIS.    -- Confirmed by negative E-cadherin; positive p120 (cytoplasmic), SMMHC, p63; and loss of epithelial        mosaicism on CK5/6 immunostains.   - Margins uninvolved by invasive and in-situ carcinoma.    -- Invasive carcinoma: 8 mm from nearest posterior margin.     -- DCIS: 0.1 mm from nearest posterior margin; < 1 mm from nearest anterior margin; 1.5 mm from        nearest medial margin; > 2 mm from all remaining margins.   - Benign skin.    -- No dermal lymphovascular invasion.   - Lymphovascular invasion: Not identified.  - Microcalcifications: Present, associated with DCIS.   - Benign fibrocystic changes with atypia (atypical and florid usual ductal hyperplasia, columnar cell     lesions with flat epithelial atypia/FEA, cystic and papillary apocrine metaplasia).  - Radial scar, 0.4 cm.     B. Breast, Left, Left Breast Lumpectomy new superior margin - Short Superior, Long Lateral:  - Ductal carcinoma in-situ (DCIS).     -- Size and Extent: 16 mm in greatest approximate extent; present in 4 sequential sections; 7 of 78 blocks.     -- Architectural Patterns: Cribriform, micropapillary, solid, papillary.    -- Nuclear grade: I-II (low to intermediate).     -- Necrosis: Present, focal.     -- Confirmed by positive E-cadherin, P120 (membranous) and loss of epithelial mosaicism on         CK5/6 immunostains.    -- Associated prior biopsy site changes with biopsy clip and ALIYAH  marker.  - No invasive carcinoma identified.  - Margins uninvolved by DCIS.     -- DCIS < 1 mm from nearest posterior margin; < 1-2 mm from medial margin; > 2 mm from all       remaining margins.    - Lymphovascular invasion: Not  identified.  - Microcalcifications: Present, associated with DCIS, lobular neoplasia and non-neoplastic breast tissue.    - Benign fibrocystic changes with atypia:    -- Multifocal/extensive non-invasive lobular neoplasia (focal lobular carcinoma in-situ and        predominantly atypical lobular hyperplasia with pagetoid ductal spread), focally involving        and adjacent to DCIS.        ** Confirmed by negative E-cadherin; positive p120 (cytoplasmic) immunostains.     -- Usual ductal hyperplasia, columnar cell change and hyperplasia with focal flat epithelial        atypia/FEA, sclerosing adenosis, fibroadenomatoid changes, cystic and papillary apocrine metaplasia.   - Small intraductal papilloma without atypia, 0.1 cm.      09/28/2021   Final    A. Breast, Left, 7:00 location 2 cores w/ Calcs, Biopsy:  - Ductal carcinoma in situ.  - No invasive carcinoma is seen.       Architectural patterns: Solid, papillary       Nuclear grade of ductal carcinoma in situ: Grade 2 (II).       Necrosis:  Focal.       Size of in-situ carcinoma: At least 4 mm.  - Microcalcifications: Present in DCIS.  - Breast cancer biomarker studies (paraffin block number: A1):           Estrogen, progesterone receptor studies pending, to be described in a separate receptor report.     B. Breast, Left, 7:00 location 1 core w/ No Calcs, Biopsy:  - Atypical lobular hyperplasia.  - Usual ductal hyperplasia involving intraductal papilloma.     C. Breast, Left, 9:00 location 2 cores w/ Calcs, Biopsy:  - Non-proliferative fibrocystic changes, including microcysts, microcalcifications with stromal fibrosis.  - Negative for atypia or carcinoma.     D. Breast, Left, 9:00 location 1 core w/ No Calcs, Biopsy:  - Intraductal papilloma.  - Negative for atypia or carcinoma.           Image Results:   Image result are reviewed and documented in Hematology/Oncology history    Complete PFT with post bronchodilator  Pulmonary Function Test Report  Nehal VASQUEZ  Annette 63 y.o. female MRN: [id number]    Date of Testin24    Date of Interpretation: 24    Requesting Physician: Dr Knight    Reason for Testing: Asthma    Reference set for interpretation: 2012    Procedure: The patient was taken to pulmonary function testing laboratory.    The patient demonstrated good effort and cooperation.  The results of   this test meet ATS standards for acceptability and repeatability.  Data   set appears appropriate for interpretation.    Post bronchodilator testing performed after the administration of 2.5mg   albuterol in 3cc normal saline administered via nebulizer per   bronchodilator protocol.    Results:  FEV1/FVC Ratio: 81 %  Forced Vital Capacity: 1.90 L 75 % predicted  FEV1: 1.54 L 77 % predicted    After administration of bronchodilator   FEV1/FVC Ratio: 80%  FVC: 1.78 L, 70% predicted, -6% change  FEV1: 1.42 L, 71% predicted, -7% change    Lung volumes by body plethysmography:   Total Lung Capacity 91 % predicted   Residual volume 122 % predicted    DLCO corrected for patients hemoglobin level: 85 %    Interpretation:    No obstructive airflow defect. Spirometry suggests restriction, however,   lung volumes are normal.     No significant response to the administration of bronchodilator per ATS   Standards    Normal Lung volumes    Normal diffusion capacity    Flow volume loop is consistent with restriction    Rubens Olson MD  Pulmonary and Critical Care Medicine      LABS:  Lab data are reviewed and documented in [facility] history.     Lab Results   Component Value Date    HGB 13.2 2022    HCT 41.6 2022    MCV 88 2022     2022    WBC 5.13 2022    NRBC 0 2022     Lab Results   Component Value Date     2015    K 3.8 2022     (H) 2022    CO2 28 2022    ANIONGAP 10 2015    BUN 14 2022    CREATININE 0.93 2022    GLUCOSE 91 2015    GLUF 87 2022    CALCIUM 9.8  "05/06/2022    CORRECTEDCA 10.1 12/08/2021    AST 18 05/06/2022    ALT 23 05/06/2022    ALKPHOS 108 05/06/2022    PROT 7.7 03/05/2015    BILITOT 0.81 03/05/2015    EGFR 66 05/06/2022       No results found for: \"IRON\", \"TIBC\", \"FERRITIN\"    No results found for: \"YCKLUJHO53\"    No results for input(s): \"WBC\", \"CREAT\", \"PLT\" in the last 72 hours.    By:  Ayo Doan MD, 9/24/2024, 9:31 AM                                   "

## 2024-09-23 NOTE — PROGRESS NOTES
ASSESSMENT & PLAN: Nehal was seen today for gynecologic exam.    Diagnoses and all orders for this visit:    Encntr for gyn exam (general) (routine) w/o abn findings      1.  Routine well woman exam done today.  2.  Pap and HPV:  Pap and cotesting was not done today.  Current ASCCP Guidelines reviewed.  3.  Mammogram has been scheduled.  4.  Colorectal cancer screening is up to date.    5.  DEXA is not up to date.  DEXA: patient has order.  Patient encouraged to schedule.    6. The following were reviewed in today's visit: adequate intake of calcium and vitamin D, exercise, and healthy diet.  7.  F/u 1 year for next routine GYN exam.      CC:  Annual Gynecologic Examination    HPI: Nehal Bryant is a 64 y.o. who presents for annual gynecologic examination.  She has the following concerns: No complaints.    Health Maintenance:    Patient describes her health as good.  Patient has weight concerns.  She exercises with walking.    She does wear her seatbelt routinely.    She does perform regular monthly self breast exams.    She does feel safe at home.   Patients does not follow a special diet.  Patient gets <1 servings of dairy or calcium rich foods daily.      Last pap:   Last mammogram:   Last colorectal cancer screenin colonoscopy  Last DEXA: 2019    Patient Active Problem List   Diagnosis    Anxiety    Asthma    Benign essential hypertension    Combined hyperlipidemia    Prediabetes    Vitamin D deficiency    Fibroid    Localized osteoporosis without current pathological fracture    Acute pain of right shoulder    Pain in both lower extremities    Neck pain    BMI 35.0-35.9,adult    Malignant neoplasm of lower-inner quadrant of left breast in female, estrogen receptor positive (HCC)    Family history of cancer    BRCA negative    GERD (gastroesophageal reflux disease)    Use of anastrozole    Thyroid nodule    Trigger finger    Multiple nevi    Contusion of fifth toe of right foot    SONIYA  (obstructive sleep apnea)    Trigger middle finger of left hand       Past Medical History:   Diagnosis Date    Asthma     Last Assessed: 10/19/2016    Breast cancer (HCC)     Left breast 2021    Cancer (HCC) 10/2021    Fibroid     GERD (gastroesophageal reflux disease)     controlled with diet    Hypercholesteremia     Hypertension     Wears glasses        Past Surgical History:   Procedure Laterality Date    BREAST BIOPSY Left 09/28/2021    multiple sites    BREAST LUMPECTOMY Left 11/19/2021    Procedure: LUMPECTOMY BREAST BRACKETED AJAY LOCALIZED;  Surgeon: Audrey Lacey MD;  Location: [facility];  Service: Surgical Oncology    BREAST LUMPECTOMY Left 1/21/2022    Procedure: LUMPECTOMY BREAST, re-excision; 0800 NUC MED;  Surgeon: Audrey Lacey MD;  Location[facility];  Service: Surgical Oncology    BUNIONECTOMY      BUNIONECTOMY Right 2009    BUNIONECTOMY Left 2007    COLONOSCOPY      FOOT SURGERY      LYMPH NODE BIOPSY Left 1/21/2022    Procedure: BIOPSY LYMPH NODE SENTINEL;  Surgeon: Audrey Lacey MD;  Location: [facility];  Service: Surgical Oncology    MAMMO NEEDLE LOCALIZATION LEFT (ALL INC) Left 11/12/2021    MAMMO NEEDLE LOCALIZATION LEFT (ALL INC) EACH ADD Left 11/12/2021    MAMMO STEREOTACTIC BREAST BIOPSY LEFT (ALL INC) Left 9/28/2021    MAMMO STEREOTACTIC BREAST BIOPSY LEFT (ALL INC) EACH ADD Left 9/28/2021    NASAL POLYP EXCISION      CT COLONOSCOPY FLX DX W/COLLJ SPEC WHEN PFRMD N/A 5/23/2016    Procedure: COLONOSCOPY;  Surgeon: Adriana eWstfall DO;  Location: [facility];  Service: Gastroenterology    SHOULDER ARTHROSCOPY Right 2012    SHOULDER ARTHROSCOPY W/ ROTATOR CUFF REPAIR Right 2012    US GUIDED THYROID BIOPSY  6/2/2023       Past OB/Gyn History:    Patient is not currently sexually active.  heterosexual.    Family History   Problem Relation Age of Onset    Hypertension Mother     Breast cancer Mother         age unknown; bilateral breast cancer    Lung cancer Mother     Cervical cancer Mother      Cancer Mother     Lung disease Mother     Diabetes Father     Hypertension Father     Prostate cancer Father         age unknown    Breast cancer Maternal Grandmother 44    Diabetes Paternal Grandmother     Hypertension Brother     Prostate cancer Brother 50    Heart disease Brother     Hypertension Brother     Prostate cancer Brother     Hypertension Maternal Aunt     No Known Problems Maternal Aunt     Lung cancer Maternal Uncle     Diabetes Paternal Aunt     Diabetes Paternal Uncle     Hypertension Cousin     Breast cancer Cousin         under 50    Diabetes Cousin     Breast cancer Cousin     Breast cancer Cousin         under 50    Hypertension Brother     Hypertension Maternal Aunt        Social History:   Social History     Socioeconomic History    Marital status: /Civil Union     Spouse name: Not on file    Number of children: Not on file    Years of education: Not on file    Highest education level: Not on file   Occupational History    Not on file   Tobacco Use    Smoking status: Never     Passive exposure: Never    Smokeless tobacco: Never   Vaping Use    Vaping status: Never Used   Substance and Sexual Activity    Alcohol use: No    Drug use: Never    Sexual activity: Not Currently     Partners: Male     Birth control/protection: Post-menopausal, None   Other Topics Concern    Not on file   Social History Narrative    Not on file     Social Determinants of Health     Financial Resource Strain: Not on file   Food Insecurity: Not on file   Transportation Needs: Not on file   Physical Activity: Not on file   Stress: Not on file   Social Connections: Not on file   Intimate Partner Violence: Not on file   Housing Stability: Not on file     Presently lives with .  Patient is currently retired.    No Known Allergies      Current Outpatient Medications:     albuterol (PROVENTIL HFA,VENTOLIN HFA) 90 mcg/act inhaler, INHALE 2 PUFFS BY MOUTH EVERY 6 HOURS AS NEEDED FOR WHEEZING, Disp: 18 g, Rfl:  "0    amLODIPine (NORVASC) 10 mg tablet, Take 1 tablet by mouth once daily, Disp: 100 tablet, Rfl: 1    anastrozole (ARIMIDEX) 1 mg tablet, Take 1 tablet by mouth once daily, Disp: 90 tablet, Rfl: 3    atorvastatin (LIPITOR) 10 mg tablet, Take 1 tablet by mouth once daily, Disp: 90 tablet, Rfl: 1    calcium carbonate (OS-HALIMA) 1250 (500 Ca) MG tablet, Take 1,200 mg by mouth daily , Disp: , Rfl:     cholecalciferol (VITAMIN D3) 1,000 units tablet, Take 2,000 Units by mouth daily  , Disp: , Rfl:     hydrocortisone 2.5 % cream, Apply topically 3 (three) times a day prn, Disp: 28 g, Rfl: 2    multivitamin (THERAGRAN) TABS, Take 1 tablet by mouth daily., Disp: , Rfl:     Review of Systems  Constitutional :no fever, feels well, no tiredness, no recent weight gain or loss  ENT: no ear ache, no loss of hearing, no nosebleeds or nasal discharge, no sore throat or hoarseness.  Cardiovascular: no complaints of slow or fast heart beat, no chest pain, no palpitations, no leg claudication or lower extremity edema.  Respiratory: no complaints of shortness of shortness of breath, no CASTANON  Breasts:no complaints of breast pain, breast lump, or nipple discharge  Gastrointestinal: no complaints of abdominal pain, constipation, nausea, vomiting, or diarrhea or bloody stools  Genitourinary : no complaints of dysuria, incontinence, pelvic pain, no dysmenorrhea, vaginal discharge or abnormal vaginal bleeding and as noted in HPI.  Musculoskeletal: no complaints of arthralgia, no myalgia, no joint swelling or stiffness, no limb pain or swelling.  Integumentary: no complaints of skin rash or lesion, itching or dry skin  Neurological: no complaints of headache, no confusion, no numbness or tingling, no dizziness or fainting     Physical Exam:   /84   Ht 5' 3\" (1.6 m)   Wt 93.6 kg (206 lb 6.4 oz)   LMP 12/15/2003 (Within Years)   BMI 36.56 kg/m²     General: appears stated age, cooperative, alert normal mood and affect   Psychiatric " oriented to person, place and time.  Mood and affect normal   Neck: normal, supple,trachea midline, no masses.  Thyroid: normal, no thyromegaly   Heart: regular rate and rhythm, S1, S2 normal, no murmur, click, rub or gallop   Lungs: clear to auscultation bilaterally, no increased work of breathing or signs of respiratory distress   Breasts: normal, no dimpling or skin changes noted.  Left breast: minimal induration along area of lumpectomy   Abdomen: soft, non-tender, without masses or organomegaly   Vulva: normal , no lesions   Vagina: normal , no lesions or atrophy   Urethra: normal   Urethal meatus normal   Bladder Normal, soft, non-tender and no prolapse or masses appreciated   Cervix: normal, no palpable masses    Uterus: normal , non-tender, not enlarged, no palpable masses   Adnexa: normal, non-tender without fullness or masses   Lymphatic Palpation of lymph nodes in neck, axilla, groin and/or other locations: no lymphadenopathy or masses noted   Skin Normal skin turgor and no rashes.  Palpation of skin and subcutaneous tissue normal.

## 2024-09-24 ENCOUNTER — OFFICE VISIT (OUTPATIENT)
Dept: HEMATOLOGY ONCOLOGY | Facility: CLINIC | Age: 64
End: 2024-09-24
Payer: MEDICARE

## 2024-09-24 VITALS
TEMPERATURE: 97.9 F | HEART RATE: 76 BPM | DIASTOLIC BLOOD PRESSURE: 68 MMHG | RESPIRATION RATE: 16 BRPM | BODY MASS INDEX: 36.5 KG/M2 | WEIGHT: 206 LBS | OXYGEN SATURATION: 98 % | SYSTOLIC BLOOD PRESSURE: 132 MMHG | HEIGHT: 63 IN

## 2024-09-24 DIAGNOSIS — C50.312 MALIGNANT NEOPLASM OF LOWER-INNER QUADRANT OF LEFT BREAST IN FEMALE, ESTROGEN RECEPTOR POSITIVE (HCC): Primary | ICD-10-CM

## 2024-09-24 DIAGNOSIS — E66.01 OBESITY, MORBID (HCC): ICD-10-CM

## 2024-09-24 DIAGNOSIS — Z79.811 USE OF ANASTROZOLE: ICD-10-CM

## 2024-09-24 DIAGNOSIS — Z17.0 MALIGNANT NEOPLASM OF LOWER-INNER QUADRANT OF LEFT BREAST IN FEMALE, ESTROGEN RECEPTOR POSITIVE (HCC): Primary | ICD-10-CM

## 2024-09-24 PROCEDURE — G2211 COMPLEX E/M VISIT ADD ON: HCPCS | Performed by: INTERNAL MEDICINE

## 2024-09-24 PROCEDURE — 99215 OFFICE O/P EST HI 40 MIN: CPT | Performed by: INTERNAL MEDICINE

## 2024-09-26 ENCOUNTER — OFFICE VISIT (OUTPATIENT)
Dept: SURGICAL ONCOLOGY | Facility: CLINIC | Age: 64
End: 2024-09-26
Payer: MEDICARE

## 2024-09-26 VITALS
HEIGHT: 63 IN | SYSTOLIC BLOOD PRESSURE: 132 MMHG | TEMPERATURE: 97.1 F | WEIGHT: 206 LBS | DIASTOLIC BLOOD PRESSURE: 72 MMHG | HEART RATE: 89 BPM | OXYGEN SATURATION: 97 % | BODY MASS INDEX: 36.5 KG/M2

## 2024-09-26 DIAGNOSIS — Z17.0 MALIGNANT NEOPLASM OF LOWER-INNER QUADRANT OF LEFT BREAST IN FEMALE, ESTROGEN RECEPTOR POSITIVE (HCC): Primary | ICD-10-CM

## 2024-09-26 DIAGNOSIS — Z79.811 USE OF ANASTROZOLE: ICD-10-CM

## 2024-09-26 DIAGNOSIS — C50.312 MALIGNANT NEOPLASM OF LOWER-INNER QUADRANT OF LEFT BREAST IN FEMALE, ESTROGEN RECEPTOR POSITIVE (HCC): Primary | ICD-10-CM

## 2024-09-26 PROCEDURE — 99214 OFFICE O/P EST MOD 30 MIN: CPT

## 2024-09-26 NOTE — PROGRESS NOTES
Surgical Oncology Follow Up       [address]  [facility] SURGICAL ONCOLOGY [place]  [address]  [facility] [id number]    Nehal VASQUEZ Annette  1960  [id number]  [address] [facility] SURGICAL ONCOLOGY [place]  [address]  [facility] [place] [address]    Chief Complaint   Patient presents with    Follow-up       Assessment/Plan:  1. Malignant neoplasm of lower-inner quadrant of left breast in female, estrogen receptor positive (HCC)  - 6 mo f/u    2. Use of anastrozole  - continue use per medical oncology        Discussion/Summary: Patient is a 64-year-old female presenting today for six-month follow-up for left breast cancer diagnosed in September 2021. Pathology revealed DCIS ER/FL positive. She underwent genetic testing which was negative but revealed a VUS of MSH6. She had a left breast lumpectomy with sentinel node biopsy with Dr. Lacey.  Surgical pathology revealed invasive carcinoma. She underwent re-excision with sln bx. There was no lymph node involvement. She completed whole breast radiation therapy. She is currently on anastrozole. She is scheduled for mammo next month. There were no concerns her clinical breast exam. She notes tenderness on the left breast. I will see the patient back in 6 months or sooner should the need arise. She was instructed to call with any questions or concerns prior to this time. All questions were answered today.     History of Present Illness:     Oncology History   Malignant neoplasm of lower-inner quadrant of left breast in female, estrogen receptor positive (HCC)   9/28/2021 Initial Diagnosis    Ductal carcinoma in situ (DCIS) of left breast     9/28/2021 Biopsy    Final Diagnosis   A. Breast, Left, 7:00 location 2 cores w/ Calcs, Biopsy:  - Ductal carcinoma in situ.  - No invasive carcinoma is seen.       Architectural patterns: Solid, papillary       Nuclear grade of ductal carcinoma in situ: Grade 2 (II).       Necrosis:   Focal.       Size of in-situ carcinoma: At least 4 mm.  - Microcalcifications: Present in DCIS.  - Breast cancer biomarker studies (paraffin block number: A1):           Estrogen, progesterone receptor studies pending, to be described in a separate receptor report.      ER 90-95% positive  Pr90-95% positive  B. Breast, Left, 7:00 location 1 core w/ No Calcs, Biopsy:  - Atypical lobular hyperplasia.  - Usual ductal hyperplasia involving intraductal papilloma.      C. Breast, Left, 9:00 location 2 cores w/ Calcs, Biopsy:  - Non-proliferative fibrocystic changes, including microcysts, microcalcifications with stromal fibrosis.  - Negative for atypia or carcinoma.      D. Breast, Left, 9:00 location 1 core w/ No Calcs, Biopsy:  - Intraductal papilloma.  - Negative for atypia or carcinoma.         10/26/2021 -  Cancer Staged    Staging form: Breast, AJCC 8th Edition  - Clinical: Stage 0 (cTis (DCIS), cN0, cM0, ER+, HI+, HER2: Not Assessed) - Signed by Audrey Lacey MD on 10/26/2021  Stage prefix: Initial diagnosis  Method of lymph node assessment: Clinical  Nuclear grade: G2       10/2021 Genetic Testing    Test: [employer] BRCAplus STAT Panel (8 genes): ZAKIA, BRCA1, BRCA2, CDH1, CHEK2, PALB2, PTEN, TP53   Result:   Negative - No Clinically Significant Variants Detected       Assessment:  Negative     Additional Testing:  Test(s): [employer] BRCAplus STAT Panel (8 genes): ZAKIA, BRCA1, BRCA2, CDH1, CHEK2, PALB2, PTEN, TP53 with reflex to [facility] CancerNext (28 additional genes): APC, ZAKIA, AXIN2 BARD1, BRCA1, BRCA2, BRIP1, BMPR1A, CDH1, CDK4, CDKN2A, CHEK2, DICER1, EPCAM, GREM1, HOXB13, MLH1, MSH2, MSH3, MSH6, MUTYH, NBN, NF1, NTHL1, PALB2, PMS2, POLD1, POLE, PTEN, RAD51C, RAD51D, RECQL SMAD4, SMARCA4, STK11, TP53      Result: Variant of uncertain significance     Variant: MSH6 Variant, Unknown Significance: p.T6P       Variant of uncertain significance (VUS)     11/19/2021 Surgery    Final Diagnosis   A. Breast, Left, Left breast  lumpectomy, short superior, long lateral:  - Invasive mammary carcinoma of no special type (ductal), 8 mm in greatest dimension with    extensive adjacent ductal carcinoma in-situ and associated non-invasive lobular neoplasia.    -- Jamie histologic grade 2 of 3 (total score: 7 of 9)        * Glandular (acinar) Tubular Differentiation < 10%, score 3.        * Nuclear Pleomorphism 2 of 3, score 2.        * Mitotic Rate 4-7/mm2, (8-14 mitoses/10HPF; 8 mitoses/10 HPF), score 2.    -- Confirmed by tumor cell immunophenotype:        * Positive: E-cadherin, P120 - each in a membranous pattern.        * Negative: p63, SMMHC.  - Ductal carcinoma in-situ (DCIS): Present; not an extensive intraductal component associated with     invasive carcinoma (< 5% of total tumor) but extensive DCIS in adjacent tissue.     -- Confirmed by positive E-cadherin, p120 (membranous), SMMHC, p63 and loss of epithelial         mosaicism on CK5/6 immunostains.     -- Size and Extent:  At least 48 mm in approximate extent; present in 12 sequential sections; 43 of 91 blocks.     -- Architectural Patterns: Cribriform, micropapillary, solid, papillary.    -- Nuclear grade: I-II (low to intermediate)    -- Necrosis: Present, focal and central necrosis identified.     -- Associated prior biopsy site changes with twist clip and Aliyah  marker.  - Multifocal non-invasive lobular neoplasia (focally florid lobular carcinoma in-situ/LCIS and atypical lobular     hyperplasia with pagetoid ductal spread) involving and adjacent to DCIS.    -- Confirmed by negative E-cadherin; positive p120 (cytoplasmic), SMMHC, p63; and loss of epithelial        mosaicism on CK5/6 immunostains.   - Margins uninvolved by invasive and in-situ carcinoma.    -- Invasive carcinoma: 8 mm from nearest posterior margin.     -- DCIS: 0.1 mm from nearest posterior margin; < 1 mm from nearest anterior margin; 1.5 mm from        nearest medial margin; > 2 mm from all remaining  margins.   - Benign skin.    -- No dermal lymphovascular invasion.   - Lymphovascular invasion: Not identified.  - Microcalcifications: Present, associated with DCIS.   - Benign fibrocystic changes with atypia (atypical and florid usual ductal hyperplasia, columnar cell     lesions with flat epithelial atypia/FEA, cystic and papillary apocrine metaplasia).  - Radial scar, 0.4 cm.      B. Breast, Left, Left Breast Lumpectomy new superior margin - Short Superior, Long Lateral:  - Ductal carcinoma in-situ (DCIS).     -- Size and Extent: 16 mm in greatest approximate extent; present in 4 sequential sections; 7 of 78 blocks.     -- Architectural Patterns: Cribriform, micropapillary, solid, papillary.    -- Nuclear grade: I-II (low to intermediate).     -- Necrosis: Present, focal.     -- Confirmed by positive E-cadherin, P120 (membranous) and loss of epithelial mosaicism on         CK5/6 immunostains.    -- Associated prior biopsy site changes with biopsy clip and AJAY  marker.  - No invasive carcinoma identified.  - Margins uninvolved by DCIS.     -- DCIS < 1 mm from nearest posterior margin; < 1-2 mm from medial margin; > 2 mm from all       remaining margins.    - Lymphovascular invasion: Not identified.  - Microcalcifications: Present, associated with DCIS, lobular neoplasia and non-neoplastic breast tissue.    - Benign fibrocystic changes with atypia:    -- Multifocal/extensive non-invasive lobular neoplasia (focal lobular carcinoma in-situ and        predominantly atypical lobular hyperplasia with pagetoid ductal spread), focally involving        and adjacent to DCIS.        ** Confirmed by negative E-cadherin; positive p120 (cytoplasmic) immunostains.     -- Usual ductal hyperplasia, columnar cell change and hyperplasia with focal flat epithelial        atypia/FEA, sclerosing adenosis, fibroadenomatoid changes, cystic and papillary apocrine metaplasia.   - Small intraductal papilloma without atypia, 0.1 cm.          12/7/2021 -  Cancer Staged    Staging form: Breast, AJCC 8th Edition  - Pathologic stage from 12/7/2021: Stage IA (pT1b, pN0, cM0, G2, ER+, GA+, HER2-) - Signed by Audrey Lacey MD on 12/7/2021  Stage prefix: Initial diagnosis  Method of lymph node assessment: Clinical  Histologic grading system: 3 grade system       1/21/2022 Surgery    Final Diagnosis   A.  Left breast, anterior margin:     - Histologic changes compatible with prior surgical procedure.     - Skin with dermal cicatrix.     - No invasive or in situ carcinoma identified.     - Final anterior margin is negative for malignancy.     B.  Left breast, new posterior margin:     - Focus of atypical lobular hyperplasia (ALH).     - No invasive or in situ carcinoma identified.     - Final posterior margin is negative for malignancy.     C.  Left breast, medial margin:     - Histologic changes compatible with prior surgical procedure.     - No invasive or in situ carcinoma identified.     - Final medial margin is negative for malignacny.     D.  Left axilla, sentinel lymph node #1:     - Single lymph node; negative for malignancy (0/1).     E.  Left axilla, sentinel lymph node #2:     - Single lymph node; negative for malignancy (0/1).     F.  Left axilla, sentinel lymph node #3:     - Single lymph node; negative for malignancy (0/1).     G.  Left axilla, sentinel lymph node #4:     - Single lymph node; negative for malignancy (0/1).        3/23/2022 - 4/2022 Radiation    Prone L Boost 10X/6X 5 / 5 200 0 1,000 6   Prone L Br # 6X 16 / 16 266 0 4,256 21      Treatment dates:  3/23/2022 - 4/20/2022           -Interval History: Patient is a 64-year-old female presenting today for six-month follow-up for left breast cancer diagnosed in September 2021. She is currently on anastrozole.Patient denies changes on her breast exam. She denies persistent headaches, bone pain, back pain, shortness of breath, or abdominal pain.      Review of Systems:  Review of  Systems   Constitutional:  Negative for activity change, appetite change, fatigue and unexpected weight change.   Respiratory:  Negative for cough and shortness of breath.    Cardiovascular:  Negative for chest pain.   Gastrointestinal:  Negative for abdominal pain, diarrhea, nausea and vomiting.   Endocrine: Negative for heat intolerance.   Musculoskeletal:  Negative for arthralgias, back pain and myalgias.   Skin:  Negative for rash.   Neurological:  Negative for weakness and headaches.   Hematological:  Negative for adenopathy.       Patient Active Problem List   Diagnosis    Anxiety    Asthma    Benign essential hypertension    Combined hyperlipidemia    Prediabetes    Vitamin D deficiency    Fibroid    Localized osteoporosis without current pathological fracture    Acute pain of right shoulder    Pain in both lower extremities    Neck pain    BMI 35.0-35.9,adult    Malignant neoplasm of lower-inner quadrant of left breast in female, estrogen receptor positive (HCC)    Family history of cancer    BRCA negative    GERD (gastroesophageal reflux disease)    Use of anastrozole    Thyroid nodule    Trigger finger    Multiple nevi    Contusion of fifth toe of right foot    SONIYA (obstructive sleep apnea)    Trigger middle finger of left hand    Obesity, morbid (HCC)     Past Medical History:   Diagnosis Date    Asthma     Last Assessed: 10/19/2016    Breast cancer (HCC)     Left breast 2021    Cancer (HCC) 10/2021    Fibroid     GERD (gastroesophageal reflux disease)     controlled with diet    Hypercholesteremia     Hypertension     Wears glasses      Past Surgical History:   Procedure Laterality Date    BREAST BIOPSY Left 09/28/2021    multiple sites    BREAST LUMPECTOMY Left 11/19/2021    Procedure: LUMPECTOMY BREAST BRACKETED AJAY LOCALIZED;  Surgeon: Audrey Lacey MD;  Location: [facility];  Service: Surgical Oncology    BREAST LUMPECTOMY Left 1/21/2022    Procedure: LUMPECTOMY BREAST, re-excision; 0800 NUC MED;   Surgeon: Audrey Lacey MD;  Location: [facility];  Service: Surgical Oncology    BUNIONECTOMY      BUNIONECTOMY Right 2009    BUNIONECTOMY Left 2007    COLONOSCOPY      FOOT SURGERY      LYMPH NODE BIOPSY Left 1/21/2022    Procedure: BIOPSY LYMPH NODE SENTINEL;  Surgeon: Audrey Lacey MD;  Location: [facility];  Service: Surgical Oncology    MAMMO NEEDLE LOCALIZATION LEFT (ALL INC) Left 11/12/2021    MAMMO NEEDLE LOCALIZATION LEFT (ALL INC) EACH ADD Left 11/12/2021    MAMMO STEREOTACTIC BREAST BIOPSY LEFT (ALL INC) Left 9/28/2021    MAMMO STEREOTACTIC BREAST BIOPSY LEFT (ALL INC) EACH ADD Left 9/28/2021    NASAL POLYP EXCISION      DC COLONOSCOPY FLX DX W/COLLJ SPEC WHEN PFRMD N/A 5/23/2016    Procedure: COLONOSCOPY;  Surgeon: Adriana Westfall DO;  Location: [facility];  Service: Gastroenterology    SHOULDER ARTHROSCOPY Right 2012    SHOULDER ARTHROSCOPY W/ ROTATOR CUFF REPAIR Right 2012    US GUIDED THYROID BIOPSY  6/2/2023     Family History   Problem Relation Age of Onset    Hypertension Mother     Breast cancer Mother         age unknown; bilateral breast cancer    Lung cancer Mother     Cervical cancer Mother     Cancer Mother     Lung disease Mother     Diabetes Father     Hypertension Father     Prostate cancer Father         age unknown    Breast cancer Maternal Grandmother 44    Diabetes Paternal Grandmother     Hypertension Brother     Prostate cancer Brother 50    Heart disease Brother     Hypertension Brother     Prostate cancer Brother     Hypertension Maternal Aunt     No Known Problems Maternal Aunt     Lung cancer Maternal Uncle     Diabetes Paternal Aunt     Diabetes Paternal Uncle     Hypertension Cousin     Breast cancer Cousin         under 50    Diabetes Cousin     Breast cancer Cousin     Breast cancer Cousin         under 50    Hypertension Brother     Hypertension Maternal Aunt      Social History     Socioeconomic History    Marital status: /Civil Union     Spouse name: Not on file     Number of children: Not on file    Years of education: Not on file    Highest education level: Not on file   Occupational History    Not on file   Tobacco Use    Smoking status: Never     Passive exposure: Never    Smokeless tobacco: Never   Vaping Use    Vaping status: Never Used   Substance and Sexual Activity    Alcohol use: No    Drug use: Never    Sexual activity: Yes     Partners: Male     Birth control/protection: Post-menopausal, None   Other Topics Concern    Not on file   Social History Narrative    Not on file     Social Determinants of Health     Financial Resource Strain: Not on file   Food Insecurity: Not on file   Transportation Needs: Not on file   Physical Activity: Not on file   Stress: Not on file   Social Connections: Not on file   Intimate Partner Violence: Not on file   Housing Stability: Not on file       Current Outpatient Medications:     albuterol (PROVENTIL HFA,VENTOLIN HFA) 90 mcg/act inhaler, INHALE 2 PUFFS BY MOUTH EVERY 6 HOURS AS NEEDED FOR WHEEZING, Disp: 18 g, Rfl: 0    amLODIPine (NORVASC) 10 mg tablet, Take 1 tablet by mouth once daily, Disp: 100 tablet, Rfl: 1    anastrozole (ARIMIDEX) 1 mg tablet, Take 1 tablet by mouth once daily, Disp: 90 tablet, Rfl: 3    atorvastatin (LIPITOR) 10 mg tablet, Take 1 tablet by mouth once daily, Disp: 90 tablet, Rfl: 1    calcium carbonate (OS-HALIMA) 1250 (500 Ca) MG tablet, Take 1,200 mg by mouth daily , Disp: , Rfl:     cholecalciferol (VITAMIN D3) 1,000 units tablet, Take 2,000 Units by mouth daily  , Disp: , Rfl:     hydrocortisone 2.5 % cream, Apply topically 3 (three) times a day prn, Disp: 28 g, Rfl: 2    multivitamin (THERAGRAN) TABS, Take 1 tablet by mouth daily., Disp: , Rfl:   No Known Allergies  Vitals:    09/26/24 1242   BP: 132/72   Pulse: 89   Temp: (!) 97.1 °F (36.2 °C)   SpO2: 97%       Physical Exam  Constitutional:       General: She is not in acute distress.     Appearance: Normal appearance.   Cardiovascular:      Rate and  Rhythm: Normal rate and regular rhythm.      Pulses: Normal pulses.      Heart sounds: Normal heart sounds.   Pulmonary:      Effort: Pulmonary effort is normal.      Breath sounds: Normal breath sounds.   Chest:      Chest wall: No mass.   Breasts:     Right: No swelling, bleeding, inverted nipple, mass, nipple discharge, skin change or tenderness.      Left: No swelling, bleeding, inverted nipple, mass, nipple discharge, skin change or tenderness.       Abdominal:      General: Abdomen is flat.      Palpations: Abdomen is soft.   Lymphadenopathy:      Upper Body:      Right upper body: No supraclavicular, axillary or pectoral adenopathy.      Left upper body: No supraclavicular, axillary or pectoral adenopathy.   Skin:     General: Skin is warm.   Neurological:      General: No focal deficit present.      Mental Status: She is alert and oriented to person, place, and time.   Psychiatric:         Mood and Affect: Mood normal.         Behavior: Behavior normal.           Results:    Imaging  No results found.    I reviewed the above imaging data.      Advance Care Planning/Advance Directives:  Discussed disease status, cancer treatment plans and/or cancer treatment goals with the patient.

## 2024-10-02 ENCOUNTER — RA CDI HCC (OUTPATIENT)
Dept: OTHER | Facility: HOSPITAL | Age: 64
End: 2024-10-02

## 2024-10-17 ENCOUNTER — OFFICE VISIT (OUTPATIENT)
Dept: FAMILY MEDICINE CLINIC | Facility: CLINIC | Age: 64
End: 2024-10-17
Payer: MEDICARE

## 2024-10-17 VITALS
DIASTOLIC BLOOD PRESSURE: 80 MMHG | HEART RATE: 68 BPM | HEIGHT: 64 IN | SYSTOLIC BLOOD PRESSURE: 130 MMHG | BODY MASS INDEX: 35.24 KG/M2 | WEIGHT: 206.4 LBS | OXYGEN SATURATION: 99 % | TEMPERATURE: 98.2 F

## 2024-10-17 DIAGNOSIS — Z23 ENCOUNTER FOR IMMUNIZATION: ICD-10-CM

## 2024-10-17 DIAGNOSIS — G47.33 OSA (OBSTRUCTIVE SLEEP APNEA): ICD-10-CM

## 2024-10-17 DIAGNOSIS — J45.909 MILD ASTHMA WITHOUT COMPLICATION, UNSPECIFIED WHETHER PERSISTENT: ICD-10-CM

## 2024-10-17 DIAGNOSIS — Z17.0 MALIGNANT NEOPLASM OF LOWER-INNER QUADRANT OF LEFT BREAST IN FEMALE, ESTROGEN RECEPTOR POSITIVE (HCC): ICD-10-CM

## 2024-10-17 DIAGNOSIS — R73.03 PREDIABETES: ICD-10-CM

## 2024-10-17 DIAGNOSIS — E04.1 THYROID NODULE: ICD-10-CM

## 2024-10-17 DIAGNOSIS — Z00.00 WELCOME TO MEDICARE PREVENTIVE VISIT: Primary | ICD-10-CM

## 2024-10-17 DIAGNOSIS — R06.02 SHORTNESS OF BREATH: ICD-10-CM

## 2024-10-17 DIAGNOSIS — E78.2 COMBINED HYPERLIPIDEMIA: ICD-10-CM

## 2024-10-17 DIAGNOSIS — C50.312 MALIGNANT NEOPLASM OF LOWER-INNER QUADRANT OF LEFT BREAST IN FEMALE, ESTROGEN RECEPTOR POSITIVE (HCC): ICD-10-CM

## 2024-10-17 PROCEDURE — G0402 INITIAL PREVENTIVE EXAM: HCPCS | Performed by: FAMILY MEDICINE

## 2024-10-17 PROCEDURE — G0403 EKG FOR INITIAL PREVENT EXAM: HCPCS | Performed by: FAMILY MEDICINE

## 2024-10-17 PROCEDURE — G0008 ADMIN INFLUENZA VIRUS VAC: HCPCS

## 2024-10-17 PROCEDURE — 99214 OFFICE O/P EST MOD 30 MIN: CPT | Performed by: FAMILY MEDICINE

## 2024-10-17 PROCEDURE — 90662 IIV NO PRSV INCREASED AG IM: CPT

## 2024-10-17 NOTE — ASSESSMENT & PLAN NOTE
History of thyroid nodules.  Patient had FNA biopsy June 2023 which was negative.  She had ultrasound Maria C 3, 2024 which showed few small subcentimeter nodules.  Will continue to you to monitor every 2 years

## 2024-10-17 NOTE — ASSESSMENT & PLAN NOTE
Patient on CPAP.  She states she is still having problems getting adjusted to this.  Continue follow-up with sleep medicine as directed

## 2024-10-17 NOTE — PROGRESS NOTES
Welcome to Medicare    Ambulatory Visit  Name: Nehal Bryant      : 1960      MRN: [id number]  Encounter Provider: Shankar Knight DO  Encounter Date: 10/17/2024   Encounter department: [facility]    Assessment & Plan       Preventive health issues were discussed with patient, and age appropriate screening tests were ordered as noted in patient's After Visit Summary. Personalized health advice and appropriate referrals for health education or preventive services given if needed, as noted in patient's After Visit Summary.    History of Present Illness     HPI   Patient Care Team:  Shankar Knight DO as PCP - General (Family Medicine)  DO Myriam Stern MD Kimberly Jegel Chaput, DO as Endoscopist  Audrey Lacey MD (General Surgery)  Tia Lauren MD (Radiation Oncology)    Review of Systems  Medical History Reviewed by provider this encounter:       Annual Wellness Visit Questionnaire   Nehal is here for her Welcome to Medicare visit.     Health Risk Assessment:   Patient rates overall health as very good. Patient feels that their physical health rating is same. Patient is very satisfied with their life. Eyesight was rated as same. Hearing was rated as same. Patient feels that their emotional and mental health rating is same. Patients states they are never, rarely angry. Patient states they are sometimes unusually tired/fatigued. Pain experienced in the last 7 days has been some. Patient's pain rating has been 1/10. Patient states that she has experienced weight loss or gain in last 6 months.     Depression Screening:   PHQ-2 Score: 0      Fall Risk Screening:   In the past year, patient has experienced: no history of falling in past year      Urinary Incontinence Screening:   Patient has not leaked urine accidently in the last six months.     Home Safety:  Patient does not have trouble with stairs inside or outside of their home. Patient has working smoke alarms  and has working carbon monoxide detector. Home safety hazards include: none.     Nutrition:   Current diet is Unhealthy.     Medications:   Patient is currently taking over-the-counter supplements. OTC medications include: see medication list. Patient is able to manage medications.     Activities of Daily Living (ADLs)/Instrumental Activities of Daily Living (IADLs):   Walk and transfer into and out of bed and chair?: Yes  Dress and groom yourself?: Yes    Bathe or shower yourself?: Yes    Feed yourself? Yes  Do your laundry/housekeeping?: Yes  Manage your money, pay your bills and track your expenses?: Yes  Make your own meals?: Yes    Do your own shopping?: Yes    Previous Hospitalizations:   Any hospitalizations or ED visits within the last 12 months?: No      Advance Care Planning:   Living will: No    Durable POA for healthcare: No    Advanced directive: No    ACP document given: Yes      Cognitive Screening:   Provider or family/friend/caregiver concerned regarding cognition?: No    PREVENTIVE SCREENINGS      Cardiovascular Screening:    General: Screening Not Indicated and History Lipid Disorder      Diabetes Screening:     General: Risks and Benefits Discussed      Colorectal Cancer Screening:     General: Screening Current      Breast Cancer Screening:     General: History Breast Cancer      Cervical Cancer Screening:    General: Screening Not Indicated      Osteoporosis Screening:    General: Screening Not Indicated and History Osteoporosis      Abdominal Aortic Aneurysm (AAA) Screening:        General: Risks and Benefits Discussed      Lung Cancer Screening:     General: Screening Not Indicated      Hepatitis C Screening:    General: Screening Current    Screening, Brief Intervention, and Referral to Treatment (SBIRT)    Screening  Typical number of drinks in a day: 0  Typical number of drinks in a week: 0  Interpretation: Low risk drinking behavior.    AUDIT-C Screenin) How often did you have a  drink containing alcohol in the past year? never  2) How many drinks did you have on a typical day when you were drinking in the past year? 0  3) How often did you have 6 or more drinks on one occasion in the past year? never    AUDIT-C Score: 0  Interpretation: Score 0-2 (female): Negative screen for alcohol misuse    Single Item Drug Screening:  How often have you used an illegal drug (including marijuana) or a prescription medication for non-medical reasons in the past year? never    Single Item Drug Screen Score: 0  Interpretation: Negative screen for possible drug use disorder    Social Determinants of Health     Food Insecurity: No Food Insecurity (10/14/2024)    Hunger Vital Sign     Worried About Running Out of Food in the Last Year: Never true     Ran Out of Food in the Last Year: Never true   Transportation Needs: No Transportation Needs (10/14/2024)    PRAPARE - Transportation     Lack of Transportation (Medical): No     Lack of Transportation (Non-Medical): No   Housing Stability: Unknown (10/14/2024)    Housing Stability Vital Sign     Unable to Pay for Housing in the Last Year: No     Homeless in the Last Year: No   Utilities: Not At Risk (10/14/2024)    [facility] Utilities     Threatened with loss of utilities: No     No results found.    Objective     LMP 12/15/2003 (Within Years)     Physical Exam  Vitals and nursing note reviewed.   Constitutional:       General: She is not in acute distress.     Appearance: She is well-developed.   HENT:      Head: Normocephalic and atraumatic.   Eyes:      Conjunctiva/sclera: Conjunctivae normal.   Cardiovascular:      Rate and Rhythm: Normal rate and regular rhythm.      Heart sounds: No murmur heard.  Pulmonary:      Effort: Pulmonary effort is normal. No respiratory distress.      Breath sounds: Normal breath sounds.   Abdominal:      Palpations: Abdomen is soft.      Tenderness: There is no abdominal tenderness.   Musculoskeletal:         General: No swelling.       Cervical back: Neck supple.   Skin:     General: Skin is warm and dry.      Capillary Refill: Capillary refill takes less than 2 seconds.   Neurological:      Mental Status: She is alert.   Psychiatric:         Mood and Affect: Mood normal.

## 2024-10-17 NOTE — PATIENT INSTRUCTIONS
Medicare Preventive Visit Patient Instructions  Thank you for completing your Welcome to Medicare Visit or Medicare Annual Wellness Visit today. Your next wellness visit will be due in one year (10/18/2025).  The screening/preventive services that you may require over the next 5-10 years are detailed below. Some tests may not apply to you based off risk factors and/or age. Screening tests ordered at today's visit but not completed yet may show as past due. Also, please note that scanned in results may not display below.  Preventive Screenings:  Service Recommendations Previous Testing/Comments   Colorectal Cancer Screening  * Colonoscopy    * Fecal Occult Blood Test (FOBT)/Fecal Immunochemical Test (FIT)  * Fecal DNA/Cologuard Test  * Flexible Sigmoidoscopy Age: 45-75 years old   Colonoscopy: every 10 years (may be performed more frequently if at higher risk)  OR  FOBT/FIT: every 1 year  OR  Cologuard: every 3 years  OR  Sigmoidoscopy: every 5 years  Screening may be recommended earlier than age 45 if at higher risk for colorectal cancer. Also, an individualized decision between you and your healthcare provider will decide whether screening between the ages of 76-85 would be appropriate. Colonoscopy: 05/23/2016  FOBT/FIT: Not on file  Cologuard: Not on file  Sigmoidoscopy: Not on file    Screening Current     Breast Cancer Screening Age: 40+ years old  Frequency: every 1-2 years  Not required if history of left and right mastectomy Mammogram: 10/25/2023    History Breast Cancer   Cervical Cancer Screening Between the ages of 21-29, pap smear recommended once every 3 years.   Between the ages of 30-65, can perform pap smear with HPV co-testing every 5 years.   Recommendations may differ for women with a history of total hysterectomy, cervical cancer, or abnormal pap smears in past. Pap Smear: 09/23/2024    Screening Not Indicated   Hepatitis C Screening Once for adults born between 1945 and 1965  More frequently in  patients at high risk for Hepatitis C Hep C Antibody: 01/21/2022    Screening Current   Diabetes Screening 1-2 times per year if you're at risk for diabetes or have pre-diabetes Fasting glucose: 87 mg/dL (5/6/2022)  A1C: 5.5 % (5/6/2022)  Risks and Benefits Discussed   Cholesterol Screening Once every 5 years if you don't have a lipid disorder. May order more often based on risk factors. Lipid panel: 05/06/2022    Screening Not Indicated  History Lipid Disorder     Other Preventive Screenings Covered by Medicare:  Abdominal Aortic Aneurysm (AAA) Screening: covered once if your at risk. You're considered to be at risk if you have a family history of AAA.  Lung Cancer Screening: covers low dose CT scan once per year if you meet all of the following conditions: (1) Age 55-77; (2) No signs or symptoms of lung cancer; (3) Current smoker or have quit smoking within the last 15 years; (4) You have a tobacco smoking history of at least 20 pack years (packs per day multiplied by number of years you smoked); (5) You get a written order from a healthcare provider.  Glaucoma Screening: covered annually if you're considered high risk: (1) You have diabetes OR (2) Family history of glaucoma OR (3)  aged 50 and older OR (4)  American aged 65 and older  Osteoporosis Screening: covered every 2 years if you meet one of the following conditions: (1) You're estrogen deficient and at risk for osteoporosis based off medical history and other findings; (2) Have a vertebral abnormality; (3) On glucocorticoid therapy for more than 3 months; (4) Have primary hyperparathyroidism; (5) On osteoporosis medications and need to assess response to drug therapy.   Last bone density test (DXA Scan): 05/30/2019.  HIV Screening: covered annually if you're between the age of 15-65. Also covered annually if you are younger than 15 and older than 65 with risk factors for HIV infection. For pregnant patients, it is covered up to 3  times per pregnancy.    Immunizations:  Immunization Recommendations   Influenza Vaccine Annual influenza vaccination during flu season is recommended for all persons aged >= 6 months who do not have contraindications   Pneumococcal Vaccine   * Pneumococcal conjugate vaccine = PCV13 (Prevnar 13), PCV15 (Vaxneuvance), PCV20 (Prevnar 20)  * Pneumococcal polysaccharide vaccine = PPSV23 (Pneumovax) Adults 19-63 yo with certain risk factors or if 65+ yo  If never received any pneumonia vaccine: recommend Prevnar 20 (PCV20)  Give PCV20 if previously received 1 dose of PCV13 or PPSV23   Hepatitis B Vaccine 3 dose series if at intermediate or high risk (ex: diabetes, end stage renal disease, liver disease)   Respiratory syncytial virus (RSV) Vaccine - COVERED BY MEDICARE PART D  * RSVPreF3 (Arexvy) CDC recommends that adults 60 years of age and older may receive a single dose of RSV vaccine using shared clinical decision-making (SCDM)   Tetanus (Td) Vaccine - COST NOT COVERED BY MEDICARE PART B Following completion of primary series, a booster dose should be given every 10 years to maintain immunity against tetanus. Td may also be given as tetanus wound prophylaxis.   Tdap Vaccine - COST NOT COVERED BY MEDICARE PART B Recommended at least once for all adults. For pregnant patients, recommended with each pregnancy.   Shingles Vaccine (Shingrix) - COST NOT COVERED BY MEDICARE PART B  2 shot series recommended in those 19 years and older who have or will have weakened immune systems or those 50 years and older     Health Maintenance Due:      Topic Date Due   • Breast Cancer Screening: Mammogram  10/25/2024   • Colorectal Cancer Screening  05/23/2026   • HIV Screening  Completed   • Hepatitis C Screening  Completed   • Cervical Cancer Screening  Discontinued     Immunizations Due:      Topic Date Due   • Pneumococcal Vaccine: 65+ Years (1 of 2 - PCV) Never done   • Influenza Vaccine (1) 09/01/2024   • COVID-19 Vaccine (4 -  2023-24 season) 09/01/2024     Advance Directives   What are advance directives?  Advance directives are legal documents that state your wishes and plans for medical care. These plans are made ahead of time in case you lose your ability to make decisions for yourself. Advance directives can apply to any medical decision, such as the treatments you want, and if you want to donate organs.   What are the types of advance directives?  There are many types of advance directives, and each state has rules about how to use them. You may choose a combination of any of the following:  Living will:  This is a written record of the treatment you want. You can also choose which treatments you do not want, which to limit, and which to stop at a certain time. This includes surgery, medicine, IV fluid, and tube feedings.   Durable power of  for healthcare (DPAHC):  This is a written record that states who you want to make healthcare choices for you when you are unable to make them for yourself. This person, called a proxy, is usually a family member or a friend. You may choose more than 1 proxy.  Do not resuscitate (DNR) order:  A DNR order is used in case your heart stops beating or you stop breathing. It is a request not to have certain forms of treatment, such as CPR. A DNR order may be included in other types of advance directives.  Medical directive:  This covers the care that you want if you are in a coma, near death, or unable to make decisions for yourself. You can list the treatments you want for each condition. Treatment may include pain medicine, surgery, blood transfusions, dialysis, IV or tube feedings, and a ventilator (breathing machine).  Values history:  This document has questions about your views, beliefs, and how you feel and think about life. This information can help others choose the care that you would choose.  Why are advance directives important?  An advance directive helps you control your care.  Although spoken wishes may be used, it is better to have your wishes written down. Spoken wishes can be misunderstood, or not followed. Treatments may be given even if you do not want them. An advance directive may make it easier for your family to make difficult choices about your care.   Weight Management   Why it is important to manage your weight:  Being overweight increases your risk of health conditions such as heart disease, high blood pressure, type 2 diabetes, and certain types of cancer. It can also increase your risk for osteoarthritis, sleep apnea, and other respiratory problems. Aim for a slow, steady weight loss. Even a small amount of weight loss can lower your risk of health problems.  How to lose weight safely:  A safe and healthy way to lose weight is to eat fewer calories and get regular exercise. You can lose up about 1 pound a week by decreasing the number of calories you eat by 500 calories each day.   Healthy meal plan for weight management:  A healthy meal plan includes a variety of foods, contains fewer calories, and helps you stay healthy. A healthy meal plan includes the following:  Eat whole-grain foods more often.  A healthy meal plan should contain fiber. Fiber is the part of grains, fruits, and vegetables that is not broken down by your body. Whole-grain foods are healthy and provide extra fiber in your diet. Some examples of whole-grain foods are whole-wheat breads and pastas, oatmeal, brown rice, and bulgur.  Eat a variety of vegetables every day.  Include dark, leafy greens such as spinach, kale, minnie greens, and mustard greens. Eat yellow and orange vegetables such as carrots, sweet potatoes, and winter squash.   Eat a variety of fruits every day.  Choose fresh or canned fruit (canned in its own juice or light syrup) instead of juice. Fruit juice has very little or no fiber.  Eat low-fat dairy foods.  Drink fat-free (skim) milk or 1% milk. Eat fat-free yogurt and low-fat cottage  cheese. Try low-fat cheeses such as mozzarella and other reduced-fat cheeses.  Choose meat and other protein foods that are low in fat.  Choose beans or other legumes such as split peas or lentils. Choose fish, skinless poultry (chicken or turkey), or lean cuts of red meat (beef or pork). Before you cook meat or poultry, cut off any visible fat.   Use less fat and oil.  Try baking foods instead of frying them. Add less fat, such as margarine, sour cream, regular salad dressing and mayonnaise to foods. Eat fewer high-fat foods. Some examples of high-fat foods include french fries, doughnuts, ice cream, and cakes.  Eat fewer sweets.  Limit foods and drinks that are high in sugar. This includes candy, cookies, regular soda, and sweetened drinks.  Exercise:  Exercise at least 30 minutes per day on most days of the week. Some examples of exercise include walking, biking, dancing, and swimming. You can also fit in more physical activity by taking the stairs instead of the elevator or parking farther away from stores. Ask your healthcare provider about the best exercise plan for you.      © Copyright [employer] 2018 Information is for End User's use only and may not be sold, redistributed or otherwise used for commercial purposes. All illustrations and images included in CareNotes® are the copyrighted property of [employer].D.A[employer]M., Inc. or [employer]

## 2024-10-17 NOTE — ASSESSMENT & PLAN NOTE
Patient has been complaining of occasional shortness of breath.  Can be exertional, or at rest.  Recently started CPAP.  Uncertain if this has some correlation.  PFT showed mild restriction, otherwise was negative.  Patient denies any chest pain, but consider stress testing if symptoms persist.

## 2024-10-17 NOTE — ASSESSMENT & PLAN NOTE
Continue atorvastatin 10 mg daily.  Patient is due for labs.  She will get these drawn shortly

## 2024-10-17 NOTE — PROGRESS NOTES
Ambulatory Visit  Name: Nehal Bryant      : 1960      MRN: [id number]  Encounter Provider: Shankar Knight DO  Encounter Date: 10/17/2024   Encounter department: [facility]    Assessment & Plan  Welcome to Medicare preventive visit  Patient presents for recheck of chronic medical problems today.  Overall she is doing well.  She states her diet is relatively healthy.  She does not exercise much.  She is a non-smoker.  Rare use of alcohol.  She drinks 1 cup of coffee per day.  Exam today unremarkable.  Recommend continue healthy diet and regular exercise program.  Orders:    POCT ECG    Shortness of breath  Patient has been complaining of occasional shortness of breath.  Can be exertional, or at rest.  Recently started CPAP.  Uncertain if this has some correlation.  PFT showed mild restriction, otherwise was negative.  Patient denies any chest pain, but consider stress testing if symptoms persist.       Combined hyperlipidemia  Continue atorvastatin 10 mg daily.  Patient is due for labs.  She will get these drawn shortly       Prediabetes  History of mildly elevated glucose.  Continue reduced carb diet and exercise.  Patient is due for labs.  She states she will get these done shortly  Orders:    Comprehensive metabolic panel; Future    Hemoglobin A1C; Future    Thyroid nodule  History of thyroid nodules.  Patient had FNA biopsy 2023 which was negative.  She had ultrasound Maria C 3, 2024 which showed few small subcentimeter nodules.  Will continue to you to monitor every 2 years       SONIYA (obstructive sleep apnea)  Patient on CPAP.  She states she is still having problems getting adjusted to this.  Continue follow-up with sleep medicine as directed       Malignant neoplasm of lower-inner quadrant of left breast in female, estrogen receptor positive (HCC)  History of stage Ia breast cancer diagnosed in .  This was ER/NM positive, HER2 negative.  Status post lumpectomy and  radiation.  Still on anastrozole.  Continue follow-up with oncology team as directed       Mild asthma without complication, unspecified whether persistent  Continue albuterol.       Encounter for immunization    Orders:    influenza vaccine, high-dose, PF 0.5 mL (Fluzone High Dose)         History of Present Illness     Patient presents for recheck of chronic medical problems today.  Overall she is doing well.  She states her diet is relatively healthy.  She does not exercise much.  She is a non-smoker.  Rare use of alcohol.  She drinks 1 cup of coffee per day.      Review of Systems   Respiratory: Negative.     Cardiovascular: Negative.    Gastrointestinal: Negative.    Genitourinary: Negative.      Past Medical History:   Diagnosis Date    Asthma     Last Assessed: 10/19/2016    Breast cancer (HCC)     Left breast 2021    Cancer (HCC) 10/2021    Fibroid     GERD (gastroesophageal reflux disease)     controlled with diet    Hypercholesteremia     Hypertension     Wears glasses      Past Surgical History:   Procedure Laterality Date    BREAST BIOPSY Left 09/28/2021    multiple sites    BREAST LUMPECTOMY Left 11/19/2021    Procedure: LUMPECTOMY BREAST BRACKETED AJAY LOCALIZED;  Surgeon: Audrey Lacey MD;  Location: [facility];  Service: Surgical Oncology    BREAST LUMPECTOMY Left 1/21/2022    Procedure: LUMPECTOMY BREAST, re-excision; 0800 NUC MED;  Surgeon: Audrey Lacey MD;  Location: [facility];  Service: Surgical Oncology    BUNIONECTOMY      BUNIONECTOMY Right 2009    BUNIONECTOMY Left 2007    COLONOSCOPY      FOOT SURGERY      LYMPH NODE BIOPSY Left 1/21/2022    Procedure: BIOPSY LYMPH NODE SENTINEL;  Surgeon: Audrey Lacey MD;  Location: [facility];  Service: Surgical Oncology    MAMMO NEEDLE LOCALIZATION LEFT (ALL INC) Left 11/12/2021    MAMMO NEEDLE LOCALIZATION LEFT (ALL INC) EACH ADD Left 11/12/2021    MAMMO STEREOTACTIC BREAST BIOPSY LEFT (ALL INC) Left 9/28/2021    MAMMO STEREOTACTIC BREAST BIOPSY LEFT  (ALL INC) EACH ADD Left 9/28/2021    NASAL POLYP EXCISION      NC COLONOSCOPY FLX DX W/COLLJ SPEC WHEN PFRMD N/A 5/23/2016    Procedure: COLONOSCOPY;  Surgeon: Adriana Westfall DO;  Location: [facility];  Service: Gastroenterology    SHOULDER ARTHROSCOPY Right 2012    SHOULDER ARTHROSCOPY W/ ROTATOR CUFF REPAIR Right 2012    US GUIDED THYROID BIOPSY  6/2/2023     Family History   Problem Relation Age of Onset    Hypertension Mother     Breast cancer Mother         age unknown; bilateral breast cancer    Lung cancer Mother     Cervical cancer Mother     Cancer Mother     Lung disease Mother     Diabetes Father     Hypertension Father     Prostate cancer Father         age unknown    Breast cancer Maternal Grandmother 44    Diabetes Paternal Grandmother     Hypertension Brother     Prostate cancer Brother 50    Heart disease Brother     Hypertension Brother     Prostate cancer Brother     Hypertension Maternal Aunt     No Known Problems Maternal Aunt     Lung cancer Maternal Uncle     Diabetes Paternal Aunt     Diabetes Paternal Uncle     Hypertension Cousin     Breast cancer Cousin         under 50    Diabetes Cousin     Breast cancer Cousin     Breast cancer Cousin         under 50    Hypertension Brother     Hypertension Maternal Aunt      Social History     Tobacco Use    Smoking status: Never     Passive exposure: Never    Smokeless tobacco: Never   Vaping Use    Vaping status: Never Used   Substance and Sexual Activity    Alcohol use: No    Drug use: Never    Sexual activity: Yes     Partners: Male     Birth control/protection: Post-menopausal, None     Current Outpatient Medications on File Prior to Visit   Medication Sig    albuterol (PROVENTIL HFA,VENTOLIN HFA) 90 mcg/act inhaler INHALE 2 PUFFS BY MOUTH EVERY 6 HOURS AS NEEDED FOR WHEEZING    amLODIPine (NORVASC) 10 mg tablet Take 1 tablet by mouth once daily    anastrozole (ARIMIDEX) 1 mg tablet Take 1 tablet by mouth once daily    atorvastatin (LIPITOR) 10  "mg tablet Take 1 tablet by mouth once daily    calcium carbonate (OS-HALIMA) 1250 (500 Ca) MG tablet Take 1,200 mg by mouth daily     cholecalciferol (VITAMIN D3) 1,000 units tablet Take 2,000 Units by mouth daily      hydrocortisone 2.5 % cream Apply topically 3 (three) times a day prn    multivitamin (THERAGRAN) TABS Take 1 tablet by mouth daily.     No Known Allergies  Immunization History   Administered Date(s) Administered    COVID-19 MODERNA VACC 0.5 ML IM 03/23/2021, 04/22/2021, 01/05/2022    INFLUENZA 10/15/2018, 10/12/2023    Influenza Quadrivalent, 6-35 Months IM 10/19/2016, 10/11/2017    Influenza Split High Dose Preservative Free IM 10/17/2024    Influenza, injectable, quadrivalent, preservative free 0.5 mL 10/12/2023    Influenza, recombinant, quadrivalent,injectable, preservative free 10/30/2019, 10/07/2020, 12/08/2021, 11/10/2022    Influenza, seasonal, injectable 1960, 10/01/2014, 11/05/2015, 11/01/2016    Influenza, seasonal, injectable, preservative free 10/15/2018    Palivizumab (RSV-MAb) 05/03/2024    Respiratory Syncytial Virus Vaccine (Recombinant, Adjuvanted) 05/03/2024    Tdap 05/05/2022    Zoster Vaccine Recombinant 10/12/2023, 10/12/2023, 04/15/2024     Objective     /80 (BP Location: Left arm, Patient Position: Sitting, Cuff Size: Large)   Pulse 68   Temp 98.2 °F (36.8 °C) (Temporal)   Ht 5' 3.5\" (1.613 m)   Wt 93.6 kg (206 lb 6.4 oz)   LMP 12/15/2003 (Within Years)   SpO2 99%   BMI 35.99 kg/m²     Physical Exam  Cardiovascular:      Rate and Rhythm: Normal rate and regular rhythm.      Heart sounds: Normal heart sounds.      Comments: Carotids: no bruits  Ext: no edema  Pulmonary:      Effort: Pulmonary effort is normal. No respiratory distress.      Breath sounds: No wheezing or rales.   Psychiatric:         Behavior: Behavior normal.         Thought Content: Thought content normal.         Answers submitted by the patient for this visit:  Medicare Annual Wellness Visit " "(Submitted on 10/14/2024)  How would you rate your overall health?: very good  Compared to last year, how is your physical health?: same  In general, how satisfied are you with your life?: very satisfied  Compared to last year, how is your eyesight?: same  Compared to last year, how is your hearing?: same  Compared to last year, how is your emotional/mental health?: same  How often is anger a problem for you?: never, rarely  How often do you feel unusually tired/fatigued?: sometimes  In the past 7 days, how much pain have you experienced?: some  If you answered \"some\" or \"a lot\", please rate the severity of your pain on a scale of 1 to 10 (1 being the least severe pain and 10 being the most intense pain).: 1/10  In the past 6 months, have you lost or gained 10 pounds without trying?: Yes  One or more falls in the last year: No  In the past 6 months, have you accidentally leaked urine?: No  Do you have trouble with the stairs inside or outside your home?: No  Does your home have working smoke alarms?: Yes  Does your home have a carbon monoxide monitor?: Yes  Which safety hazards (if any) have you experienced in your home? Please select all that apply.: none  How would you describe your current diet? Please select all that apply.: Unhealthy  In addition to prescription medications, are you taking any over-the-counter supplements?: Yes  If yes, what supplements are you taking?: Calcium with vitamin D3,  Women 50+ mulit vitamins  Can you manage your medications?: Yes  Are you currently taking any opioid medications?: No  Can you walk and transfer into and out of your bed and chair?: Yes  Can you dress and groom yourself?: Yes  Can you bathe or shower yourself?: Yes  Can you feed yourself?: Yes  Can you do your laundry/ housekeeping?: Yes  Can you manage your money, pay your bills, and track your expenses?: Yes  Can you make your own meals?: Yes  Can you do your own shopping?: Yes  Within the last 12 months, have you had " any hospitalizations or Emergency Department visits?: No  Do you have a living will?: No  Do you have a Durable POA (Power of ) for healthcare decisions?: No  Do you have an Advanced Directive for end of life decisions?: No  How often have you used an illegal drug (including marijuana) or a prescription medication for non-medical reasons in the past year?: never  What is the typical number of drinks you consume in a day?: 0  What is the typical number of drinks you consume in a week?: 0  How often did you have a drink containing alcohol in the past year?: never  How many drinks did you have on a typical day  when you were drinking in the past year?: 0  How often did you have 6 or more drinks on one occasion in the past year?: never

## 2024-10-17 NOTE — ASSESSMENT & PLAN NOTE
History of stage Ia breast cancer diagnosed in 2021.  This was ER/PA positive, HER2 negative.  Status post lumpectomy and radiation.  Still on anastrozole.  Continue follow-up with oncology team as directed

## 2024-10-19 DIAGNOSIS — E78.2 COMBINED HYPERLIPIDEMIA: ICD-10-CM

## 2024-10-21 ENCOUNTER — TELEPHONE (OUTPATIENT)
Dept: HEMATOLOGY ONCOLOGY | Facility: CLINIC | Age: 64
End: 2024-10-21

## 2024-10-21 RX ORDER — ATORVASTATIN CALCIUM 10 MG/1
10 TABLET, FILM COATED ORAL DAILY
Qty: 90 TABLET | Refills: 0 | Status: SHIPPED | OUTPATIENT
Start: 2024-10-21

## 2024-10-21 NOTE — TELEPHONE ENCOUNTER
Call out to patient in regard to recommended appointment post Dexa scan. Appointment made for 12/6 at 0930 am. Patient dexa scan on 11/27.    Patient agreeable and appreciative of call.

## 2024-10-21 NOTE — TELEPHONE ENCOUNTER
----- Message from Mary Alaniz MD sent at 10/21/2024 11:29 AM EDT -----  Can checkout reschedule Nehal as she was supposed to be seen only after her DEXA (November is when the scan was scheduled).   spouse

## 2024-10-28 ENCOUNTER — HOSPITAL ENCOUNTER (OUTPATIENT)
Dept: MAMMOGRAPHY | Facility: CLINIC | Age: 64
Discharge: HOME | End: 2024-10-28
Payer: MEDICARE

## 2024-10-28 VITALS — HEIGHT: 64 IN | BODY MASS INDEX: 35.17 KG/M2 | WEIGHT: 206 LBS

## 2024-10-28 DIAGNOSIS — Z17.0 MALIGNANT NEOPLASM OF LOWER-INNER QUADRANT OF LEFT BREAST IN FEMALE, ESTROGEN RECEPTOR POSITIVE (HCC): ICD-10-CM

## 2024-10-28 DIAGNOSIS — C50.312 MALIGNANT NEOPLASM OF LOWER-INNER QUADRANT OF LEFT BREAST IN FEMALE, ESTROGEN RECEPTOR POSITIVE (HCC): ICD-10-CM

## 2024-10-28 PROCEDURE — 77066 DX MAMMO INCL CAD BI: CPT

## 2024-10-28 PROCEDURE — G0279 TOMOSYNTHESIS, MAMMO: HCPCS

## 2024-11-26 NOTE — PROGRESS NOTES
Hematology/Oncology Outpatient Office Note    Date of Service: 2024    [facility] [address]  [place] [id number]  [phone]    Reason for Consultation:   Chief Complaint   Patient presents with    Follow-up     Cancer Stage at diagnosis: 1A    Referral Physician: No ref. provider found    Primary Care Physician:  Shankar Knight DO     Nickname: Nehal    Spouse: Annette Chaparro    Original ECO    Today's ECO    Goals and Barriers:  Current Goal: Minimize effects of disease burden, extend life.   Barriers to accomplishing this: None    Patient's Capacity to Self Care:  Patient is able to self care    Code Status: Not assessed    Advanced directives: Not assessed    ASSESSMENT & PLAN      Diagnosis ICD-10-CM Associated Orders   1. Malignant neoplasm of lower-inner quadrant of left breast in female, estrogen receptor positive (HCC)  C50.312     Z17.0             This is a 64 y.o. c PMHx notable for hypertension, SONIYA, asthma, GERD, osteoporosis, being seen in consultation for stage Ia hormone positive, HER2 negative breast cancer      Discussion of decision making  Oncology history updated, accordingly, during this visit  Goals of care/patient communication  I discussed with the patient the clinical course leading up to their cancer diagnosis. I reviewed relevant office notes, imaging reports and pathology result as well.  I told the patient that this is a case of curable disease and what this means. We discussed that the goal of anti-cancer therapy is to provide best quality of life, extend overall survival, and progression free survival as shown in clinical trials. We also discussed that there might be a point when the cancer will no longer respond to this anti-neoplastic therapy.   I explained the risks/benefits of the proposed cancer therapy: Anastrozole and after discussion including understanding risks of possible  life-threatening complications and therapy-related malignancy development  TNM/Staging At Diagnosis  pT1b, pN0, M0   Cancer Staging   Malignant neoplasm of lower-inner quadrant of left breast in female, estrogen receptor positive (HCC)  Staging form: Breast, AJCC 8th Edition  - Clinical: Stage 0 (cTis (DCIS), cN0, cM0, ER+, IN+, HER2: Not Assessed) - Signed by Audrey Lacey MD on 10/26/2021  Stage prefix: Initial diagnosis  Method of lymph node assessment: Clinical  Nuclear grade: G2  - Pathologic stage from 12/7/2021: Stage IA (pT1b, pN0, cM0, G2, ER+, IN+, HER2-) - Signed by Audrey Lacey MD on 12/7/2021  Stage prefix: Initial diagnosis  Method of lymph node assessment: Clinical  Histologic grading system: 3 grade system  - Pathologic: No stage assigned - Unsigned    Disease Features/Tumor Markers/Genetics  Tumor Marker: N/A  Notable Path Features:   8 mm of invasive ductal carcinoma, grade 2. No evidence of lymphovascular invasion.  4 sentinel lymph nodes were all negative for metastatic disease.  ER 90% positive, IN 90% positive, HER2 negative disease.  Stage I A (pT1b, pN0, M0)   MSH-6 variant with uncertain significance   Treatment: Adjuvant hormonal therapy with anastrozole since February 2022.   Other Supportive care: NA  Treatment Team Members  Surgeon: Audrey Lacey  Rad Onc: Xin Weber  Palliative  Labs  Diagnostics  10/25/2023 diagnostic bilateral mammogram benign BI-RADS 2  5/28/2019: DEXA scan indicates osteoporosis at the spine area. T measure at -2.8   11/27/2024 DEXA: RESULTS: LUMBAR SPINE:  L1-L4: BMD 0.876 gm/cm2  T-score -2.5, osteoporosis      Discussion of decision making    I personally reviewed the following lab results, the image studies, pathology, other specialty/physicians consult notes and recommendations, and outside medical records. I had a lengthy discussion with the patient and shared the work-up findings. We discussed the diagnosis and management plan as below. I spent 42 minutes  reviewing the records (labs, clinician notes, outside records, medical history, ordering medicine/tests/procedures, monitoring of anti-neoplastic toxicities, interpreting the imaging/labs previously done) and coordination of care as well as direct time with the patient today, of which greater than 50% of the time was spent in counseling and coordination of care with the patient/family.      Plan/Labs  DEXA scan due 11/2026  Continue refilled anastrozole 1 mg daily until February 2027, discuss BCI as we get closer  Cont to f/u with Surg-Onc, do yearly mammogram follow-up        Follow Up 6 months    All questions were answered to the patient's satisfaction during this encounter. The patient knows the contact information for our office and knows to reach out for any relevant concerns related to this encounter. They are to call for any temperature 100.4 or higher, new symptoms including but not restricted to shaking chills, decreased appetite, nausea, vomiting, diarrhea, increased fatigue, shortness of breath or chest pain, confusion, and not feeling the strength to come to the clinic. For all other listed problems and medical diagnosis in their chart - they are managed by PCP and/or other specialists, which the patient acknowledges. Thank you very much for your consultation and making us a part of this patient's care. We are continuing to follow closely with you. Please do not hesitate to reach out to me with any additional questions or concerns.    Mary Alaniz MD  Hematology & Medical Oncology Staff Physician             Disclaimer: This document was prepared using [employer] technology. If a word or phrase is confusing, or does not make sense, this is likely due to recognition error which was not discovered during this clinician's review. If you believe an error has occurred, please contact me through [facility] service line for lilliana?cation.      ONCOLOGY HISTORY OF PRESENT ILLNESS        Oncology  History   Malignant neoplasm of lower-inner quadrant of left breast in female, estrogen receptor positive (HCC)   9/28/2021 Initial Diagnosis    Ductal carcinoma in situ (DCIS) of left breast     9/28/2021 Biopsy    Final Diagnosis   A. Breast, Left, 7:00 location 2 cores w/ Calcs, Biopsy:  - Ductal carcinoma in situ.  - No invasive carcinoma is seen.       Architectural patterns: Solid, papillary       Nuclear grade of ductal carcinoma in situ: Grade 2 (II).       Necrosis:  Focal.       Size of in-situ carcinoma: At least 4 mm.  - Microcalcifications: Present in DCIS.  - Breast cancer biomarker studies (paraffin block number: A1):           Estrogen, progesterone receptor studies pending, to be described in a separate receptor report.      ER 90-95% positive  Pr90-95% positive  B. Breast, Left, 7:00 location 1 core w/ No Calcs, Biopsy:  - Atypical lobular hyperplasia.  - Usual ductal hyperplasia involving intraductal papilloma.      C. Breast, Left, 9:00 location 2 cores w/ Calcs, Biopsy:  - Non-proliferative fibrocystic changes, including microcysts, microcalcifications with stromal fibrosis.  - Negative for atypia or carcinoma.      D. Breast, Left, 9:00 location 1 core w/ No Calcs, Biopsy:  - Intraductal papilloma.  - Negative for atypia or carcinoma.         10/26/2021 -  Cancer Staged    Staging form: Breast, AJCC 8th Edition  - Clinical: Stage 0 (cTis (DCIS), cN0, cM0, ER+, WA+, HER2: Not Assessed) - Signed by Audrey Lacey MD on 10/26/2021  Stage prefix: Initial diagnosis  Method of lymph node assessment: Clinical  Nuclear grade: G2       10/2021 Genetic Testing    Test: [employer] BRCAplus STAT Panel (8 genes): ZAKIA, BRCA1, BRCA2, CDH1, CHEK2, PALB2, PTEN, TP53   Result:   Negative - No Clinically Significant Variants Detected       Assessment:  Negative     Additional Testing:  Test(s): [employer] BRCAplus STAT Panel (8 genes): ZAKIA, BRCA1, BRCA2, CDH1, CHEK2, PALB2, PTEN, TP53 with reflex to [facility] CancerNext (28  additional genes): APC, ZAKIA, AXIN2 BARD1, BRCA1, BRCA2, BRIP1, BMPR1A, CDH1, CDK4, CDKN2A, CHEK2, DICER1, EPCAM, GREM1, HOXB13, MLH1, MSH2, MSH3, MSH6, MUTYH, NBN, NF1, NTHL1, PALB2, PMS2, POLD1, POLE, PTEN, RAD51C, RAD51D, RECQL SMAD4, SMARCA4, STK11, TP53      Result: Variant of uncertain significance     Variant: MSH6 Variant, Unknown Significance: p.T6P       Variant of uncertain significance (VUS)     11/19/2021 Surgery    Final Diagnosis   A. Breast, Left, Left breast lumpectomy, short superior, long lateral:  - Invasive mammary carcinoma of no special type (ductal), 8 mm in greatest dimension with    extensive adjacent ductal carcinoma in-situ and associated non-invasive lobular neoplasia.    -- [place] histologic grade 2 of 3 (total score: 7 of 9)        * Glandular (acinar) Tubular Differentiation < 10%, score 3.        * Nuclear Pleomorphism 2 of 3, score 2.        * Mitotic Rate 4-7/mm2, (8-14 mitoses/10HPF; 8 mitoses/10 HPF), score 2.    -- Confirmed by tumor cell immunophenotype:        * Positive: E-cadherin, P120 - each in a membranous pattern.        * Negative: p63, SMMHC.  - Ductal carcinoma in-situ (DCIS): Present; not an extensive intraductal component associated with     invasive carcinoma (< 5% of total tumor) but extensive DCIS in adjacent tissue.     -- Confirmed by positive E-cadherin, p120 (membranous), SMMHC, p63 and loss of epithelial         mosaicism on CK5/6 immunostains.     -- Size and Extent:  At least 48 mm in approximate extent; present in 12 sequential sections; 43 of 91 blocks.     -- Architectural Patterns: Cribriform, micropapillary, solid, papillary.    -- Nuclear grade: I-II (low to intermediate)    -- Necrosis: Present, focal and central necrosis identified.     -- Associated prior biopsy site changes with twist clip and Aliyah  marker.  - Multifocal non-invasive lobular neoplasia (focally florid lobular carcinoma in-situ/LCIS and atypical lobular     hyperplasia with  pagetoid ductal spread) involving and adjacent to DCIS.    -- Confirmed by negative E-cadherin; positive p120 (cytoplasmic), SMMHC, p63; and loss of epithelial        mosaicism on CK5/6 immunostains.   - Margins uninvolved by invasive and in-situ carcinoma.    -- Invasive carcinoma: 8 mm from nearest posterior margin.     -- DCIS: 0.1 mm from nearest posterior margin; < 1 mm from nearest anterior margin; 1.5 mm from        nearest medial margin; > 2 mm from all remaining margins.   - Benign skin.    -- No dermal lymphovascular invasion.   - Lymphovascular invasion: Not identified.  - Microcalcifications: Present, associated with DCIS.   - Benign fibrocystic changes with atypia (atypical and florid usual ductal hyperplasia, columnar cell     lesions with flat epithelial atypia/FEA, cystic and papillary apocrine metaplasia).  - Radial scar, 0.4 cm.      B. Breast, Left, Left Breast Lumpectomy new superior margin - Short Superior, Long Lateral:  - Ductal carcinoma in-situ (DCIS).     -- Size and Extent: 16 mm in greatest approximate extent; present in 4 sequential sections; 7 of 78 blocks.     -- Architectural Patterns: Cribriform, micropapillary, solid, papillary.    -- Nuclear grade: I-II (low to intermediate).     -- Necrosis: Present, focal.     -- Confirmed by positive E-cadherin, P120 (membranous) and loss of epithelial mosaicism on         CK5/6 immunostains.    -- Associated prior biopsy site changes with biopsy clip and AJAY  marker.  - No invasive carcinoma identified.  - Margins uninvolved by DCIS.     -- DCIS < 1 mm from nearest posterior margin; < 1-2 mm from medial margin; > 2 mm from all       remaining margins.    - Lymphovascular invasion: Not identified.  - Microcalcifications: Present, associated with DCIS, lobular neoplasia and non-neoplastic breast tissue.    - Benign fibrocystic changes with atypia:    -- Multifocal/extensive non-invasive lobular neoplasia (focal lobular carcinoma in-situ and         predominantly atypical lobular hyperplasia with pagetoid ductal spread), focally involving        and adjacent to DCIS.        ** Confirmed by negative E-cadherin; positive p120 (cytoplasmic) immunostains.     -- Usual ductal hyperplasia, columnar cell change and hyperplasia with focal flat epithelial        atypia/FEA, sclerosing adenosis, fibroadenomatoid changes, cystic and papillary apocrine metaplasia.   - Small intraductal papilloma without atypia, 0.1 cm.         12/7/2021 -  Cancer Staged    Staging form: Breast, AJCC 8th Edition  - Pathologic stage from 12/7/2021: Stage IA (pT1b, pN0, cM0, G2, ER+, RI+, HER2-) - Signed by Audrey Lacey MD on 12/7/2021  Stage prefix: Initial diagnosis  Method of lymph node assessment: Clinical  Histologic grading system: 3 grade system       1/21/2022 Surgery    Final Diagnosis   A.  Left breast, anterior margin:     - Histologic changes compatible with prior surgical procedure.     - Skin with dermal cicatrix.     - No invasive or in situ carcinoma identified.     - Final anterior margin is negative for malignancy.     B.  Left breast, new posterior margin:     - Focus of atypical lobular hyperplasia (ALH).     - No invasive or in situ carcinoma identified.     - Final posterior margin is negative for malignancy.     C.  Left breast, medial margin:     - Histologic changes compatible with prior surgical procedure.     - No invasive or in situ carcinoma identified.     - Final medial margin is negative for malignacny.     D.  Left axilla, sentinel lymph node #1:     - Single lymph node; negative for malignancy (0/1).     E.  Left axilla, sentinel lymph node #2:     - Single lymph node; negative for malignancy (0/1).     F.  Left axilla, sentinel lymph node #3:     - Single lymph node; negative for malignancy (0/1).     G.  Left axilla, sentinel lymph node #4:     - Single lymph node; negative for malignancy (0/1).        3/23/2022 - 4/2022 Radiation    Prone L Boost  10X/6X 5 / 5 200 0 1,000 6   Prone L Br # 6X 16 / 16 266 0 4,256 21      Treatment dates:  3/23/2022 - 4/20/2022            SUBJECTIVE  (INTERVAL HISTORY)      Clotting History  none   Bleeding History  none   Cancer History  Early stage HR+ breast cancer   Family Cancer History  Mother , grandmother breast cancer   H/O Blood/Plt Transfusion  none   Tobacco/etoh/drug abuse nonw       Cancer Screening history Up to date mammograms, uptodate with roseanna , 2016 colonoscopy   Occupation Retired - worked for [employer]     She has chronic mild joint pains that are not daily. Has infrequent hot flashes.  Doing quite well overall.    I have reviewed the relevant past medical, surgical, social and family history. I have also reviewed allergies and medications for this patient.    Review of Systems     A 10-point review of system was performed, pertinent positive and negative were detailed as above. Otherwise, the 10-point review of system was negative.      Past Medical History:   Diagnosis Date    Asthma     Last Assessed: 10/19/2016    Breast cancer (HCC)     Left breast 2021    Cancer (HCC) 10/2021    Fibroid     GERD (gastroesophageal reflux disease)     controlled with diet    Hypercholesteremia     Hypertension     Wears glasses        Past Surgical History:   Procedure Laterality Date    BREAST BIOPSY Left 09/28/2021    multiple sites    BREAST LUMPECTOMY Left 11/19/2021    Procedure: LUMPECTOMY BREAST BRACKETED AJAY LOCALIZED;  Surgeon: Audrey Lacey MD;  Location: [facility];  Service: Surgical Oncology    BREAST LUMPECTOMY Left 1/21/2022    Procedure: LUMPECTOMY BREAST, re-excision; 0800 NUC MED;  Surgeon: Audrey Lacey MD;  Location: [facility];  Service: Surgical Oncology    BUNIONECTOMY      BUNIONECTOMY Right 2009    BUNIONECTOMY Left 2007    COLONOSCOPY      FOOT SURGERY      LYMPH NODE BIOPSY Left 1/21/2022    Procedure: BIOPSY LYMPH NODE SENTINEL;  Surgeon: Audrey Lacey MD;  Location: [facility];   Service: Surgical Oncology    MAMMO NEEDLE LOCALIZATION LEFT (ALL INC) Left 11/12/2021    MAMMO NEEDLE LOCALIZATION LEFT (ALL INC) EACH ADD Left 11/12/2021    MAMMO STEREOTACTIC BREAST BIOPSY LEFT (ALL INC) Left 9/28/2021    MAMMO STEREOTACTIC BREAST BIOPSY LEFT (ALL INC) EACH ADD Left 9/28/2021    NASAL POLYP EXCISION      SC COLONOSCOPY FLX DX W/COLLJ SPEC WHEN PFRMD N/A 5/23/2016    Procedure: COLONOSCOPY;  Surgeon: Adriana Westfall DO;  Location: [facility];  Service: Gastroenterology    SHOULDER ARTHROSCOPY Right 2012    SHOULDER ARTHROSCOPY W/ ROTATOR CUFF REPAIR Right 2012    US GUIDED THYROID BIOPSY  6/2/2023       Family History   Problem Relation Age of Onset    Hypertension Mother     Breast cancer Mother         age unknown; bilateral breast cancer    Lung cancer Mother     Cervical cancer Mother     Cancer Mother     Lung disease Mother     Diabetes Father     Hypertension Father     Prostate cancer Father         age unknown    Breast cancer Maternal Grandmother 44    Diabetes Paternal Grandmother     Hypertension Brother     Prostate cancer Brother 50    Heart disease Brother     Hypertension Brother     Prostate cancer Brother     Hypertension Maternal Aunt     No Known Problems Maternal Aunt     Lung cancer Maternal Uncle     Diabetes Paternal Aunt     Diabetes Paternal Uncle     Hypertension Cousin     Breast cancer Cousin         under 50    Diabetes Cousin     Breast cancer Cousin     Breast cancer Cousin         under 50    Hypertension Brother     Hypertension Maternal Aunt        Social History     Socioeconomic History    Marital status: /Civil Union     Spouse name: Not on file    Number of children: Not on file    Years of education: Not on file    Highest education level: Not on file   Occupational History    Not on file   Tobacco Use    Smoking status: Never     Passive exposure: Never    Smokeless tobacco: Never   Vaping Use    Vaping status: Never Used   Substance and Sexual  Activity    Alcohol use: No    Drug use: Never    Sexual activity: Yes     Partners: Male     Birth control/protection: Post-menopausal, None   Other Topics Concern    Not on file   Social History Narrative    Not on file     Social Drivers of Health     Financial Resource Strain: Not on file   Food Insecurity: No Food Insecurity (10/17/2024)    Nursing - Inadequate Food Risk Classification     Worried About Running Out of Food in the Last Year: Never true     Ran Out of Food in the Last Year: Never true     Ran Out of Food in the Last Year: Not on file   Transportation Needs: No Transportation Needs (10/17/2024)    PRAPARE - Transportation     Lack of Transportation (Medical): No     Lack of Transportation (Non-Medical): No   Physical Activity: Not on file   Stress: Not on file   Social Connections: Not on file   Intimate Partner Violence: Not on file   Housing Stability: Low Risk  (10/17/2024)    Housing Stability Vital Sign     Unable to Pay for Housing in the Last Year: No     Number of Times Moved in the Last Year: 1     Homeless in the Last Year: No       No Known Allergies    Current Outpatient Medications   Medication Sig Dispense Refill    albuterol (PROVENTIL HFA,VENTOLIN HFA) 90 mcg/act inhaler INHALE 2 PUFFS BY MOUTH EVERY 6 HOURS AS NEEDED FOR WHEEZING 18 g 0    amLODIPine (NORVASC) 10 mg tablet Take 1 tablet by mouth once daily 100 tablet 1    anastrozole (ARIMIDEX) 1 mg tablet Take 1 tablet by mouth once daily 90 tablet 3    atorvastatin (LIPITOR) 10 mg tablet Take 1 tablet by mouth once daily 90 tablet 0    calcium carbonate (OS-HALIMA) 1250 (500 Ca) MG tablet Take 1,200 mg by mouth daily       cholecalciferol (VITAMIN D3) 1,000 units tablet Take 2,000 Units by mouth daily        hydrocortisone 2.5 % cream Apply topically 3 (three) times a day prn 28 g 2    multivitamin (THERAGRAN) TABS Take 1 tablet by mouth daily.       No current facility-administered medications for this visit.       (Not in a  hospital admission)      Objective:     24 Hour Vitals Assessment:     Vitals:    12/06/24 0921   BP: 148/80   Pulse: 88   Resp: 18   Temp: 97.8 °F (36.6 °C)   SpO2: 97%         PHYSICIAN EXAM (8 organ systems needed):    General: Appearance: alert, cooperative, no distress.  HEENT: Normocephalic, atraumatic. No scleral icterus. conjunctivae clear. EOMI.  Chest: No tenderness to palpation. No open wound noted.  Lungs: Clear to auscultation bilaterally, Respirations unlabored.  Cardiac: Regular rate and rhythm, +S1and S2, -r/m/g  Abdomen: Soft, non-tender, non-distended. Bowel sounds are normal.   Extremities:  No edema, cyanosis, clubbing.  Skin: Skin color, turgor are normal. No rashes.  Breast:  Lumpectomy scar at inner lower quadrant of her left breast with no palpable abnormalities.  Right breast exam is negative.  Lymphatics: no palpable supra-cervical, axillary  Neurologic: Awake, Alert, and oriented, no gross focal deficits noted b/l.       DATA REVIEW:    Pathology Result:    Final Diagnosis   Date Value Ref Range Status   06/02/2023   Final    A. and B. Thyroid, Left Mid, FNA (ThinPrep and Smear Preparations):  - Benign ([place] Category II). See Note.  - Monolayered sheets of benign follicular cells and oncocytes, ample colloid, and macrophages.  - Consistent with benign follicular nodule.     01/21/2022   Final    A.  Left breast, anterior margin:     - Histologic changes compatible with prior surgical procedure.     - Skin with dermal cicatrix.     - No invasive or in situ carcinoma identified.     - Final anterior margin is negative for malignancy.    B.  Left breast, new posterior margin:     - Focus of atypical lobular hyperplasia (ALH).     - No invasive or in situ carcinoma identified.     - Final posterior margin is negative for malignancy.    C.  Left breast, medial margin:     - Histologic changes compatible with prior surgical procedure.     - No invasive or in situ carcinoma identified.     -  Final medial margin is negative for malignacny.    D.  Left axilla, sentinel lymph node #1:     - Single lymph node; negative for malignancy (0/1).    E.  Left axilla, sentinel lymph node #2:     - Single lymph node; negative for malignancy (0/1).    F.  Left axilla, sentinel lymph node #3:     - Single lymph node; negative for malignancy (0/1).    G.  Left axilla, sentinel lymph node #4:     - Single lymph node; negative for malignancy (0/1).       11/19/2021   Final    A. Breast, Left, Left breast lumpectomy, short superior, long lateral:  - Invasive mammary carcinoma of no special type (ductal), 8 mm in greatest dimension with    extensive adjacent ductal carcinoma in-situ and associated non-invasive lobular neoplasia.    -- Jamie histologic grade 2 of 3 (total score: 7 of 9)        * Glandular (acinar) Tubular Differentiation < 10%, score 3.        * Nuclear Pleomorphism 2 of 3, score 2.        * Mitotic Rate 4-7/mm2, (8-14 mitoses/10HPF; 8 mitoses/10 HPF), score 2.    -- Confirmed by tumor cell immunophenotype:        * Positive: E-cadherin, P120 - each in a membranous pattern.        * Negative: p63, SMMHC.  - Ductal carcinoma in-situ (DCIS): Present; not an extensive intraductal component associated with     invasive carcinoma (< 5% of total tumor) but extensive DCIS in adjacent tissue.     -- Confirmed by positive E-cadherin, p120 (membranous), SMMHC, p63 and loss of epithelial         mosaicism on CK5/6 immunostains.     -- Size and Extent:  At least 48 mm in approximate extent; present in 12 sequential sections; 43 of 91 blocks.     -- Architectural Patterns: Cribriform, micropapillary, solid, papillary.    -- Nuclear grade: I-II (low to intermediate)    -- Necrosis: Present, focal and central necrosis identified.     -- Associated prior biopsy site changes with twist clip and Aliyah  marker.  - Multifocal non-invasive lobular neoplasia (focally florid lobular carcinoma in-situ/LCIS and atypical  lobular     hyperplasia with pagetoid ductal spread) involving and adjacent to DCIS.    -- Confirmed by negative E-cadherin; positive p120 (cytoplasmic), SMMHC, p63; and loss of epithelial        mosaicism on CK5/6 immunostains.   - Margins uninvolved by invasive and in-situ carcinoma.    -- Invasive carcinoma: 8 mm from nearest posterior margin.     -- DCIS: 0.1 mm from nearest posterior margin; < 1 mm from nearest anterior margin; 1.5 mm from        nearest medial margin; > 2 mm from all remaining margins.   - Benign skin.    -- No dermal lymphovascular invasion.   - Lymphovascular invasion: Not identified.  - Microcalcifications: Present, associated with DCIS.   - Benign fibrocystic changes with atypia (atypical and florid usual ductal hyperplasia, columnar cell     lesions with flat epithelial atypia/FEA, cystic and papillary apocrine metaplasia).  - Radial scar, 0.4 cm.     B. Breast, Left, Left Breast Lumpectomy new superior margin - Short Superior, Long Lateral:  - Ductal carcinoma in-situ (DCIS).     -- Size and Extent: 16 mm in greatest approximate extent; present in 4 sequential sections; 7 of 78 blocks.     -- Architectural Patterns: Cribriform, micropapillary, solid, papillary.    -- Nuclear grade: I-II (low to intermediate).     -- Necrosis: Present, focal.     -- Confirmed by positive E-cadherin, P120 (membranous) and loss of epithelial mosaicism on         CK5/6 immunostains.    -- Associated prior biopsy site changes with biopsy clip and AJAY  marker.  - No invasive carcinoma identified.  - Margins uninvolved by DCIS.     -- DCIS < 1 mm from nearest posterior margin; < 1-2 mm from medial margin; > 2 mm from all       remaining margins.    - Lymphovascular invasion: Not identified.  - Microcalcifications: Present, associated with DCIS, lobular neoplasia and non-neoplastic breast tissue.    - Benign fibrocystic changes with atypia:    -- Multifocal/extensive non-invasive lobular neoplasia (focal  lobular carcinoma in-situ and        predominantly atypical lobular hyperplasia with pagetoid ductal spread), focally involving        and adjacent to DCIS.        ** Confirmed by negative E-cadherin; positive p120 (cytoplasmic) immunostains.     -- Usual ductal hyperplasia, columnar cell change and hyperplasia with focal flat epithelial        atypia/FEA, sclerosing adenosis, fibroadenomatoid changes, cystic and papillary apocrine metaplasia.   - Small intraductal papilloma without atypia, 0.1 cm.      09/28/2021   Final    A. Breast, Left, 7:00 location 2 cores w/ Calcs, Biopsy:  - Ductal carcinoma in situ.  - No invasive carcinoma is seen.       Architectural patterns: Solid, papillary       Nuclear grade of ductal carcinoma in situ: Grade 2 (II).       Necrosis:  Focal.       Size of in-situ carcinoma: At least 4 mm.  - Microcalcifications: Present in DCIS.  - Breast cancer biomarker studies (paraffin block number: A1):           Estrogen, progesterone receptor studies pending, to be described in a separate receptor report.     B. Breast, Left, 7:00 location 1 core w/ No Calcs, Biopsy:  - Atypical lobular hyperplasia.  - Usual ductal hyperplasia involving intraductal papilloma.     C. Breast, Left, 9:00 location 2 cores w/ Calcs, Biopsy:  - Non-proliferative fibrocystic changes, including microcysts, microcalcifications with stromal fibrosis.  - Negative for atypia or carcinoma.     D. Breast, Left, 9:00 location 1 core w/ No Calcs, Biopsy:  - Intraductal papilloma.  - Negative for atypia or carcinoma.           Image Results:   Image result are reviewed and documented in Hematology/Oncology history    DXA bone density spine hip and pelvis  Narrative: CENTRAL  DXA SCAN    CLINICAL HISTORY:   64 year old post-menopausal Black female risk factors include past study noted low bone mass, spine. Additional medical history: Treatment for breast cancer 2021 followed by treatment with anastrozole. History of premature  menopause.    TECHNIQUE: Bone densitometry was performed using a H[employer] Horizon W bone densitometer.  Regions of interest appear properly placed. There are   other confounding variables which could limit the study.    Her elevated BMI may influence the T score result.    COMPARISON: Follow-up    RESULTS:  LUMBAR SPINE:  L1-L4:  BMD 0.876 gm/cm2  T-score -2.5, osteoporosis, and 0.6% less than 2019 and 1.8% less than 2015.  Z-score    LEFT TOTAL HIP:  BMD 0.845 gm/cm2  T-score -1.2  Z-score    LEFT FEMORAL NECK:  BMD 0.817 gm/cm2  T-score -0.9 and 0.4% higher than 2019 and 2.4% higher than 2015.  Z-score    A 25 hydroxy vitamin D level, an intact PTH, and a comprehensive metabolic panel are suggested in this patient.    Treatment may be a consideration in this patient after excluding secondary causes for osteoporosis..  Impression: 1. Based on the WHO classification, this study identifies a diagnosis of osteoporosis, notable at the spine area and the patient is considered at  risk for fracture.    2. A daily intake of calcium of at least 1200 mg and vitamin D, 800-1000 IU, as well as weight bearing and muscle strengthening exercise, fall prevention and avoidance of tobacco and excessive alcohol intake as basic preventive measures are recommended.    3. Repeat DXA scan on the same equipment in 18-24 months as clinically indicated.    The 10 year risk of hip fracture is 0.1%, with the 10 year risk of major osteoporotic fracture being 2.9%, as calculated by the WHO fracture risk assessment tool (FRAX).  The current NOF guidelines recommend treating patients with FRAX 10 year risk score   of >3% for hip fracture and >20% for major osteoporotic fracture.    WHO CLASSIFICATION:  Normal (a T-score of -1.0 or higher)  Low bone mineral density (a T-score of less than -1.0 but higher than -2.5)  Osteoporosis (a T-score of -2.5 or less)  Severe osteoporosis (a T-score of -2.5 or less with a fragility fracture)    Thank you for  "allowing us the opportunity to participate in your patient care.    The expanded DEXA report will no longer be arriving in your mail. If you desire to view the full report please contact [facility]'s medical records or access the PACS system.    Workstation performed: [id number]      LABS:  Lab data are reviewed and documented in HemOnc history.     Lab Results   Component Value Date    HGB 13.2 03/30/2022    HCT 41.6 03/30/2022    MCV 88 03/30/2022     03/30/2022    WBC 5.13 03/30/2022    NRBC 0 03/30/2022     Lab Results   Component Value Date     03/05/2015    K 3.8 05/06/2022     (H) 05/06/2022    CO2 28 05/06/2022    ANIONGAP 10 03/05/2015    BUN 14 05/06/2022    CREATININE 0.93 05/06/2022    GLUCOSE 91 03/05/2015    GLUF 87 05/06/2022    CALCIUM 9.8 05/06/2022    CORRECTEDCA 10.1 12/08/2021    AST 18 05/06/2022    ALT 23 05/06/2022    ALKPHOS 108 05/06/2022    PROT 7.7 03/05/2015    BILITOT 0.81 03/05/2015    EGFR 66 05/06/2022       No results found for: \"IRON\", \"TIBC\", \"FERRITIN\"    No results found for: \"EVOHKBQB20\"    No results for input(s): \"WBC\", \"CREAT\", \"PLT\" in the last 72 hours.    By:  Mary Alaniz MD, 12/6/2024, 9:30 AM                                   "

## 2024-11-27 ENCOUNTER — HOSPITAL ENCOUNTER (OUTPATIENT)
Dept: BONE DENSITY | Facility: MEDICAL CENTER | Age: 64
Discharge: HOME | End: 2024-11-27
Payer: MEDICARE

## 2024-11-27 DIAGNOSIS — C50.312 MALIGNANT NEOPLASM OF LOWER-INNER QUADRANT OF LEFT BREAST IN FEMALE, ESTROGEN RECEPTOR POSITIVE (HCC): ICD-10-CM

## 2024-11-27 DIAGNOSIS — Z79.811 USE OF ANASTROZOLE: ICD-10-CM

## 2024-11-27 DIAGNOSIS — Z17.0 MALIGNANT NEOPLASM OF LOWER-INNER QUADRANT OF LEFT BREAST IN FEMALE, ESTROGEN RECEPTOR POSITIVE (HCC): ICD-10-CM

## 2024-11-27 PROCEDURE — 77080 DXA BONE DENSITY AXIAL: CPT

## 2024-12-06 ENCOUNTER — OFFICE VISIT (OUTPATIENT)
Dept: HEMATOLOGY ONCOLOGY | Facility: CLINIC | Age: 64
End: 2024-12-06
Payer: MEDICARE

## 2024-12-06 VITALS
DIASTOLIC BLOOD PRESSURE: 80 MMHG | BODY MASS INDEX: 35.34 KG/M2 | HEART RATE: 88 BPM | RESPIRATION RATE: 18 BRPM | HEIGHT: 64 IN | OXYGEN SATURATION: 97 % | SYSTOLIC BLOOD PRESSURE: 148 MMHG | WEIGHT: 207 LBS | TEMPERATURE: 97.8 F

## 2024-12-06 DIAGNOSIS — Z17.0 MALIGNANT NEOPLASM OF LOWER-INNER QUADRANT OF LEFT BREAST IN FEMALE, ESTROGEN RECEPTOR POSITIVE (HCC): Primary | ICD-10-CM

## 2024-12-06 DIAGNOSIS — C50.312 MALIGNANT NEOPLASM OF LOWER-INNER QUADRANT OF LEFT BREAST IN FEMALE, ESTROGEN RECEPTOR POSITIVE (HCC): Primary | ICD-10-CM

## 2024-12-06 PROCEDURE — G2211 COMPLEX E/M VISIT ADD ON: HCPCS | Performed by: INTERNAL MEDICINE

## 2024-12-06 PROCEDURE — 99215 OFFICE O/P EST HI 40 MIN: CPT | Performed by: INTERNAL MEDICINE

## 2024-12-06 RX ORDER — ANASTROZOLE 1 MG/1
1 TABLET ORAL DAILY
Qty: 90 TABLET | Refills: 3 | Status: SHIPPED | OUTPATIENT
Start: 2024-12-06

## 2024-12-19 ENCOUNTER — RESULTS FOLLOW-UP (OUTPATIENT)
Dept: OBGYN CLINIC | Facility: CLINIC | Age: 64
End: 2024-12-19

## 2024-12-30 ENCOUNTER — OFFICE VISIT (OUTPATIENT)
Dept: RADIATION ONCOLOGY | Facility: CLINIC | Age: 64
End: 2024-12-30
Attending: INTERNAL MEDICINE
Payer: MEDICARE

## 2024-12-30 VITALS
HEART RATE: 82 BPM | WEIGHT: 207.4 LBS | SYSTOLIC BLOOD PRESSURE: 132 MMHG | DIASTOLIC BLOOD PRESSURE: 81 MMHG | HEIGHT: 63 IN | TEMPERATURE: 97.6 F | RESPIRATION RATE: 17 BRPM | BODY MASS INDEX: 36.75 KG/M2 | OXYGEN SATURATION: 98 %

## 2024-12-30 DIAGNOSIS — C50.312 MALIGNANT NEOPLASM OF LOWER-INNER QUADRANT OF LEFT BREAST IN FEMALE, ESTROGEN RECEPTOR POSITIVE (HCC): Primary | ICD-10-CM

## 2024-12-30 DIAGNOSIS — Z17.0 MALIGNANT NEOPLASM OF LOWER-INNER QUADRANT OF LEFT BREAST IN FEMALE, ESTROGEN RECEPTOR POSITIVE (HCC): Primary | ICD-10-CM

## 2024-12-30 PROCEDURE — 99211 OFF/OP EST MAY X REQ PHY/QHP: CPT | Performed by: INTERNAL MEDICINE

## 2024-12-30 PROCEDURE — 99213 OFFICE O/P EST LOW 20 MIN: CPT | Performed by: INTERNAL MEDICINE

## 2024-12-30 NOTE — ASSESSMENT & PLAN NOTE
Nehal Bryant is a 64 y.o. female former patient of Dr. Lauren who received adjuvant radiation therapy to the left breast completing on 4/20/22.    She presents for routine follow-up now 2 years and 8 months after completion of radiation therapy.  She has no late toxicity related to treatment and no evidence of disease.  Continue clinical and radiographic surveillance.  3/28/25    Surgical Oncology  6/25/25      RTC in 1 year or as needed, she prefers 1 year.

## 2024-12-30 NOTE — PROGRESS NOTES
Nehal Bryant 1960 is a 64 y.o. female With history of stage 1 A left breast cancer ER/ID+,Her 2-. She is s/p lumpectomy 11/19/21. 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 1/21/22 had excision with sln  biopsy.  She completed Radiation on 4/20/22. She was last seen by  on 11/27/23. She returns today for a follow up visit.     9/24/24   She has been taking Anastrozole since 2022, will continue until 2/2027. Follow up with Surgical Oncology.    9/26/24 Radha   No new findings on exam today. She does report some tenderness. Mammogram  coming up next month.     10/28/24 Mammo diagnostic bilateral w 3d &cad  IMPRESSION:   No evidence of malignancy.  Stable postsurgical changes left breast.  ASSESSMENT/BI-RADS CATEGORY:  Left: 2 - Benign  Right: 2 - Benign  Overall: 2 - Benign  RECOMMENDATION:       - Diagnostic mammogram in 1 year for both breasts.    Upcoming   3/28/25    Surgical Oncology  6/25/25            Follow up visit     Oncology History Overview Note   With history of stage 1 A left breast cancer ER/ID+,Her 2-. She is s/p lumpectomy 11/19/21. 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 She completed Radiation on 4/20/22. She was last seen by  on 11/27/23. She returns today for a follow up visit.     9/24/24   She has been taking Anastrozole since 2022, will continue until 2/2027. Follow up with Surgical Oncology.    9/26/24 Radha   No new findings on exam today. She does report some tenderness. Mammogram  coming up next month.     10/28/24 Mammo diagnostic bilateral w 3d &cad  IMPRESSION:   No evidence of malignancy.  Stable postsurgical changes left breast.  ASSESSMENT/BI-RADS CATEGORY:  Left: 2 - Benign  Right: 2 - Benign  Overall: 2 - Benign  RECOMMENDATION:       - Diagnostic mammogram in 1 year for both breasts.    Upcoming   3/28/25    Surgical Oncology  6/25/25             Malignant neoplasm of lower-inner quadrant of  left breast in female, estrogen receptor positive (HCC)   9/28/2021 Initial Diagnosis    Ductal carcinoma in situ (DCIS) of left breast     9/28/2021 Biopsy    Final Diagnosis   A. Breast, Left, 7:00 location 2 cores w/ Calcs, Biopsy:  - Ductal carcinoma in situ.  - No invasive carcinoma is seen.       Architectural patterns: Solid, papillary       Nuclear grade of ductal carcinoma in situ: Grade 2 (II).       Necrosis:  Focal.       Size of in-situ carcinoma: At least 4 mm.  - Microcalcifications: Present in DCIS.  - Breast cancer biomarker studies (paraffin block number: A1):           Estrogen, progesterone receptor studies pending, to be described in a separate receptor report.      ER 90-95% positive  Pr90-95% positive  B. Breast, Left, 7:00 location 1 core w/ No Calcs, Biopsy:  - Atypical lobular hyperplasia.  - Usual ductal hyperplasia involving intraductal papilloma.      C. Breast, Left, 9:00 location 2 cores w/ Calcs, Biopsy:  - Non-proliferative fibrocystic changes, including microcysts, microcalcifications with stromal fibrosis.  - Negative for atypia or carcinoma.      D. Breast, Left, 9:00 location 1 core w/ No Calcs, Biopsy:  - Intraductal papilloma.  - Negative for atypia or carcinoma.         10/26/2021 -  Cancer Staged    Staging form: Breast, AJCC 8th Edition  - Clinical: Stage 0 (cTis (DCIS), cN0, cM0, ER+, WI+, HER2: Not Assessed) - Signed by Audrey Lacey MD on 10/26/2021  Stage prefix: Initial diagnosis  Method of lymph node assessment: Clinical  Nuclear grade: G2       10/2021 Genetic Testing    Test: [employer] BRCAplus STAT Panel (8 genes): ZAKIA, BRCA1, BRCA2, CDH1, CHEK2, PALB2, PTEN, TP53   Result:   Negative - No Clinically Significant Variants Detected       Assessment:  Negative     Additional Testing:  Test(s): [employer] BRCAplus STAT Panel (8 genes): ZAKIA, BRCA1, BRCA2, CDH1, CHEK2, PALB2, PTEN, TP53 with reflex to [employer] CancerNext (28 additional genes): APC, ZAKIA, AXIN2 BARD1, BRCA1, BRCA2,  BRIP1, BMPR1A, CDH1, CDK4, CDKN2A, CHEK2, DICER1, EPCAM, GREM1, HOXB13, MLH1, MSH2, MSH3, MSH6, MUTYH, NBN, NF1, NTHL1, PALB2, PMS2, POLD1, POLE, PTEN, RAD51C, RAD51D, RECQL SMAD4, SMARCA4, STK11, TP53      Result: Variant of uncertain significance     Variant: MSH6 Variant, Unknown Significance: p.T6P       Variant of uncertain significance (VUS)     11/19/2021 Surgery    Final Diagnosis   A. Breast, Left, Left breast lumpectomy, short superior, long lateral:  - Invasive mammary carcinoma of no special type (ductal), 8 mm in greatest dimension with    extensive adjacent ductal carcinoma in-situ and associated non-invasive lobular neoplasia.    -- Jamie histologic grade 2 of 3 (total score: 7 of 9)        * Glandular (acinar) Tubular Differentiation < 10%, score 3.        * Nuclear Pleomorphism 2 of 3, score 2.        * Mitotic Rate 4-7/mm2, (8-14 mitoses/10HPF; 8 mitoses/10 HPF), score 2.    -- Confirmed by tumor cell immunophenotype:        * Positive: E-cadherin, P120 - each in a membranous pattern.        * Negative: p63, SMMHC.  - Ductal carcinoma in-situ (DCIS): Present; not an extensive intraductal component associated with     invasive carcinoma (< 5% of total tumor) but extensive DCIS in adjacent tissue.     -- Confirmed by positive E-cadherin, p120 (membranous), SMMHC, p63 and loss of epithelial         mosaicism on CK5/6 immunostains.     -- Size and Extent:  At least 48 mm in approximate extent; present in 12 sequential sections; 43 of 91 blocks.     -- Architectural Patterns: Cribriform, micropapillary, solid, papillary.    -- Nuclear grade: I-II (low to intermediate)    -- Necrosis: Present, focal and central necrosis identified.     -- Associated prior biopsy site changes with twist clip and Aliyah  marker.  - Multifocal non-invasive lobular neoplasia (focally florid lobular carcinoma in-situ/LCIS and atypical lobular     hyperplasia with pagetoid ductal spread) involving and adjacent to  DCIS.    -- Confirmed by negative E-cadherin; positive p120 (cytoplasmic), SMMHC, p63; and loss of epithelial        mosaicism on CK5/6 immunostains.   - Margins uninvolved by invasive and in-situ carcinoma.    -- Invasive carcinoma: 8 mm from nearest posterior margin.     -- DCIS: 0.1 mm from nearest posterior margin; < 1 mm from nearest anterior margin; 1.5 mm from        nearest medial margin; > 2 mm from all remaining margins.   - Benign skin.    -- No dermal lymphovascular invasion.   - Lymphovascular invasion: Not identified.  - Microcalcifications: Present, associated with DCIS.   - Benign fibrocystic changes with atypia (atypical and florid usual ductal hyperplasia, columnar cell     lesions with flat epithelial atypia/FEA, cystic and papillary apocrine metaplasia).  - Radial scar, 0.4 cm.      B. Breast, Left, Left Breast Lumpectomy new superior margin - Short Superior, Long Lateral:  - Ductal carcinoma in-situ (DCIS).     -- Size and Extent: 16 mm in greatest approximate extent; present in 4 sequential sections; 7 of 78 blocks.     -- Architectural Patterns: Cribriform, micropapillary, solid, papillary.    -- Nuclear grade: I-II (low to intermediate).     -- Necrosis: Present, focal.     -- Confirmed by positive E-cadherin, P120 (membranous) and loss of epithelial mosaicism on         CK5/6 immunostains.    -- Associated prior biopsy site changes with biopsy clip and AJAY  marker.  - No invasive carcinoma identified.  - Margins uninvolved by DCIS.     -- DCIS < 1 mm from nearest posterior margin; < 1-2 mm from medial margin; > 2 mm from all       remaining margins.    - Lymphovascular invasion: Not identified.  - Microcalcifications: Present, associated with DCIS, lobular neoplasia and non-neoplastic breast tissue.    - Benign fibrocystic changes with atypia:    -- Multifocal/extensive non-invasive lobular neoplasia (focal lobular carcinoma in-situ and        predominantly atypical lobular hyperplasia  with pagetoid ductal spread), focally involving        and adjacent to DCIS.        ** Confirmed by negative E-cadherin; positive p120 (cytoplasmic) immunostains.     -- Usual ductal hyperplasia, columnar cell change and hyperplasia with focal flat epithelial        atypia/FEA, sclerosing adenosis, fibroadenomatoid changes, cystic and papillary apocrine metaplasia.   - Small intraductal papilloma without atypia, 0.1 cm.         12/7/2021 -  Cancer Staged    Staging form: Breast, AJCC 8th Edition  - Pathologic stage from 12/7/2021: Stage IA (pT1b, pN0, cM0, G2, ER+, FL+, HER2-) - Signed by Audrey Lacey MD on 12/7/2021  Stage prefix: Initial diagnosis  Method of lymph node assessment: Clinical  Histologic grading system: 3 grade system       1/21/2022 Surgery    Final Diagnosis   A.  Left breast, anterior margin:     - Histologic changes compatible with prior surgical procedure.     - Skin with dermal cicatrix.     - No invasive or in situ carcinoma identified.     - Final anterior margin is negative for malignancy.     B.  Left breast, new posterior margin:     - Focus of atypical lobular hyperplasia (ALH).     - No invasive or in situ carcinoma identified.     - Final posterior margin is negative for malignancy.     C.  Left breast, medial margin:     - Histologic changes compatible with prior surgical procedure.     - No invasive or in situ carcinoma identified.     - Final medial margin is negative for malignacny.     D.  Left axilla, sentinel lymph node #1:     - Single lymph node; negative for malignancy (0/1).     E.  Left axilla, sentinel lymph node #2:     - Single lymph node; negative for malignancy (0/1).     F.  Left axilla, sentinel lymph node #3:     - Single lymph node; negative for malignancy (0/1).     G.  Left axilla, sentinel lymph node #4:     - Single lymph node; negative for malignancy (0/1).        3/23/2022 - 4/2022 Radiation    Prone L Boost 10X/6X 5 / 5 200 0 1,000 6   Prone L Br # 6X 16 /  16 266 0 4,256 21      Treatment dates:  3/23/2022 - 4/20/2022          Review of Systems:  Review of Systems   Constitutional:  Negative for activity change, appetite change and fatigue.   HENT: Negative.     Eyes: Negative.    Respiratory: Negative.     Gastrointestinal: Negative.    Endocrine: Positive for heat intolerance.   Genitourinary: Negative.    Musculoskeletal: Negative.         Reports trigger finger.   Skin:  Negative for wound.        Report soreness and numbness in Left breast.   Denies lotion use for breast.    Allergic/Immunologic: Negative.    Neurological: Negative.    Hematological: Negative.    Psychiatric/Behavioral: Negative.         Clinical Trial: no    Pregnancy test needed:  no    Teaching completed     Health Maintenance   Topic Date Due    Pneumococcal Vaccine: 65+ Years (1 of 2 - PCV) Never done    BMI: Followup Plan  01/16/2021    PT PLAN OF CARE  09/14/2022    COVID-19 Vaccine (4 - 2024-25 season) 09/01/2024    Fall Risk  10/17/2025    Depression Screening  10/17/2025    Urinary Incontinence Screening  10/17/2025    Medicare Annual Wellness Visit (AWV)  10/17/2025    Breast Cancer Screening: Mammogram  10/28/2025    BMI: Adult  12/06/2025    Colorectal Cancer Screening  05/23/2026    HIV Screening  Completed    Hepatitis C Screening  Completed    Osteoporosis Screening  Completed    RSV Vaccine Age 60+ Years  Completed    Zoster Vaccine  Completed    Influenza Vaccine  Completed    Meningococcal B Vaccine  Aged Out    RSV Vaccine age 0-20 Months  Aged Out    HIB Vaccine  Aged Out    IPV Vaccine  Aged Out    Hepatitis A Vaccine  Aged Out    Meningococcal ACWY Vaccine  Aged Out    HPV Vaccine  Aged Out    Cervical Cancer Screening  Discontinued     Patient Active Problem List   Diagnosis    Anxiety    Asthma    Benign essential hypertension    Combined hyperlipidemia    Prediabetes    Vitamin D deficiency    Fibroid    Localized osteoporosis without current pathological fracture     Acute pain of right shoulder    Pain in both lower extremities    Neck pain    BMI 35.0-35.9,adult    Malignant neoplasm of lower-inner quadrant of left breast in female, estrogen receptor positive (HCC)    Family history of cancer    BRCA negative    GERD (gastroesophageal reflux disease)    Use of anastrozole    Thyroid nodule    Trigger finger    Multiple nevi    Contusion of fifth toe of right foot    SONIYA (obstructive sleep apnea)    Trigger middle finger of left hand    Obesity, morbid (HCC)    Shortness of breath     Past Medical History:   Diagnosis Date    Asthma     Last Assessed: 10/19/2016    Breast cancer (HCC)     Left breast 2021    Cancer (HCC) 10/2021    Fibroid     GERD (gastroesophageal reflux disease)     controlled with diet    Hypercholesteremia     Hypertension     Wears glasses      Past Surgical History:   Procedure Laterality Date    BREAST BIOPSY Left 09/28/2021    multiple sites    BREAST LUMPECTOMY Left 11/19/2021    Procedure: LUMPECTOMY BREAST BRACKETED AJAY LOCALIZED;  Surgeon: Audrey Lacey MD;  Location: [facility];  Service: Surgical Oncology    BREAST LUMPECTOMY Left 1/21/2022    Procedure: LUMPECTOMY BREAST, re-excision; 0800 NUC MED;  Surgeon: Audrey Lacey MD;  Location: [facility];  Service: Surgical Oncology    BUNIONECTOMY      BUNIONECTOMY Right 2009    BUNIONECTOMY Left 2007    COLONOSCOPY      FOOT SURGERY      LYMPH NODE BIOPSY Left 1/21/2022    Procedure: BIOPSY LYMPH NODE SENTINEL;  Surgeon: Audrey Lacey MD;  Location: [facility];  Service: Surgical Oncology    MAMMO NEEDLE LOCALIZATION LEFT (ALL INC) Left 11/12/2021    MAMMO NEEDLE LOCALIZATION LEFT (ALL INC) EACH ADD Left 11/12/2021    MAMMO STEREOTACTIC BREAST BIOPSY LEFT (ALL INC) Left 9/28/2021    MAMMO STEREOTACTIC BREAST BIOPSY LEFT (ALL INC) EACH ADD Left 9/28/2021    NASAL POLYP EXCISION      MT COLONOSCOPY FLX DX W/COLLJ SPEC WHEN PFRMD N/A 5/23/2016    Procedure: COLONOSCOPY;  Surgeon: Adriana Westfall DO;   Location: [facility];  Service: Gastroenterology    SHOULDER ARTHROSCOPY Right 2012    SHOULDER ARTHROSCOPY W/ ROTATOR CUFF REPAIR Right 2012    US GUIDED THYROID BIOPSY  6/2/2023     Family History   Problem Relation Age of Onset    Hypertension Mother     Breast cancer Mother         age unknown; bilateral breast cancer    Lung cancer Mother     Cervical cancer Mother     Cancer Mother     Lung disease Mother     Diabetes Father     Hypertension Father     Prostate cancer Father         age unknown    Breast cancer Maternal Grandmother 44    Diabetes Paternal Grandmother     Hypertension Brother     Prostate cancer Brother 50    Heart disease Brother     Hypertension Brother     Prostate cancer Brother     Hypertension Maternal Aunt     No Known Problems Maternal Aunt     Lung cancer Maternal Uncle     Diabetes Paternal Aunt     Diabetes Paternal Uncle     Hypertension Cousin     Breast cancer Cousin         under 50    Diabetes Cousin     Breast cancer Cousin     Breast cancer Cousin         under 50    Hypertension Brother     Hypertension Maternal Aunt      Social History     Socioeconomic History    Marital status: /Civil Union     Spouse name: Not on file    Number of children: Not on file    Years of education: Not on file    Highest education level: Not on file   Occupational History    Not on file   Tobacco Use    Smoking status: Never     Passive exposure: Never    Smokeless tobacco: Never   Vaping Use    Vaping status: Never Used   Substance and Sexual Activity    Alcohol use: No    Drug use: Never    Sexual activity: Yes     Partners: Male     Birth control/protection: Post-menopausal, None   Other Topics Concern    Not on file   Social History Narrative    Not on file     Social Drivers of Health     Financial Resource Strain: Not on file   Food Insecurity: No Food Insecurity (10/17/2024)    Nursing - Inadequate Food Risk Classification     Worried About Running Out of Food in the Last Year:  Never true     Ran Out of Food in the Last Year: Never true     Ran Out of Food in the Last Year: Not on file   Transportation Needs: No Transportation Needs (10/17/2024)    PRAPARE - Transportation     Lack of Transportation (Medical): No     Lack of Transportation (Non-Medical): No   Physical Activity: Not on file   Stress: Not on file   Social Connections: Not on file   Intimate Partner Violence: Not on file   Housing Stability: Low Risk  (10/17/2024)    Housing Stability Vital Sign     Unable to Pay for Housing in the Last Year: No     Number of Times Moved in the Last Year: 1     Homeless in the Last Year: No       Current Outpatient Medications:     albuterol (PROVENTIL HFA,VENTOLIN HFA) 90 mcg/act inhaler, INHALE 2 PUFFS BY MOUTH EVERY 6 HOURS AS NEEDED FOR WHEEZING, Disp: 18 g, Rfl: 0    amLODIPine (NORVASC) 10 mg tablet, Take 1 tablet by mouth once daily, Disp: 100 tablet, Rfl: 1    anastrozole (ARIMIDEX) 1 mg tablet, Take 1 tablet (1 mg total) by mouth daily, Disp: 90 tablet, Rfl: 3    atorvastatin (LIPITOR) 10 mg tablet, Take 1 tablet by mouth once daily, Disp: 90 tablet, Rfl: 0    calcium carbonate (OS-HALIMA) 1250 (500 Ca) MG tablet, Take 1,200 mg by mouth daily , Disp: , Rfl:     cholecalciferol (VITAMIN D3) 1,000 units tablet, Take 2,000 Units by mouth daily  , Disp: , Rfl:     hydrocortisone 2.5 % cream, Apply topically 3 (three) times a day prn, Disp: 28 g, Rfl: 2    multivitamin (THERAGRAN) TABS, Take 1 tablet by mouth daily., Disp: , Rfl:   No Known Allergies  There were no vitals filed for this visit.

## 2024-12-30 NOTE — PROGRESS NOTES
Follow-up Visit   Name: Nehal Bryant      : 1960      MRN: [id number]  Encounter Provider: Bala Lopez MD  Encounter Date: 2024   Encounter department: [facility] RADIATION ONCOLOGY  :  Assessment & Plan  Malignant neoplasm of lower-inner quadrant of left breast in female, estrogen receptor positive (HCC)  Nehal Bryant is a 64 y.o. female former patient of Dr. Lauren who received adjuvant radiation therapy to the left breast completing on 22.    She presents for routine follow-up now 2 years and 8 months after completion of radiation therapy.  She has no late toxicity related to treatment and no evidence of disease.  Continue clinical and radiographic surveillance.  3/28/25    Surgical Oncology  25      RTC in 1 year or as needed, she prefers 1 year.           History of Present Illness   Chief Complaint   Patient presents with    Follow-up   Pertinent Medical History   Nehal Bryant 1960 is a 64 y.o. female with history of stage 1A left breast cancer ER/MS+,Her 2-. She is s/p initial lumpectomy 21. 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 22 had excision with sln  biopsy.  She completed Radiation on 22. She was last seen by  on 23. She returns today for a follow up visit.      24   She has been taking Anastrozole since , will continue until 2027. Follow up with Surgical Oncology.     24 Radha   No new findings on exam today. She does report some tenderness. Mammogram  coming up next month.      10/28/24 Mammo diagnostic bilateral w 3d &cad  IMPRESSION:   No evidence of malignancy.  Stable postsurgical changes left breast.  ASSESSMENT/BI-RADS CATEGORY:  Left: 2 - Benign  Right: 2 - Benign  Overall: 2 - Benign  RECOMMENDATION:       - Diagnostic mammogram in 1 year for both breasts.     Upcoming   3/28/25    Surgical Oncology  25        Feels well. Has some  postsurgical symptoms in the breast which are stable. Taking endocrine therapy.     Oncology History   Cancer Staging   Malignant neoplasm of lower-inner quadrant of left breast in female, estrogen receptor positive (HCC)  Staging form: Breast, AJCC 8th Edition  - Clinical: Stage 0 (cTis (DCIS), cN0, cM0, ER+, MA+, HER2: Not Assessed) - Signed by Audrey Lacey MD on 10/26/2021  Stage prefix: Initial diagnosis  Method of lymph node assessment: Clinical  Nuclear grade: G2  - Pathologic stage from 12/7/2021: Stage IA (pT1b, pN0, cM0, G2, ER+, MA+, HER2-) - Signed by Audrey Lacey MD on 12/7/2021  Stage prefix: Initial diagnosis  Method of lymph node assessment: Clinical  Histologic grading system: 3 grade system  Oncology History   Malignant neoplasm of lower-inner quadrant of left breast in female, estrogen receptor positive (HCC)   9/28/2021 Initial Diagnosis    Ductal carcinoma in situ (DCIS) of left breast     9/28/2021 Biopsy    Final Diagnosis   A. Breast, Left, 7:00 location 2 cores w/ Calcs, Biopsy:  - Ductal carcinoma in situ.  - No invasive carcinoma is seen.       Architectural patterns: Solid, papillary       Nuclear grade of ductal carcinoma in situ: Grade 2 (II).       Necrosis:  Focal.       Size of in-situ carcinoma: At least 4 mm.  - Microcalcifications: Present in DCIS.  - Breast cancer biomarker studies (paraffin block number: A1):           Estrogen, progesterone receptor studies pending, to be described in a separate receptor report.      ER 90-95% positive  Pr90-95% positive  B. Breast, Left, 7:00 location 1 core w/ No Calcs, Biopsy:  - Atypical lobular hyperplasia.  - Usual ductal hyperplasia involving intraductal papilloma.      C. Breast, Left, 9:00 location 2 cores w/ Calcs, Biopsy:  - Non-proliferative fibrocystic changes, including microcysts, microcalcifications with stromal fibrosis.  - Negative for atypia or carcinoma.      D. Breast, Left, 9:00 location 1 core w/ No Calcs, Biopsy:  -  Intraductal papilloma.  - Negative for atypia or carcinoma.         10/26/2021 -  Cancer Staged    Staging form: Breast, AJCC 8th Edition  - Clinical: Stage 0 (cTis (DCIS), cN0, cM0, ER+, WA+, HER2: Not Assessed) - Signed by Audrey Lacey MD on 10/26/2021  Stage prefix: Initial diagnosis  Method of lymph node assessment: Clinical  Nuclear grade: G2       10/2021 Genetic Testing    Test: Room[employer] BRCAplus STAT Panel (8 genes): ZAKIA, BRCA1, BRCA2, CDH1, CHEK2, PALB2, PTEN, TP53   Result:   Negative - No Clinically Significant Variants Detected       Assessment:  Negative     Additional Testing:  Test(s): Room[employer] BRCAplus STAT Panel (8 genes): ZAKIA, BRCA1, BRCA2, CDH1, CHEK2, PALB2, PTEN, TP53 with reflex to [employer] CancerNext (28 additional genes): APC, ZAKIA, AXIN2 BARD1, BRCA1, BRCA2, BRIP1, BMPR1A, CDH1, CDK4, CDKN2A, CHEK2, DICER1, EPCAM, GREM1, HOXB13, MLH1, MSH2, MSH3, MSH6, MUTYH, NBN, NF1, NTHL1, PALB2, PMS2, POLD1, POLE, PTEN, RAD51C, RAD51D, RECQL SMAD4, SMARCA4, STK11, TP53      Result: Variant of uncertain significance     Variant: MSH6 Variant, Unknown Significance: p.T6P       Variant of uncertain significance (VUS)     11/19/2021 Surgery    Final Diagnosis   A. Breast, Left, Left breast lumpectomy, short superior, long lateral:  - Invasive mammary carcinoma of no special type (ductal), 8 mm in greatest dimension with    extensive adjacent ductal carcinoma in-situ and associated non-invasive lobular neoplasia.    -- [place] histologic grade 2 of 3 (total score: 7 of 9)        * Glandular (acinar) Tubular Differentiation < 10%, score 3.        * Nuclear Pleomorphism 2 of 3, score 2.        * Mitotic Rate 4-7/mm2, (8-14 mitoses/10HPF; 8 mitoses/10 HPF), score 2.    -- Confirmed by tumor cell immunophenotype:        * Positive: E-cadherin, P120 - each in a membranous pattern.        * Negative: p63, SMMHC.  - Ductal carcinoma in-situ (DCIS): Present; not an extensive intraductal component associated with      invasive carcinoma (< 5% of total tumor) but extensive DCIS in adjacent tissue.     -- Confirmed by positive E-cadherin, p120 (membranous), SMMHC, p63 and loss of epithelial         mosaicism on CK5/6 immunostains.     -- Size and Extent:  At least 48 mm in approximate extent; present in 12 sequential sections; 43 of 91 blocks.     -- Architectural Patterns: Cribriform, micropapillary, solid, papillary.    -- Nuclear grade: I-II (low to intermediate)    -- Necrosis: Present, focal and central necrosis identified.     -- Associated prior biopsy site changes with twist clip and [place]  marker.  - Multifocal non-invasive lobular neoplasia (focally florid lobular carcinoma in-situ/LCIS and atypical lobular     hyperplasia with pagetoid ductal spread) involving and adjacent to DCIS.    -- Confirmed by negative E-cadherin; positive p120 (cytoplasmic), SMMHC, p63; and loss of epithelial        mosaicism on CK5/6 immunostains.   - Margins uninvolved by invasive and in-situ carcinoma.    -- Invasive carcinoma: 8 mm from nearest posterior margin.     -- DCIS: 0.1 mm from nearest posterior margin; < 1 mm from nearest anterior margin; 1.5 mm from        nearest medial margin; > 2 mm from all remaining margins.   - Benign skin.    -- No dermal lymphovascular invasion.   - Lymphovascular invasion: Not identified.  - Microcalcifications: Present, associated with DCIS.   - Benign fibrocystic changes with atypia (atypical and florid usual ductal hyperplasia, columnar cell     lesions with flat epithelial atypia/FEA, cystic and papillary apocrine metaplasia).  - Radial scar, 0.4 cm.      B. Breast, Left, Left Breast Lumpectomy new superior margin - Short Superior, Long Lateral:  - Ductal carcinoma in-situ (DCIS).     -- Size and Extent: 16 mm in greatest approximate extent; present in 4 sequential sections; 7 of 78 blocks.     -- Architectural Patterns: Cribriform, micropapillary, solid, papillary.    -- Nuclear grade: I-II (low  to intermediate).     -- Necrosis: Present, focal.     -- Confirmed by positive E-cadherin, P120 (membranous) and loss of epithelial mosaicism on         CK5/6 immunostains.    -- Associated prior biopsy site changes with biopsy clip and AJAY  marker.  - No invasive carcinoma identified.  - Margins uninvolved by DCIS.     -- DCIS < 1 mm from nearest posterior margin; < 1-2 mm from medial margin; > 2 mm from all       remaining margins.    - Lymphovascular invasion: Not identified.  - Microcalcifications: Present, associated with DCIS, lobular neoplasia and non-neoplastic breast tissue.    - Benign fibrocystic changes with atypia:    -- Multifocal/extensive non-invasive lobular neoplasia (focal lobular carcinoma in-situ and        predominantly atypical lobular hyperplasia with pagetoid ductal spread), focally involving        and adjacent to DCIS.        ** Confirmed by negative E-cadherin; positive p120 (cytoplasmic) immunostains.     -- Usual ductal hyperplasia, columnar cell change and hyperplasia with focal flat epithelial        atypia/FEA, sclerosing adenosis, fibroadenomatoid changes, cystic and papillary apocrine metaplasia.   - Small intraductal papilloma without atypia, 0.1 cm.         12/7/2021 -  Cancer Staged    Staging form: Breast, AJCC 8th Edition  - Pathologic stage from 12/7/2021: Stage IA (pT1b, pN0, cM0, G2, ER+, SD+, HER2-) - Signed by Audrey Lacey MD on 12/7/2021  Stage prefix: Initial diagnosis  Method of lymph node assessment: Clinical  Histologic grading system: 3 grade system       1/21/2022 Surgery    Final Diagnosis   A.  Left breast, anterior margin:     - Histologic changes compatible with prior surgical procedure.     - Skin with dermal cicatrix.     - No invasive or in situ carcinoma identified.     - Final anterior margin is negative for malignancy.     B.  Left breast, new posterior margin:     - Focus of atypical lobular hyperplasia (ALH).     - No invasive or in situ  "carcinoma identified.     - Final posterior margin is negative for malignancy.     C.  Left breast, medial margin:     - Histologic changes compatible with prior surgical procedure.     - No invasive or in situ carcinoma identified.     - Final medial margin is negative for malignacny.     D.  Left axilla, sentinel lymph node #1:     - Single lymph node; negative for malignancy (0/1).     E.  Left axilla, sentinel lymph node #2:     - Single lymph node; negative for malignancy (0/1).     F.  Left axilla, sentinel lymph node #3:     - Single lymph node; negative for malignancy (0/1).     G.  Left axilla, sentinel lymph node #4:     - Single lymph node; negative for malignancy (0/1).        3/23/2022 - 4/2022 Radiation    Prone L Boost 10X/6X 5 / 5 200 0 1,000 6   Prone L Br # 6X 16 / 16 266 0 4,256 21      Treatment dates:  3/23/2022 - 4/20/2022         Review of Systems Refer to nursing note.          Objective   /81 (BP Location: Right arm)   Pulse 82   Temp 97.6 °F (36.4 °C) (Temporal)   Resp 17   Ht 5' 3\" (1.6 m)   Wt 94.1 kg (207 lb 6.4 oz)   LMP 12/15/2003 (Within Years)   SpO2 98%   BMI 36.74 kg/m²     Pain Screening:  Pain Score: 0-No pain  ECOG ECOG Performance Status: 0 - Fully active, able to carry on all pre-disease performance without restriction  Physical Exam   General Appearance:  Alert, cooperative, no distress, appears stated age  Breast Exam:  Left  Breast: No residual dyspigmentation in the irradiated field. No edema. Benign exam. No palpable abnormalities in the breast, axilla, or supraclavicular region  Right Breast: Benign exam. No palpable abnormalities in the breast, axilla, or supraclavicular region  Exam chaperoned by female nursing staff.  Lungs: Respirations unlabored, no cyanosis, able to speak in complete sentences without dyspnea.  Abdomen: Non-distended  Skin: No generalized rash or dermatitis  Neurologic: ANOx3, speech and cognition intact.    Administrative Statements " "  I have spent a total time of 20 minutes in caring for this patient on the day of the visit/encounter including Instructions for management, Counseling / Coordination of care, Documenting in the medical record, Reviewing / ordering tests, medicine, procedures  , and Obtaining or reviewing history  .   Portions of the record may have been created with voice recognition software.  Occasional wrong word or \"sound a like\" substitutions may have occurred due to the inherent limitations of voice recognition software.  Read the chart carefully and recognize, using context, where substitutions have occurred.  "

## 2025-01-14 ENCOUNTER — OFFICE VISIT (OUTPATIENT)
Dept: SLEEP CENTER | Facility: CLINIC | Age: 65
End: 2025-01-14
Payer: MEDICARE

## 2025-01-14 VITALS
SYSTOLIC BLOOD PRESSURE: 130 MMHG | HEIGHT: 63 IN | DIASTOLIC BLOOD PRESSURE: 86 MMHG | WEIGHT: 203 LBS | BODY MASS INDEX: 35.97 KG/M2

## 2025-01-14 DIAGNOSIS — E66.812 CLASS 2 OBESITY: ICD-10-CM

## 2025-01-14 DIAGNOSIS — G47.33 OSA (OBSTRUCTIVE SLEEP APNEA): Primary | ICD-10-CM

## 2025-01-14 PROCEDURE — 99213 OFFICE O/P EST LOW 20 MIN: CPT

## 2025-01-14 NOTE — PROGRESS NOTES
[facility]  Sleep Medicine Follow Up/Established Patient    PATIENT NAME: Nehal Bryant  DATE OF SERVICE: January 20, 2025  DATE OF LAST VISIT: 9/9/2024    ASSESSMENT/PLAN:  Nehal Bryant is a 64 y.o. female with PMHx of breast cancer, SONIYA on CPAP, GERD, asthma, HTN, HLD, prediabetes, anxiety and obesity who returns to the office for follow up.    SONIYA (Obstructive Sleep Apnea)  Class 2 Obesity  - The patient has a history of severe SONIYA diagnosed in 2023 on HSAT (MICHAEL 33.2, supine MICHAEL 33.2, O2 clive 80%, TST <90% 21 min) is currently prescribed auto CPAP 5-15 cmH2O.  She is continuing to have difficulties with the fit of her mask.  - Therapy and compliance data reviewed: residual AHI 12.6, compliance 70% and mask leak is borderline with patient noting leak later in the evening.  - Will adjust to APAP 8-15 cmH2O.  - Asked her to consistently try the FFM for the next 2 weeks and to call the office if usage is not improving.  - Discussed how to adjust heat and humidity settings today (device was not present at today's visit), recommend she call the DME if she is having difficulty adjusting her settings.  - Refill of supplies will be sent to the patient's DME.  - Explained the importance of keeping the machine clean, and that they should be eligible for refills of supplies every 3-6 months depending on insurance.  We also discussed the insurance compliance requirements.  - Encouraged healthy lifestyle with adequate sleep (7-9 hours per night), healthy balanced diet and routine exercise.  Explained the importance of avoiding driving while drowsy.  - Weight loss is recommended.  - Follow-up in 4 months  -     PAP DME Pressure Change  -     PAP DME Resupply/Reorder  ________________________________________________________________________________________________    Interval History: Nehal Bryant is a 64 y.o. female with PMHx of breast cancer, SONIYA on CPAP, GERD, asthma, HTN, HLD,  prediabetes, anxiety and obesity who returns to the office for follow up.  It is unclear if the patient had a mask refitting after her last appointment.  She states she is currently using the hybrid style mask still, but thinks that she has an FFM at home.  She notes continued issues with pressure on her top lip from the mask, but does not believe that she is consistently tried the FFM to know if this solves the issue.  She does state that she was getting slightly more acclimated to the device until last week when she states suddenly felt the heat was too high and she was beginning to have excessive dry mouth without clear change in her settings.  She does also note leak that occurs later in the night and will wake her up causing her to have to take the mask off and put it back on in order to obtain an appropriate seal.  She denies any aerophagia or pressure intolerance at this time, but notes she feels the pressure is too low when the device initially starts.    Prior History: The patient for started following with [facility] in 12/2023 due to an HSAT that showed severe SONIYA (MICHAEL 33.2, supine MICHAEL 33.2, O2 clive 80%, TST <90% 21 min). Recommendation was for her to begin on auto CPAP 5-15 cmH2O. On her initial visit she did note multiple nocturnal awakenings due to nocturia and for unclear reasons, but denied symptoms of sleep onset insomnia or RLS. She did report significant daytime sleepiness with napping 4 times a week up to 2 to 3 hours at a time.  At her initial compliance visit she noted benefit with using the device, but felt as if she was breathing through her mouth at times while wearing a nasal pillow interface.  She ultimately switched to a hybrid interface, but has continued to have issues with this and additional refitting was recommended.  She is now returning for continued follow up.    ESS: Total score: (Patient-Rptd) (P) 10/24  Greater or equal to 10 is positive for excessive daytime  sleepiness  Pertinent Meds: None    Sleep-Wake Schedule:  Bedtime: 2300  Wake Time: 0730  Difficulty Falling Asleep: No  Avg Number of Awakenings: 3-4x a night  Cause of Awakenings: bathroom and mask leak  Weekend Sleep Schedule: unchanged  Naps: denies    Avg TST per 24 hours: 6-7 hours    Past Treatments:  APAP 5-15 cmH2O    Prior Sleep Studies:  HSAT -- 11/25/2023 (Wt 199.0 lbs)  MICHAEL - 33.2  Sup MICHAEL - 33.2  O2 [place] - 80.0%  TST < 90% - 21 min    Other Relevant Labs and Studies:  Therapy and Compliance Data -- 12/15/2024 - 1/13/2025      Past Medical History:   Diagnosis Date    Asthma     Last Assessed: 10/19/2016    Breast cancer (HCC)     Left breast 2021    Cancer (HCC) 10/2021    Fibroid     GERD (gastroesophageal reflux disease)     controlled with diet    Hypercholesteremia     Hypertension     Wears glasses      Past Surgical History:   Procedure Laterality Date    BREAST BIOPSY Left 09/28/2021    multiple sites    BREAST LUMPECTOMY Left 11/19/2021    Procedure: LUMPECTOMY BREAST BRACKETED AJAY LOCALIZED;  Surgeon: Audrey Lacey MD;  Location: [facility];  Service: Surgical Oncology    BREAST LUMPECTOMY Left 1/21/2022    Procedure: LUMPECTOMY BREAST, re-excision; 0800 NUC MED;  Surgeon: Audrey Lacey MD;  Location: [facility];  Service: Surgical Oncology    BUNIONECTOMY      BUNIONECTOMY Right 2009    BUNIONECTOMY Left 2007    COLONOSCOPY      FOOT SURGERY      LYMPH NODE BIOPSY Left 1/21/2022    Procedure: BIOPSY LYMPH NODE SENTINEL;  Surgeon: Audrey Lacey MD;  Location: [facility];  Service: Surgical Oncology    MAMMO NEEDLE LOCALIZATION LEFT (ALL INC) Left 11/12/2021    MAMMO NEEDLE LOCALIZATION LEFT (ALL INC) EACH ADD Left 11/12/2021    MAMMO STEREOTACTIC BREAST BIOPSY LEFT (ALL INC) Left 9/28/2021    MAMMO STEREOTACTIC BREAST BIOPSY LEFT (ALL INC) EACH ADD Left 9/28/2021    NASAL POLYP EXCISION      DC COLONOSCOPY FLX DX W/COLLJ SPEC WHEN PFRMD N/A 5/23/2016    Procedure: COLONOSCOPY;  Surgeon:  Adriana Westfall DO;  Location: [facility];  Service: Gastroenterology    SHOULDER ARTHROSCOPY Right 2012    SHOULDER ARTHROSCOPY W/ ROTATOR CUFF REPAIR Right 2012    US GUIDED THYROID BIOPSY  6/2/2023     Patient Active Problem List   Diagnosis    Anxiety    Asthma    Benign essential hypertension    Combined hyperlipidemia    Prediabetes    Vitamin D deficiency    Fibroid    Localized osteoporosis without current pathological fracture    Acute pain of right shoulder    Pain in both lower extremities    Neck pain    BMI 35.0-35.9,adult    Malignant neoplasm of lower-inner quadrant of left breast in female, estrogen receptor positive (HCC)    Family history of cancer    BRCA negative    GERD (gastroesophageal reflux disease)    Use of anastrozole    Thyroid nodule    Trigger finger    Multiple nevi    Contusion of fifth toe of right foot    SONIYA (obstructive sleep apnea)    Trigger middle finger of left hand    Obesity, morbid (HCC)    Shortness of breath     Allergies as of 01/14/2025    (No Known Allergies)     REVIEW OF SYSTEMS:  Review of Systems  10-point system review completed, all of which are negative except as mentioned above.    CURRENT MEDICATIONS:  Current Outpatient Medications   Medication Instructions    albuterol (PROVENTIL HFA,VENTOLIN HFA) 90 mcg/act inhaler 2 puffs, Inhalation, Every 6 hours PRN    amLODIPine (NORVASC) 10 mg, Oral, Daily    anastrozole (ARIMIDEX) 1 mg, Oral, Daily    atorvastatin (LIPITOR) 10 mg, Oral, Daily    calcium carbonate (OS-HALIMA) 1,200 mg, Daily    cholecalciferol (VITAMIN D3) 2,000 Units, Daily    hydrocortisone 2.5 % cream Topical, 3 times daily, prn    multivitamin (THERAGRAN) TABS 1 tablet, Daily     SOCIAL HISTORY:  Social History     Tobacco Use    Smoking status: Never     Passive exposure: Never    Smokeless tobacco: Never   Vaping Use    Vaping status: Never Used   Substance Use Topics    Alcohol use: No    Drug use: Never     FAMILY HISTORY:  Family History  "  Problem Relation Age of Onset    Hypertension Mother     Breast cancer Mother         age unknown; bilateral breast cancer    Lung cancer Mother     Cervical cancer Mother     Cancer Mother     Lung disease Mother     Diabetes Father     Hypertension Father     Prostate cancer Father         age unknown    Breast cancer Maternal Grandmother 44    Diabetes Paternal Grandmother     Hypertension Brother     Prostate cancer Brother 50    Heart disease Brother     Hypertension Brother     Prostate cancer Brother     Hypertension Maternal Aunt     No Known Problems Maternal Aunt     Lung cancer Maternal Uncle     Diabetes Paternal Aunt     Diabetes Paternal Uncle     Hypertension Cousin     Breast cancer Cousin         under 50    Diabetes Cousin     Breast cancer Cousin     Breast cancer Cousin         under 50    Hypertension Brother     Hypertension Maternal Aunt      PHYSICAL EXAMINATION:  Vital Signs:  /86   Ht 5' 3\" (1.6 m)   Wt 92.1 kg (203 lb)   LMP 12/15/2003 (Within Years)   BMI 35.96 kg/m²   Body mass index is 35.96 kg/m².  Constitutional: NAD, well appearing, obese   Mental Status: AAOx3  Skin: Warm, dry, no rashes noted   Eyes: PERRL, normal conjunctiva  ENT: Nasal congestion absent, nasal valve incompetence absent.  Chest: RRR, +S1/S2, CTA B/L, no W/R/R, no M/R/G   Extremities: No digital clubbing or pedal edema      Virginia Mercer MD  Pulmonary-Critical Care and Sleep Medicine  01/20/25    Portions of the record may have been created with voice recognition software. Occasional wrong word or \"sound a like\" substitutions may have occurred due to the inherent limitations of voice recognition software. Please read the chart carefully and recognize, using context, where substitutions have occurred.     "

## 2025-01-14 NOTE — PATIENT INSTRUCTIONS
Continue PAP Therapy  - Continue APAP at 8-15 cmH2O.  Remember to clean your mask and equipment regularly, as directed.  You should be eligible for new supplies approximately every 3-6 months, depending on your insurance coverage. Contact your Durable Medical Equipment (DME) company for new supplies as needed.  Follow up in 4 months    Care and Maintenance  Headgear should be washed as needed. Daily inspection and weekly washings are recommended. Do not disassemble the straps. Machine wash in warm water, making sure to attach Velcro hooks and tabs before washing. Line dry or machine dry on a low setting.  Masks should be washed every day. Daily inspection is recommended. Leave the mask and tubing attached. Gently wash the mask with a soft cloth using warm water and mild detergent, concentrating on the mask cushion flaps. DO NOT use alcohol or bleach. Rinse thoroughly and air dry.  Tubing and headgear should be washed weekly. Daily inspection is recommended. Wash in warm water and mild detergent and rinse thoroughly. Hook the tubing to the machine and blow until dry.  Humidifier should be washed daily and filled with DISTILLED water before use. Wash with warm water and mild detergent. Disinfect weekly by soaking with a solution of 1 part white vinegar and 3 parts water for 30 minutes. Rinse thoroughly and air dry.  Disposable filters should be replaced once a month. Wash reusable foam filters with warm water and mild detergent at least once a month. Rinse thoroughly and dry with paper towels.  Avoid  that contain fragrance or conditioners, as these will leave a residue.  NEVER iron any soft goods.    Insurance Requirements  Your insurance requires a face-to-face follow up visit within a 31-90 day period after starting PAP therapy.  Your insurance requires compliance with your PAP device, which is at least 4 hours per night for 70% of the time. This must be done over a 30 day period and must occur within the  initial 31-90 day period after starting CPAP.  Your insurance also requires at least yearly follow ups to continue to pay for CPAP supplies.     PAP Supply Guidelines  Below are the guidelines for reordering your supplies. You will be responsible for your deductible, co payments, and out of pocket expenses.    Item Frequency   Nasal Mask (no headgear) 1 every 3 months   Nasal Mask Cushion 1 every 2 weeks   Full Face Mask (no headgear) 1 every 3 months   Full Face Mask Cushion 1 every month   Nasal Pillows 1 every 2 weeks   Headgear 1 every 6 months   hin Strap 1 every 6 months   cody 1 every 3 months   Filters: Reusable 1 every 6 months   Filters: Disposable 1 every 2 weeks   Humidifier Chamber(disposable) 1 every 6 months

## 2025-01-15 ENCOUNTER — TELEPHONE (OUTPATIENT)
Dept: SLEEP CENTER | Facility: CLINIC | Age: 65
End: 2025-01-15

## 2025-01-16 DIAGNOSIS — E78.2 COMBINED HYPERLIPIDEMIA: ICD-10-CM

## 2025-01-16 RX ORDER — ATORVASTATIN CALCIUM 10 MG/1
10 TABLET, FILM COATED ORAL DAILY
Qty: 90 TABLET | Refills: 0 | Status: SHIPPED | OUTPATIENT
Start: 2025-01-16

## 2025-02-07 ENCOUNTER — TELEPHONE (OUTPATIENT)
Dept: HEMATOLOGY ONCOLOGY | Facility: CLINIC | Age: 65
End: 2025-02-07

## 2025-02-07 NOTE — TELEPHONE ENCOUNTER
Patient called stating that she had contacted [facility] for her refill and they had told her the script was canceled. Informed her that a new 90 day script with 3 refills was sent to her pharmacy on 12/06/24. Patient will contact pharmacy to see if they can pull this script up on file.

## 2025-02-25 ENCOUNTER — OFFICE VISIT (OUTPATIENT)
Dept: URGENT CARE | Facility: CLINIC | Age: 65
End: 2025-02-25
Payer: MEDICARE

## 2025-02-25 ENCOUNTER — APPOINTMENT (EMERGENCY)
Dept: CT IMAGING | Facility: HOSPITAL | Age: 65
DRG: 103 | End: 2025-02-25
Payer: MEDICARE

## 2025-02-25 ENCOUNTER — HOSPITAL ENCOUNTER (INPATIENT)
Facility: HOSPITAL | Age: 65
LOS: 1 days | Discharge: HOME | DRG: 103 | End: 2025-02-28
Attending: EMERGENCY MEDICINE | Admitting: INTERNAL MEDICINE
Payer: MEDICARE

## 2025-02-25 VITALS
OXYGEN SATURATION: 98 % | HEART RATE: 98 BPM | DIASTOLIC BLOOD PRESSURE: 78 MMHG | TEMPERATURE: 96.8 F | SYSTOLIC BLOOD PRESSURE: 152 MMHG | RESPIRATION RATE: 18 BRPM

## 2025-02-25 DIAGNOSIS — R20.0 RIGHT ARM NUMBNESS: ICD-10-CM

## 2025-02-25 DIAGNOSIS — R20.0 FACIAL NUMBNESS: Primary | ICD-10-CM

## 2025-02-25 DIAGNOSIS — R20.2 NUMBNESS AND TINGLING OF RIGHT SIDE OF FACE: Primary | ICD-10-CM

## 2025-02-25 DIAGNOSIS — R20.0 NUMBNESS AND TINGLING OF RIGHT SIDE OF FACE: Primary | ICD-10-CM

## 2025-02-25 LAB
2HR DELTA HS TROPONIN: >1 NG/L
ALBUMIN SERPL BCG-MCNC: 4.6 G/DL (ref 3.5–5)
ALP SERPL-CCNC: 119 U/L (ref 34–104)
ALT SERPL W P-5'-P-CCNC: 13 U/L (ref 7–52)
ANION GAP SERPL CALCULATED.3IONS-SCNC: 6 MMOL/L (ref 4–13)
APTT PPP: 30 SECONDS (ref 23–34)
AST SERPL W P-5'-P-CCNC: 14 U/L (ref 13–39)
BASOPHILS # BLD AUTO: 0.04 THOUSANDS/ÂΜL (ref 0–0.1)
BASOPHILS NFR BLD AUTO: 1 % (ref 0–1)
BILIRUB SERPL-MCNC: 0.75 MG/DL (ref 0.2–1)
BUN SERPL-MCNC: 12 MG/DL (ref 5–25)
CALCIUM SERPL-MCNC: 10.7 MG/DL (ref 8.4–10.2)
CARDIAC TROPONIN I PNL SERPL HS: 3 NG/L (ref ?–50)
CARDIAC TROPONIN I PNL SERPL HS: <2 NG/L (ref ?–50)
CHLORIDE SERPL-SCNC: 105 MMOL/L (ref 96–108)
CO2 SERPL-SCNC: 29 MMOL/L (ref 21–32)
CREAT SERPL-MCNC: 0.81 MG/DL (ref 0.6–1.3)
EOSINOPHIL # BLD AUTO: 0.09 THOUSAND/ÂΜL (ref 0–0.61)
EOSINOPHIL NFR BLD AUTO: 2 % (ref 0–6)
ERYTHROCYTE [DISTWIDTH] IN BLOOD BY AUTOMATED COUNT: 13.5 % (ref 11.6–15.1)
GFR SERPL CREATININE-BSD FRML MDRD: 76 ML/MIN/1.73SQ M
GLUCOSE SERPL-MCNC: 103 MG/DL (ref 65–140)
HCT VFR BLD AUTO: 42.9 % (ref 34.8–46.1)
HGB BLD-MCNC: 14.1 G/DL (ref 11.5–15.4)
IMM GRANULOCYTES # BLD AUTO: 0 THOUSAND/UL (ref 0–0.2)
IMM GRANULOCYTES NFR BLD AUTO: 0 % (ref 0–2)
INR PPP: 1.11 (ref 0.85–1.19)
LYMPHOCYTES # BLD AUTO: 2.82 THOUSANDS/ÂΜL (ref 0.6–4.47)
LYMPHOCYTES NFR BLD AUTO: 50 % (ref 14–44)
MAGNESIUM SERPL-MCNC: 2.1 MG/DL (ref 1.9–2.7)
MCH RBC QN AUTO: 28.5 PG (ref 26.8–34.3)
MCHC RBC AUTO-ENTMCNC: 32.9 G/DL (ref 31.4–37.4)
MCV RBC AUTO: 87 FL (ref 82–98)
MONOCYTES # BLD AUTO: 0.23 THOUSAND/ÂΜL (ref 0.17–1.22)
MONOCYTES NFR BLD AUTO: 4 % (ref 4–12)
NEUTROPHILS # BLD AUTO: 2.39 THOUSANDS/ÂΜL (ref 1.85–7.62)
NEUTS SEG NFR BLD AUTO: 43 % (ref 43–75)
NRBC BLD AUTO-RTO: 0 /100 WBCS
PLATELET # BLD AUTO: 319 THOUSANDS/UL (ref 149–390)
PMV BLD AUTO: 9 FL (ref 8.9–12.7)
POTASSIUM SERPL-SCNC: 3.6 MMOL/L (ref 3.5–5.3)
PROT SERPL-MCNC: 8.4 G/DL (ref 6.4–8.4)
PROTHROMBIN TIME: 14.5 SECONDS (ref 12.3–15)
RBC # BLD AUTO: 4.94 MILLION/UL (ref 3.81–5.12)
SODIUM SERPL-SCNC: 140 MMOL/L (ref 135–147)
TSH SERPL DL<=0.05 MIU/L-ACNC: 1.61 UIU/ML (ref 0.45–4.5)
WBC # BLD AUTO: 5.57 THOUSAND/UL (ref 4.31–10.16)

## 2025-02-25 PROCEDURE — 93005 ELECTROCARDIOGRAM TRACING: CPT

## 2025-02-25 PROCEDURE — 80053 COMPREHEN METABOLIC PANEL: CPT

## 2025-02-25 PROCEDURE — 99213 OFFICE O/P EST LOW 20 MIN: CPT | Performed by: PHYSICIAN ASSISTANT

## 2025-02-25 PROCEDURE — 84484 ASSAY OF TROPONIN QUANT: CPT

## 2025-02-25 PROCEDURE — 83735 ASSAY OF MAGNESIUM: CPT

## 2025-02-25 PROCEDURE — 99285 EMERGENCY DEPT VISIT HI MDM: CPT

## 2025-02-25 PROCEDURE — 85730 THROMBOPLASTIN TIME PARTIAL: CPT

## 2025-02-25 PROCEDURE — 96360 HYDRATION IV INFUSION INIT: CPT

## 2025-02-25 PROCEDURE — 70496 CT ANGIOGRAPHY HEAD: CPT

## 2025-02-25 PROCEDURE — 85610 PROTHROMBIN TIME: CPT

## 2025-02-25 PROCEDURE — G0463 HOSPITAL OUTPT CLINIC VISIT: HCPCS | Performed by: PHYSICIAN ASSISTANT

## 2025-02-25 PROCEDURE — 85025 COMPLETE CBC W/AUTO DIFF WBC: CPT

## 2025-02-25 PROCEDURE — 36415 COLL VENOUS BLD VENIPUNCTURE: CPT

## 2025-02-25 PROCEDURE — 84443 ASSAY THYROID STIM HORMONE: CPT

## 2025-02-25 PROCEDURE — 70498 CT ANGIOGRAPHY NECK: CPT

## 2025-02-25 RX ADMIN — IOHEXOL 85 ML: 350 INJECTION, SOLUTION INTRAVENOUS at 22:23

## 2025-02-25 RX ADMIN — SODIUM CHLORIDE 500 ML: 0.9 INJECTION, SOLUTION INTRAVENOUS at 21:34

## 2025-02-25 NOTE — PROGRESS NOTES
[facility]    NAME: Nehal Bryant is a 64 y.o. female  : 1960    MRN: [id number]  DATE: 2025  TIME: 6:22 PM    Assessment and Plan   Numbness and tingling of right side of face [R20.0, R20.2]  1. Numbness and tingling of right side of face  Transfer to other facility      2. Right arm numbness  Transfer to other facility          Patient Instructions     Patient Instructions   I am concerned that we do not have everything you need in our [facility] to fully assess what is going on, so I recommend we send you to the emergency room now for further evaluation.  When you get there, the emergency room provider(s) will assess you just like I did and decide about further testing based on what they see and how your condition progresses.    Chief Complaint     Chief Complaint   Patient presents with    Numbness     Patient states she has been feeling pain in the R side of her face and upper arm. Patient states she doesn't feel tingling, just numbness. Patient also states off an on headache x1.5 weeks. Patient states she has also been using her as needed inhaler more frequently.        History of Present Illness   Nehal Bryant presents to the clinic c/o  64-year-old female comes in with numbness and feeling funny right side of face and right upper and lower arm that started with a tingling sensation about 2 days ago.  Now just feels funny and numb.    No changes in strength, difficulty talking, changes with vision or hearing.  No difficulty swallowing.  Denies any changes in balance, concentration.  Does have headache right sided.  No URI symptoms.    Did have some palpitations and right upper chest discomfort a couple days ago and thought it was her asthma.  Has been using her albuterol inhaler more recently.  She reports that she stopped using her CPAP machine about a week ago.  She has been on it for about a year.            Review of Systems   Review of Systems   Constitutional:   Negative for activity change, appetite change, chills, diaphoresis, fatigue and fever.   HENT:  Negative for congestion, hearing loss, rhinorrhea, sinus pain, sore throat, trouble swallowing and voice change.    Eyes:  Negative for photophobia and visual disturbance.   Respiratory:  Positive for chest tightness and wheezing. Negative for cough and shortness of breath.    Cardiovascular:  Positive for chest pain and palpitations. Negative for leg swelling.        Chest pain and palpitations resolved   Gastrointestinal:  Negative for nausea and vomiting.   Neurological:  Positive for numbness and headaches. Negative for dizziness, tremors, facial asymmetry, speech difficulty, weakness and light-headedness.       Current Medications     Long-Term Medications   Medication Sig Dispense Refill    amLODIPine (NORVASC) 10 mg tablet Take 1 tablet by mouth once daily 100 tablet 1    anastrozole (ARIMIDEX) 1 mg tablet Take 1 tablet (1 mg total) by mouth daily 90 tablet 3    atorvastatin (LIPITOR) 10 mg tablet Take 1 tablet by mouth once daily 90 tablet 0    calcium carbonate (OS-HALIMA) 1250 (500 Ca) MG tablet Take 1,200 mg by mouth daily       cholecalciferol (VITAMIN D3) 1,000 units tablet Take 2,000 Units by mouth daily        hydrocortisone 2.5 % cream Apply topically 3 (three) times a day prn 28 g 2       Current Allergies     Allergies as of 02/25/2025    (No Known Allergies)          The following portions of the patient's history were reviewed and updated as appropriate: allergies, current medications, past family history, past medical history, past social history, past surgical history and problem list.  Past Medical History:   Diagnosis Date    Asthma     Last Assessed: 10/19/2016    Breast cancer (HCC)     Left breast 2021    Cancer (HCC) 10/2021    Fibroid     GERD (gastroesophageal reflux disease)     controlled with diet    Hypercholesteremia     Hypertension     Wears glasses      Past Surgical History:   Procedure  Laterality Date    BREAST BIOPSY Left 09/28/2021    multiple sites    BREAST LUMPECTOMY Left 11/19/2021    Procedure: LUMPECTOMY BREAST BRACKETED AJAY LOCALIZED;  Surgeon: Audrey Lacey MD;  [facility];  Service: Surgical Oncology    BREAST LUMPECTOMY Left 1/21/2022    Procedure: LUMPECTOMY BREAST, re-excision; 0800 NUC MED;  Surgeon: Audrey Lacey MD;  [facility];  Service: Surgical Oncology    BUNIONECTOMY      BUNIONECTOMY Right 2009    BUNIONECTOMY Left 2007    COLONOSCOPY      FOOT SURGERY      LYMPH NODE BIOPSY Left 1/21/2022    Procedure: BIOPSY LYMPH NODE SENTINEL;  Surgeon: Audrey Lacey MD;  [facility];  Service: Surgical Oncology    MAMMO NEEDLE LOCALIZATION LEFT (ALL INC) Left 11/12/2021    MAMMO NEEDLE LOCALIZATION LEFT (ALL INC) EACH ADD Left 11/12/2021    MAMMO STEREOTACTIC BREAST BIOPSY LEFT (ALL INC) Left 9/28/2021    MAMMO STEREOTACTIC BREAST BIOPSY LEFT (ALL INC) EACH ADD Left 9/28/2021    NASAL POLYP EXCISION      MD COLONOSCOPY FLX DX W/COLLJ SPEC WHEN PFRMD N/A 5/23/2016    Procedure: COLONOSCOPY;  Surgeon: Adriana Westfall DO;  [facility];  Service: Gastroenterology    SHOULDER ARTHROSCOPY Right 2012    SHOULDER ARTHROSCOPY W/ ROTATOR CUFF REPAIR Right 2012    US GUIDED THYROID BIOPSY  6/2/2023     Family History   Problem Relation Age of Onset    Hypertension Mother     Breast cancer Mother         age unknown; bilateral breast cancer    Lung cancer Mother     Cervical cancer Mother     Cancer Mother     Lung disease Mother     Diabetes Father     Hypertension Father     Prostate cancer Father         age unknown    Breast cancer Maternal Grandmother 44    Diabetes Paternal Grandmother     Hypertension Brother     Prostate cancer Brother 50    Heart disease Brother     Hypertension Brother     Prostate cancer Brother     Hypertension Maternal Aunt     No Known Problems Maternal Aunt     Lung cancer Maternal Uncle     Diabetes Paternal Aunt     Diabetes  Paternal Uncle     Hypertension Cousin     Breast cancer Cousin         under 50    Diabetes Cousin     Breast cancer Cousin     Breast cancer Cousin         under 50    Hypertension Brother     Hypertension Maternal Aunt        Objective   /78   Pulse 98   Temp (!) 96.8 °F (36 °C)   Resp 18   LMP 12/15/2003 (Within Years)   SpO2 98%   Patient's last menstrual period was 12/15/2003 (within years).       Physical Exam     Physical Exam  Vitals and nursing note reviewed.   Constitutional:       General: She is not in acute distress.     Appearance: She is not ill-appearing, toxic-appearing or diaphoretic.      Comments: Appears mildly anxious but in no acute distress.  Speech and gait are normal.  Accompanied by .   HENT:      Head: Normocephalic and atraumatic.      Right Ear: Tympanic membrane, ear canal and external ear normal.      Left Ear: Tympanic membrane, ear canal and external ear normal.      Nose: No congestion or rhinorrhea.      Mouth/Throat:      Mouth: Mucous membranes are moist.      Pharynx: No oropharyngeal exudate or posterior oropharyngeal erythema.   Eyes:      General: No scleral icterus.     Extraocular Movements: Extraocular movements intact.      Conjunctiva/sclera: Conjunctivae normal.      Pupils: Pupils are equal, round, and reactive to light.   Neck:      Vascular: No carotid bruit.   Cardiovascular:      Rate and Rhythm: Normal rate and regular rhythm.      Heart sounds: Normal heart sounds. No murmur heard.     No friction rub. No gallop.   Pulmonary:      Effort: Pulmonary effort is normal. No respiratory distress.      Breath sounds: Normal breath sounds. No stridor. No wheezing, rhonchi or rales.   Musculoskeletal:      Cervical back: Normal range of motion and neck supple. No rigidity or tenderness.      Right lower leg: No edema.      Left lower leg: No edema.   Lymphadenopathy:      Cervical: No cervical adenopathy.   Skin:     General: Skin is warm and dry.       Coloration: Skin is not pale.   Neurological:      General: No focal deficit present.      Mental Status: She is alert and oriented to person, place, and time.      Cranial Nerves: No cranial nerve deficit.      Sensory: No sensory deficit.      Motor: No weakness.      Coordination: Coordination normal.      Gait: Gait normal.      Deep Tendon Reflexes: Reflexes normal.      Comments: Sensation on face is intact to light touch.  Sensation on arms is intact to light touch.   Psychiatric:         Mood and Affect: Mood normal.         Behavior: Behavior normal.

## 2025-02-26 ENCOUNTER — DOCUMENTATION (OUTPATIENT)
Dept: ADMINISTRATIVE | Facility: OTHER | Age: 65
End: 2025-02-26

## 2025-02-26 ENCOUNTER — APPOINTMENT (OUTPATIENT)
Dept: NON INVASIVE DIAGNOSTICS | Facility: HOSPITAL | Age: 65
DRG: 103 | End: 2025-02-26
Payer: MEDICARE

## 2025-02-26 PROBLEM — R20.0 FACIAL NUMBNESS: Status: ACTIVE | Noted: 2025-02-26

## 2025-02-26 LAB
ANION GAP SERPL CALCULATED.3IONS-SCNC: 5 MMOL/L (ref 4–13)
AORTIC ROOT: 2.7 CM
AORTIC VALVE MEAN VELOCITY: 10.9 M/S
ASCENDING AORTA: 2.6 CM
ATRIAL RATE: 66 BPM
ATRIAL RATE: 69 BPM
ATRIAL RATE: 73 BPM
AV LVOT MEAN GRADIENT: 3 MMHG
AV LVOT PEAK GRADIENT: 5 MMHG
AV MEAN PRESS GRAD SYS DOP V1V2: 6 MMHG
AV PEAK GRADIENT: 11 MMHG
AV VELOCITY RATIO: 0.68
AV VMAX SYS DOP: 1.68 M/S
BSA FOR ECHO PROCEDURE: 1.95 M2
BUN SERPL-MCNC: 11 MG/DL (ref 5–25)
CALCIUM SERPL-MCNC: 9.8 MG/DL (ref 8.4–10.2)
CHLORIDE SERPL-SCNC: 109 MMOL/L (ref 96–108)
CHOLEST SERPL-MCNC: 177 MG/DL (ref ?–200)
CO2 SERPL-SCNC: 27 MMOL/L (ref 21–32)
CREAT SERPL-MCNC: 0.75 MG/DL (ref 0.6–1.3)
DOP CALC AO VTI: 37.51 CM
DOP CALC LVOT PEAK VEL VTI: 25.64 CM
DOP CALC LVOT PEAK VEL: 1.16 M/S
E WAVE DECELERATION TIME: 168 MS
E/A RATIO: 1.02
ERYTHROCYTE [DISTWIDTH] IN BLOOD BY AUTOMATED COUNT: 13.5 % (ref 11.6–15.1)
EST. AVERAGE GLUCOSE BLD GHB EST-MCNC: 114 MG/DL
FRACTIONAL SHORTENING: 38 (ref 28–44)
GFR SERPL CREATININE-BSD FRML MDRD: 83 ML/MIN/1.73SQ M
GLUCOSE SERPL-MCNC: 106 MG/DL (ref 65–140)
HBA1C MFR BLD: 5.6 %
HCT VFR BLD AUTO: 39.3 % (ref 34.8–46.1)
HDLC SERPL-MCNC: 59 MG/DL
HGB BLD-MCNC: 12.8 G/DL (ref 11.5–15.4)
INTERVENTRICULAR SEPTUM IN DIASTOLE (PARASTERNAL SHORT AXIS VIEW): 0.9 CM
INTERVENTRICULAR SEPTUM: 0.9 CM (ref 0.6–1.1)
LAAS-AP2: 19.2 CM2
LAAS-AP4: 19 CM2
LDLC SERPL CALC-MCNC: 101 MG/DL (ref 0–100)
LEFT ATRIUM SIZE: 3.7 CM
LEFT ATRIUM VOLUME (MOD BIPLANE): 57 ML
LEFT ATRIUM VOLUME INDEX (MOD BIPLANE): 29.2 ML/M2
LEFT INTERNAL DIMENSION IN SYSTOLE: 2.8 CM (ref 2.1–4)
LEFT VENTRICULAR INTERNAL DIMENSION IN DIASTOLE: 4.5 CM (ref 3.5–6)
LEFT VENTRICULAR POSTERIOR WALL IN END DIASTOLE: 0.9 CM
LEFT VENTRICULAR STROKE VOLUME: 61 ML
LV EF US.2D.A4C+ESTIMATED: 69 %
LVSV (TEICH): 61 ML
MCH RBC QN AUTO: 29.2 PG (ref 26.8–34.3)
MCHC RBC AUTO-ENTMCNC: 32.6 G/DL (ref 31.4–37.4)
MCV RBC AUTO: 90 FL (ref 82–98)
MV E'TISSUE VEL-SEP: 9 CM/S
MV PEAK A VEL: 0.84 M/S
MV PEAK E VEL: 86 CM/S
MV STENOSIS PRESSURE HALF TIME: 49 MS
MV VALVE AREA P 1/2 METHOD: 4.49
P AXIS: 76 DEGREES
P AXIS: 77 DEGREES
P AXIS: 80 DEGREES
PLATELET # BLD AUTO: 256 THOUSANDS/UL (ref 149–390)
PMV BLD AUTO: 8.7 FL (ref 8.9–12.7)
POTASSIUM SERPL-SCNC: 3.9 MMOL/L (ref 3.5–5.3)
PR INTERVAL: 142 MS
PR INTERVAL: 160 MS
PR INTERVAL: 168 MS
QRS AXIS: 77 DEGREES
QRS AXIS: 81 DEGREES
QRS AXIS: 82 DEGREES
QRSD INTERVAL: 86 MS
QRSD INTERVAL: 90 MS
QRSD INTERVAL: 94 MS
QT INTERVAL: 396 MS
QT INTERVAL: 402 MS
QT INTERVAL: 406 MS
QTC INTERVAL: 424 MS
QTC INTERVAL: 425 MS
QTC INTERVAL: 442 MS
RBC # BLD AUTO: 4.39 MILLION/UL (ref 3.81–5.12)
RIGHT ATRIAL 2D VOLUME: 29 ML
RIGHT ATRIUM AREA SYSTOLE A4C: 12.8 CM2
RIGHT VENTRICLE ID DIMENSION: 3.3 CM
SL CV LEFT ATRIUM LENGTH A2C: 5.2 CM
SL CV LV EF: 55
SL CV PED ECHO LEFT VENTRICLE DIASTOLIC VOLUME (MOD BIPLANE) 2D: 92 ML
SL CV PED ECHO LEFT VENTRICLE SYSTOLIC VOLUME (MOD BIPLANE) 2D: 30 ML
SODIUM SERPL-SCNC: 141 MMOL/L (ref 135–147)
T WAVE AXIS: 56 DEGREES
T WAVE AXIS: 64 DEGREES
T WAVE AXIS: 65 DEGREES
TR MAX PG: 24 MMHG
TR PEAK VELOCITY: 2.5 M/S
TRICUSPID ANNULAR PLANE SYSTOLIC EXCURSION: 2.3 CM
TRICUSPID VALVE PEAK REGURGITATION VELOCITY: 2.46 M/S
TRIGL SERPL-MCNC: 83 MG/DL (ref ?–150)
VENTRICULAR RATE: 66 BPM
VENTRICULAR RATE: 69 BPM
VENTRICULAR RATE: 73 BPM
WBC # BLD AUTO: 5.25 THOUSAND/UL (ref 4.31–10.16)

## 2025-02-26 PROCEDURE — 93010 ELECTROCARDIOGRAM REPORT: CPT | Performed by: STUDENT IN AN ORGANIZED HEALTH CARE EDUCATION/TRAINING PROGRAM

## 2025-02-26 PROCEDURE — 94002 VENT MGMT INPAT INIT DAY: CPT

## 2025-02-26 PROCEDURE — 80061 LIPID PANEL: CPT

## 2025-02-26 PROCEDURE — 99204 OFFICE O/P NEW MOD 45 MIN: CPT | Performed by: PSYCHIATRY & NEUROLOGY

## 2025-02-26 PROCEDURE — 94760 N-INVAS EAR/PLS OXIMETRY 1: CPT

## 2025-02-26 PROCEDURE — 85027 COMPLETE CBC AUTOMATED: CPT

## 2025-02-26 PROCEDURE — 93306 TTE W/DOPPLER COMPLETE: CPT

## 2025-02-26 PROCEDURE — 99223 1ST HOSP IP/OBS HIGH 75: CPT | Performed by: INTERNAL MEDICINE

## 2025-02-26 PROCEDURE — 80048 BASIC METABOLIC PNL TOTAL CA: CPT

## 2025-02-26 PROCEDURE — 83036 HEMOGLOBIN GLYCOSYLATED A1C: CPT

## 2025-02-26 PROCEDURE — 93306 TTE W/DOPPLER COMPLETE: CPT | Performed by: INTERNAL MEDICINE

## 2025-02-26 RX ORDER — ASPIRIN 81 MG/1
81 TABLET, CHEWABLE ORAL DAILY
Status: DISCONTINUED | OUTPATIENT
Start: 2025-02-26 | End: 2025-02-28

## 2025-02-26 RX ORDER — ONDANSETRON 2 MG/ML
4 INJECTION INTRAMUSCULAR; INTRAVENOUS EVERY 6 HOURS PRN
Status: DISCONTINUED | OUTPATIENT
Start: 2025-02-26 | End: 2025-02-28 | Stop reason: HOSPADM

## 2025-02-26 RX ORDER — ACETAMINOPHEN 325 MG/1
975 TABLET ORAL EVERY 8 HOURS PRN
Status: DISCONTINUED | OUTPATIENT
Start: 2025-02-26 | End: 2025-02-28 | Stop reason: HOSPADM

## 2025-02-26 RX ORDER — MAGNESIUM HYDROXIDE/ALUMINUM HYDROXICE/SIMETHICONE 120; 1200; 1200 MG/30ML; MG/30ML; MG/30ML
30 SUSPENSION ORAL EVERY 6 HOURS PRN
Status: DISCONTINUED | OUTPATIENT
Start: 2025-02-26 | End: 2025-02-28 | Stop reason: HOSPADM

## 2025-02-26 RX ORDER — ENOXAPARIN SODIUM 100 MG/ML
40 INJECTION SUBCUTANEOUS DAILY
Status: DISCONTINUED | OUTPATIENT
Start: 2025-02-26 | End: 2025-02-28 | Stop reason: HOSPADM

## 2025-02-26 RX ORDER — ATORVASTATIN CALCIUM 10 MG/1
10 TABLET, FILM COATED ORAL DAILY
Status: DISCONTINUED | OUTPATIENT
Start: 2025-02-26 | End: 2025-02-26

## 2025-02-26 RX ORDER — AMLODIPINE BESYLATE 10 MG/1
10 TABLET ORAL DAILY
Status: DISCONTINUED | OUTPATIENT
Start: 2025-02-26 | End: 2025-02-28 | Stop reason: HOSPADM

## 2025-02-26 RX ORDER — ATORVASTATIN CALCIUM 40 MG/1
40 TABLET, FILM COATED ORAL DAILY
Status: DISCONTINUED | OUTPATIENT
Start: 2025-02-27 | End: 2025-02-28

## 2025-02-26 RX ORDER — ASPIRIN 81 MG/1
324 TABLET, CHEWABLE ORAL ONCE
Status: COMPLETED | OUTPATIENT
Start: 2025-02-26 | End: 2025-02-26

## 2025-02-26 RX ORDER — POLYETHYLENE GLYCOL 3350 17 G/17G
17 POWDER, FOR SOLUTION ORAL DAILY
Status: DISCONTINUED | OUTPATIENT
Start: 2025-02-26 | End: 2025-02-28 | Stop reason: HOSPADM

## 2025-02-26 RX ORDER — LORAZEPAM 2 MG/ML
0.5 INJECTION INTRAMUSCULAR ONCE
Status: COMPLETED | OUTPATIENT
Start: 2025-02-26 | End: 2025-02-27

## 2025-02-26 RX ORDER — ANASTROZOLE 1 MG/1
1 TABLET ORAL DAILY
Status: DISCONTINUED | OUTPATIENT
Start: 2025-02-26 | End: 2025-02-28 | Stop reason: HOSPADM

## 2025-02-26 RX ADMIN — ANASTROZOLE 1 MG: 1 TABLET, COATED ORAL at 08:53

## 2025-02-26 RX ADMIN — ENOXAPARIN SODIUM 40 MG: 40 INJECTION SUBCUTANEOUS at 08:54

## 2025-02-26 RX ADMIN — ATORVASTATIN CALCIUM 10 MG: 10 TABLET, FILM COATED ORAL at 08:53

## 2025-02-26 RX ADMIN — ASPIRIN 81 MG CHEWABLE TABLET 81 MG: 81 TABLET CHEWABLE at 08:53

## 2025-02-26 RX ADMIN — ASPIRIN 324 MG: 81 TABLET, CHEWABLE ORAL at 01:35

## 2025-02-26 RX ADMIN — AMLODIPINE BESYLATE 10 MG: 10 TABLET ORAL at 08:53

## 2025-02-26 NOTE — PROGRESS NOTES
02/26/25 8:55 AM    Patient was called after the Urgent Care visit Patient was not called after urgent care visit due to being admitted to the hospital.     Thank you.  Claudine Sheldon MA  [facility]

## 2025-02-26 NOTE — PLAN OF CARE
Problem: Neurological Deficit  Goal: Neurological status is stable or improving  Description: Interventions:  - Monitor and assess patient's level of consciousness, motor function, sensory function, and level of assistance needed for ADLs.   - Monitor and report changes from baseline. Collaborate with interdisciplinary team to initiate plan and implement interventions as ordered.   - Provide and maintain a safe environment.  - Consider seizure precautions.  - Consider fall precautions.  - Consider aspiration precautions.  - Consider bleeding precautions.  Outcome: Progressing     Problem: Communication Impairment  Goal: Ability to express needs and understand communication  Description: Assess patient's communication skills and ability to understand information.  Patient will demonstrate use of effective communication techniques, alternative methods of communication and understanding even if not able to speak.     - Encourage communication and provide alternate methods of communication as needed.  - Collaborate with case management/ for discharge needs.  - Include patient/family/caregiver in decisions related to communication.  Outcome: Progressing     Problem: Nutrition  Goal: Nutrition/Hydration status is improving  Description: Monitor and assess patient's nutrition/hydration status for malnutrition (ex- brittle hair, bruises, dry skin, pale skin and conjunctiva, muscle wasting, smooth red tongue, and disorientation). Collaborate with interdisciplinary team and initiate plan and interventions as ordered.  Monitor patient's weight and dietary intake as ordered or per policy. Utilize nutrition screening tool and intervene per policy. Determine patient's food preferences and provide high-protein, high-caloric foods as appropriate.     - Assist patient with eating.  - Allow adequate time for meals.  - Encourage patient to take dietary supplement as ordered.  - Collaborate with clinical nutritionist.  -  Include patient/family/caregiver in decisions related to nutrition.  Outcome: Progressing    Problem: Activity Intolerance/Impaired Mobility  Goal: Mobility/activity is maintained at optimum level for patient  Description: Interventions:  - Assess and monitor patient  barriers to mobility and need for assistive/adaptive devices.  - Assess patient's emotional response to limitations.  - Collaborate with interdisciplinary team and initiate plans and interventions as ordered.  - Encourage independent activity per ability.  - Maintain proper body alignment.  - Perform active/passive rom as tolerated/ordered.  - Plan activities to conserve energy.  - Turn patient as appropriate  Outcome: Progressing     Problem: Knowledge Deficit  Goal: Patient/family/caregiver demonstrates understanding of disease process, treatment plan, medications, and discharge instructions  Description: Complete learning assessment and assess knowledge base.  Interventions:  - Provide teaching at level of understanding  - Provide teaching via preferred learning methods  Outcome: Progressing     Problem: DISCHARGE PLANNING  Goal: Discharge to home or other facility with appropriate resources  Description: INTERVENTIONS:  - Identify barriers to discharge w/patient and caregiver  - Arrange for needed discharge resources and transportation as appropriate  - Identify discharge learning needs (meds, wound care, etc.)  - Arrange for interpretive services to assist at discharge as needed  - Refer to Case Management Department for coordinating discharge planning if the patient needs post-hospital services based on physician/advanced practitioner order or complex needs related to functional status, cognitive ability, or social support system  Outcome: Progressing

## 2025-02-26 NOTE — PLAN OF CARE
Problem: PAIN - ADULT  Goal: Verbalizes/displays adequate comfort level or baseline comfort level  Description: Interventions:  - Encourage patient to monitor pain and request assistance  - Assess pain using appropriate pain scale  - Administer analgesics based on type and severity of pain and evaluate response  - Implement non-pharmacological measures as appropriate and evaluate response  - Consider cultural and social influences on pain and pain management  - Notify physician/advanced practitioner if interventions unsuccessful or patient reports new pain  Outcome: Progressing     Problem: INFECTION - ADULT  Goal: Absence or prevention of progression during hospitalization  Description: INTERVENTIONS:  - Assess and monitor for signs and symptoms of infection  - Monitor lab/diagnostic results  - Monitor all insertion sites, i.e. indwelling lines, tubes, and drains  - Monitor endotracheal if appropriate and nasal secretions for changes in amount and color  - [place] appropriate cooling/warming therapies per order  - Administer medications as ordered  - Instruct and encourage patient and family to use good hand hygiene technique  - Identify and instruct in appropriate isolation precautions for identified infection/condition  Outcome: Progressing  Goal: Absence of fever/infection during neutropenic period  Description: INTERVENTIONS:  - Monitor WBC    Outcome: Progressing     Problem: SAFETY ADULT  Goal: Patient will remain free of falls  Description: INTERVENTIONS:  - Educate patient/family on patient safety including physical limitations  - Instruct patient to call for assistance with activity   - Consult OT/PT to assist with strengthening/mobility   - Keep Call bell within reach  - Keep bed low and locked with side rails adjusted as appropriate  - Keep care items and personal belongings within reach  - Initiate and maintain comfort rounds  - Make Fall Risk Sign visible to staff  - Offer Toileting every 2 Hours,  in advance of need  - Initiate/Maintain bed alarm  - Obtain necessary fall risk management equipment: In place   - Apply yellow socks and bracelet for high fall risk patients  - Consider moving patient to room near nurses station  Outcome: Progressing  Goal: Maintain or return to baseline ADL function  Description: INTERVENTIONS:  -  Assess patient's ability to carry out ADLs; assess patient's baseline for ADL function and identify physical deficits which impact ability to perform ADLs (bathing, care of mouth/teeth, toileting, grooming, dressing, etc.)  - Assess/evaluate cause of self-care deficits   - Assess range of motion  - Assess patient's mobility; develop plan if impaired  - Assess patient's need for assistive devices and provide as appropriate  - Encourage maximum independence but intervene and supervise when necessary  - Involve family in performance of ADLs  - Assess for home care needs following discharge   - Consider OT consult to assist with ADL evaluation and planning for discharge  - Provide patient education as appropriate  Outcome: Progressing  Goal: Maintains/Returns to pre admission functional level  Description: INTERVENTIONS:  - Perform AM-PAC 6 Click Basic Mobility/ Daily Activity assessment daily.  - Set and communicate daily mobility goal to care team and patient/family/caregiver.   - Collaborate with rehabilitation services on mobility goals if consulted  - Perform Range of Motion 3 times a day.  - Reposition patient every 2 hours.  - Dangle patient 3 times a day  - Stand patient 3 times a day  - Ambulate patient 3 times a day  - Out of bed to chair 3 times a day   - Out of bed for meals 3 times a day  - Out of bed for toileting  - Record patient progress and toleration of activity level   Outcome: Progressing     Problem: DISCHARGE PLANNING  Goal: Discharge to home or other facility with appropriate resources  Description: INTERVENTIONS:  - Identify barriers to discharge w/patient and  caregiver  - Arrange for needed discharge resources and transportation as appropriate  - Identify discharge learning needs (meds, wound care, etc.)  - Arrange for interpretive services to assist at discharge as needed  - Refer to Case Management Department for coordinating discharge planning if the patient needs post-hospital services based on physician/advanced practitioner order or complex needs related to functional status, cognitive ability, or social support system  Outcome: Progressing     Problem: Knowledge Deficit  Goal: Patient/family/caregiver demonstrates understanding of disease process, treatment plan, medications, and discharge instructions  Description: Complete learning assessment and assess knowledge base.  Interventions:  - Provide teaching at level of understanding  - Provide teaching via preferred learning methods  Outcome: Progressing

## 2025-02-26 NOTE — SPEECH THERAPY NOTE
Speech Language/Pathology  Speech/Language Pathology  Assessment    Patient Name: Nehal Bryant  Today's Date: 2/26/2025     Problem List  Principal Problem:    Facial numbness  Active Problems:    Benign essential hypertension    SONIYA (obstructive sleep apnea)    Past Medical History  Past Medical History:   Diagnosis Date    Asthma     Last Assessed: 10/19/2016    Breast cancer (HCC)     Left breast 2021    Cancer (HCC) 10/2021    Fibroid     GERD (gastroesophageal reflux disease)     controlled with diet    Hypercholesteremia     Hypertension     Wears glasses      Past Surgical History  Past Surgical History:   Procedure Laterality Date    BREAST BIOPSY Left 09/28/2021    multiple sites    BREAST LUMPECTOMY Left 11/19/2021    Procedure: LUMPECTOMY BREAST BRACKETED AJAY LOCALIZED;  Surgeon: Audrey Lacey MD;  [facility];  Service: Surgical Oncology    BREAST LUMPECTOMY Left 1/21/2022    Procedure: LUMPECTOMY BREAST, re-excision; 0800 NUC MED;  Surgeon: Audrey Lacey MD;  [facility];  Service: Surgical Oncology    BUNIONECTOMY      BUNIONECTOMY Right 2009    BUNIONECTOMY Left 2007    COLONOSCOPY      FOOT SURGERY      LYMPH NODE BIOPSY Left 1/21/2022    Procedure: BIOPSY LYMPH NODE SENTINEL;  Surgeon: Audrey Lacey MD;  [facility];  Service: Surgical Oncology    MAMMO NEEDLE LOCALIZATION LEFT (ALL INC) Left 11/12/2021    MAMMO NEEDLE LOCALIZATION LEFT (ALL INC) EACH ADD Left 11/12/2021    MAMMO STEREOTACTIC BREAST BIOPSY LEFT (ALL INC) Left 9/28/2021    MAMMO STEREOTACTIC BREAST BIOPSY LEFT (ALL INC) EACH ADD Left 9/28/2021    NASAL POLYP EXCISION      MN COLONOSCOPY FLX DX W/COLLJ SPEC WHEN PFRMD N/A 5/23/2016    Procedure: COLONOSCOPY;  Surgeon: Adriana Westfall DO;  [facility];  Service: Gastroenterology    SHOULDER ARTHROSCOPY Right 2012    SHOULDER ARTHROSCOPY W/ ROTATOR CUFF REPAIR Right 2012    US GUIDED THYROID BIOPSY  6/2/2023       Pt screened at lunch.  at  bedside. Pt fed self. Clear speech. No facial asymmetry. Denied any speech changes or swallowing difficulty. Reported a very small area of numbness on R side of face, Denied any dental issues. No ST eeds identified. Will dc order. If specific ST needs arise or status changes, please reconsult.     H&P/Admit info/ pertinent provider notes: (PMH noted above)  Chief Complaint:        Chief Complaint   Patient presents with    Facial Numbness       Pt reports right sided facial numbness and right forearm numbness that started 2 days ago and a headache on and off x1.5 weeks       Nehal Bryant is a 64 y.o. female with a PMH of HTN, HLD, SONIYA who presents with facial numbness.              History obtained from patient, chart review and discussion with ED PORTER. Presents tonight for evaluation of facial numbness. This began about 2 days ago without real inciting event or trauma. She did have an intermittent headache that has been waxing/waning over this time. The sensation feels like numbness mainly on the right side of her face and right arm. No recent illnesses or fevers. No vision changes or weakness noted. No personal history of stroke/TIA but strong family history of heart disease. No tobacco or alcohol use. Reports compliance with prescribed medications but stopped using CPAP this past week.       Special Studies:  2/25 CTA head and neck:  1. No evidence of acute infarct, intracranial hemorrhage or mass.  2. No hemodynamically significant stenosis, dissection or occlusion of the carotid or vertebral arteries or major vessels of the [place].

## 2025-02-26 NOTE — OCCUPATIONAL THERAPY NOTE
Occupational Therapy         Patient Name: Nehal Bryant  Today's Date: 2/26/2025 02/26/25 1256   OT Last Visit   OT Visit Date 02/26/25   Note Type   Note type Screen   Additional Comments OT consult received, chart reviewed. Per RN, pt is functioning at [facility]. No skilled IP OT needs identified, will complete orders. Available for reconsult PRN.     Monica Abraham

## 2025-02-26 NOTE — PHYSICAL THERAPY NOTE
PHYSICAL THERAPY SCREEN          Patient Name: Nehal Bryant  Today's Date: 2/26/2025 02/26/25 1254   PT Last Visit   PT Visit Date 02/26/25   Note Type   Note type Screen   Additional Comments Consult received. Nsg am-pac 23. No acute deficits per nsg. Will sign off.       Cindy Zhong, PT

## 2025-02-26 NOTE — ED PROVIDER NOTES
Time reflects when diagnosis was documented in both MDM as applicable and the Disposition within this note       Time User Action Codes Description Comment    2/26/2025  1:32 AM Prashant Ruiz Add [R20.0] Facial numbness           ED Disposition       None          Assessment & Plan       Medical Decision Making  64-year-old female presents with 2 days of right facial and arm numbness.  Also endorses 2 weeks of headache.  Complains of increased fatigue and states she recently stopped using her CPAP machine.  On exam patient is very well-appearing, AOx3, in NAD.  She is hypertensive 180s/80s.  Neuroexam without any reproducible findings.  CN II through XII intact, PERRLA, EOM intact.  Strength 5/5 upper and lower extremities bilaterally, symmetric.  Sensation grossly intact bilateral face, upper and lower arms.  Normal finger-nose and heel-to-shin.  Normal gait.  No pronator drift, negative Romberg.    Overall encouraging neurological exam.  However patient is 64 years old with risk factors of HTN and HLD.  Is outside of the window for stroke alert and low concern for this regardless.  Will proceed with CTA head and neck and appropriate blood work.    No abnormal findings in blood work or CTA head and neck.  Still has some residual numbness although may have improved a bit.  BP improving.  Patient 65 y/o, HTN and HLD.  Recommend admission for monitoring and likely follow up MRI to ensure no CVA.  Discussed with patient who is agreeable.  Discussed with JUAN MANUEL Bergman.  Patient care transferred to [facility].     Amount and/or Complexity of Data Reviewed  Labs: ordered.  Radiology: ordered.    Risk  OTC drugs.  Prescription drug management.             Medications   sodium chloride 0.9 % bolus 500 mL (0 mL Intravenous Stopped 2/25/25 2236)   iohexol (OMNIPAQUE) 350 MG/ML injection (MULTI-DOSE) 100 mL (85 mL Intravenous Given 2/25/25 2223)   aspirin chewable tablet 324 mg (324 mg Oral Given 2/26/25 0135)       ED Risk Strat  Scores                                                History of Present Illness       Chief Complaint   Patient presents with    Facial Numbness     Pt reports right sided facial numbness and right forearm numbness that started 2 days ago and a headache on and off x1.5 weeks        Past Medical History:   Diagnosis Date    Asthma     Last Assessed: 10/19/2016    Breast cancer (HCC)     Left breast 2021    Cancer (HCC) 10/2021    Fibroid     GERD (gastroesophageal reflux disease)     controlled with diet    Hypercholesteremia     Hypertension     Wears glasses       Past Surgical History:   Procedure Laterality Date    BREAST BIOPSY Left 09/28/2021    multiple sites    BREAST LUMPECTOMY Left 11/19/2021    Procedure: LUMPECTOMY BREAST BRACKETED AJAY LOCALIZED;  Surgeon: Audrey Lacey MD;  Location[facility];  Service: Surgical Oncology    BREAST LUMPECTOMY Left 1/21/2022    Procedure: LUMPECTOMY BREAST, re-excision; 0800 NUC MED;  Surgeon: Audrey Lacey MD;  [facility];  Service: Surgical Oncology    BUNIONECTOMY      BUNIONECTOMY Right 2009    BUNIONECTOMY Left 2007    COLONOSCOPY      FOOT SURGERY      LYMPH NODE BIOPSY Left 1/21/2022    Procedure: BIOPSY LYMPH NODE SENTINEL;  Surgeon: Audrey Lacey MD;  Location[facility];  Service: Surgical Oncology    MAMMO NEEDLE LOCALIZATION LEFT (ALL INC) Left 11/12/2021    MAMMO NEEDLE LOCALIZATION LEFT (ALL INC) EACH ADD Left 11/12/2021    MAMMO STEREOTACTIC BREAST BIOPSY LEFT (ALL INC) Left 9/28/2021    MAMMO STEREOTACTIC BREAST BIOPSY LEFT (ALL INC) EACH ADD Left 9/28/2021    NASAL POLYP EXCISION      SD COLONOSCOPY FLX DX W/COLLJ SPEC WHEN PFRMD N/A 5/23/2016    Procedure: COLONOSCOPY;  Surgeon: Adriana Westfall DO;  Location: [facility];  Service: Gastroenterology    SHOULDER ARTHROSCOPY Right 2012    SHOULDER ARTHROSCOPY W/ ROTATOR CUFF REPAIR Right 2012    US GUIDED THYROID BIOPSY  6/2/2023      Family History   Problem Relation Age of Onset     Hypertension Mother     Breast cancer Mother         age unknown; bilateral breast cancer    Lung cancer Mother     Cervical cancer Mother     Cancer Mother     Lung disease Mother     Diabetes Father     Hypertension Father     Prostate cancer Father         age unknown    Breast cancer Maternal Grandmother 44    Diabetes Paternal Grandmother     Hypertension Brother     Prostate cancer Brother 50    Heart disease Brother     Hypertension Brother     Prostate cancer Brother     Hypertension Maternal Aunt     No Known Problems Maternal Aunt     Lung cancer Maternal Uncle     Diabetes Paternal Aunt     Diabetes Paternal Uncle     Hypertension Cousin     Breast cancer Cousin         under 50    Diabetes Cousin     Breast cancer Cousin     Breast cancer Cousin         under 50    Hypertension Brother     Hypertension Maternal Aunt       Social History     Tobacco Use    Smoking status: Never     Passive exposure: Never    Smokeless tobacco: Never   Vaping Use    Vaping status: Never Used   Substance Use Topics    Alcohol use: No    Drug use: Never      E-Cigarette/Vaping    E-Cigarette Use Never User       E-Cigarette/Vaping Substances    Nicotine No     THC No     CBD No     Flavoring No     Other No     Unknown No       I have reviewed and agree with the history as documented.     64-year-old female with PMH of HTN, HLD, breast cancer, asthma presents for evaluation of right sided facial and arm numbness.  This started about 2 days ago, is not worsening or improving.  Symptoms are vague, sensation just feels a little bit different in specific areas of right side of her face, right upper arm, right forearm.  Also endorses headache for about 2 weeks.  States that she stopped using her CPAP recently.  Is also feeling more fatigued.  Compliant with her daily medications.  She denies any weakness, difficulty ambulating, dizziness, syncope, vision changes.        Review of Systems   Constitutional:  Negative for chills and  fever.   HENT:  Negative for ear pain and sore throat.    Eyes:  Negative for pain and visual disturbance.   Respiratory:  Negative for cough and shortness of breath.    Cardiovascular:  Negative for chest pain and palpitations.   Gastrointestinal:  Negative for abdominal pain and vomiting.   Genitourinary:  Negative for dysuria and hematuria.   Musculoskeletal:  Negative for arthralgias and back pain.   Skin:  Negative for color change and rash.   Neurological:  Positive for numbness and headaches. Negative for seizures and syncope.   All other systems reviewed and are negative.          Objective       ED Triage Vitals   Temperature Pulse Blood Pressure Respirations SpO2 Patient Position - Orthostatic VS   02/25/25 1848 02/25/25 1848 02/25/25 1848 02/25/25 1848 02/25/25 1848 02/25/25 1848   98.7 °F (37.1 °C) 95 155/74 18 99 % Sitting      Temp Source Heart Rate Source BP Location FiO2 (%) Pain Score    02/26/25 0245 02/25/25 1859 02/25/25 1848 -- 02/25/25 1859    Temporal Monitor Right arm  5      Vitals      Date and Time Temp Pulse SpO2 Resp BP Pain Score FACES Pain Rating User   02/26/25 0730 97.6 °F (36.4 °C) 66 98 % 18 130/79 No Pain -- SW   02/26/25 0545 -- 56 100 % 18 134/73 -- -- TP   02/26/25 0445 97.1 °F (36.2 °C) 72 100 % 18 131/86 -- -- DF   02/26/25 0345 97.6 °F (36.4 °C) 69 100 % 18 131/81 -- -- TP   02/26/25 0245 97.8 °F (36.6 °C) 60 100 % 18 131/77 -- -- TP   02/26/25 0236 -- -- -- -- -- No Pain -- DF   02/26/25 0230 -- -- -- -- -- No Pain -- DF   02/26/25 0222 -- 72 98 % 18 142/65 2 -- DM   02/25/25 2145 -- 62 96 % 16 144/70 -- -- DH   02/25/25 1859 -- 77 100 % 18 185/83 5 -- DH   02/25/25 1848 98.7 °F (37.1 °C) 95 99 % 18 155/74 -- -- YUKO            Physical Exam  Vitals and nursing note reviewed.   Constitutional:       General: She is not in acute distress.     Appearance: She is well-developed.   HENT:      Head: Normocephalic and atraumatic.   Eyes:      Conjunctiva/sclera: Conjunctivae  normal.   Cardiovascular:      Rate and Rhythm: Normal rate and regular rhythm.      Heart sounds: No murmur heard.  Pulmonary:      Effort: Pulmonary effort is normal. No respiratory distress.      Breath sounds: Normal breath sounds.   Abdominal:      Palpations: Abdomen is soft.      Tenderness: There is no abdominal tenderness.   Musculoskeletal:         General: No swelling.      Cervical back: Neck supple.   Skin:     General: Skin is warm and dry.      Capillary Refill: Capillary refill takes less than 2 seconds.   Neurological:      General: No focal deficit present.      Mental Status: She is alert and oriented to person, place, and time. Mental status is at baseline.      GCS: GCS eye subscore is 4. GCS verbal subscore is 5. GCS motor subscore is 6.      Cranial Nerves: Cranial nerves 2-12 are intact.      Sensory: Sensation is intact.      Motor: Motor function is intact.      Coordination: Coordination is intact.      Gait: Gait is intact.   Psychiatric:         Mood and Affect: Mood normal.         Results Reviewed       Procedure Component Value Units Date/Time    TSH [[id number]]  (Normal) Collected: 02/25/25 2131    Lab Status: Final result Specimen: Blood from Arm, Left Updated: 02/25/25 2356     TSH 3RD GENERATON 1.610 uIU/mL     HS Troponin I 2hr [[id number]]  (Normal) Collected: 02/25/25 2328    Lab Status: Final result Specimen: Blood from Arm, Left Updated: 02/25/25 2355     hs TnI 2hr 3 ng/L      Delta 2hr hsTnI >1 ng/L     HS Troponin 0hr (reflex protocol) [[id number]]  (Normal) Collected: 02/25/25 2131    Lab Status: Final result Specimen: Blood from Arm, Left Updated: 02/25/25 2207     hs TnI 0hr <2 ng/L     Comprehensive metabolic panel [[id number]]  (Abnormal) Collected: 02/25/25 2131    Lab Status: Final result Specimen: Blood from Arm, Left Updated: 02/25/25 2203     Sodium 140 mmol/L      Potassium 3.6 mmol/L      Chloride 105 mmol/L      CO2 29 mmol/L      ANION GAP 6 mmol/L      BUN 12  mg/dL      Creatinine 0.81 mg/dL      Glucose 103 mg/dL      Calcium 10.7 mg/dL      AST 14 U/L      ALT 13 U/L      Alkaline Phosphatase 119 U/L      Total Protein 8.4 g/dL      Albumin 4.6 g/dL      Total Bilirubin 0.75 mg/dL      eGFR 76 ml/min/1.73sq m     Narrative:      National Kidney Disease Foundation guidelines for Chronic Kidney Disease (CKD):     Stage 1 with normal or high GFR (GFR > 90 mL/min/1.73 square meters)    Stage 2 Mild CKD (GFR = 60-89 mL/min/1.73 square meters)    Stage 3A Moderate CKD (GFR = 45-59 mL/min/1.73 square meters)    Stage 3B Moderate CKD (GFR = 30-44 mL/min/1.73 square meters)    Stage 4 Severe CKD (GFR = 15-29 mL/min/1.73 square meters)    Stage 5 End Stage CKD (GFR <15 mL/min/1.73 square meters)  Note: GFR calculation is accurate only with a steady state creatinine    Magnesium [[id number]]  (Normal) Collected: 02/25/25 2131    Lab Status: Final result Specimen: Blood from Arm, Left Updated: 02/25/25 2203     Magnesium 2.1 mg/dL     Protime-INR [[id number]]  (Normal) Collected: 02/25/25 2131    Lab Status: Final result Specimen: Blood from Arm, Left Updated: 02/25/25 2159     Protime 14.5 seconds      INR 1.11    Narrative:      INR Therapeutic Range    Indication                                             INR Range      Atrial Fibrillation                                               2.0-3.0  Hypercoagulable State                                    2.0.2.3  Left Ventricular Asist Device                            2.0-3.0  Mechanical Heart Valve                                  -    Aortic(with afib, MI, embolism, HF, LA enlargement,    and/or coagulopathy)                                     2.0-3.0 (2.5-3.5)     Mitral                                                             2.5-3.5  Prosthetic/Bioprosthetic Heart Valve               2.0-3.0  Venous thromboembolism (VTE: VT, PE        2.0-3.0    APTT [[id number]]  (Normal) Collected: 02/25/25 2131    Lab Status: Final result  Specimen: Blood from Arm, Left Updated: 02/25/25 2159     PTT 30 seconds     CBC and differential [[id number]]  (Abnormal) Collected: 02/25/25 2131    Lab Status: Final result Specimen: Blood from Arm, Left Updated: 02/25/25 2144     WBC 5.57 Thousand/uL      RBC 4.94 Million/uL      Hemoglobin 14.1 g/dL      Hematocrit 42.9 %      MCV 87 fL      MCH 28.5 pg      MCHC 32.9 g/dL      RDW 13.5 %      MPV 9.0 fL      Platelets 319 Thousands/uL      nRBC 0 /100 WBCs      Segmented % 43 %      Immature Grans % 0 %      Lymphocytes % 50 %      Monocytes % 4 %      Eosinophils Relative 2 %      Basophils Relative 1 %      Absolute Neutrophils 2.39 Thousands/µL      Absolute Immature Grans 0.00 Thousand/uL      Absolute Lymphocytes 2.82 Thousands/µL      Absolute Monocytes 0.23 Thousand/µL      Eosinophils Absolute 0.09 Thousand/µL      Basophils Absolute 0.04 Thousands/µL             CTA head and neck with and without contrast   Final Interpretation by Riky Galarza MD (02/25 2333)         1. No evidence of acute infarct, intracranial hemorrhage or mass.   2. No hemodynamically significant stenosis, dissection or occlusion of the carotid or vertebral arteries or major vessels of the [place].                  Workstation performed: [id number]         MRI Inpatient Order    (Results Pending)       Procedures    ED Medication and Procedure Management   Prior to Admission Medications   Prescriptions Last Dose Informant Patient Reported? Taking?   albuterol (PROVENTIL HFA,VENTOLIN HFA) 90 mcg/act inhaler  Self No Yes   Sig: INHALE 2 PUFFS BY MOUTH EVERY 6 HOURS AS NEEDED FOR WHEEZING   amLODIPine (NORVASC) 10 mg tablet  Self No Yes   Sig: Take 1 tablet by mouth once daily   anastrozole (ARIMIDEX) 1 mg tablet  Self No Yes   Sig: Take 1 tablet (1 mg total) by mouth daily   atorvastatin (LIPITOR) 10 mg tablet   No Yes   Sig: Take 1 tablet by mouth once daily   calcium carbonate (OS-HALIMA) 1250 (500 Ca) MG tablet  Self  Yes Yes   Sig: Take 1,200 mg by mouth daily    cholecalciferol (VITAMIN D3) 1,000 units tablet  Self Yes Yes   Sig: Take 2,000 Units by mouth daily     hydrocortisone 2.5 % cream  Self No Yes   Sig: Apply topically 3 (three) times a day prn   multivitamin (THERAGRAN) TABS  Self Yes Yes   Sig: Take 1 tablet by mouth daily.      Facility-Administered Medications: None     Current Discharge Medication List        CONTINUE these medications which have NOT CHANGED    Details   albuterol (PROVENTIL HFA,VENTOLIN HFA) 90 mcg/act inhaler INHALE 2 PUFFS BY MOUTH EVERY 6 HOURS AS NEEDED FOR WHEEZING  Qty: 18 g, Refills: 0    Comments: Substitution to a formulary equivalent within the same pharmaceutical class is authorized.  Associated Diagnoses: Mild asthma without complication, unspecified whether persistent      amLODIPine (NORVASC) 10 mg tablet Take 1 tablet by mouth once daily  Qty: 100 tablet, Refills: 1    Associated Diagnoses: Benign essential hypertension      anastrozole (ARIMIDEX) 1 mg tablet Take 1 tablet (1 mg total) by mouth daily  Qty: 90 tablet, Refills: 3    Associated Diagnoses: Malignant neoplasm of lower-inner quadrant of left breast in female, estrogen receptor positive (HCC)      atorvastatin (LIPITOR) 10 mg tablet Take 1 tablet by mouth once daily  Qty: 90 tablet, Refills: 0    Associated Diagnoses: Combined hyperlipidemia      calcium carbonate (OS-HALIMA) 1250 (500 Ca) MG tablet Take 1,200 mg by mouth daily       cholecalciferol (VITAMIN D3) 1,000 units tablet Take 2,000 Units by mouth daily        hydrocortisone 2.5 % cream Apply topically 3 (three) times a day prn  Qty: 28 g, Refills: 2    Associated Diagnoses: Dermatitis      multivitamin (THERAGRAN) TABS Take 1 tablet by mouth daily.           No discharge procedures on file.  ED SEPSIS DOCUMENTATION   Time reflects when diagnosis was documented in both MDM as applicable and the Disposition within this note       Time User Action Codes Description  Comment    2/26/2025  1:32 AM Prashant Ruiz Add [R20.0] Facial numbness                  Juan Francisco Ramos PA-C  02/26/25 0831

## 2025-02-26 NOTE — ASSESSMENT & PLAN NOTE
Reports control  Outpatient regimen: amlodipine 10mg daily     Plan:  Continue outpatient regimen

## 2025-02-26 NOTE — ASSESSMENT & PLAN NOTE
Presents with 2d history of right facial/arm numbness  CTA head/neck no evidence of acute infarct, hemorrhage, mass. No LVO or dissection    Plan:  Stroke pathway  ASA load, continue daily ASA and statin   Maintain telemetry  MRI brain/TTE  Neurology consult

## 2025-02-26 NOTE — PROGRESS NOTES
Blood pressure elevated  Appointment department: [facility]  Appointment provider: Celeste Dozier PA-C  Blood pressure   02/25/25 1759 152/78   02/25/25 1757 140/70

## 2025-02-26 NOTE — ASSESSMENT & PLAN NOTE
"Nehal Bryant is a 64 y.o. female with HTN, HLD, SONIYA, breast cancer s/p radiation therapy who presents to [place] ED on 2/25/2025 with right facial and right upper arm numbness/tingling x 2 days as well as intermittent headache x 2 weeks.    Workup:  - CTA head and neck:  \"1. No evidence of acute infarct, intracranial hemorrhage or mass.  2. No hemodynamically significant stenosis, dissection or occlusion of the carotid or vertebral arteries or major vessels of the [place].\"  - Lipid panel: Total cholesterol 117, triglycerides 83, HDL 59, LDL elevated 101  - Hemoglobin A1c: 5.6    Intermittent right hemispheric headaches x 2 weeks, much improved, with associated right facial and right upper extremity numbness/tingling x 2 days.  Etiology remains unclear at this time.  Will obtain MRI brain to assess for acute infarct.    Plan:  - MRI brain with and without contrast pending given breast cancer history  - Echo pending  - S/p  mg x 1 load  - Started on ASA 81 mg daily  - Atorvastatin increased to 40 mg daily  - Goal normotension, euglycemia, normothermia  - Telemetry  - Frequent neurochecks  - PT/OT/speech  - Stroke education  - Medical management and supportive care per primary team, notify with changes  "

## 2025-02-26 NOTE — CONSULTS
"Consultation - Neurology   Name: Nehal Bryant 64 y.o. female I MRN: [id number]  Unit/Bed#: E4 -01 I Date of Admission: 2/25/2025   Date of Service: 2/26/2025 I Hospital Day: 0   Inpatient consult to Neurology  Consult performed by: Ankita Reeves PA-C  Consult ordered by: Prashant Ruiz PA-C      Physician Requesting Evaluation: Henry Jacob DO   Reason for Evaluation / Principal Problem: Facial numbness    Assessment & Plan  Facial numbness  Nehal Bryant is a 64 y.o. female with HTN, HLD, SONIYA, breast cancer s/p radiation therapy who presents to [place] [facility] on 2/25/2025 with right facial and right upper arm numbness/tingling x 2 days as well as intermittent headache x 2 weeks.    Workup:  - CTA head and neck:  \"1. No evidence of acute infarct, intracranial hemorrhage or mass.  2. No hemodynamically significant stenosis, dissection or occlusion of the carotid or vertebral arteries or major vessels of the [place].\"  - Lipid panel: Total cholesterol 117, triglycerides 83, HDL 59, LDL elevated 101  - Hemoglobin A1c: 5.6    Intermittent right hemispheric headaches x 2 weeks, much improved, with associated right facial and right upper extremity numbness/tingling x 2 days.  Etiology remains unclear at this time.  Will obtain MRI brain to assess for acute infarct.    Plan:  - MRI brain with and without contrast pending given breast cancer history  - Echo pending  - S/p  mg x 1 load  - Started on ASA 81 mg daily  - Atorvastatin increased to 40 mg daily  - Goal normotension, euglycemia, normothermia  - Telemetry  - Frequent neurochecks  - PT/OT/speech  - Stroke education  - Medical management and supportive care per primary team, notify with changes  Benign essential hypertension  - BP on presentation 155/74  - Goal normotension  - Medical management per primary team    Recommendations for outpatient neurological follow up have yet to be determined.    History of Present Illness   Nehal VASQUEZ" Annette is a 64 y.o.  female with HTN, HLD, SONIYA, breast cancer s/p radiation therapy who presents to [place] [facility] on 2/25/2025 with right facial and right upper arm numbness/tingling x 2 days as well as intermittent headache x 2 weeks.    History obtained per chart review. Patient has been experiencing intermittent headaches for the past 2 weeks.  Headache is located primarily in the right hemisphere and described as a sharp pain.  Patient states that she typically does not get frequent headaches and reports that when she does have a headache, they resolve with Tylenol.  Patient denies any associated photophobia, phonophobia, nausea, or vomiting with the intermittent headache.    Approximately 2 days ago patient developed right facial numbness/tingling and right upper arm numbness/tingling.  She denies any weakness or clumsiness associated with the right upper extremity sensory symptoms.  Denies any speech dysfunction, similar symptoms in the past, stroke history, blood thinner use at home.  Admits to breast cancer history, treated with radiation (2021 and 2022) and states she is currently cancer free on the hormone pill. Admits to palpitations a couple of days ago which she attributed to her asthma. She also admits to a significant amount of stress at home.     Patient presented to [place] [facility] on 2/25/2025, BP on presentation 155/74.  CTA head and neck completed unremarkable for acute intracranial abnormalities or evidence of large vessel occlusion/critical stenosis.  Patient was loaded with  mg x 1 and placed on the stroke pathway.    Today patient reports that her right sided sensory symptoms are better, however not completely resolved.  Patient states she no longer has an ache in her head, however reports that it still does not feel right.  Currently reports 4/10 pain scale in her head.  Denies any associated photophobia, phonophobia, nausea, vomiting, chest pain, shortness of breath, abdominal pain,  speech dysfunction, double vision, blurry vision, loss of vision, weakness.    Review of Systems   12 point ROS performed, as stated above, all others negative.  Historical Information   Past Medical History:   Diagnosis Date    Asthma     Last Assessed: 10/19/2016    Breast cancer (HCC)     Left breast 2021    Cancer (HCC) 10/2021    Fibroid     GERD (gastroesophageal reflux disease)     controlled with diet    Hypercholesteremia     Hypertension     Wears glasses      Past Surgical History:   Procedure Laterality Date    BREAST BIOPSY Left 09/28/2021    multiple sites    BREAST LUMPECTOMY Left 11/19/2021    Procedure: LUMPECTOMY BREAST BRACKETED AJAY LOCALIZED;  Surgeon: Audrey Lacey MD;  Location: [facility];  Service: Surgical Oncology    BREAST LUMPECTOMY Left 1/21/2022    Procedure: LUMPECTOMY BREAST, re-excision; 0800 NUC MED;  Surgeon: Audrey Lacey MD;  Location[facility];  Service: Surgical Oncology    BUNIONECTOMY      BUNIONECTOMY Right 2009    BUNIONECTOMY Left 2007    COLONOSCOPY      FOOT SURGERY      LYMPH NODE BIOPSY Left 1/21/2022    Procedure: BIOPSY LYMPH NODE SENTINEL;  Surgeon: Audrey Lacey MD;  Location: [facility];  Service: Surgical Oncology    MAMMO NEEDLE LOCALIZATION LEFT (ALL INC) Left 11/12/2021    MAMMO NEEDLE LOCALIZATION LEFT (ALL INC) EACH ADD Left 11/12/2021    MAMMO STEREOTACTIC BREAST BIOPSY LEFT (ALL INC) Left 9/28/2021    MAMMO STEREOTACTIC BREAST BIOPSY LEFT (ALL INC) EACH ADD Left 9/28/2021    NASAL POLYP EXCISION      RI COLONOSCOPY FLX DX W/COLLJ SPEC WHEN PFRMD N/A 5/23/2016    Procedure: COLONOSCOPY;  Surgeon: Adriana Westfall DO;  Location: [facility];  Service: Gastroenterology    SHOULDER ARTHROSCOPY Right 2012    SHOULDER ARTHROSCOPY W/ ROTATOR CUFF REPAIR Right 2012    US GUIDED THYROID BIOPSY  6/2/2023     Social History     Tobacco Use    Smoking status: Never     Passive exposure: Never    Smokeless tobacco: Never   Vaping Use    Vaping status: Never Used    Substance and Sexual Activity    Alcohol use: No    Drug use: Never    Sexual activity: Yes     Partners: Male     Birth control/protection: Post-menopausal, None     E-Cigarette/Vaping    E-Cigarette Use Never User      E-Cigarette/Vaping Substances    Nicotine No     THC No     CBD No     Flavoring No     Other No     Unknown No      Family History   Problem Relation Age of Onset    Hypertension Mother     Breast cancer Mother         age unknown; bilateral breast cancer    Lung cancer Mother     Cervical cancer Mother     Cancer Mother     Lung disease Mother     Diabetes Father     Hypertension Father     Prostate cancer Father         age unknown    Breast cancer Maternal Grandmother 44    Diabetes Paternal Grandmother     Hypertension Brother     Prostate cancer Brother 50    Heart disease Brother     Hypertension Brother     Prostate cancer Brother     Hypertension Maternal Aunt     No Known Problems Maternal Aunt     Lung cancer Maternal Uncle     Diabetes Paternal Aunt     Diabetes Paternal Uncle     Hypertension Cousin     Breast cancer Cousin         under 50    Diabetes Cousin     Breast cancer Cousin     Breast cancer Cousin         under 50    Hypertension Brother     Hypertension Maternal Aunt      Social History     Tobacco Use    Smoking status: Never     Passive exposure: Never    Smokeless tobacco: Never   Vaping Use    Vaping status: Never Used   Substance and Sexual Activity    Alcohol use: No    Drug use: Never    Sexual activity: Yes     Partners: Male     Birth control/protection: Post-menopausal, None       Current Facility-Administered Medications:     acetaminophen (TYLENOL) tablet 975 mg, Q8H PRN    aluminum-magnesium hydroxide-simethicone (MAALOX) oral suspension 30 mL, Q6H PRN    amLODIPine (NORVASC) tablet 10 mg, Daily    anastrozole (ARIMIDEX) tablet 1 mg, Daily    aspirin chewable tablet 81 mg, Daily    atorvastatin (LIPITOR) tablet 10 mg, Daily    enoxaparin (LOVENOX) subcutaneous  injection 40 mg, Daily    LORazepam (ATIVAN) injection 0.5 mg, Once    ondansetron (ZOFRAN) injection 4 mg, Q6H PRN    polyethylene glycol (MIRALAX) packet 17 g, Daily  Prior to Admission Medications   Prescriptions Last Dose Informant Patient Reported? Taking?   albuterol (PROVENTIL HFA,VENTOLIN HFA) 90 mcg/act inhaler  Self No Yes   Sig: INHALE 2 PUFFS BY MOUTH EVERY 6 HOURS AS NEEDED FOR WHEEZING   amLODIPine (NORVASC) 10 mg tablet  Self No Yes   Sig: Take 1 tablet by mouth once daily   anastrozole (ARIMIDEX) 1 mg tablet  Self No Yes   Sig: Take 1 tablet (1 mg total) by mouth daily   atorvastatin (LIPITOR) 10 mg tablet   No Yes   Sig: Take 1 tablet by mouth once daily   calcium carbonate (OS-HALIMA) 1250 (500 Ca) MG tablet  Self Yes Yes   Sig: Take 1,200 mg by mouth daily    cholecalciferol (VITAMIN D3) 1,000 units tablet  Self Yes Yes   Sig: Take 2,000 Units by mouth daily     hydrocortisone 2.5 % cream  Self No Yes   Sig: Apply topically 3 (three) times a day prn   multivitamin (THERAGRAN) TABS  Self Yes Yes   Sig: Take 1 tablet by mouth daily.      Facility-Administered Medications: None     Patient has no known allergies.    Objective :  Temp:  [96.8 °F (36 °C)-98.7 °F (37.1 °C)] 97.9 °F (36.6 °C)  HR:  [56-98] 67  BP: (130-185)/(62-86) 130/62  Resp:  [16-18] 18  SpO2:  [96 %-100 %] 97 %  O2 Device: None (Room air)    Physical Exam  Vitals and nursing note reviewed.   Constitutional:       General: She is not in acute distress.     Appearance: Normal appearance. She is not ill-appearing, toxic-appearing or diaphoretic.   HENT:      Head: Normocephalic and atraumatic.   Eyes:      General: No scleral icterus.        Right eye: No discharge.         Left eye: No discharge.      Extraocular Movements: Extraocular movements intact.      Conjunctiva/sclera: Conjunctivae normal.   Musculoskeletal:         General: Normal range of motion.   Skin:     General: Skin is warm and dry.      Coloration: Skin is not jaundiced  or pale.   Neurological:      Mental Status: She is alert.   Psychiatric:         Mood and Affect: Mood normal.         Thought Content: Thought content normal.         Judgment: Judgment normal.     Neurological Exam  Mental Status  Alert.  Patient is alert, sitting up in bed, accompanied by    Oriented to person, place, month, and year   Able to name all objects provided   Follows central and appendicular commands   Answers all questions appropriately   No dysarthria or aphasia noted .    Cranial Nerves  CN III, IV, VI: Extraocular movements intact bilaterally.  Primary gaze midline, conjugate gaze noted   EOMs intact, no evidence of nystagmus   No visual field deficits   Right V2 minimal numbness to light touch   No facial asymmetry   Tongue midline, no deviation or laceration .    Motor  Normal muscle bulk throughout. Normal muscle tone.  Bilateral upper and lower extremity strength testing 5/5 throughout except as noted:  Left hip flexion 4+/5.    Sensory  Sensation to light touch intact in all extremities  No evidence of extinction on exam.    Reflexes  No involuntary movements or rhythmic seizure like activity noted throughout exam .    Coordination  Right: Finger-to-nose normal. Heel-to-shin normal.Left: Finger-to-nose normal. Heel-to-shin normal.      Lab Results: I have personally reviewed pertinent reports.  Recent Results (from the past 24 hours)   ECG 12 lead    Collection Time: 02/25/25  7:22 PM   Result Value Ref Range    Ventricular Rate 73 BPM    Atrial Rate 73 BPM    IA Interval 142 ms    QRSD Interval 86 ms    QT Interval 402 ms    QTC Interval 442 ms    P Axis 80 degrees    QRS Axis 81 degrees    T Wave Axis 56 degrees   ECG 12 lead    Collection Time: 02/25/25  9:25 PM   Result Value Ref Range    Ventricular Rate 66 BPM    Atrial Rate 66 BPM    IA Interval 160 ms    QRSD Interval 90 ms    QT Interval 406 ms    QTC Interval 425 ms    P Axis 77 degrees    QRS Axis 77 degrees    T Wave Axis  64 degrees   CBC and differential    Collection Time: 02/25/25  9:31 PM   Result Value Ref Range    WBC 5.57 4.31 - 10.16 Thousand/uL    RBC 4.94 3.81 - 5.12 Million/uL    Hemoglobin 14.1 11.5 - 15.4 g/dL    Hematocrit 42.9 34.8 - 46.1 %    MCV 87 82 - 98 fL    MCH 28.5 26.8 - 34.3 pg    MCHC 32.9 31.4 - 37.4 g/dL    RDW 13.5 11.6 - 15.1 %    MPV 9.0 8.9 - 12.7 fL    Platelets 319 149 - 390 Thousands/uL    nRBC 0 /100 WBCs    Segmented % 43 43 - 75 %    Immature Grans % 0 0 - 2 %    Lymphocytes % 50 (H) 14 - 44 %    Monocytes % 4 4 - 12 %    Eosinophils Relative 2 0 - 6 %    Basophils Relative 1 0 - 1 %    Absolute Neutrophils 2.39 1.85 - 7.62 Thousands/µL    Absolute Immature Grans 0.00 0.00 - 0.20 Thousand/uL    Absolute Lymphocytes 2.82 0.60 - 4.47 Thousands/µL    Absolute Monocytes 0.23 0.17 - 1.22 Thousand/µL    Eosinophils Absolute 0.09 0.00 - 0.61 Thousand/µL    Basophils Absolute 0.04 0.00 - 0.10 Thousands/µL   Protime-INR    Collection Time: 02/25/25  9:31 PM   Result Value Ref Range    Protime 14.5 12.3 - 15.0 seconds    INR 1.11 0.85 - 1.19   APTT    Collection Time: 02/25/25  9:31 PM   Result Value Ref Range    PTT 30 23 - 34 seconds   Comprehensive metabolic panel    Collection Time: 02/25/25  9:31 PM   Result Value Ref Range    Sodium 140 135 - 147 mmol/L    Potassium 3.6 3.5 - 5.3 mmol/L    Chloride 105 96 - 108 mmol/L    CO2 29 21 - 32 mmol/L    ANION GAP 6 4 - 13 mmol/L    BUN 12 5 - 25 mg/dL    Creatinine 0.81 0.60 - 1.30 mg/dL    Glucose 103 65 - 140 mg/dL    Calcium 10.7 (H) 8.4 - 10.2 mg/dL    AST 14 13 - 39 U/L    ALT 13 7 - 52 U/L    Alkaline Phosphatase 119 (H) 34 - 104 U/L    Total Protein 8.4 6.4 - 8.4 g/dL    Albumin 4.6 3.5 - 5.0 g/dL    Total Bilirubin 0.75 0.20 - 1.00 mg/dL    eGFR 76 ml/min/1.73sq m   Magnesium    Collection Time: 02/25/25  9:31 PM   Result Value Ref Range    Magnesium 2.1 1.9 - 2.7 mg/dL   HS Troponin 0hr (reflex protocol)    Collection Time: 02/25/25  9:31 PM  "  Result Value Ref Range    hs TnI 0hr <2 \"Refer to ACS Flowchart\"- see link ng/L   TSH    Collection Time: 02/25/25  9:31 PM   Result Value Ref Range    TSH 3RD GENERATON 1.610 0.450 - 4.500 uIU/mL   ECG 12 lead    Collection Time: 02/25/25 11:20 PM   Result Value Ref Range    Ventricular Rate 69 BPM    Atrial Rate 69 BPM    NH Interval 168 ms    QRSD Interval 94 ms    QT Interval 396 ms    QTC Interval 424 ms    P Axis 76 degrees    QRS Axis 82 degrees    T Wave [place] 65 degrees   HS Troponin I 2hr    Collection Time: 02/25/25 11:28 PM   Result Value Ref Range    hs TnI 2hr 3 \"Refer to ACS Flowchart\"- see link ng/L    Delta 2hr hsTnI >1 <20 ng/L   Lipid Panel with Direct LDL reflex    Collection Time: 02/26/25  5:08 AM   Result Value Ref Range    Cholesterol 177 See Comment mg/dL    Triglycerides 83 See Comment mg/dL    HDL, Direct 59 >=50 mg/dL    LDL Calculated 101 (H) 0 - 100 mg/dL   Basic metabolic panel    Collection Time: 02/26/25  5:08 AM   Result Value Ref Range    Sodium 141 135 - 147 mmol/L    Potassium 3.9 3.5 - 5.3 mmol/L    Chloride 109 (H) 96 - 108 mmol/L    CO2 27 21 - 32 mmol/L    ANION GAP 5 4 - 13 mmol/L    BUN 11 5 - 25 mg/dL    Creatinine 0.75 0.60 - 1.30 mg/dL    Glucose 106 65 - 140 mg/dL    Calcium 9.8 8.4 - 10.2 mg/dL    eGFR 83 ml/min/1.73sq m   CBC (With Platelets)    Collection Time: 02/26/25  5:08 AM   Result Value Ref Range    WBC 5.25 4.31 - 10.16 Thousand/uL    RBC 4.39 3.81 - 5.12 Million/uL    Hemoglobin 12.8 11.5 - 15.4 g/dL    Hematocrit 39.3 34.8 - 46.1 %    MCV 90 82 - 98 fL    MCH 29.2 26.8 - 34.3 pg    MCHC 32.6 31.4 - 37.4 g/dL    RDW 13.5 11.6 - 15.1 %    Platelets 256 149 - 390 Thousands/uL    MPV 8.7 (L) 8.9 - 12.7 fL   Echo complete w/ contrast if indicated    Collection Time: 02/26/25  8:48 AM   Result Value Ref Range    BSA 1.95 m2    A4C EF 69 %    LVIDd 4.50 cm    LVIDS 2.80 cm    IVSd 0.90 cm    LVPWd 0.90 cm    LVOT peak VTI 25.64 cm    FS 38 28 - 44    MV E' Tissue " Velocity Septal 9 cm/s    LA Volume Index (BP) 29.2 mL/m2    E/A ratio 1.02     E wave deceleration time 168 ms    MV Peak E [place] 86 cm/s    MV Peak A [place] 0.84 m/s    AV LVOT peak gradient 5 mmHg    LVOT peak grayson 1.16 m/s    RVID d 3.3 cm    Tricuspid annular plane systolic excursion 2.30 cm    LA size 3.7 cm    LA length (A2C) 5.20 cm    LA volume (BP) 57 mL    RA 2D Volume 29.0 mL    RAA A4C 12.8 cm2    Aortic valve peak velocity 1.68 m/s    Ao VTI 37.51 cm    AV mean gradient 6 mmHg    LVOT mn grad 3.0 mmHg    AV peak gradient 11 mmHg    MV stenosis pressure 1/2 time 49 ms    MV valve area p 1/2 method 4.50     TR Peak [place] 2.5 m/s    Triscuspid Valve Regurgitation Peak Gradient 24.0 mmHg    Ao root 2.70 cm    Asc Ao 2.6 cm    Aortic valve mean velocity 10.90 m/s    Tricuspid valve peak regurgitation velocity 2.46 m/s    Left ventricular stroke volume (2D) 61.00 mL    IVS 0.9 cm    LEFT VENTRICLE SYSTOLIC VOLUME (MOD BIPLANE) 2D 30 mL    LV DIASTOLIC VOLUME (MOD BIPLANE) 2D 92 mL    Left Atrium Area-systolic Four Chamber 19 cm2    Left Atrium Area-systolic Apical Two Chamber 19.2 cm2    LVSV, 2D 61 mL     Imaging Studies: I have personally reviewed pertinent reports and I have personally reviewed pertinent films in PACS.    EKG, Pathology, and Other Studies: I have personally reviewed pertinent reports.    VTE Prophylaxis: Enoxaparin (Lovenox)    Dictation voice to text software has been used in the creation of this document.  Please consider this in light of any contextual or grammatical errors.

## 2025-02-26 NOTE — H&P
H&P - Hospitalist   Name: Nehal Bryant 64 y.o. female I MRN: [id number]  Unit/Bed#: ED-01 I Date of Admission: 2/25/2025   Date of Service: 2/26/2025 I Hospital Day: 0     Assessment & Plan  Facial numbness  Presents with 2d history of right facial/arm numbness  CTA head/neck no evidence of acute infarct, hemorrhage, mass. No LVO or dissection    Plan:  Stroke pathway  ASA load, continue daily ASA and statin   Maintain telemetry  MRI brain/TTE  Neurology consult  Benign essential hypertension  Reports control  Outpatient regimen: amlodipine 10mg daily     Plan:  Continue outpatient regimen   SONIYA (obstructive sleep apnea)  Reports not using it over last week    Plan:  CPAP HS    VTE Pharmacologic Prophylaxis: VTE Score: 3 Moderate Risk (Score 3-4) - Pharmacological DVT Prophylaxis Ordered: enoxaparin (Lovenox).  Code Status: No Order   Discussion with family: Updated  () at bedside.    Anticipated Length of Stay: Patient will be admitted on an observation basis with an anticipated length of stay of less than 2 midnights secondary to numbness.    History of Present Illness   Chief Complaint:   Chief Complaint   Patient presents with    Facial Numbness     Pt reports right sided facial numbness and right forearm numbness that started 2 days ago and a headache on and off x1.5 weeks      Nehal Bryant is a 64 y.o. female with a PMH of HTN, HLD, SONIYA who presents with facial numbness.   History obtained from patient, chart review and discussion with ED PORTER. Presents tonight for evaluation of facial numbness. This began about 2 days ago without real inciting event or trauma. She did have an intermittent headache that has been waxing/waning over this time. The sensation feels like numbness mainly on the right side of her face and right arm. No recent illnesses or fevers. No vision changes or weakness noted. No personal history of stroke/TIA but strong family history of heart disease. No tobacco or  alcohol use. Reports compliance with prescribed medications but stopped using CPAP this past week.     Review of Systems   Constitutional:  Negative for chills and fever.   Respiratory:  Negative for shortness of breath.    Cardiovascular:  Negative for chest pain and palpitations.   Gastrointestinal:  Negative for abdominal pain, diarrhea, nausea and vomiting.   Neurological:  Positive for numbness. Negative for syncope and facial asymmetry.   All other systems reviewed and are negative.      Historical Information   Past Medical History:   Diagnosis Date    Asthma     Last Assessed: 10/19/2016    Breast cancer (HCC)     Left breast 2021    Cancer (HCC) 10/2021    Fibroid     GERD (gastroesophageal reflux disease)     controlled with diet    Hypercholesteremia     Hypertension     Wears glasses      Past Surgical History:   Procedure Laterality Date    BREAST BIOPSY Left 09/28/2021    multiple sites    BREAST LUMPECTOMY Left 11/19/2021    Procedure: LUMPECTOMY BREAST BRACKETED AJAY LOCALIZED;  Surgeon: Audrey Lacey MD;  Location: [facility];  Service: Surgical Oncology    BREAST LUMPECTOMY Left 1/21/2022    Procedure: LUMPECTOMY BREAST, re-excision; 0800 NUC MED;  Surgeon: Audrey Lacey MD;  Location: [facility];  Service: Surgical Oncology    BUNIONECTOMY      BUNIONECTOMY Right 2009    BUNIONECTOMY Left 2007    COLONOSCOPY      FOOT SURGERY      LYMPH NODE BIOPSY Left 1/21/2022    Procedure: BIOPSY LYMPH NODE SENTINEL;  Surgeon: Audrey Lacey MD;  Location: [facility];  Service: Surgical Oncology    MAMMO NEEDLE LOCALIZATION LEFT (ALL INC) Left 11/12/2021    MAMMO NEEDLE LOCALIZATION LEFT (ALL INC) EACH ADD Left 11/12/2021    MAMMO STEREOTACTIC BREAST BIOPSY LEFT (ALL INC) Left 9/28/2021    MAMMO STEREOTACTIC BREAST BIOPSY LEFT (ALL INC) EACH ADD Left 9/28/2021    NASAL POLYP EXCISION      MO COLONOSCOPY FLX DX W/COLLJ SPEC WHEN PFRMD N/A 5/23/2016    Procedure: COLONOSCOPY;  Surgeon: Adriana Westfall DO;   Location: [facility];  Service: Gastroenterology    SHOULDER ARTHROSCOPY Right 2012    SHOULDER ARTHROSCOPY W/ ROTATOR CUFF REPAIR Right 2012    US GUIDED THYROID BIOPSY  6/2/2023     Social History     Tobacco Use    Smoking status: Never     Passive exposure: Never    Smokeless tobacco: Never   Vaping Use    Vaping status: Never Used   Substance and Sexual Activity    Alcohol use: No    Drug use: Never    Sexual activity: Yes     Partners: Male     Birth control/protection: Post-menopausal, None     E-Cigarette/Vaping    E-Cigarette Use Never User      E-Cigarette/Vaping Substances    Nicotine No     THC No     CBD No     Flavoring No     Other No     Unknown No      Family History   Problem Relation Age of Onset    Hypertension Mother     Breast cancer Mother         age unknown; bilateral breast cancer    Lung cancer Mother     Cervical cancer Mother     Cancer Mother     Lung disease Mother     Diabetes Father     Hypertension Father     Prostate cancer Father         age unknown    Breast cancer Maternal Grandmother 44    Diabetes Paternal Grandmother     Hypertension Brother     Prostate cancer Brother 50    Heart disease Brother     Hypertension Brother     Prostate cancer Brother     Hypertension Maternal Aunt     No Known Problems Maternal Aunt     Lung cancer Maternal Uncle     Diabetes Paternal Aunt     Diabetes Paternal Uncle     Hypertension Cousin     Breast cancer Cousin         under 50    Diabetes Cousin     Breast cancer Cousin     Breast cancer Cousin         under 50    Hypertension Brother     Hypertension Maternal Aunt      Social History:  Marital Status: /Civil Union   Occupation:   Patient Pre-hospital Living Situation: Home  Patient Pre-hospital Level of Mobility: walks  Patient Pre-hospital Diet Restrictions:     Meds/Allergies   I have reviewed home medications with patient personally.  Prior to Admission medications    Medication Sig Start Date End Date Taking? Authorizing  Provider   albuterol (PROVENTIL HFA,VENTOLIN HFA) 90 mcg/act inhaler INHALE 2 PUFFS BY MOUTH EVERY 6 HOURS AS NEEDED FOR WHEEZING 8/12/24  Yes Shankar Knight DO   amLODIPine (NORVASC) 10 mg tablet Take 1 tablet by mouth once daily 7/24/24  Yes Shankar Knight DO   anastrozole (ARIMIDEX) 1 mg tablet Take 1 tablet (1 mg total) by mouth daily 12/6/24  Yes Mary Alaniz MD   atorvastatin (LIPITOR) 10 mg tablet Take 1 tablet by mouth once daily 1/16/25  Yes Shankar Knight DO   calcium carbonate (OS-HALIMA) 1250 (500 Ca) MG tablet Take 1,200 mg by mouth daily    Yes Historical Provider, MD   cholecalciferol (VITAMIN D3) 1,000 units tablet Take 2,000 Units by mouth daily     Yes Historical Provider, MD   hydrocortisone 2.5 % cream Apply topically 3 (three) times a day prn 3/7/24  Yes Shankar Knight DO   multivitamin (THERAGRAN) TABS Take 1 tablet by mouth daily.   Yes Historical Provider, MD     No Known Allergies    Objective :  Temp:  [96.8 °F (36 °C)-98.7 °F (37.1 °C)] 98.7 °F (37.1 °C)  HR:  [62-98] 62  BP: (140-185)/(70-83) 144/70  Resp:  [16-18] 16  SpO2:  [96 %-100 %] 96 %  O2 Device: None (Room air)    Physical Exam  Vitals and nursing note reviewed.   Constitutional:       General: She is not in acute distress.     Appearance: She is well-developed.   HENT:      Head: Normocephalic and atraumatic.   Eyes:      Conjunctiva/sclera: Conjunctivae normal.   Cardiovascular:      Rate and Rhythm: Normal rate and regular rhythm.      Heart sounds: No murmur heard.  Pulmonary:      Effort: Pulmonary effort is normal. No respiratory distress.      Breath sounds: Normal breath sounds.   Abdominal:      Palpations: Abdomen is soft.      Tenderness: There is no abdominal tenderness.   Musculoskeletal:         General: No swelling.      Cervical back: Neck supple.   Skin:     General: Skin is warm and dry.      Capillary Refill: Capillary refill takes less than 2 seconds.   Neurological:      Mental Status: She is alert.      GCS:  GCS eye subscore is 4. GCS verbal subscore is 5. GCS motor subscore is 6.      Cranial Nerves: No facial asymmetry.      Motor: Motor function is intact.      Coordination: Coordination is intact.   Psychiatric:         Mood and Affect: Mood normal.          Lines/Drains:            Lab Results: I have reviewed the following results:  Results from last 7 days   Lab Units 02/25/25  2131   WBC Thousand/uL 5.57   HEMOGLOBIN g/dL 14.1   HEMATOCRIT % 42.9   PLATELETS Thousands/uL 319   SEGS PCT % 43   LYMPHO PCT % 50*   MONO PCT % 4   EOS PCT % 2     Results from last 7 days   Lab Units 02/25/25  2131   SODIUM mmol/L 140   POTASSIUM mmol/L 3.6   CHLORIDE mmol/L 105   CO2 mmol/L 29   BUN mg/dL 12   CREATININE mg/dL 0.81   ANION GAP mmol/L 6   CALCIUM mg/dL 10.7*   ALBUMIN g/dL 4.6   TOTAL BILIRUBIN mg/dL 0.75   ALK PHOS U/L 119*   ALT U/L 13   AST U/L 14   GLUCOSE RANDOM mg/dL 103     Results from last 7 days   Lab Units 02/25/25  2131   INR  1.11         Lab Results   Component Value Date    HGBA1C 5.5 05/06/2022    HGBA1C 5.8 01/16/2020    HGBA1C 5.7 07/12/2019           Imaging Results Review: I reviewed radiology reports from this admission including: CT head.  Other Study Results Review: EKG was personally reviewed and my interpretation is: NSR. HR  ..    Administrative Statements       ** Please Note: This note has been constructed using a voice recognition system. **

## 2025-02-27 ENCOUNTER — APPOINTMENT (INPATIENT)
Dept: MRI IMAGING | Facility: HOSPITAL | Age: 65
DRG: 103 | End: 2025-02-27
Payer: MEDICARE

## 2025-02-27 PROCEDURE — 99233 SBSQ HOSP IP/OBS HIGH 50: CPT | Performed by: INTERNAL MEDICINE

## 2025-02-27 PROCEDURE — 94760 N-INVAS EAR/PLS OXIMETRY 1: CPT

## 2025-02-27 PROCEDURE — 94660 CPAP INITIATION&MGMT: CPT

## 2025-02-27 PROCEDURE — A9585 GADOBUTROL INJECTION: HCPCS | Performed by: INTERNAL MEDICINE

## 2025-02-27 PROCEDURE — 70553 MRI BRAIN STEM W/O & W/DYE: CPT

## 2025-02-27 RX ORDER — GADOBUTROL 604.72 MG/ML
9 INJECTION INTRAVENOUS
Status: COMPLETED | OUTPATIENT
Start: 2025-02-27 | End: 2025-02-27

## 2025-02-27 RX ORDER — LORAZEPAM 2 MG/ML
0.5 INJECTION INTRAMUSCULAR ONCE
Status: COMPLETED | OUTPATIENT
Start: 2025-02-27 | End: 2025-02-27

## 2025-02-27 RX ADMIN — GADOBUTROL 9 ML: 604.72 INJECTION INTRAVENOUS at 19:56

## 2025-02-27 RX ADMIN — ASPIRIN 81 MG CHEWABLE TABLET 81 MG: 81 TABLET CHEWABLE at 08:06

## 2025-02-27 RX ADMIN — ANASTROZOLE 1 MG: 1 TABLET, COATED ORAL at 08:07

## 2025-02-27 RX ADMIN — LORAZEPAM 0.5 MG: 2 INJECTION INTRAMUSCULAR; INTRAVENOUS at 09:07

## 2025-02-27 RX ADMIN — LORAZEPAM 0.5 MG: 2 INJECTION INTRAMUSCULAR; INTRAVENOUS at 18:21

## 2025-02-27 RX ADMIN — AMLODIPINE BESYLATE 10 MG: 10 TABLET ORAL at 08:06

## 2025-02-27 RX ADMIN — ATORVASTATIN CALCIUM 40 MG: 40 TABLET, FILM COATED ORAL at 18:21

## 2025-02-27 RX ADMIN — POLYETHYLENE GLYCOL 3350 17 G: 17 POWDER, FOR SOLUTION ORAL at 08:06

## 2025-02-27 RX ADMIN — ENOXAPARIN SODIUM 40 MG: 40 INJECTION SUBCUTANEOUS at 08:06

## 2025-02-27 NOTE — ASSESSMENT & PLAN NOTE
Presents with 2d history of right facial/arm numbness  CTA head/neck no evidence of acute infarct, hemorrhage, mass. No LVO or dissection    Stroke pathway  ASA load, continue daily ASA and statin   Maintain telemetry  MRI brain/TTE, pending  LDL not at goal, the patient has had stroke

## 2025-02-27 NOTE — PROGRESS NOTES
Progress Note - Hospitalist   Name: Nehal Bryant 64 y.o. female I MRN: [id number]  Unit/Bed#: E4 -01 I Date of Admission: 2/25/2025   Date of Service: 2/27/2025 I Hospital Day: 0    Assessment & Plan  Facial numbness  Presents with 2d history of right facial/arm numbness  CTA head/neck no evidence of acute infarct, hemorrhage, mass. No LVO or dissection    Stroke pathway  ASA load, continue daily ASA and statin   Maintain telemetry  MRI brain/TTE, pending  LDL not at goal, the patient has had stroke  Benign essential hypertension  Reports control  Outpatient regimen: amlodipine 10mg daily   Continue outpatient regimen   SONIYA (obstructive sleep apnea)  Reports not using it over last week  CPAP HS              Hospital Course:     64-year-old female patient presenting with right facial weakness concerning for possible left hemispheric stroke.  Does have previous history of breast cancer.  She is pending MRI of the brain    Assessment:      Principal Problem:    Facial numbness  Active Problems:    Benign essential hypertension    SONIYA (obstructive sleep apnea)      Plan:    MRI of the brain with and without contrast       VTE Pharmacologic Prophylaxis:   Pharmacologic: Enoxaparin (Lovenox)  Mechanical VTE Prophylaxis in Place: Yes    AM-PAC Basic Mobility:  Basic Mobility Inpatient Raw Score: 23    JH-HLM Achieved: 7: Walk 25 feet or more  JH-HLM Goal: 7: Walk 25 feet or more    HLM Goal listed above. Continue with multidisciplinary rounding and encourage appropriate mobility to improve upon HLM goals.         Patient Centered Rounds: Case discussed and reviewed with nursing    Discussions with Specialists or Other Care Team Provider: Case management    Education and Discussions with Family / Patient: Discussed with patient  at bedside    Time Spent for Care: 80 minutes.  More than 50% of total time spent on counseling and coordination of care as described above.    Current Length of Stay: 0  day(s)    Current Patient Status: Inpatient   Certification Statement: The patient will continue to require additional inpatient hospital stay due to stroke evaluation    Discharge Plan / Estimated Discharge Date: To be determined but likely 48 hours    Code Status: Level 1 - Full Code      Subjective:   Seen and examined, no further difficulty swallowing or facial numbness.  No headache otherwise feels well    A complete and comprehensive 14 point organ system review has been performed and all other systems are negative other than stated above.    Objective:     Vitals:   Temp (24hrs), Av.6 °F (36.4 °C), Min:96.6 °F (35.9 °C), Max:98.5 °F (36.9 °C)    Temp:  [96.6 °F (35.9 °C)-98.5 °F (36.9 °C)] 98.5 °F (36.9 °C)  HR:  [58-75] 66  Resp:  [18] 18  BP: (117-138)/(56-74) 132/74  SpO2:  [96 %-99 %] 98 %  Body mass index is 35.96 kg/m².     Input and Output Summary (last 24 hours):     No intake or output data in the 24 hours ending 25 1614    Physical Exam:     General: well appearing, no acute distress  HEENT: atraumatic, PERRLA, moist mucosa, normal pharynx, normal tonsils and adenoids, normal tongue, no fluid in sinuses  Neck: Trachea midline, no carotid bruit, no masses  Respiratory: normal chest wall expansion, CTA B, no r/r/w, no rubs  Cardiovascular: RRR, no m/r/g, Normal S1 and S2  Abdomen: Soft, non-tender, non-distended, normal bowel sounds in all quadrants, no hepatosplenomegaly, no tympany  Rectal: deferred  Musculoskeletal: normal ROM in upper and lower extremities  Integumentary: warm, dry, and pink, with no rash, purpura, or petechia  Heme/Lymph: no lymphadenopathy, no bruises  Neurological: Cranial Nerves II-XII grossly intact  Psychiatric: cooperative with normal mood, affect, and cognition      Additional Data:     Labs:    Results from last 7 days   Lab Units 25  0508 25  2131   WBC Thousand/uL 5.25 5.57   HEMOGLOBIN g/dL 12.8 14.1   HEMATOCRIT % 39.3 42.9   PLATELETS  Thousands/uL 256 319   SEGS PCT %  --  43   LYMPHO PCT %  --  50*   MONO PCT %  --  4   EOS PCT %  --  2     Results from last 7 days   Lab Units 02/26/25  0508 02/25/25  2131   POTASSIUM mmol/L 3.9 3.6   CHLORIDE mmol/L 109* 105   CO2 mmol/L 27 29   BUN mg/dL 11 12   CREATININE mg/dL 0.75 0.81   CALCIUM mg/dL 9.8 10.7*   ALK PHOS U/L  --  119*   ALT U/L  --  13   AST U/L  --  14     Results from last 7 days   Lab Units 02/25/25  2131   INR  1.11       * I Have Reviewed All Lab Data Listed Above.  * Additional Pertinent Lab Tests Reviewed: All Labs For Current Hospital Admission Reviewed    Imaging:    Imaging Reports Reviewed Today Include: No new imaging  Imaging Personally Reviewed by Myself Includes: No new imaging    Recent Cultures (last 7 days):           Last 24 Hours Medication List:   Current Facility-Administered Medications   Medication Dose Route Frequency Provider Last Rate    acetaminophen  975 mg Oral Q8H PRN Prashant Ruiz PA-C      aluminum-magnesium hydroxide-simethicone  30 mL Oral Q6H PRN Prashant Ruiz PA-C      amLODIPine  10 mg Oral Daily Prashant Ruiz PA-C      anastrozole  1 mg Oral Daily Prashant Ruiz PA-C      aspirin  81 mg Oral Daily Prashant Ruiz PA-C      atorvastatin  40 mg Oral Daily Ankita PORTER Reeves      enoxaparin  40 mg Subcutaneous Daily Prashant Ruiz PA-C      ondansetron  4 mg Intravenous Q6H PRN Prashant Ruiz PA-C      polyethylene glycol  17 g Oral Daily Prashant Ruiz PA-C         AM-PAC Basic Mobility:  Basic Mobility Inpatient Raw Score: 23    -HLM Achieved: 7: Walk 25 feet or more  -HLM Goal: 7: Walk 25 feet or more    HLM Goal listed above. Continue with multidisciplinary rounding and encourage appropriate mobility to improve upon HLM goals.     Today, Patient Was Seen By: Henry Jacob DO    ** Please Note: This note was completed in part utilizing [employer] software dictation.  Grammatical errors, random word insertions, spelling mistakes, and  incomplete sentences may be an occasional consequence of this system secondary to software limitations, ambient noise, and hardware issues.  If you have any questions or concerns about the content, text, or information contained within the body of this dictation, please contact the provider for clarification. **

## 2025-02-27 NOTE — PLAN OF CARE
Problem: PAIN - ADULT  Goal: Verbalizes/displays adequate comfort level or baseline comfort level  Description: Interventions:  - Encourage patient to monitor pain and request assistance  - Assess pain using appropriate pain scale  - Administer analgesics based on type and severity of pain and evaluate response  - Implement non-pharmacological measures as appropriate and evaluate response  - Consider cultural and social influences on pain and pain management  - Notify physician/advanced practitioner if interventions unsuccessful or patient reports new pain  Outcome: Progressing     Problem: INFECTION - ADULT  Goal: Absence or prevention of progression during hospitalization  Description: INTERVENTIONS:  - Assess and monitor for signs and symptoms of infection  - Monitor lab/diagnostic results  - Monitor all insertion sites, i.e. indwelling lines, tubes, and drains  - Monitor endotracheal if appropriate and nasal secretions for changes in amount and color  - [place] appropriate cooling/warming therapies per order  - Administer medications as ordered  - Instruct and encourage patient and family to use good hand hygiene technique  - Identify and instruct in appropriate isolation precautions for identified infection/condition  Outcome: Progressing  Goal: Absence of fever/infection during neutropenic period  Description: INTERVENTIONS:  - Monitor WBC    Outcome: Progressing     Problem: SAFETY ADULT  Goal: Patient will remain free of falls  Description: INTERVENTIONS:  - Educate patient/family on patient safety including physical limitations  - Instruct patient to call for assistance with activity   - Consult OT/PT to assist with strengthening/mobility   - Keep Call bell within reach  - Keep bed low and locked with side rails adjusted as appropriate  - Keep care items and personal belongings within reach  - Initiate and maintain comfort rounds  - Make Fall Risk Sign visible to staff  - Offer Toileting every 2 Hours,  in advance of need  - Initiate/Maintain bed alarm  - Obtain necessary fall risk management equipment: In place   - Apply yellow socks and bracelet for high fall risk patients  - Consider moving patient to room near nurses station  Outcome: Progressing  Goal: Maintain or return to baseline ADL function  Description: INTERVENTIONS:  -  Assess patient's ability to carry out ADLs; assess patient's baseline for ADL function and identify physical deficits which impact ability to perform ADLs (bathing, care of mouth/teeth, toileting, grooming, dressing, etc.)  - Assess/evaluate cause of self-care deficits   - Assess range of motion  - Assess patient's mobility; develop plan if impaired  - Assess patient's need for assistive devices and provide as appropriate  - Encourage maximum independence but intervene and supervise when necessary  - Involve family in performance of ADLs  - Assess for home care needs following discharge   - Consider OT consult to assist with ADL evaluation and planning for discharge  - Provide patient education as appropriate  Outcome: Progressing  Goal: Maintains/Returns to pre admission functional level  Description: INTERVENTIONS:  - Perform AM-PAC 6 Click Basic Mobility/ Daily Activity assessment daily.  - Set and communicate daily mobility goal to care team and patient/family/caregiver.   - Collaborate with rehabilitation services on mobility goals if consulted  - Perform Range of Motion 3 times a day.  - Reposition patient every 2 hours.  - Dangle patient 3 times a day  - Stand patient 3 times a day  - Ambulate patient 3 times a day  - Out of bed to chair 3 times a day   - Out of bed for meals 3 times a day  - Out of bed for toileting  - Record patient progress and toleration of activity level   Outcome: Progressing     Problem: DISCHARGE PLANNING  Goal: Discharge to home or other facility with appropriate resources  Description: INTERVENTIONS:  - Identify barriers to discharge w/patient and  caregiver  - Arrange for needed discharge resources and transportation as appropriate  - Identify discharge learning needs (meds, wound care, etc.)  - Arrange for interpretive services to assist at discharge as needed  - Refer to Case Management Department for coordinating discharge planning if the patient needs post-hospital services based on physician/advanced practitioner order or complex needs related to functional status, cognitive ability, or social support system  Outcome: Progressing     Problem: Knowledge Deficit  Goal: Patient/family/caregiver demonstrates understanding of disease process, treatment plan, medications, and discharge instructions  Description: Complete learning assessment and assess knowledge base.  Interventions:  - Provide teaching at level of understanding  - Provide teaching via preferred learning methods  Outcome: Progressing     Problem: Neurological Deficit  Goal: Neurological status is stable or improving  Description: Interventions:  - Monitor and assess patient's level of consciousness, motor function, sensory function, and level of assistance needed for ADLs.   - Monitor and report changes from baseline. Collaborate with interdisciplinary team to initiate plan and implement interventions as ordered.   - Provide and maintain a safe environment.  - Consider seizure precautions.  - Consider fall precautions.  - Consider aspiration precautions.  - Consider bleeding precautions.  Outcome: Progressing     Problem: Activity Intolerance/Impaired Mobility  Goal: Mobility/activity is maintained at optimum level for patient  Description: Interventions:  - Assess and monitor patient  barriers to mobility and need for assistive/adaptive devices.  - Assess patient's emotional response to limitations.  - Collaborate with interdisciplinary team and initiate plans and interventions as ordered.  - Encourage independent activity per ability.  - Maintain proper body alignment.  - Perform  active/passive rom as tolerated/ordered.  - Plan activities to conserve energy.  - Turn patient as appropriate  Outcome: Progressing     Problem: Communication Impairment  Goal: Ability to express needs and understand communication  Description: Assess patient's communication skills and ability to understand information.  Patient will demonstrate use of effective communication techniques, alternative methods of communication and understanding even if not able to speak.     - Encourage communication and provide alternate methods of communication as needed.  - Collaborate with case management/ for discharge needs.  - Include patient/family/caregiver in decisions related to communication.  Outcome: Progressing     Problem: Potential for Aspiration  Goal: Non-ventilated patient's risk of aspiration is minimized  Description: Assess and monitor vital signs, respiratory status, and labs (WBC).  Monitor for signs of aspiration (tachypnea, cough, rales, wheezing, cyanosis, fever).    - Assess and monitor patient's ability to swallow.  - Place patient up in chair to eat if possible.  - HOB up at 90 degrees to eat if unable to get patient up into chair.  - Supervise patient during oral intake.   - Instruct patient/ family to take small bites.  - Instruct patient/ family to take small single sips when taking liquids.  - Follow patient-specific strategies generated by speech pathologist.  Outcome: Progressing     Problem: Nutrition  Goal: Nutrition/Hydration status is improving  Description: Monitor and assess patient's nutrition/hydration status for malnutrition (ex- brittle hair, bruises, dry skin, pale skin and conjunctiva, muscle wasting, smooth red tongue, and disorientation). Collaborate with interdisciplinary team and initiate plan and interventions as ordered.  Monitor patient's weight and dietary intake as ordered or per policy. Utilize nutrition screening tool and intervene per policy. Determine  patient's food preferences and provide high-protein, high-caloric foods as appropriate.     - Assist patient with eating.  - Allow adequate time for meals.  - Encourage patient to take dietary supplement as ordered.  - Collaborate with clinical nutritionist.  - Include patient/family/caregiver in decisions related to nutrition.  Outcome: Progressing

## 2025-02-27 NOTE — PLAN OF CARE
Problem: PAIN - ADULT  Goal: Verbalizes/displays adequate comfort level or baseline comfort level  Description: Interventions:  - Encourage patient to monitor pain and request assistance  - Assess pain using appropriate pain scale  - Administer analgesics based on type and severity of pain and evaluate response  - Implement non-pharmacological measures as appropriate and evaluate response  - Consider cultural and social influences on pain and pain management  - Notify physician/advanced practitioner if interventions unsuccessful or patient reports new pain  Outcome: Progressing     Problem: INFECTION - ADULT  Goal: Absence or prevention of progression during hospitalization  Description: INTERVENTIONS:  - Assess and monitor for signs and symptoms of infection  - Monitor lab/diagnostic results  - Monitor all insertion sites, i.e. indwelling lines, tubes, and drains  - Monitor endotracheal if appropriate and nasal secretions for changes in amount and color  - [place] appropriate cooling/warming therapies per order  - Administer medications as ordered  - Instruct and encourage patient and family to use good hand hygiene technique  - Identify and instruct in appropriate isolation precautions for identified infection/condition  Outcome: Progressing  Goal: Absence of fever/infection during neutropenic period  Description: INTERVENTIONS:  - Monitor WBC    Outcome: Progressing     Problem: SAFETY ADULT  Goal: Patient will remain free of falls  Description: INTERVENTIONS:  - Educate patient/family on patient safety including physical limitations  - Instruct patient to call for assistance with activity   - Consult OT/PT to assist with strengthening/mobility   - Keep Call bell within reach  - Keep bed low and locked with side rails adjusted as appropriate  - Keep care items and personal belongings within reach  - Initiate and maintain comfort rounds  - Make Fall Risk Sign visible to staff  - Offer Toileting every  Hours,  in advance of need  - Initiate/Maintain alarm  - Obtain necessary fall risk management equipment:   - Apply yellow socks and bracelet for high fall risk patients  - Consider moving patient to room near nurses station  Outcome: Progressing  Goal: Maintain or return to baseline ADL function  Description: INTERVENTIONS:  -  Assess patient's ability to carry out ADLs; assess patient's baseline for ADL function and identify physical deficits which impact ability to perform ADLs (bathing, care of mouth/teeth, toileting, grooming, dressing, etc.)  - Assess/evaluate cause of self-care deficits   - Assess range of motion  - Assess patient's mobility; develop plan if impaired  - Assess patient's need for assistive devices and provide as appropriate  - Encourage maximum independence but intervene and supervise when necessary  - Involve family in performance of ADLs  - Assess for home care needs following discharge   - Consider OT consult to assist with ADL evaluation and planning for discharge  - Provide patient education as appropriate  Outcome: Progressing  Goal: Maintains/Returns to pre admission functional level  Description: INTERVENTIONS:  - Perform AM-PAC 6 Click Basic Mobility/ Daily Activity assessment daily.  - Set and communicate daily mobility goal to care team and patient/family/caregiver.   - Collaborate with rehabilitation services on mobility goals if consulted  - Perform Range of Motion  times a day.  - Reposition patient every  hours.  - Dangle patient  times a day  - Stand patient  times a day  - Ambulate patient  times a day  - Out of bed to chair  times a day   - Out of bed for meals  times a day  - Out of bed for toileting  - Record patient progress and toleration of activity level   Outcome: Progressing     Problem: DISCHARGE PLANNING  Goal: Discharge to home or other facility with appropriate resources  Description: INTERVENTIONS:  - Identify barriers to discharge w/patient and caregiver  - Arrange for  needed discharge resources and transportation as appropriate  - Identify discharge learning needs (meds, wound care, etc.)  - Arrange for interpretive services to assist at discharge as needed  - Refer to Case Management Department for coordinating discharge planning if the patient needs post-hospital services based on physician/advanced practitioner order or complex needs related to functional status, cognitive ability, or social support system  Outcome: Progressing     Problem: Knowledge Deficit  Goal: Patient/family/caregiver demonstrates understanding of disease process, treatment plan, medications, and discharge instructions  Description: Complete learning assessment and assess knowledge base.  Interventions:  - Provide teaching at level of understanding  - Provide teaching via preferred learning methods  Outcome: Progressing     Problem: Neurological Deficit  Goal: Neurological status is stable or improving  Description: Interventions:  - Monitor and assess patient's level of consciousness, motor function, sensory function, and level of assistance needed for ADLs.   - Monitor and report changes from baseline. Collaborate with interdisciplinary team to initiate plan and implement interventions as ordered.   - Provide and maintain a safe environment.  - Consider seizure precautions.  - Consider fall precautions.  - Consider aspiration precautions.  - Consider bleeding precautions.  Outcome: Progressing     Problem: Activity Intolerance/Impaired Mobility  Goal: Mobility/activity is maintained at optimum level for patient  Description: Interventions:  - Assess and monitor patient  barriers to mobility and need for assistive/adaptive devices.  - Assess patient's emotional response to limitations.  - Collaborate with interdisciplinary team and initiate plans and interventions as ordered.  - Encourage independent activity per ability.  - Maintain proper body alignment.  - Perform active/passive rom as  tolerated/ordered.  - Plan activities to conserve energy.  - Turn patient as appropriate  Outcome: Progressing     Problem: Communication Impairment  Goal: Ability to express needs and understand communication  Description: Assess patient's communication skills and ability to understand information.  Patient will demonstrate use of effective communication techniques, alternative methods of communication and understanding even if not able to speak.     - Encourage communication and provide alternate methods of communication as needed.  - Collaborate with case management/ for discharge needs.  - Include patient/family/caregiver in decisions related to communication.  Outcome: Progressing     Problem: Potential for Aspiration  Goal: Non-ventilated patient's risk of aspiration is minimized  Description: Assess and monitor vital signs, respiratory status, and labs (WBC).  Monitor for signs of aspiration (tachypnea, cough, rales, wheezing, cyanosis, fever).    - Assess and monitor patient's ability to swallow.  - Place patient up in chair to eat if possible.  - HOB up at 90 degrees to eat if unable to get patient up into chair.  - Supervise patient during oral intake.   - Instruct patient/ family to take small bites.  - Instruct patient/ family to take small single sips when taking liquids.  - Follow patient-specific strategies generated by speech pathologist.  Outcome: Progressing     Problem: Nutrition  Goal: Nutrition/Hydration status is improving  Description: Monitor and assess patient's nutrition/hydration status for malnutrition (ex- brittle hair, bruises, dry skin, pale skin and conjunctiva, muscle wasting, smooth red tongue, and disorientation). Collaborate with interdisciplinary team and initiate plan and interventions as ordered.  Monitor patient's weight and dietary intake as ordered or per policy. Utilize nutrition screening tool and intervene per policy. Determine patient's food preferences  and provide high-protein, high-caloric foods as appropriate.     - Assist patient with eating.  - Allow adequate time for meals.  - Encourage patient to take dietary supplement as ordered.  - Collaborate with clinical nutritionist.  - Include patient/family/caregiver in decisions related to nutrition.  Outcome: Progressing

## 2025-02-27 NOTE — ASSESSMENT & PLAN NOTE
"Nehal Bryant is a 64 y.o. female with HTN, HLD, SONIYA, breast cancer s/p radiation therapy who presents to [facility] ED on 2/25/2025 with right facial and right upper arm numbness/tingling x 2 days as well as intermittent headache x 2 weeks.    Workup:  - CTA head and neck:  \"1. No evidence of acute infarct, intracranial hemorrhage or mass.  2. No hemodynamically significant stenosis, dissection or occlusion of the carotid or vertebral arteries or major vessels of the [place].\"  - Lipid panel: Total cholesterol 117, triglycerides 83, HDL 59, LDL elevated 101  - Hemoglobin A1c: 5.6    Intermittent right hemispheric headaches x 2 weeks, much improved, with associated right facial and right upper extremity numbness/tingling x 2 days.  Etiology remains unclear at this time.  Will obtain MRI brain to assess for acute infarct.    Plan:  - MRI brain with and without contrast pending given breast cancer history  - Echo pending  - S/p  mg x 1 load  - Started on ASA 81 mg daily  - Atorvastatin increased to 40 mg daily  - Goal normotension, euglycemia, normothermia  - Telemetry  - Frequent neurochecks  - PT/OT/speech  - Stroke education  - Medical management and supportive care per primary team, notify with changes  "

## 2025-02-27 NOTE — CASE MANAGEMENT
Case Management Assessment & Discharge Planning Note    Patient name Nehal Bryant  [facility] /[facility] -* MRN [id number]  : 1960 Date 2025       Current Admission Date: 2025  Current Admission Diagnosis:Facial numbness   Patient Active Problem List    Diagnosis Date Noted Date Diagnosed    Facial numbness 2025     Shortness of breath 10/17/2024     Obesity, morbid (HCC) 2024     Trigger middle finger of left hand 2023     SONIYA (obstructive sleep apnea) 2023     Trigger finger 04/10/2023     Multiple nevi 04/10/2023     Contusion of fifth toe of right foot 04/10/2023     Thyroid nodule 11/10/2022     Use of anastrozole 2022     GERD (gastroesophageal reflux disease)      BRCA negative 10/26/2021     Malignant neoplasm of lower-inner quadrant of left breast in female, estrogen receptor positive (HCC) 10/13/2021     Family history of cancer 10/13/2021     Acute pain of right shoulder 2020     Pain in both lower extremities 2020     Neck pain 2020     BMI 35.0-35.9,adult 2020     Fibroid 2019     Localized osteoporosis without current pathological fracture 2019     Prediabetes 2017     Anxiety 2015     Combined hyperlipidemia 2015     Asthma 2015     Benign essential hypertension 2015     Vitamin D deficiency 2015       LOS (days): 0  Geometric Mean LOS (GMLOS) (days): 2  Days to GMLOS:1.9     OBJECTIVE:              Current admission status: Inpatient       Preferred Pharmacy:   [facility] [address] [place] [address]  [address] [place] [id number]  Phone: [phone] Fax: [phone]    Primary Care Provider: Shankar Knight DO    Primary Insurance: MEDICARE  Secondary Insurance: BLUE CROSS    ASSESSMENT:  Active Health Care Proxies       Shankar Bryant Health Care Representative - Spouse   Primary Phone: [phone] (Mobile)  Home Phone:  [phone]                           Readmission Root Cause  30 Day Readmission: No    Patient Information  Admitted from:: Home  Mental Status: Alert  During Assessment patient was accompanied by: Not accompanied during assessment  Assessment information provided by:: Patient  Primary Caregiver: Self  Support Systems: Self, Spouse/significant other  County of Residence: [place]  What city do you live in?: [place]  Home entry access options. Select all that apply.: Stairs  Number of steps to enter home.: 2  Type of Current Residence: 2 story home  Upon entering residence, is there a bedroom on the main floor (no further steps)?: No  A bedroom is located on the following floor levels of residence (select all that apply):: 2nd Floor  Upon entering residence, is there a bathroom on the main floor (no further steps)?: No  Indicate which floors of current residence have a bathroom (select all the apply):: 2nd Floor  Number of steps to 2nd floor from main floor: One Flight  Living Arrangements: Lives w/ Spouse/significant other  Is patient a ?: No    Activities of Daily Living Prior to Admission  Functional Status: Independent  Completes ADLs independently?: Yes  Ambulates independently?: Yes  Does patient use assisted devices?: No  Does patient currently own DME?: Yes  What DME does the patient currently own?: CPAP  Does patient have a history of Outpatient Therapy (PT/OT)?: Yes (after rotator cuff surgery)  Does the patient have a history of Short-Term Rehab?: No  Does patient have a history of HHC?: No  Does patient currently have HHC?: No         Patient Information Continued  Income Source: Pension/longterm  Does patient have prescription coverage?: Yes  Does patient receive dialysis treatments?: No  Does patient have a history of substance abuse?: No  Does patient have a history of Mental Health Diagnosis?: No         Means of Transportation  Means of Transport to Appts:: Drives Self          DISCHARGE  DETAILS:    Discharge planning discussed with:: patient  Freedom of Choice: Yes     CM contacted family/caregiver?: No- see comments (patient alert & oriented)  Were Treatment Team discharge recommendations reviewed with patient/caregiver?: Yes  Did patient/caregiver verbalize understanding of patient care needs?: Yes  Were patient/caregiver advised of the risks associated with not following Treatment Team discharge recommendations?: Yes    Contacts  Patient Contacts: Shankar Bryant,   Relationship to Patient:: Family  Contact Method: Phone  Phone Number: [phone]  Reason/Outcome: Continuity of Care, Emergency Contact, Discharge Planning                        Treatment Team Recommendation: Home  Discharge Destination Plan:: Home  Transport at Discharge : Family                                      Additional Comments: Introduced self and role to patient at bedside.  Patient independent at baseline.   will provide d/c transportation.

## 2025-02-28 ENCOUNTER — TELEPHONE (OUTPATIENT)
Age: 65
End: 2025-02-28

## 2025-02-28 VITALS
TEMPERATURE: 97.8 F | RESPIRATION RATE: 18 BRPM | BODY MASS INDEX: 35.97 KG/M2 | HEIGHT: 63 IN | DIASTOLIC BLOOD PRESSURE: 63 MMHG | SYSTOLIC BLOOD PRESSURE: 137 MMHG | OXYGEN SATURATION: 97 % | HEART RATE: 78 BPM | WEIGHT: 203 LBS

## 2025-02-28 PROCEDURE — 99239 HOSP IP/OBS DSCHRG MGMT >30: CPT | Performed by: INTERNAL MEDICINE

## 2025-02-28 PROCEDURE — 94760 N-INVAS EAR/PLS OXIMETRY 1: CPT

## 2025-02-28 PROCEDURE — 94660 CPAP INITIATION&MGMT: CPT

## 2025-02-28 PROCEDURE — NC001 PR NO CHARGE: Performed by: PSYCHIATRY & NEUROLOGY

## 2025-02-28 RX ORDER — ATORVASTATIN CALCIUM 10 MG/1
10 TABLET, FILM COATED ORAL DAILY
Status: DISCONTINUED | OUTPATIENT
Start: 2025-03-01 | End: 2025-02-28 | Stop reason: HOSPADM

## 2025-02-28 RX ORDER — RIBOFLAVIN (VITAMIN B2) 400 MG
400 TABLET ORAL DAILY
Qty: 30 TABLET | Refills: 0 | Status: SHIPPED | OUTPATIENT
Start: 2025-02-28 | End: 2025-03-30

## 2025-02-28 RX ADMIN — AMLODIPINE BESYLATE 10 MG: 10 TABLET ORAL at 08:10

## 2025-02-28 RX ADMIN — ASPIRIN 81 MG CHEWABLE TABLET 81 MG: 81 TABLET CHEWABLE at 08:10

## 2025-02-28 RX ADMIN — ENOXAPARIN SODIUM 40 MG: 40 INJECTION SUBCUTANEOUS at 08:10

## 2025-02-28 RX ADMIN — ANASTROZOLE 1 MG: 1 TABLET, COATED ORAL at 08:09

## 2025-02-28 RX ADMIN — ATORVASTATIN CALCIUM 40 MG: 40 TABLET, FILM COATED ORAL at 08:10

## 2025-02-28 NOTE — CONSULTS
MRI brain w/wo contrast reviewed with attending neurologist - No acute intracranial pathology.  Echo: 55%, normal sized atrium bilaterally.     Per discussion with primary team, headache is resolved today. Etiology likely secondary to headache. Discontinue Aspirin, Atorvastatin decreased back to home dose 10 mg qHS. No further inpatient neurological recommendations at this time.     Nehal PEDRO Bryant will need follow-up in in 6 weeks with headache team for Migraine in 60 minute appointment. They will not require outpatient neurological testing.   Message sent to schedulers.

## 2025-02-28 NOTE — PLAN OF CARE
Problem: PAIN - ADULT  Goal: Verbalizes/displays adequate comfort level or baseline comfort level  Description: Interventions:  - Encourage patient to monitor pain and request assistance  - Assess pain using appropriate pain scale  - Administer analgesics based on type and severity of pain and evaluate response  - Implement non-pharmacological measures as appropriate and evaluate response  - Consider cultural and social influences on pain and pain management  - Notify physician/advanced practitioner if interventions unsuccessful or patient reports new pain  Outcome: Progressing     Problem: INFECTION - ADULT  Goal: Absence or prevention of progression during hospitalization  Description: INTERVENTIONS:  - Assess and monitor for signs and symptoms of infection  - Monitor lab/diagnostic results  - Monitor all insertion sites, i.e. indwelling lines, tubes, and drains  - Monitor endotracheal if appropriate and nasal secretions for changes in amount and color  - [place] appropriate cooling/warming therapies per order  - Administer medications as ordered  - Instruct and encourage patient and family to use good hand hygiene technique  - Identify and instruct in appropriate isolation precautions for identified infection/condition  Outcome: Progressing  Goal: Absence of fever/infection during neutropenic period  Description: INTERVENTIONS:  - Monitor WBC    Outcome: Progressing     Problem: SAFETY ADULT  Goal: Patient will remain free of falls  Description: INTERVENTIONS:  - Educate patient/family on patient safety including physical limitations  - Instruct patient to call for assistance with activity   - Consult OT/PT to assist with strengthening/mobility   - Keep Call bell within reach  - Keep bed low and locked with side rails adjusted as appropriate  - Keep care items and personal belongings within reach  - Initiate and maintain comfort rounds  - Make Fall Risk Sign visible to staff  - Offer Toileting every 2 Hours,  in advance of need  - Initiate/Maintain bed alarm  - Obtain necessary fall risk management equipment: In place   - Apply yellow socks and bracelet for high fall risk patients  - Consider moving patient to room near nurses station  Outcome: Progressing  Goal: Maintain or return to baseline ADL function  Description: INTERVENTIONS:  -  Assess patient's ability to carry out ADLs; assess patient's baseline for ADL function and identify physical deficits which impact ability to perform ADLs (bathing, care of mouth/teeth, toileting, grooming, dressing, etc.)  - Assess/evaluate cause of self-care deficits   - Assess range of motion  - Assess patient's mobility; develop plan if impaired  - Assess patient's need for assistive devices and provide as appropriate  - Encourage maximum independence but intervene and supervise when necessary  - Involve family in performance of ADLs  - Assess for home care needs following discharge   - Consider OT consult to assist with ADL evaluation and planning for discharge  - Provide patient education as appropriate  Outcome: Progressing  Goal: Maintains/Returns to pre admission functional level  Description: INTERVENTIONS:  - Perform AM-PAC 6 Click Basic Mobility/ Daily Activity assessment daily.  - Set and communicate daily mobility goal to care team and patient/family/caregiver.   - Collaborate with rehabilitation services on mobility goals if consulted  - Perform Range of Motion 3 times a day.  - Reposition patient every 2 hours.  - Dangle patient 3 times a day  - Stand patient 3 times a day  - Ambulate patient 3 times a day  - Out of bed to chair 3 times a day   - Out of bed for meals 3 times a day  - Out of bed for toileting  - Record patient progress and toleration of activity level   Outcome: Progressing     Problem: DISCHARGE PLANNING  Goal: Discharge to home or other facility with appropriate resources  Description: INTERVENTIONS:  - Identify barriers to discharge w/patient and  caregiver  - Arrange for needed discharge resources and transportation as appropriate  - Identify discharge learning needs (meds, wound care, etc.)  - Arrange for interpretive services to assist at discharge as needed  - Refer to Case Management Department for coordinating discharge planning if the patient needs post-hospital services based on physician/advanced practitioner order or complex needs related to functional status, cognitive ability, or social support system  Outcome: Progressing     Problem: Knowledge Deficit  Goal: Patient/family/caregiver demonstrates understanding of disease process, treatment plan, medications, and discharge instructions  Description: Complete learning assessment and assess knowledge base.  Interventions:  - Provide teaching at level of understanding  - Provide teaching via preferred learning methods  Outcome: Progressing     Problem: Neurological Deficit  Goal: Neurological status is stable or improving  Description: Interventions:  - Monitor and assess patient's level of consciousness, motor function, sensory function, and level of assistance needed for ADLs.   - Monitor and report changes from baseline. Collaborate with interdisciplinary team to initiate plan and implement interventions as ordered.   - Provide and maintain a safe environment.  - Consider seizure precautions.  - Consider fall precautions.  - Consider aspiration precautions.  - Consider bleeding precautions.  Outcome: Progressing     Problem: Activity Intolerance/Impaired Mobility  Goal: Mobility/activity is maintained at optimum level for patient  Description: Interventions:  - Assess and monitor patient  barriers to mobility and need for assistive/adaptive devices.  - Assess patient's emotional response to limitations.  - Collaborate with interdisciplinary team and initiate plans and interventions as ordered.  - Encourage independent activity per ability.  - Maintain proper body alignment.  - Perform  active/passive rom as tolerated/ordered.  - Plan activities to conserve energy.  - Turn patient as appropriate  Outcome: Progressing     Problem: Communication Impairment  Goal: Ability to express needs and understand communication  Description: Assess patient's communication skills and ability to understand information.  Patient will demonstrate use of effective communication techniques, alternative methods of communication and understanding even if not able to speak.     - Encourage communication and provide alternate methods of communication as needed.  - Collaborate with case management/ for discharge needs.  - Include patient/family/caregiver in decisions related to communication.  Outcome: Progressing     Problem: Potential for Aspiration  Goal: Non-ventilated patient's risk of aspiration is minimized  Description: Assess and monitor vital signs, respiratory status, and labs (WBC).  Monitor for signs of aspiration (tachypnea, cough, rales, wheezing, cyanosis, fever).    - Assess and monitor patient's ability to swallow.  - Place patient up in chair to eat if possible.  - HOB up at 90 degrees to eat if unable to get patient up into chair.  - Supervise patient during oral intake.   - Instruct patient/ family to take small bites.  - Instruct patient/ family to take small single sips when taking liquids.  - Follow patient-specific strategies generated by speech pathologist.  Outcome: Progressing     Problem: Nutrition  Goal: Nutrition/Hydration status is improving  Description: Monitor and assess patient's nutrition/hydration status for malnutrition (ex- brittle hair, bruises, dry skin, pale skin and conjunctiva, muscle wasting, smooth red tongue, and disorientation). Collaborate with interdisciplinary team and initiate plan and interventions as ordered.  Monitor patient's weight and dietary intake as ordered or per policy. Utilize nutrition screening tool and intervene per policy. Determine  patient's food preferences and provide high-protein, high-caloric foods as appropriate.     - Assist patient with eating.  - Allow adequate time for meals.  - Encourage patient to take dietary supplement as ordered.  - Collaborate with clinical nutritionist.  - Include patient/family/caregiver in decisions related to nutrition.  Outcome: Progressing

## 2025-02-28 NOTE — ASSESSMENT & PLAN NOTE
Presents with 2d history of right facial/arm numbness  CTA head/neck no evidence of acute infarct, hemorrhage, mass. No LVO or dissection  MRI of the brain negative for stroke, felt to be more likely related to migraine headache.  Noted on riboflavin 400 mg once daily  MRI negative for stroke  Resume Lipitor 10 mg at current dose

## 2025-02-28 NOTE — TELEPHONE ENCOUNTER
STILL ADMITTED;2/25/2025 - present (3 days)  [facility]      HFU/ SL ALL/ MIGRAINES    DC-     ----- Message from ALAINA Campbell sent at 2/28/2025  9:25 AM EST -----  Regarding: HFNehal Mohan Annette will need follow-up in in 6 weeks with headache team for Migraine in 60 minute appointment. They will not require outpatient neurological testing.     Thank you,   Olivia FOLEY

## 2025-02-28 NOTE — DISCHARGE INSTR - AVS FIRST PAGE
Dear Nehal Bryant,     It was our pleasure to care for you here at [facility].  It is our hope that we were always able to exceed the expected standards for your care during your stay.  You were hospitalized due to migraine headache presenting as right facial numbness.  You were cared for on the fourth floor by Henry Jacob DO with the [facility] who covers for your primary care physician (PCP), Shankar Knight DO, while you were hospitalized.  If you have any questions or concerns related to this hospitalization, you may contact us at .  For follow up as well as any medication refills, we recommend that you follow up with your primary care physician.  A registered nurse will reach out to you by phone within a few days after your discharge to answer any additional questions that you may have after going home.  However, at this time we provide for you here, the most important instructions / recommendations at discharge:     Notable Medication Adjustments -   Riboflavin 400 units once daily  Magnesium 400 mg once a day  Testing Required after Discharge -   None  Important follow up information -   PCP follow-up in 1 week    will need follow-up in in 6 weeks with headache team for Migraine in 60 minute appointment.   Other Instructions -   Make sure to wear your CPAP at bedtime  Please review this entire after visit summary as additional general instructions including medication list, appointments, activity, diet, any pertinent wound care, and other additional recommendations from your care team that may be provided for you.      Sincerely,     Henry Jacob DO and Nurse Erika Styles

## 2025-02-28 NOTE — DISCHARGE SUMMARY
Discharge Summary - Hospitalist   Name: Nehal Bryant 64 y.o. female I MRN: [id number]  Unit/Bed#: E4 -01 I Date of Admission: 2/25/2025   Date of Service: 2/28/2025 I Hospital Day: 1         Admitting Provider:  Henry Jacob DO  Discharge Provider:  Henry Jacob DO  Admission Date: 2/25/2025       Discharge Date: 02/28/25   LOS: 1  Primary Care Physician at Discharge: Shankar Knight [phone]    HOSPITAL COURSE:  Nehal Bryant is a 64 y.o. female who presented right facial and right upper numbness and tingling for 48 hours prior to admission.  She also reported 2 weeks of intermittent headache.  The patient's past medical history is notable for hypertension hyperlipidemia obstructive sleep apnea as well as history of breast cancer status post radiation therapy.  Coincidentally the patient has been noncompliant with her CPAP over the past week.  She then presented with headaches.  She also reported having right facial numbness and tingling as well as right upper arm numbness and tingling.    CTA performed during hospitalization showed no large vessel occlusion, she was initially given aspirin 325 mg x 1 and urgently evaluated in consultation by the neurology service.    MRI of the brain demonstrated no evidence of stroke.  It was postulated that this was likely migraine headache in the setting of noncompliance to CPAP.    When her 2-week history of symptoms she was placed on riboflavin as well as magnesium oxide for the treatment of cortical spreading depression as well as migraine headaches.    At the time of discharge the patient was tolerating oral diet they were without acute complaint and they were medically cleared for discharge.  All questions were answered the patient's satisfaction and they were in agreement with the discharge plan.    DISCHARGE DIAGNOSES  * Facial numbness  Assessment & Plan  Presents with 2d history of right facial/arm numbness  CTA head/neck no evidence of acute  infarct, hemorrhage, mass. No LVO or dissection  MRI of the brain negative for stroke, felt to be more likely related to migraine headache.  Noted on riboflavin 400 mg once daily  MRI negative for stroke  Resume Lipitor 10 mg at current dose    SONIYA (obstructive sleep apnea)  Assessment & Plan  Reports not using it over last week  CPAP HS    Benign essential hypertension  Assessment & Plan  Reports control  Outpatient regimen: amlodipine 10mg daily   Continue outpatient regimen       CONSULTING PROVIDERS   IP CONSULT TO NEUROLOGY  IP CONSULT TO CASE MANAGEMENT    PROCEDURES PERFORMED  * No surgery found *    RADIOLOGY RESULTS  MRI brain w wo contrast  Result Date: 2/27/2025  Impression: No acute intracranial pathology. Minimal chronic microangiopathy. Workstation performed: [id number]     CTA head and neck with and without contrast  Result Date: 2/25/2025  Impression: 1. No evidence of acute infarct, intracranial hemorrhage or mass. 2. No hemodynamically significant stenosis, dissection or occlusion of the carotid or vertebral arteries or major vessels of the [place]. Workstation performed: [id number]       LABS  Results from last 7 days   Lab Units 02/26/25  0508 02/25/25  2131   WBC Thousand/uL 5.25 5.57   HEMOGLOBIN g/dL 12.8 14.1   HEMATOCRIT % 39.3 42.9   MCV fL 90 87   PLATELETS Thousands/uL 256 319   INR   --  1.11     Results from last 7 days   Lab Units 02/26/25  0508 02/25/25  2131   SODIUM mmol/L 141 140   POTASSIUM mmol/L 3.9 3.6   CHLORIDE mmol/L 109* 105   CO2 mmol/L 27 29   BUN mg/dL 11 12   CREATININE mg/dL 0.75 0.81   CALCIUM mg/dL 9.8 10.7*   ALBUMIN g/dL  --  4.6   TOTAL BILIRUBIN mg/dL  --  0.75   ALK PHOS U/L  --  119*   ALT U/L  --  13   AST U/L  --  14   EGFR ml/min/1.73sq m 83 76   GLUCOSE RANDOM mg/dL 106 103     Results from last 7 days   Lab Units 02/25/25  2328 02/25/25  2131   HS TNI 0HR ng/L  --  <2   HS TNI 2HR ng/L 3  --                   Results from last 7 days   Lab Units  "02/26/25  0508   HEMOGLOBIN A1C % 5.6     Results from last 7 days   Lab Units 02/25/25  2131   TSH 3RD GENERATON uIU/mL 1.610         Results from last 7 days   Lab Units 02/26/25  0508   TRIGLYCERIDES mg/dL 83   CHOLESTEROL mg/dL 177   LDL CALC mg/dL 101*   HDL mg/dL 59       Cultures:         Invalid input(s): \"URIBILINOGEN\"                    PHYSICAL EXAM:  Vitals:   Blood Pressure: 137/63 (02/28/25 0804)  Pulse: 78 (02/28/25 0804)  Temperature: 97.8 °F (36.6 °C) (02/28/25 0804)  Temp Source: Temporal (02/28/25 0804)  Respirations: 18 (02/28/25 0804)  Height: 5' 3\" (160 cm) (02/26/25 0828)  Weight - Scale: 92.1 kg (203 lb) (02/26/25 0828)  SpO2: 97 % (02/28/25 0804)      General: well appearing, no acute distress  HEENT: atraumatic, PERRLA, moist mucosa, normal pharynx, normal tonsils and adenoids, normal tongue, no fluid in sinuses  Neck: Trachea midline, no carotid bruit, no masses  Respiratory: normal chest wall expansion, CTA B  Cardiovascular: RRR, no m/r/g, Normal S1 and S2  Abdomen: Soft, non-tender, non-distended, normal bowel sounds in all quadrants, no hepatosplenomegaly, no tympany  Rectal: deferred  Musculoskeletal: Moves all  Integumentary: warm, dry, and pink, with no visible rash, purpura, or petechia  Heme/Lymph: no lymphadenopathy, no bruises  Neurological: Cranial Nerves II-XII grossly intact  Psychiatric: cooperative with normal mood, affect, and cognition       Discharge Disposition: Home/Self Care    AM-PAC Basic Mobility:  Basic Mobility Inpatient Raw Score: 23    JH-HLM Achieved: 7: Walk 25 feet or more  JH-HLM Goal: 7: Walk 25 feet or more    HLM Goal listed above. Continue with ongoing physical therapy and encourage appropriate mobility to improve upon HLM goals.      Test Results Pending at Discharge:           Medications   Summary of Medication Adjustments made as a result of this hospitalization: See discharge summary and AVS for medication changes  Medication Dosing Tapers - Please " refer to Discharge Medication List for details on any medication dosing tapers (if applicable to patient).  Discharge Medication List: See after visit summary for reconciled discharge medications.     Diet restrictions:         Diet Orders   (From admission, onward)                 Start     Ordered    02/26/25 0243  Diet Regular; Regular House  Diet effective now        References:    Adult Nutrition Support Algorithm    RD Therapeutic Diet Order Protocol   Question Answer Comment   Diet Type Regular    Regular Regular House    RD to adjust diet per protocol? Yes        02/26/25 0242                  Activity restrictions: No strenuous activity  Discharge Condition: good    Outpatient Follow-Up and Discharge Instructions  See after visit summary section titled Discharge Instructions for information provided to patient and family.      Code Status: Level 1 - Full Code  Discharge Statement   I spent 86 minutes discharging the patient. This time was spent on the day of discharge. Greater than 50% of total time was spent with the patient and / or family counseling and / or coordination of care.    ** Please Note: This note was completed in part utilizing [employer] Dragon [employer] One Software.  Grammatical errors, random word insertions, spelling mistakes, and incomplete sentences may be an occasional consequence of this system secondary to software limitations, ambient noise, and hardware issues.  If you have any questions or concerns about the content, text, or information contained within the body of this dictation, please contact the provider for clarification.**

## 2025-02-28 NOTE — CASE MANAGEMENT
Case Management Discharge Planning Note    Patient name Nehal Bryant  Location [facility] /E4 -* MRN [id number]  : 1960 Date 2025       Current Admission Date: 2025  Current Admission Diagnosis:Facial numbness   Patient Active Problem List    Diagnosis Date Noted Date Diagnosed    Facial numbness 2025     Shortness of breath 10/17/2024     Obesity, morbid (HCC) 2024     Trigger middle finger of left hand 2023     SONIYA (obstructive sleep apnea) 2023     Trigger finger 04/10/2023     Multiple nevi 04/10/2023     Contusion of fifth toe of right foot 04/10/2023     Thyroid nodule 11/10/2022     Use of anastrozole 2022     GERD (gastroesophageal reflux disease)      BRCA negative 10/26/2021     Malignant neoplasm of lower-inner quadrant of left breast in female, estrogen receptor positive (HCC) 10/13/2021     Family history of cancer 10/13/2021     Acute pain of right shoulder 2020     Pain in both lower extremities 2020     Neck pain 2020     BMI 35.0-35.9,adult 2020     Fibroid 2019     Localized osteoporosis without current pathological fracture 2019     Prediabetes 2017     Anxiety 2015     Combined hyperlipidemia 2015     Asthma 2015     Benign essential hypertension 2015     Vitamin D deficiency 2015       LOS (days): 1  Geometric Mean LOS (GMLOS) (days): 2  Days to GMLOS:0.9     OBJECTIVE:  Risk of Unplanned Readmission Score: 11.16         Current admission status: Inpatient   Preferred Pharmacy:   [facility] [address] [place] [address] [address] [facility]  [address] [facility] [address]  Phone: [phone] Fax: [phone]    Primary Care Provider: Shankar Knight DO    Primary Insurance: MEDICARE  Secondary Insurance: BLUE CROSS    DISCHARGE DETAILS:                                                                                                 Additional Comments: CM met  with the pt.  She was OOB ad roberta in the romm.  Per pt no needs for discharge.  Family will provide transportation.

## 2025-03-03 ENCOUNTER — TRANSITIONAL CARE MANAGEMENT (OUTPATIENT)
Dept: FAMILY MEDICINE CLINIC | Facility: CLINIC | Age: 65
End: 2025-03-03

## 2025-03-04 DIAGNOSIS — I10 BENIGN ESSENTIAL HYPERTENSION: ICD-10-CM

## 2025-03-05 ENCOUNTER — TELEPHONE (OUTPATIENT)
Age: 65
End: 2025-03-05

## 2025-03-05 RX ORDER — AMLODIPINE BESYLATE 10 MG/1
10 TABLET ORAL DAILY
Qty: 100 TABLET | Refills: 0 | OUTPATIENT
Start: 2025-03-05

## 2025-03-05 RX ORDER — AMLODIPINE BESYLATE 10 MG/1
10 TABLET ORAL DAILY
Qty: 100 TABLET | Refills: 1 | Status: SHIPPED | OUTPATIENT
Start: 2025-03-05

## 2025-03-05 NOTE — TELEPHONE ENCOUNTER
Pt called the office today and said she received a call from our office. Pt also asked if Dr. Knight is going to fill the Amlodipine. Please advise [phone] thank you.

## 2025-03-07 ENCOUNTER — OFFICE VISIT (OUTPATIENT)
Dept: FAMILY MEDICINE CLINIC | Facility: CLINIC | Age: 65
End: 2025-03-07
Payer: MEDICARE

## 2025-03-07 VITALS
HEART RATE: 77 BPM | TEMPERATURE: 97.8 F | BODY MASS INDEX: 36.03 KG/M2 | SYSTOLIC BLOOD PRESSURE: 132 MMHG | DIASTOLIC BLOOD PRESSURE: 80 MMHG | OXYGEN SATURATION: 99 % | WEIGHT: 203.4 LBS

## 2025-03-07 DIAGNOSIS — G43.809 OTHER MIGRAINE WITHOUT STATUS MIGRAINOSUS, NOT INTRACTABLE: Primary | ICD-10-CM

## 2025-03-07 DIAGNOSIS — R13.10 DYSPHAGIA, UNSPECIFIED TYPE: ICD-10-CM

## 2025-03-07 PROCEDURE — 99495 TRANSJ CARE MGMT MOD F2F 14D: CPT | Performed by: NURSE PRACTITIONER

## 2025-03-07 NOTE — PROGRESS NOTES
Transition of Care Visit  Name: Nehal Bryant      : 1960      MRN: [id number]  Encounter Provider: ALAINA Rucker  Encounter Date: 3/7/2025   Encounter department: [facility]    Assessment & Plan  Dysphagia, unspecified type  Patient reports difficulty with swallowing food bolus  Family with reported history of stricture requiring dilatation  Patient with concern for same  Refer to GI-possible EGD    Orders:    Ambulatory Referral to Gastroenterology; Future    Other migraine without status migrainosus, not intractable  Recent hospitalization records reviewed with patient  Start riboflavin and magnesium as recommended  Neurology follow-up scheduled for   Routine checkup with PCP scheduled for   Return to care sooner with any problems or concerns  ER precautions for any neurologic symptoms              History of Present Illness     Transitional Care Management Review:   Nehal Bryant is a 64 y.o. female here for TCM follow up.     During the TCM phone call patient stated:  TCM Call       Date and time call was made  3/3/2025  5:22 PM    Hospital care reviewed  Records reviewed    Date of Admission  25    Date of discharge  25    Diagnosis  Facial numbness    Disposition  Home    Were the patients medications reviewed and updated  Yes    Current Symptoms  None          TCM Call       Post hospital issues  None    Scheduled for follow up?  Yes    Did you obtain your prescribed medications  No  Nothing new prescribed    Do you need help managing your prescriptions or medications  No    Is transportation to your appointment needed  No    I have advised the patient to call PCP with any new or worsening symptoms  Yocasta Cardoso MA    Living Arrangements  Spouse or Significiant other          Patient presents today for hospital follow-up.  Recent hospitalization from  to .  She presented initially with right sided facial and upper arm numbness and  tingling, 2 weeks of intermittent headache.  Stroke and cardiac cause ruled out.  Deemed likely migraine.  Discharged with follow-up with neurology.  She has had no other symptoms since discharge.  She has not started magnesium and/or riboflavin as of yet.  Today she feels well, and reports is back to her normal baseline.          Review of Systems   Constitutional: Negative.    Respiratory: Negative.     Cardiovascular: Negative.    Gastrointestinal: Negative.    Neurological: Negative.    Psychiatric/Behavioral: Negative.       Objective   /80 (BP Location: Left arm, Patient Position: Sitting, Cuff Size: Large)   Pulse 77   Temp 97.8 °F (36.6 °C) (Temporal)   Wt 92.3 kg (203 lb 6.4 oz)   LMP 12/15/2003 (Within Years)   SpO2 99%   BMI 36.03 kg/m²     Physical Exam  Vitals and nursing note reviewed.   Constitutional:       General: She is not in acute distress.     Appearance: Normal appearance. She is well-developed. She is not ill-appearing.   Eyes:      Pupils: Pupils are equal, round, and reactive to light.   Neck:      Vascular: No carotid bruit.   Cardiovascular:      Rate and Rhythm: Normal rate and regular rhythm.   Pulmonary:      Effort: Pulmonary effort is normal. No respiratory distress.      Breath sounds: Normal breath sounds and air entry.   Skin:     General: Skin is warm and dry.   Neurological:      General: No focal deficit present.      Mental Status: She is alert and oriented to person, place, and time. Mental status is at baseline.   Psychiatric:         Attention and Perception: Attention normal.         Mood and Affect: Mood normal.         Behavior: Behavior normal.         Thought Content: Thought content normal.         Judgment: Judgment normal.       Medications have been reviewed by provider in current encounter

## 2025-03-26 ENCOUNTER — APPOINTMENT (OUTPATIENT)
Dept: LAB | Facility: CLINIC | Age: 65
End: 2025-03-26
Payer: MEDICARE

## 2025-03-26 DIAGNOSIS — R73.03 PREDIABETES: ICD-10-CM

## 2025-03-26 LAB
ALBUMIN SERPL BCG-MCNC: 4.2 G/DL (ref 3.5–5)
ALP SERPL-CCNC: 109 U/L (ref 34–104)
ALT SERPL W P-5'-P-CCNC: 12 U/L (ref 7–52)
ANION GAP SERPL CALCULATED.3IONS-SCNC: 9 MMOL/L (ref 4–13)
AST SERPL W P-5'-P-CCNC: 15 U/L (ref 13–39)
BILIRUB SERPL-MCNC: 0.9 MG/DL (ref 0.2–1)
BUN SERPL-MCNC: 14 MG/DL (ref 5–25)
CALCIUM SERPL-MCNC: 10.1 MG/DL (ref 8.4–10.2)
CHLORIDE SERPL-SCNC: 107 MMOL/L (ref 96–108)
CO2 SERPL-SCNC: 27 MMOL/L (ref 21–32)
CREAT SERPL-MCNC: 0.85 MG/DL (ref 0.6–1.3)
GFR SERPL CREATININE-BSD FRML MDRD: 72 ML/MIN/1.73SQ M
GLUCOSE P FAST SERPL-MCNC: 90 MG/DL (ref 65–99)
POTASSIUM SERPL-SCNC: 3.8 MMOL/L (ref 3.5–5.3)
PROT SERPL-MCNC: 7.7 G/DL (ref 6.4–8.4)
SODIUM SERPL-SCNC: 143 MMOL/L (ref 135–147)

## 2025-03-26 PROCEDURE — 80053 COMPREHEN METABOLIC PANEL: CPT

## 2025-03-26 PROCEDURE — 36415 COLL VENOUS BLD VENIPUNCTURE: CPT

## 2025-03-27 ENCOUNTER — RESULTS FOLLOW-UP (OUTPATIENT)
Dept: FAMILY MEDICINE CLINIC | Facility: CLINIC | Age: 65
End: 2025-03-27

## 2025-03-28 ENCOUNTER — OFFICE VISIT (OUTPATIENT)
Dept: SURGICAL ONCOLOGY | Facility: CLINIC | Age: 65
End: 2025-03-28
Payer: MEDICARE

## 2025-03-28 VITALS
HEIGHT: 63 IN | SYSTOLIC BLOOD PRESSURE: 132 MMHG | DIASTOLIC BLOOD PRESSURE: 70 MMHG | BODY MASS INDEX: 36.03 KG/M2 | HEART RATE: 86 BPM | OXYGEN SATURATION: 98 % | TEMPERATURE: 97.2 F

## 2025-03-28 DIAGNOSIS — Z79.811 USE OF ANASTROZOLE: ICD-10-CM

## 2025-03-28 DIAGNOSIS — Z12.31 VISIT FOR SCREENING MAMMOGRAM: ICD-10-CM

## 2025-03-28 DIAGNOSIS — Z08 ENCOUNTER FOR FOLLOW-UP EXAMINATION AFTER COMPLETED TREATMENT FOR MALIGNANT NEOPLASM: Primary | ICD-10-CM

## 2025-03-28 DIAGNOSIS — Z17.0 MALIGNANT NEOPLASM OF LOWER-INNER QUADRANT OF LEFT BREAST IN FEMALE, ESTROGEN RECEPTOR POSITIVE (HCC): ICD-10-CM

## 2025-03-28 DIAGNOSIS — C50.312 MALIGNANT NEOPLASM OF LOWER-INNER QUADRANT OF LEFT BREAST IN FEMALE, ESTROGEN RECEPTOR POSITIVE (HCC): ICD-10-CM

## 2025-03-28 PROCEDURE — 99214 OFFICE O/P EST MOD 30 MIN: CPT

## 2025-03-28 NOTE — ASSESSMENT & PLAN NOTE
Patient is a 64-year-old female presenting today for follow-up for left breast cancer diagnosed in September 2021. Pathology revealed DCIS ER/AK positive. She underwent genetic testing which was negative but revealed a VUS of MSH6. She had a left breast lumpectomy with sentinel node biopsy with Dr. Lacey.  Surgical pathology revealed invasive carcinoma. She underwent re-excision with sln bx. There was no lymph node involvement. She completed whole breast radiation therapy. She is currently on anastrozole. She had a b/l dx mammo on 10/28/24 which was BIRADS 2 category 2 density. Order was placed for this years mammo. There were no concerns her clinical breast exam. Patient denies changes on her breast exam. She mentions continued paraesthesias in the left axilla and posterior upper arm. She denies persistent headaches, bone pain, back pain, shortness of breath, or abdominal pain. I will see the patient back in 6 months or sooner should the need arise. She was instructed to call with any questions or concerns prior to this time. All questions were answered today.

## 2025-03-28 NOTE — PROGRESS NOTES
Name: Nehal Bryant      : 1960      MRN: [id number]  Encounter Provider: ALAINA Márquez  Encounter Date: 3/28/2025   Encounter department: [facility] SURGICAL ONCOL[facility] [place]  :  Assessment & Plan  Encounter for follow-up examination after completed treatment for malignant neoplasm  - hem onc 25  - rad onc 25  - 1 year f/u  Malignant neoplasm of lower-inner quadrant of left breast in female, estrogen receptor positive (HCC)  Patient is a 64-year-old female presenting today for follow-up for left breast cancer diagnosed in 2021. Pathology revealed DCIS ER/OR positive. She underwent genetic testing which was negative but revealed a VUS of MSH6. She had a left breast lumpectomy with sentinel node biopsy with Dr. Lacey.  Surgical pathology revealed invasive carcinoma. She underwent re-excision with sln bx. There was no lymph node involvement. She completed whole breast radiation therapy. She is currently on anastrozole. She had a b/l dx mammo on 10/28/24 which was BIRADS 2 category 2 density. Order was placed for this years mammo. There were no concerns her clinical breast exam. Patient denies changes on her breast exam. She mentions continued paraesthesias in the left axilla and posterior upper arm. She denies persistent headaches, bone pain, back pain, shortness of breath, or abdominal pain. I will see the patient back in 6 months or sooner should the need arise. She was instructed to call with any questions or concerns prior to this time. All questions were answered today.   Use of anastrozole  - continue use per medical oncology  Visit for screening mammogram  Orders:  •  Mammo screening bilateral w 3d and cad; Future        Oncology History   Cancer Staging   Malignant neoplasm of lower-inner quadrant of left breast in female, estrogen receptor positive (HCC)  Staging form: Breast, AJCC 8th Edition  - Clinical: Stage 0 (cTis (DCIS), cN0, cM0, ER+, OR+,  HER2: Not Assessed) - Signed by Audrey Lacey MD on 10/26/2021  Stage prefix: Initial diagnosis  Method of lymph node assessment: Clinical  Nuclear grade: G2  - Pathologic stage from 12/7/2021: Stage IA (pT1b, pN0, cM0, G2, ER+, CO+, HER2-) - Signed by Audrey Lacey MD on 12/7/2021  Stage prefix: Initial diagnosis  Method of lymph node assessment: Clinical  Histologic grading system: 3 grade system  Oncology History   Malignant neoplasm of lower-inner quadrant of left breast in female, estrogen receptor positive (HCC)   9/28/2021 Initial Diagnosis    Ductal carcinoma in situ (DCIS) of left breast     9/28/2021 Biopsy    Final Diagnosis   A. Breast, Left, 7:00 location 2 cores w/ Calcs, Biopsy:  - Ductal carcinoma in situ.  - No invasive carcinoma is seen.       Architectural patterns: Solid, papillary       Nuclear grade of ductal carcinoma in situ: Grade 2 (II).       Necrosis:  Focal.       Size of in-situ carcinoma: At least 4 mm.  - Microcalcifications: Present in DCIS.  - Breast cancer biomarker studies (paraffin block number: A1):           Estrogen, progesterone receptor studies pending, to be described in a separate receptor report.      ER 90-95% positive  Pr90-95% positive  B. Breast, Left, 7:00 location 1 core w/ No Calcs, Biopsy:  - Atypical lobular hyperplasia.  - Usual ductal hyperplasia involving intraductal papilloma.      C. Breast, Left, 9:00 location 2 cores w/ Calcs, Biopsy:  - Non-proliferative fibrocystic changes, including microcysts, microcalcifications with stromal fibrosis.  - Negative for atypia or carcinoma.      D. Breast, Left, 9:00 location 1 core w/ No Calcs, Biopsy:  - Intraductal papilloma.  - Negative for atypia or carcinoma.       10/26/2021 -  Cancer Staged    Staging form: Breast, AJCC 8th Edition  - Clinical: Stage 0 (cTis (DCIS), cN0, cM0, ER+, CO+, HER2: Not Assessed) - Signed by Audrey Lacey MD on 10/26/2021  Stage prefix: Initial diagnosis  Method of lymph node assessment:  Clinical  Nuclear grade: G2       10/2021 Genetic Testing    Test: [employer] BRCAplus STAT Panel (8 genes): ZAKIA, BRCA1, BRCA2, CDH1, CHEK2, PALB2, PTEN, TP53   Result:   Negative - No Clinically Significant Variants Detected       Assessment:  Negative     Additional Testing:  Test(s): [employer] BRCAplus STAT Panel (8 genes): ZAKIA, BRCA1, BRCA2, CDH1, CHEK2, PALB2, PTEN, TP53 with reflex to [employer] (28 additional genes): APC, ZAKIA, AXIN2 BARD1, BRCA1, BRCA2, BRIP1, BMPR1A, CDH1, CDK4, CDKN2A, CHEK2, DICER1, EPCAM, GREM1, HOXB13, MLH1, MSH2, MSH3, MSH6, MUTYH, NBN, NF1, NTHL1, PALB2, PMS2, POLD1, POLE, PTEN, RAD51C, RAD51D, RECQL SMAD4, SMARCA4, STK11, TP53      Result: Variant of uncertain significance     Variant: MSH6 Variant, Unknown Significance: p.T6P       Variant of uncertain significance (VUS)     11/19/2021 Surgery    Final Diagnosis   A. Breast, Left, Left breast lumpectomy, short superior, long lateral:  - Invasive mammary carcinoma of no special type (ductal), 8 mm in greatest dimension with    extensive adjacent ductal carcinoma in-situ and associated non-invasive lobular neoplasia.    -- [place] histologic grade 2 of 3 (total score: 7 of 9)        * Glandular (acinar) Tubular Differentiation < 10%, score 3.        * Nuclear Pleomorphism 2 of 3, score 2.        * Mitotic Rate 4-7/mm2, (8-14 mitoses/10HPF; 8 mitoses/10 HPF), score 2.    -- Confirmed by tumor cell immunophenotype:        * Positive: E-cadherin, P120 - each in a membranous pattern.        * Negative: p63, SMMHC.  - Ductal carcinoma in-situ (DCIS): Present; not an extensive intraductal component associated with     invasive carcinoma (< 5% of total tumor) but extensive DCIS in adjacent tissue.     -- Confirmed by positive E-cadherin, p120 (membranous), SMMHC, p63 and loss of epithelial         mosaicism on CK5/6 immunostains.     -- Size and Extent:  At least 48 mm in approximate extent; present in 12 sequential sections; 43 of 91 blocks.      -- Architectural Patterns: Cribriform, micropapillary, solid, papillary.    -- Nuclear grade: I-II (low to intermediate)    -- Necrosis: Present, focal and central necrosis identified.     -- Associated prior biopsy site changes with twist clip and Aliyah  marker.  - Multifocal non-invasive lobular neoplasia (focally florid lobular carcinoma in-situ/LCIS and atypical lobular     hyperplasia with pagetoid ductal spread) involving and adjacent to DCIS.    -- Confirmed by negative E-cadherin; positive p120 (cytoplasmic), SMMHC, p63; and loss of epithelial        mosaicism on CK5/6 immunostains.   - Margins uninvolved by invasive and in-situ carcinoma.    -- Invasive carcinoma: 8 mm from nearest posterior margin.     -- DCIS: 0.1 mm from nearest posterior margin; < 1 mm from nearest anterior margin; 1.5 mm from        nearest medial margin; > 2 mm from all remaining margins.   - Benign skin.    -- No dermal lymphovascular invasion.   - Lymphovascular invasion: Not identified.  - Microcalcifications: Present, associated with DCIS.   - Benign fibrocystic changes with atypia (atypical and florid usual ductal hyperplasia, columnar cell     lesions with flat epithelial atypia/FEA, cystic and papillary apocrine metaplasia).  - Radial scar, 0.4 cm.      B. Breast, Left, Left Breast Lumpectomy new superior margin - Short Superior, Long Lateral:  - Ductal carcinoma in-situ (DCIS).     -- Size and Extent: 16 mm in greatest approximate extent; present in 4 sequential sections; 7 of 78 blocks.     -- Architectural Patterns: Cribriform, micropapillary, solid, papillary.    -- Nuclear grade: I-II (low to intermediate).     -- Necrosis: Present, focal.     -- Confirmed by positive E-cadherin, P120 (membranous) and loss of epithelial mosaicism on         CK5/6 immunostains.    -- Associated prior biopsy site changes with biopsy clip and ALIYAH  marker.  - No invasive carcinoma identified.  - Margins uninvolved by DCIS.     --  DCIS < 1 mm from nearest posterior margin; < 1-2 mm from medial margin; > 2 mm from all       remaining margins.    - Lymphovascular invasion: Not identified.  - Microcalcifications: Present, associated with DCIS, lobular neoplasia and non-neoplastic breast tissue.    - Benign fibrocystic changes with atypia:    -- Multifocal/extensive non-invasive lobular neoplasia (focal lobular carcinoma in-situ and        predominantly atypical lobular hyperplasia with pagetoid ductal spread), focally involving        and adjacent to DCIS.        ** Confirmed by negative E-cadherin; positive p120 (cytoplasmic) immunostains.     -- Usual ductal hyperplasia, columnar cell change and hyperplasia with focal flat epithelial        atypia/FEA, sclerosing adenosis, fibroadenomatoid changes, cystic and papillary apocrine metaplasia.   - Small intraductal papilloma without atypia, 0.1 cm.       12/7/2021 -  Cancer Staged    Staging form: Breast, AJCC 8th Edition  - Pathologic stage from 12/7/2021: Stage IA (pT1b, pN0, cM0, G2, ER+, KS+, HER2-) - Signed by Audrey Lacey MD on 12/7/2021  Stage prefix: Initial diagnosis  Method of lymph node assessment: Clinical  Histologic grading system: 3 grade system       1/21/2022 Surgery    Final Diagnosis   A.  Left breast, anterior margin:     - Histologic changes compatible with prior surgical procedure.     - Skin with dermal cicatrix.     - No invasive or in situ carcinoma identified.     - Final anterior margin is negative for malignancy.     B.  Left breast, new posterior margin:     - Focus of atypical lobular hyperplasia (ALH).     - No invasive or in situ carcinoma identified.     - Final posterior margin is negative for malignancy.     C.  Left breast, medial margin:     - Histologic changes compatible with prior surgical procedure.     - No invasive or in situ carcinoma identified.     - Final medial margin is negative for malignacny.     D.  Left axilla, sentinel lymph node #1:     -  "Single lymph node; negative for malignancy (0/1).     E.  Left axilla, sentinel lymph node #2:     - Single lymph node; negative for malignancy (0/1).     F.  Left axilla, sentinel lymph node #3:     - Single lymph node; negative for malignancy (0/1).     G.  Left axilla, sentinel lymph node #4:     - Single lymph node; negative for malignancy (0/1).      3/23/2022 - 4/2022 Radiation    Prone L Boost 10X/6X 5 / 5 200 0 1,000 6   Prone L Br # 6X 16 / 16 266 0 4,256 21      Treatment dates:  3/23/2022 - 4/20/2022         Review of Systems   Constitutional:  Negative for activity change, appetite change, fatigue and unexpected weight change.   Respiratory:  Negative for cough and shortness of breath.    Cardiovascular:  Negative for chest pain.   Gastrointestinal:  Negative for abdominal pain, diarrhea, nausea and vomiting.   Endocrine: Negative for heat intolerance.   Musculoskeletal:  Negative for arthralgias, back pain and myalgias.   Skin:  Negative for rash.   Neurological:  Negative for weakness and headaches.   Hematological:  Negative for adenopathy.    A complete review of systems is negative other than that noted above in the HPI.    Objective   /70 (BP Location: Left arm, Patient Position: Sitting, Cuff Size: Large)   Pulse 86   Temp (!) 97.2 °F (36.2 °C) (Temporal)   Ht 5' 3\" (1.6 m)   LMP 12/15/2003 (Within Years)   SpO2 98%   BMI 36.03 kg/m²   Vitals:    03/28/25 1046   BP: 132/70   Pulse: 86   Temp: (!) 97.2 °F (36.2 °C)   SpO2: 98%       Pain Screening:  Pain Score: 0-No pain  ECOG    Physical Exam  Constitutional:       General: She is not in acute distress.     Appearance: Normal appearance.   Cardiovascular:      Rate and Rhythm: Normal rate and regular rhythm.      Pulses: Normal pulses.      Heart sounds: Normal heart sounds.   Pulmonary:      Effort: Pulmonary effort is normal.      Breath sounds: Normal breath sounds.   Chest:      Chest wall: No mass.   Breasts:     Right: No " swelling, bleeding, inverted nipple, mass, nipple discharge, skin change or tenderness.      Left: No swelling, bleeding, inverted nipple, mass, nipple discharge, skin change or tenderness.       Abdominal:      General: Abdomen is flat.      Palpations: Abdomen is soft.   Lymphadenopathy:      Upper Body:      Right upper body: No supraclavicular, axillary or pectoral adenopathy.      Left upper body: No supraclavicular, axillary or pectoral adenopathy.   Skin:     General: Skin is warm.   Neurological:      General: No focal deficit present.      Mental Status: She is alert and oriented to person, place, and time.   Psychiatric:         Mood and Affect: Mood normal.         Behavior: Behavior normal.

## 2025-03-31 ENCOUNTER — OFFICE VISIT (OUTPATIENT)
Dept: GASTROENTEROLOGY | Facility: CLINIC | Age: 65
End: 2025-03-31
Payer: MEDICARE

## 2025-03-31 VITALS
OXYGEN SATURATION: 98 % | HEIGHT: 64 IN | TEMPERATURE: 98.7 F | WEIGHT: 200 LBS | DIASTOLIC BLOOD PRESSURE: 76 MMHG | HEART RATE: 96 BPM | SYSTOLIC BLOOD PRESSURE: 135 MMHG | BODY MASS INDEX: 34.15 KG/M2

## 2025-03-31 DIAGNOSIS — Z17.0 MALIGNANT NEOPLASM OF LOWER-INNER QUADRANT OF LEFT BREAST IN FEMALE, ESTROGEN RECEPTOR POSITIVE (HCC): ICD-10-CM

## 2025-03-31 DIAGNOSIS — C50.312 MALIGNANT NEOPLASM OF LOWER-INNER QUADRANT OF LEFT BREAST IN FEMALE, ESTROGEN RECEPTOR POSITIVE (HCC): ICD-10-CM

## 2025-03-31 DIAGNOSIS — Z12.11 SCREENING FOR COLON CANCER: ICD-10-CM

## 2025-03-31 DIAGNOSIS — R13.19 ESOPHAGEAL DYSPHAGIA: Primary | ICD-10-CM

## 2025-03-31 DIAGNOSIS — R13.10 DYSPHAGIA, UNSPECIFIED TYPE: ICD-10-CM

## 2025-03-31 PROCEDURE — G2211 COMPLEX E/M VISIT ADD ON: HCPCS | Performed by: INTERNAL MEDICINE

## 2025-03-31 PROCEDURE — 99203 OFFICE O/P NEW LOW 30 MIN: CPT | Performed by: INTERNAL MEDICINE

## 2025-03-31 RX ORDER — OMEPRAZOLE 40 MG/1
40 CAPSULE, DELAYED RELEASE ORAL DAILY
Qty: 30 CAPSULE | Refills: 2 | Status: SHIPPED | OUTPATIENT
Start: 2025-03-31

## 2025-03-31 NOTE — PROGRESS NOTES
Name: eNhal Bryant      : 1960      MRN: [id number]  Encounter Provider: Sofy Ellington DO  Encounter Date: 3/31/2025   Encounter department: [facility] [place]  :  Assessment & Plan  Esophageal dysphagia  Nehal Bryant is a 64 y.o. female with past medical history significant for hypertension, asthma, SONIYA, GERD, osteoporosis, anxiety, history of breast cancer currently on anastrozole who presents to GI clinic to establish care for concern of dysphagia. Many years of solid food dysphagia, has progressed to foods not passing with liquids. Has frequent episodes of having to induce regurgitation. Multiple family members with same. Symptoms predate radiation treatment for breast cancer.    Patient has undergone barium esophagram most recently 10/2023 for evaluation of dysphagia.  At that time, esophagus was normal in caliber.  Esophageal motility was normal with prompt emptying of contrast from the esophagus.  No mucosal lesion, ulceration, or evidence of fold thickening was noted.  No GERD was noted.  There was a small to moderate sliding hiatal hernia.    Last EGD completed 2015 significant for normal esophagus with normal GE junction, mild antral gastritis, mild duodenitis.  Duodenal biopsies negative.  Gastric biopsies significant for chronic mildly active gastritis with few H. pylori organisms identified.  Patient completed triple therapy per chart review and follow-up stool antigen done 2015 was negative.    Etiology unclear with broad differential including severe esophagitis, peptic stricture, hiatal hernia, radiation-induced esophagitis, malignancy, EOE  Would recommend EGD on expedited basis - will offer tomorrow at   Recommend soft foods until EGD  Start omeprazole 40 mg daily  Discussed ED presentation precautions in regard to swallowing issue      Orders:    EGD; Future    omeprazole (PriLOSEC) 40 MG capsule; Take 1 capsule (40 mg total) by mouth  daily    Malignant neoplasm of lower-inner quadrant of left breast in female, estrogen receptor positive (HCC)  Initial diagnosis in September 2021.  Patient status postlumpectomy and sentinel lymph node biopsy.  She completed whole breast radiation therapy and is maintained on anastrozole       Screening for colon cancer  Last colonoscopy completed 2016 significant for sigmoid diverticulosis.  Due for repeat 2026.           History of Present Illness   Nehal Bryant is a 64 y.o. female with past medical history significant for hypertension, asthma, SONIYA, GERD, osteoporosis, anxiety, history of breast cancer currently on anastrozole who presents to GI clinic to establish care for concern of dysphagia.    Patient recently seen by PCP 3/7/2025 for TCM visit after recent hospitalization.  She was hospitalized from 2/25 through 2/28 for concern of right sided facial and upper extremity numbness/tingling with associated headache.  Cardiac workup was negative.  Stroke evaluation was also negative.  Seen by neurology and suspected systemic symptoms associated with chronic migraine.  She was recommended to start magnesium and/or riboflavin.  At time of her follow-up with PCP she endorsed dysphagia to solid foods.  Reportedly with family history of stricture requiring dilation.  She was referred to GI for further evaluation and recommendation.    Frequently has symptoms of food getting stuck. Tries to drink liquids and doesn't help make it pass. Ends up having to endorse vomiting. Has many family members with similar issues, and have needed their esophagus stretched. This has been going on for many years.     Of note, patient has undergone barium esophagram most recently 10/2023 for evaluation of dysphagia.  At that time, esophagus was normal in caliber.  Esophageal motility was normal with prompt emptying of contrast from the esophagus.  No mucosal lesion, ulceration, or evidence of fold thickening was noted.  No GERD was  noted.  There was a small to moderate sliding hiatal hernia.    Last EGD completed 6/2015 significant for normal esophagus with normal GE junction, mild antral gastritis, mild duodenitis.  Duodenal biopsies negative.  Gastric biopsies significant for chronic mildly active gastritis with few H. pylori organisms identified.  Patient completed triple therapy per chart review and follow-up stool antigen done 9/2015 was negative.    Last colonoscopy completed 5/23/2016 significant for sigmoid diverticulosis with recommendation to repeat in 10 years.      HPI    Review of Systems A complete review of systems is negative other than that noted above in the HPI.         Objective   LMP 12/15/2003 (Within Years)     Physical Exam  Vitals and nursing note reviewed.   Constitutional:       General: She is not in acute distress.     Appearance: Normal appearance. She is not ill-appearing.   HENT:      Head: Normocephalic and atraumatic.      Nose: Nose normal.      Mouth/Throat:      Mouth: Mucous membranes are moist.   Eyes:      General: No scleral icterus.     Conjunctiva/sclera: Conjunctivae normal.   Cardiovascular:      Rate and Rhythm: Normal rate and regular rhythm.   Pulmonary:      Effort: Pulmonary effort is normal. No respiratory distress.   Abdominal:      General: Bowel sounds are normal. There is no distension.      Palpations: Abdomen is soft. There is no mass.      Tenderness: There is no abdominal tenderness. There is no guarding or rebound.      Hernia: No hernia is present.   Musculoskeletal:         General: Normal range of motion.      Cervical back: Normal range of motion.      Right lower leg: No edema.      Left lower leg: No edema.   Skin:     General: Skin is warm and dry.      Coloration: Skin is not jaundiced.      Findings: No bruising.   Neurological:      General: No focal deficit present.      Mental Status: She is alert and oriented to person, place, and time.      Motor: No weakness.    Psychiatric:         Mood and Affect: Mood normal.         Behavior: Behavior normal.            Lab Results: I personally reviewed relevant lab results.

## 2025-03-31 NOTE — H&P (VIEW-ONLY)
Name: Nehal Bryant      : 1960      MRN: [id number]  Encounter Provider: Sofy Ellington DO  Encounter Date: 3/31/2025   Encounter department: [facility] [place]  :  Assessment & Plan  Esophageal dysphagia  Nehal Bryant is a 64 y.o. female with past medical history significant for hypertension, asthma, SONIYA, GERD, osteoporosis, anxiety, history of breast cancer currently on anastrozole who presents to GI clinic to establish care for concern of dysphagia. Many years of solid food dysphagia, has progressed to foods not passing with liquids. Has frequent episodes of having to induce regurgitation. Multiple family members with same. Symptoms predate radiation treatment for breast cancer.    Patient has undergone barium esophagram most recently 10/2023 for evaluation of dysphagia.  At that time, esophagus was normal in caliber.  Esophageal motility was normal with prompt emptying of contrast from the esophagus.  No mucosal lesion, ulceration, or evidence of fold thickening was noted.  No GERD was noted.  There was a small to moderate sliding hiatal hernia.    Last EGD completed 2015 significant for normal esophagus with normal GE junction, mild antral gastritis, mild duodenitis.  Duodenal biopsies negative.  Gastric biopsies significant for chronic mildly active gastritis with few H. pylori organisms identified.  Patient completed triple therapy per chart review and follow-up stool antigen done 2015 was negative.    Etiology unclear with broad differential including severe esophagitis, peptic stricture, hiatal hernia, radiation-induced esophagitis, malignancy, EOE  Would recommend EGD on expedited basis - will offer tomorrow at   Recommend soft foods until EGD  Start omeprazole 40 mg daily  Discussed ED presentation precautions in regard to swallowing issue      Orders:    EGD; Future    omeprazole (PriLOSEC) 40 MG capsule; Take 1 capsule (40 mg total) by mouth  daily    Malignant neoplasm of lower-inner quadrant of left breast in female, estrogen receptor positive (HCC)  Initial diagnosis in September 2021.  Patient status postlumpectomy and sentinel lymph node biopsy.  She completed whole breast radiation therapy and is maintained on anastrozole       Screening for colon cancer  Last colonoscopy completed 2016 significant for sigmoid diverticulosis.  Due for repeat 2026.           History of Present Illness   Nehal Bryant is a 64 y.o. female with past medical history significant for hypertension, asthma, SONIYA, GERD, osteoporosis, anxiety, history of breast cancer currently on anastrozole who presents to GI clinic to establish care for concern of dysphagia.    Patient recently seen by PCP 3/7/2025 for TCM visit after recent hospitalization.  She was hospitalized from 2/25 through 2/28 for concern of right sided facial and upper extremity numbness/tingling with associated headache.  Cardiac workup was negative.  Stroke evaluation was also negative.  Seen by neurology and suspected systemic symptoms associated with chronic migraine.  She was recommended to start magnesium and/or riboflavin.  At time of her follow-up with PCP she endorsed dysphagia to solid foods.  Reportedly with family history of stricture requiring dilation.  She was referred to GI for further evaluation and recommendation.    Frequently has symptoms of food getting stuck. Tries to drink liquids and doesn't help make it pass. Ends up having to endorse vomiting. Has many family members with similar issues, and have needed their esophagus stretched. This has been going on for many years.     Of note, patient has undergone barium esophagram most recently 10/2023 for evaluation of dysphagia.  At that time, esophagus was normal in caliber.  Esophageal motility was normal with prompt emptying of contrast from the esophagus.  No mucosal lesion, ulceration, or evidence of fold thickening was noted.  No GERD was  noted.  There was a small to moderate sliding hiatal hernia.    Last EGD completed 6/2015 significant for normal esophagus with normal GE junction, mild antral gastritis, mild duodenitis.  Duodenal biopsies negative.  Gastric biopsies significant for chronic mildly active gastritis with few H. pylori organisms identified.  Patient completed triple therapy per chart review and follow-up stool antigen done 9/2015 was negative.    Last colonoscopy completed 5/23/2016 significant for sigmoid diverticulosis with recommendation to repeat in 10 years.      HPI    Review of Systems A complete review of systems is negative other than that noted above in the HPI.         Objective   LMP 12/15/2003 (Within Years)     Physical Exam  Vitals and nursing note reviewed.   Constitutional:       General: She is not in acute distress.     Appearance: Normal appearance. She is not ill-appearing.   HENT:      Head: Normocephalic and atraumatic.      Nose: Nose normal.      Mouth/Throat:      Mouth: Mucous membranes are moist.   Eyes:      General: No scleral icterus.     Conjunctiva/sclera: Conjunctivae normal.   Cardiovascular:      Rate and Rhythm: Normal rate and regular rhythm.   Pulmonary:      Effort: Pulmonary effort is normal. No respiratory distress.   Abdominal:      General: Bowel sounds are normal. There is no distension.      Palpations: Abdomen is soft. There is no mass.      Tenderness: There is no abdominal tenderness. There is no guarding or rebound.      Hernia: No hernia is present.   Musculoskeletal:         General: Normal range of motion.      Cervical back: Normal range of motion.      Right lower leg: No edema.      Left lower leg: No edema.   Skin:     General: Skin is warm and dry.      Coloration: Skin is not jaundiced.      Findings: No bruising.   Neurological:      General: No focal deficit present.      Mental Status: She is alert and oriented to person, place, and time.      Motor: No weakness.    Psychiatric:         Mood and Affect: Mood normal.         Behavior: Behavior normal.            Lab Results: I personally reviewed relevant lab results.

## 2025-03-31 NOTE — ASSESSMENT & PLAN NOTE
Initial diagnosis in September 2021.  Patient status postlumpectomy and sentinel lymph node biopsy.  She completed whole breast radiation therapy and is maintained on anastrozole

## 2025-04-04 ENCOUNTER — ANESTHESIA (OUTPATIENT)
Dept: GASTROENTEROLOGY | Facility: MEDICAL CENTER | Age: 65
End: 2025-04-04
Payer: MEDICARE

## 2025-04-04 ENCOUNTER — ANESTHESIA EVENT (OUTPATIENT)
Dept: GASTROENTEROLOGY | Facility: MEDICAL CENTER | Age: 65
End: 2025-04-04
Payer: MEDICARE

## 2025-04-04 ENCOUNTER — TELEPHONE (OUTPATIENT)
Dept: GASTROENTEROLOGY | Facility: MEDICAL CENTER | Age: 65
End: 2025-04-04

## 2025-04-04 ENCOUNTER — HOSPITAL ENCOUNTER (OUTPATIENT)
Dept: GASTROENTEROLOGY | Facility: MEDICAL CENTER | Age: 65
Setting detail: OUTPATIENT SURGERY
Discharge: HOME | End: 2025-04-04
Admitting: INTERNAL MEDICINE
Payer: MEDICARE

## 2025-04-04 VITALS
SYSTOLIC BLOOD PRESSURE: 119 MMHG | WEIGHT: 200 LBS | HEIGHT: 64 IN | BODY MASS INDEX: 34.15 KG/M2 | RESPIRATION RATE: 20 BRPM | HEART RATE: 71 BPM | DIASTOLIC BLOOD PRESSURE: 67 MMHG | OXYGEN SATURATION: 97 % | TEMPERATURE: 97.3 F

## 2025-04-04 DIAGNOSIS — R13.19 ESOPHAGEAL DYSPHAGIA: ICD-10-CM

## 2025-04-04 DIAGNOSIS — K22.2 PEPTIC STRICTURE OF ESOPHAGUS: Primary | ICD-10-CM

## 2025-04-04 PROCEDURE — 88305 TISSUE EXAM BY PATHOLOGIST: CPT | Performed by: PATHOLOGY

## 2025-04-04 PROCEDURE — C1726 CATH, BAL DIL, NON-VASCULAR: HCPCS

## 2025-04-04 PROCEDURE — 43239 EGD BIOPSY SINGLE/MULTIPLE: CPT | Performed by: INTERNAL MEDICINE

## 2025-04-04 PROCEDURE — 43249 ESOPH EGD DILATION <30 MM: CPT | Performed by: INTERNAL MEDICINE

## 2025-04-04 RX ORDER — SODIUM CHLORIDE, SODIUM LACTATE, POTASSIUM CHLORIDE, CALCIUM CHLORIDE 600; 310; 30; 20 MG/100ML; MG/100ML; MG/100ML; MG/100ML
INJECTION, SOLUTION INTRAVENOUS CONTINUOUS PRN
Status: DISCONTINUED | OUTPATIENT
Start: 2025-04-04 | End: 2025-04-04

## 2025-04-04 RX ORDER — SUCRALFATE ORAL 1 G/10ML
1 SUSPENSION ORAL
Qty: 90 ML | Refills: 0 | Status: SHIPPED | OUTPATIENT
Start: 2025-04-04 | End: 2025-04-07

## 2025-04-04 RX ORDER — SODIUM CHLORIDE, SODIUM LACTATE, POTASSIUM CHLORIDE, CALCIUM CHLORIDE 600; 310; 30; 20 MG/100ML; MG/100ML; MG/100ML; MG/100ML
125 INJECTION, SOLUTION INTRAVENOUS CONTINUOUS
Status: CANCELLED | OUTPATIENT
Start: 2025-04-04

## 2025-04-04 RX ORDER — PROPOFOL 10 MG/ML
INJECTION, EMULSION INTRAVENOUS AS NEEDED
Status: DISCONTINUED | OUTPATIENT
Start: 2025-04-04 | End: 2025-04-04

## 2025-04-04 RX ORDER — LIDOCAINE HYDROCHLORIDE 20 MG/ML
INJECTION, SOLUTION EPIDURAL; INFILTRATION; INTRACAUDAL; PERINEURAL AS NEEDED
Status: DISCONTINUED | OUTPATIENT
Start: 2025-04-04 | End: 2025-04-04

## 2025-04-04 RX ADMIN — PROPOFOL 40 MG: 10 INJECTION, EMULSION INTRAVENOUS at 10:50

## 2025-04-04 RX ADMIN — PROPOFOL 40 MG: 10 INJECTION, EMULSION INTRAVENOUS at 10:49

## 2025-04-04 RX ADMIN — PROPOFOL 40 MG: 10 INJECTION, EMULSION INTRAVENOUS at 10:57

## 2025-04-04 RX ADMIN — PROPOFOL 40 MG: 10 INJECTION, EMULSION INTRAVENOUS at 10:45

## 2025-04-04 RX ADMIN — LIDOCAINE HYDROCHLORIDE 80 MG: 20 INJECTION, SOLUTION EPIDURAL; INFILTRATION; INTRACAUDAL at 10:45

## 2025-04-04 RX ADMIN — PROPOFOL 40 MG: 10 INJECTION, EMULSION INTRAVENOUS at 10:55

## 2025-04-04 RX ADMIN — SODIUM CHLORIDE, SODIUM LACTATE, POTASSIUM CHLORIDE, AND CALCIUM CHLORIDE: .6; .31; .03; .02 INJECTION, SOLUTION INTRAVENOUS at 10:26

## 2025-04-04 RX ADMIN — PROPOFOL 40 MG: 10 INJECTION, EMULSION INTRAVENOUS at 10:46

## 2025-04-04 RX ADMIN — PROPOFOL 40 MG: 10 INJECTION, EMULSION INTRAVENOUS at 10:47

## 2025-04-04 RX ADMIN — PROPOFOL 40 MG: 10 INJECTION, EMULSION INTRAVENOUS at 10:48

## 2025-04-04 RX ADMIN — PROPOFOL 40 MG: 10 INJECTION, EMULSION INTRAVENOUS at 10:52

## 2025-04-04 NOTE — ANESTHESIA PREPROCEDURE EVALUATION
Procedure:  EGD    Relevant Problems   ANESTHESIA (within normal limits)      CARDIO   (+) Benign essential hypertension   (+) Combined hyperlipidemia      GI/HEPATIC   (+) GERD (gastroesophageal reflux disease)      GYN   (+) Malignant neoplasm of lower-inner quadrant of left breast in female, estrogen receptor positive (HCC)      NEURO/PSYCH   (+) Anxiety      PULMONARY   (+) Asthma   (+) SONIYA (obstructive sleep apnea) (Compliant with CPAP)   (+) Shortness of breath        Physical Exam    Airway    Mallampati score: III  TM Distance: >3 FB  Neck ROM: full     Dental   No notable dental hx     Cardiovascular  Cardiovascular exam normal    Pulmonary  Pulmonary exam normal     Other Findings  post-pubertal.      Anesthesia Plan  ASA Score- 3     Anesthesia Type- IV sedation with anesthesia with ASA Monitors.         Additional Monitors:     Airway Plan:            Plan Factors-Exercise tolerance (METS): >4 METS.    Chart reviewed. EKG reviewed. Imaging results reviewed. Existing labs reviewed. Patient summary reviewed.    Patient is not a current smoker.      Obstructive sleep apnea risk education given perioperatively.        Induction- intravenous.    Postoperative Plan-         Informed Consent- Anesthetic plan and risks discussed with patient.  I personally reviewed this patient with the CRNA. Discussed and agreed on the Anesthesia Plan with the CRNA..      NPO Status:  Vitals Value Taken Time   Date of last liquid 04/03/25 04/04/25 1005   Time of last liquid 2100 04/04/25 1005   Date of last solid 04/03/25 04/04/25 1005   Time of last solid 2000 04/04/25 1005

## 2025-04-04 NOTE — TELEPHONE ENCOUNTER
Called and spoke to pt to schedule repeat EGD in 2-4 weeks. Pt has scheduled procedure and has instructions for procedure.         Message  Received: Today  Diana M Jaiyeola, MD  [facility]  Please schedule this patient for repeat EGD with me in 2 to 4 weeks for esophageal dilation

## 2025-04-07 ENCOUNTER — TELEPHONE (OUTPATIENT)
Age: 65
End: 2025-04-07

## 2025-04-07 NOTE — TELEPHONE ENCOUNTER
PA for sucralfate 1 g/ 10 mg susp    SUBMITTED to [employer] employee program, [facility] PART D    via    [x][facility]-KEY: [id number]        Turnaround time for your insurance to make a decision on your Prior Authorization can take 7-21 business days.

## 2025-04-07 NOTE — TELEPHONE ENCOUNTER
Patients GI provider:  Dr. Jaiyeola    Number to return call: [phone]     Reason for call: Pt calling stating her pharmacy advised she needs a PA on medication sucralfate    Scheduled procedure/appointment date if applicable:procedure 4/25/25

## 2025-04-07 NOTE — PROGRESS NOTES
Thank you for your message.  I have updated her procedure report to reflect that esophageal balloon dilation was performed                                          HIM - PROCEDURE RESPONSE NOTE    Based on the query that was sent to you, please document below any procedures that were performed.

## 2025-04-08 NOTE — TELEPHONE ENCOUNTER
PA for SUCRALFATE 1G/10 MG  APPROVED     Date(s) approved UNTIL 04/07/2026        Patient advised by          [x]Petnethart Message  []Phone call   [x]LMOM  []L/M to call office as no active Communication consent on file  []Unable to leave detailed message as VM not approved on Communication consent       Pharmacy advised by    [x]Fax  []Phone call  []Secure Chat    Specialty Pharmacy    []     Approval letter scanned into Media Yes

## 2025-04-09 ENCOUNTER — RESULTS FOLLOW-UP (OUTPATIENT)
Dept: GASTROENTEROLOGY | Facility: MEDICAL CENTER | Age: 65
End: 2025-04-09

## 2025-04-09 PROCEDURE — 88305 TISSUE EXAM BY PATHOLOGIST: CPT | Performed by: PATHOLOGY

## 2025-04-11 ENCOUNTER — ANESTHESIA EVENT (OUTPATIENT)
Dept: ANESTHESIOLOGY | Facility: HOSPITAL | Age: 65
End: 2025-04-11

## 2025-04-11 ENCOUNTER — ANESTHESIA (OUTPATIENT)
Dept: ANESTHESIOLOGY | Facility: HOSPITAL | Age: 65
End: 2025-04-11

## 2025-04-11 DIAGNOSIS — E78.2 COMBINED HYPERLIPIDEMIA: ICD-10-CM

## 2025-04-11 RX ORDER — ATORVASTATIN CALCIUM 10 MG/1
10 TABLET, FILM COATED ORAL DAILY
Qty: 90 TABLET | Refills: 1 | Status: SHIPPED | OUTPATIENT
Start: 2025-04-11

## 2025-04-16 ENCOUNTER — OFFICE VISIT (OUTPATIENT)
Dept: NEUROLOGY | Facility: CLINIC | Age: 65
End: 2025-04-16
Payer: MEDICARE

## 2025-04-16 VITALS
TEMPERATURE: 97.5 F | SYSTOLIC BLOOD PRESSURE: 126 MMHG | DIASTOLIC BLOOD PRESSURE: 78 MMHG | WEIGHT: 201.8 LBS | HEIGHT: 64 IN | BODY MASS INDEX: 34.45 KG/M2 | OXYGEN SATURATION: 98 % | HEART RATE: 97 BPM

## 2025-04-16 DIAGNOSIS — R29.818 TRANSIENT NEUROLOGICAL SYMPTOMS: Primary | ICD-10-CM

## 2025-04-16 DIAGNOSIS — G44.209 TENSION HEADACHE: ICD-10-CM

## 2025-04-16 PROCEDURE — 99213 OFFICE O/P EST LOW 20 MIN: CPT | Performed by: NURSE PRACTITIONER

## 2025-04-16 NOTE — PROGRESS NOTES
Name: Nehal Bryant      : 1960      MRN: [id number]  Encounter Provider: ALAINA Delgado  Encounter Date: 2025   Encounter department: [facility] [place]  :  Assessment & Plan  Transient neurological symptoms  no sign of stroke on workup; diagnosed as possible migraine; patient has no history of such but had been off cpap for 1 week and had HTN        Tension headache  was getting for months prior to hospital visit; better on vitamins now but would like to reduce and see how she does          Assessment & Plan  1. Post-hospitalization follow-up.  Her blood pressure was elevated during her hospital stay, potentially due to discontinuation of CPAP therapy, which may have also contributed to her headaches and numbness. The onset of headaches prior to the cessation of CPAP therapy is noteworthy. Her response to magnesium and B2, both well-studied vitamins for migraine prevention, is intriguing. Magnesium, a natural muscle relaxant, can also alleviate tension headaches related to stress or CPAP straps.  The MRI of her brain revealed minor white matter changes, indicative of small vessel disease, a common finding in older individuals with high cholesterol, hypertension, or migraines. The degree of these changes is mild, and overall, her brain appears healthy. The CT angiogram of her head and neck did not reveal any abnormalities. She is advised to maintain consistent use of her CPAP machine and continue her current regimen of magnesium for an additional 2 months. If she remains free of headaches, she may consider discontinuing the magnesium. However, if headaches recur upon discontinuation of these medications, she should resume their use.    2. Severe sleep apnea.  She is advised to continue working with her sleep apnea doctor to optimize the CPAP settings and mask fit. Alternative treatments were discussed, but CPAP remains the gold standard for treating obstructive sleep  apnea.    3. Hiatal hernia.  She reports improvement in symptoms following esophageal dilation. She is currently taking Pepcid for acid control. She has a follow-up appointment with her GI doctor on 04/25/2025.    4. Osteoporosis.  She has osteoporosis, particularly in the lumbar area. If she continues to experience pain or discomfort, further imaging of the back/left hip may be considered. She is advised to discuss this with her family doctor, Dr. Knight      Follow up as needed; she is encouraged to call the office if any increase in frequency of headaches or if new neurological symptoms; if there are stroke symptoms she is encourage to go to the hospital.    History of Present Illness   History of Present Illness  The patient is a 64-year-old female who presents today for a hospital follow-up. She was admitted from 02/25/2025 to 02/28/2025 with right facial and upper extremity numbness and tingling, as well as headaches that had been ongoing for about a week prior to her presentation to the emergency department. She was hypertensive upon arrival and mentioned that she had stopped her CPAP at that time. MRI of the brain and other testing done at that time did not show any signs of stroke. She was started on vitamin therapy for presumed migraine headache. Today, she reports no current headaches and has not had any issues since her hospital discharge.    Headaches are not typical for her. She experienced headaches for several months, followed by numbness on the right side of her face and arm. She uses a CPAP machine for sleep apnea but discontinued its use for about a week due to discomfort and noise, after which her symptoms worsened. The headaches were present before she stopped using the CPAP, but the numbness was not. She has been trying different masks and settings with her sleep apnea doctor, Dr. Mercer, and it is getting a little better. She has not experienced any headaches since her hospital discharge. She  does not routinely monitor her blood pressure at home but reports it as normal today. She had slight headaches in her 30s, which were managed with Tylenol and did not require medical attention. She started taking magnesium and riboflavin about a week after her hospital discharge and reports no symptoms during this period. She reports no side effects from these medications. She is currently on amlodipine 10 mg for blood pressure management.    She has severe sleep apnea and is considering the Inspire device as an alternative to the CPAP mask. Her doctor has advised her to lose at least 20 pounds.    She occasionally feels like she has mucus in her throat. She had an EGD and was told she has a hiatal hernia. She was given Pepcid. She has an appointment with her GI doctor on 04/25/2025 because they had to stretch her esophagus. She feels better since they did that.    She has some pain in her left hip, She does not have a lot of back pain. She feels it sometimes when she gets up. It does not shoot down the leg.    INTERVAL: Since last visit, she has not experienced any headaches and has been working on adjusting her CPAP settings. She started taking magnesium and riboflavin about a week after her hospital discharge.    SOCIAL HISTORY  Marital Status:   Sleep: Uses CPAP for sleep apnea, has had trouble with CPAP settings and mask fit, average sleep duration not mentioned.      FAMILY HISTORY  - Father: Stroke    MRI brain 2/27/2025:  IMPRESSION:  No acute intracranial pathology. Minimal chronic microangiopathy.    CTA head/neck 2/25/2025:  IMPRESSION:  1. No evidence of acute infarct, intracranial hemorrhage or mass.  2. No hemodynamically significant stenosis, dissection or occlusion of the carotid or vertebral arteries or major vessels of the [place].    Lab Results   Component Value Date     03/05/2015    SODIUM 143 03/26/2025    K 3.8 03/26/2025     03/26/2025    CO2 27 03/26/2025     ANIONGAP 10 03/05/2015    AGAP 9 03/26/2025    BUN 14 03/26/2025    CREATININE 0.85 03/26/2025    GLUC 106 02/26/2025    GLUF 90 03/26/2025    CALCIUM 10.1 03/26/2025    AST 15 03/26/2025    ALT 12 03/26/2025    ALKPHOS 109 (H) 03/26/2025    PROT 7.7 03/05/2015    TP 7.7 03/26/2025    BILITOT 0.81 03/05/2015    TBILI 0.90 03/26/2025    EGFR 72 03/26/2025     Lab Results   Component Value Date    YAP4LPWFWRME 1.610 02/25/2025     Lab Results   Component Value Date    LDLCALC 101 (H) 02/26/2025     Lab Results   Component Value Date    HGBA1C 5.6 02/26/2025     Lab Results   Component Value Date    WBC 5.25 02/26/2025    HGB 12.8 02/26/2025    HCT 39.3 02/26/2025    MCV 90 02/26/2025     02/26/2025         HPI   Review of Systems   Constitutional: Negative.    HENT: Negative.     Eyes: Negative.    Respiratory: Negative.     Cardiovascular: Negative.    Gastrointestinal: Negative.    Endocrine: Negative.    Genitourinary: Negative.    Musculoskeletal: Negative.    Skin: Negative.    Allergic/Immunologic: Negative.    Neurological: Negative.    Hematological: Negative.    Psychiatric/Behavioral: Negative.        Pt states no headaches, no numbness face or tingling in arm    I have personally reviewed the MA's review of systems and made changes as necessary.    Current Outpatient Medications on File Prior to Visit   Medication Sig Dispense Refill    albuterol (PROVENTIL HFA,VENTOLIN HFA) 90 mcg/act inhaler INHALE 2 PUFFS BY MOUTH EVERY 6 HOURS AS NEEDED FOR WHEEZING 18 g 0    amLODIPine (NORVASC) 10 mg tablet Take 1 tablet (10 mg total) by mouth daily 100 tablet 1    anastrozole (ARIMIDEX) 1 mg tablet Take 1 tablet (1 mg total) by mouth daily 90 tablet 3    atorvastatin (LIPITOR) 10 mg tablet Take 1 tablet by mouth once daily 90 tablet 1    calcium carbonate (OS-HALIMA) 1250 (500 Ca) MG tablet Take 1,200 mg by mouth daily       cholecalciferol (VITAMIN D3) 1,000 units tablet Take 2,000 Units by mouth daily         "Magnesium 400 MG CAPS Take 1 capsule (400 mg total) by mouth in the morning 30 capsule 0    multivitamin (THERAGRAN) TABS Take 1 tablet by mouth daily.      omeprazole (PriLOSEC) 40 MG capsule Take 1 capsule (40 mg total) by mouth daily 30 capsule 2    Riboflavin 400 MG TABS Take 1 tablet (400 mg total) by mouth in the morning 30 tablet 0    hydrocortisone 2.5 % cream Apply topically 3 (three) times a day prn (Patient not taking: Reported on 4/16/2025) 28 g 2    sucralfate (CARAFATE) 1 g/10 mL suspension Take 10 mL (1 g total) by mouth 3 (three) times a day before meals for 3 days 90 mL 0     No current facility-administered medications on file prior to visit.      Social History     Tobacco Use    Smoking status: Never     Passive exposure: Never    Smokeless tobacco: Never   Vaping Use    Vaping status: Never Used   Substance and Sexual Activity    Alcohol use: Not Currently     Comment: 0    Drug use: Never    Sexual activity: Yes     Partners: Male     Birth control/protection: Post-menopausal, None        Objective   /78 (BP Location: Right arm, Patient Position: Sitting, Cuff Size: Standard)   Pulse 97   Temp 97.5 °F (36.4 °C) (Temporal)   Ht 5' 3.5\" (1.613 m)   Wt 91.5 kg (201 lb 12.8 oz)   LMP 12/15/2003 (Within Years)   SpO2 98%   BMI 35.19 kg/m²     Physical Exam  Vitals reviewed.   Constitutional:       General: She is not in acute distress.     Appearance: She is obese.   HENT:      Head: Normocephalic and atraumatic.      Right Ear: External ear normal.      Left Ear: External ear normal.      Nose: Nose normal.      Mouth/Throat:      Mouth: Mucous membranes are moist.      Pharynx: Oropharynx is clear.   Eyes:      General:         Right eye: No discharge.         Left eye: No discharge.      Extraocular Movements: Extraocular movements intact.      Pupils: Pupils are equal, round, and reactive to light.   Cardiovascular:      Rate and Rhythm: Normal rate.      Pulses: Normal pulses.      " Heart sounds: Normal heart sounds.   Pulmonary:      Effort: Pulmonary effort is normal.      Breath sounds: Normal breath sounds.   Abdominal:      General: There is no distension.      Tenderness: There is no abdominal tenderness.   Musculoskeletal:      Cervical back: Normal range of motion.      Right lower leg: No edema.      Left lower leg: No edema.   Skin:     General: Skin is warm.      Capillary Refill: Capillary refill takes less than 2 seconds.      Findings: No rash.   Neurological:      Mental Status: She is alert. Mental status is at baseline.      Cranial Nerves: No cranial nerve deficit.      Sensory: No sensory deficit.      Motor: No weakness.      Coordination: Coordination normal.      Gait: Gait normal.      Deep Tendon Reflexes: Reflexes normal.   Psychiatric:         Mood and Affect: Mood normal.         Behavior: Behavior normal.       Neurological Exam  Mental Status  Alert.    Cranial Nerves  CN III, IV, VI: Extraocular movements intact bilaterally. Pupils equal round and reactive to light bilaterally.    Gait   Normal gait.

## 2025-04-17 ENCOUNTER — TELEPHONE (OUTPATIENT)
Age: 65
End: 2025-04-17

## 2025-04-17 ENCOUNTER — OFFICE VISIT (OUTPATIENT)
Dept: FAMILY MEDICINE CLINIC | Facility: CLINIC | Age: 65
End: 2025-04-17
Payer: MEDICARE

## 2025-04-17 VITALS
BODY MASS INDEX: 34.44 KG/M2 | WEIGHT: 201.72 LBS | HEART RATE: 80 BPM | TEMPERATURE: 98.5 F | DIASTOLIC BLOOD PRESSURE: 72 MMHG | SYSTOLIC BLOOD PRESSURE: 126 MMHG | HEIGHT: 64 IN | OXYGEN SATURATION: 97 %

## 2025-04-17 DIAGNOSIS — G47.33 OSA (OBSTRUCTIVE SLEEP APNEA): ICD-10-CM

## 2025-04-17 DIAGNOSIS — C50.312 MALIGNANT NEOPLASM OF LOWER-INNER QUADRANT OF LEFT BREAST IN FEMALE, ESTROGEN RECEPTOR POSITIVE (HCC): Primary | ICD-10-CM

## 2025-04-17 DIAGNOSIS — Z13.0 SCREENING FOR DEFICIENCY ANEMIA: ICD-10-CM

## 2025-04-17 DIAGNOSIS — E78.2 COMBINED HYPERLIPIDEMIA: ICD-10-CM

## 2025-04-17 DIAGNOSIS — E04.1 THYROID NODULE: ICD-10-CM

## 2025-04-17 DIAGNOSIS — Z23 ENCOUNTER FOR IMMUNIZATION: ICD-10-CM

## 2025-04-17 DIAGNOSIS — Z17.0 MALIGNANT NEOPLASM OF LOWER-INNER QUADRANT OF LEFT BREAST IN FEMALE, ESTROGEN RECEPTOR POSITIVE (HCC): Primary | ICD-10-CM

## 2025-04-17 DIAGNOSIS — Z13.29 SCREENING FOR THYROID DISORDER: ICD-10-CM

## 2025-04-17 DIAGNOSIS — R73.03 PREDIABETES: ICD-10-CM

## 2025-04-17 DIAGNOSIS — J45.909 MILD ASTHMA WITHOUT COMPLICATION, UNSPECIFIED WHETHER PERSISTENT: ICD-10-CM

## 2025-04-17 DIAGNOSIS — M81.6 LOCALIZED OSTEOPOROSIS WITHOUT CURRENT PATHOLOGICAL FRACTURE: ICD-10-CM

## 2025-04-17 PROBLEM — E66.01 OBESITY, MORBID (HCC): Status: RESOLVED | Noted: 2024-09-24 | Resolved: 2025-04-17

## 2025-04-17 PROCEDURE — 90677 PCV20 VACCINE IM: CPT

## 2025-04-17 PROCEDURE — 99214 OFFICE O/P EST MOD 30 MIN: CPT | Performed by: FAMILY MEDICINE

## 2025-04-17 PROCEDURE — G2211 COMPLEX E/M VISIT ADD ON: HCPCS | Performed by: FAMILY MEDICINE

## 2025-04-17 PROCEDURE — G0009 ADMIN PNEUMOCOCCAL VACCINE: HCPCS

## 2025-04-17 NOTE — PROGRESS NOTES
Name: Nehal Bryant      : 1960      MRN: [id number]  Encounter Provider: Shankar Knight DO  Encounter Date: 2025   Encounter department: [facility]    Assessment & Plan  Malignant neoplasm of lower-inner quadrant of left breast in female, estrogen receptor positive (HCC)  History of stage Ia breast cancer diagnosed in .  ER/IN positive, HER2 negative.  Status post lumpectomy and radiation.  Doing well on anastrozole.  Continue follow-up with oncology team as directed       Localized osteoporosis without current pathological fracture  DEXA from 2024 reviewed with patient.  T-score -2.5 and lumbar spine.  She is already taking calcium, vitamin D and exercising.  Discussed prescription treatment including Prolia and bisphosphonates.  Patient will think about it and get back to me if she would like to proceed with 1 of these options.       Combined hyperlipidemia  Continue atorvastatin 10 mg daily  Orders:  •  Lipid Panel with Direct LDL reflex; Future    Prediabetes  A1c stable at 5.6%.  Will continue to monitor  Orders:  •  Comprehensive metabolic panel; Future  •  Hemoglobin A1C; Future    SONIYA (obstructive sleep apnea)  Patient has not been compliant with CPAP.  She states she has problems tolerating it.  She will be trying a new mask in near future       Thyroid nodule  History of thyroid nodules.  Status post FNA biopsy 2023 which was negative.  Last ultrasound 2024.  Due for repeat ultrasound in        Mild asthma without complication, unspecified whether persistent  Doing well with occasional use of albuterol       Screening for deficiency anemia    Orders:  •  CBC and differential; Future    Screening for thyroid disorder    Orders:  •  TSH, 3rd generation with Free T4 reflex; Future    Encounter for immunization    Orders:  •  Pneumococcal Conjugate Vaccine 20-valent (Pcv20)           Mammogram scheduled  Last DEXA 2024  Colonoscopy due  2026    Prevnar 20 given today  Last tetanus booster 2022  Patient had Shingrix  Patient had RSV vaccine    Lab results of March 26 reviewed with patient    6 months, fasting blood work prior      History of Present Illness     Patient presents for recheck of chronic medical problems.  Overall she is feeling well.  She is compliant with prescribed medications.  She had labs drawn on March 26      Review of Systems   Constitutional:  Negative for chills and fever.   HENT:  Negative for ear pain and sore throat.    Eyes:  Negative for pain and visual disturbance.   Respiratory:  Negative for cough and shortness of breath.    Cardiovascular:  Negative for chest pain and palpitations.   Gastrointestinal:  Negative for abdominal pain and vomiting.   Genitourinary:  Negative for dysuria and hematuria.   Musculoskeletal:  Negative for arthralgias and back pain.   Skin:  Negative for color change and rash.   Neurological:  Negative for seizures and syncope.   All other systems reviewed and are negative.    Past Medical History:   Diagnosis Date   • Asthma     Last Assessed: 10/19/2016   • Breast cancer (HCC)     Left breast 2021   • Cancer (HCC) 10/2021   • CPAP (continuous positive airway pressure) dependence    • Fibroid    • GERD (gastroesophageal reflux disease)     controlled with diet   • Hypercholesteremia    • Hypertension    • Sleep apnea    • Wears glasses      Past Surgical History:   Procedure Laterality Date   • BREAST BIOPSY Left 09/28/2021    multiple sites   • BREAST LUMPECTOMY Left 11/19/2021    Procedure: LUMPECTOMY BREAST BRACKETED AJAY LOCALIZED;  Surgeon: Audrey Lacey MD;  [facility];  Service: Surgical Oncology   • BREAST LUMPECTOMY Left 1/21/2022    Procedure: LUMPECTOMY BREAST, re-excision; 0800 NUC MED;  Surgeon: Audrey Lacey MD;  [facility];  Service: Surgical Oncology   • BUNIONECTOMY     • BUNIONECTOMY Right 2009   • BUNIONECTOMY Left 2007   • COLONOSCOPY     • FOOT SURGERY      • LYMPH NODE BIOPSY Left 1/21/2022    Procedure: BIOPSY LYMPH NODE SENTINEL;  Surgeon: Audrey Lacey MD;  Location[facility];  Service: Surgical Oncology   • MAMMO NEEDLE LOCALIZATION LEFT (ALL INC) Left 11/12/2021   • MAMMO NEEDLE LOCALIZATION LEFT (ALL INC) EACH ADD Left 11/12/2021   • MAMMO STEREOTACTIC BREAST BIOPSY LEFT (ALL INC) Left 9/28/2021   • MAMMO STEREOTACTIC BREAST BIOPSY LEFT (ALL INC) EACH ADD Left 9/28/2021   • NASAL POLYP EXCISION     • AK COLONOSCOPY FLX DX W/COLLJ SPEC WHEN PFRMD N/A 5/23/2016    Procedure: COLONOSCOPY;  Surgeon: Adriana Westfall DO;  [facility]: [facility];  Service: Gastroenterology   • SHOULDER ARTHROSCOPY Right 2012   • SHOULDER ARTHROSCOPY W/ ROTATOR CUFF REPAIR Right 2012   • US GUIDED THYROID BIOPSY  6/2/2023     Family History   Problem Relation Age of Onset   • Hypertension Mother    • Breast cancer Mother         age unknown; bilateral breast cancer   • Lung cancer Mother    • Cervical cancer Mother    • Cancer Mother    • Lung disease Mother    • Diabetes Father    • Hypertension Father    • Prostate cancer Father         age unknown   • Breast cancer Maternal Grandmother 44   • Diabetes Paternal Grandmother    • Hypertension Brother    • Prostate cancer Brother 50   • Heart disease Brother    • Hypertension Brother    • Prostate cancer Brother    • Hypertension Maternal Aunt    • No Known Problems Maternal Aunt    • Lung cancer Maternal Uncle    • Diabetes Paternal Aunt    • Diabetes Paternal Uncle    • Hypertension Cousin    • Breast cancer Cousin         under 50   • Diabetes Cousin    • Breast cancer Cousin    • Breast cancer Cousin         under 50   • Hypertension Brother    • Hypertension Maternal Aunt      Social History     Tobacco Use   • Smoking status: Never     Passive exposure: Never   • Smokeless tobacco: Never   Vaping Use   • Vaping status: Never Used   Substance and Sexual Activity   • Alcohol use: Not Currently     Comment: 0   • Drug use: Never    • Sexual activity: Yes     Partners: Male     Birth control/protection: Post-menopausal, None     Current Outpatient Medications on File Prior to Visit   Medication Sig   • albuterol (PROVENTIL HFA,VENTOLIN HFA) 90 mcg/act inhaler INHALE 2 PUFFS BY MOUTH EVERY 6 HOURS AS NEEDED FOR WHEEZING   • amLODIPine (NORVASC) 10 mg tablet Take 1 tablet (10 mg total) by mouth daily   • anastrozole (ARIMIDEX) 1 mg tablet Take 1 tablet (1 mg total) by mouth daily   • atorvastatin (LIPITOR) 10 mg tablet Take 1 tablet by mouth once daily   • calcium carbonate (OS-HALIMA) 1250 (500 Ca) MG tablet Take 1,200 mg by mouth daily    • cholecalciferol (VITAMIN D3) 1,000 units tablet Take 2,000 Units by mouth daily     • multivitamin (THERAGRAN) TABS Take 1 tablet by mouth daily.   • omeprazole (PriLOSEC) 40 MG capsule Take 1 capsule (40 mg total) by mouth daily   • Magnesium 400 MG CAPS Take 1 capsule (400 mg total) by mouth in the morning   • Riboflavin 400 MG TABS Take 1 tablet (400 mg total) by mouth in the morning   • sucralfate (CARAFATE) 1 g/10 mL suspension Take 10 mL (1 g total) by mouth 3 (three) times a day before meals for 3 days   • [DISCONTINUED] hydrocortisone 2.5 % cream Apply topically 3 (three) times a day prn (Patient not taking: Reported on 4/17/2025)     No Known Allergies  Immunization History   Administered Date(s) Administered   • COVID-19 MODERNA VACC 0.5 ML IM 03/23/2021, 04/22/2021, 01/05/2022   • INFLUENZA 10/15/2018, 10/12/2023   • Influenza Quadrivalent, 6-35 Months IM 10/19/2016, 10/11/2017   • Influenza Split High Dose Preservative Free IM 10/17/2024   • Influenza, injectable, quadrivalent, preservative free 0.5 mL 10/12/2023   • Influenza, recombinant, quadrivalent,injectable, preservative free 10/30/2019, 10/07/2020, 12/08/2021, 11/10/2022   • Influenza, seasonal, injectable 1960, 10/01/2014, 11/05/2015, 11/01/2016   • Influenza, seasonal, injectable, preservative free 10/15/2018   • Palivizumab  "(RSV-MAb) 05/03/2024   • Pneumococcal Conjugate Vaccine 20-valent (Pcv20), Polysace 04/17/2025   • Respiratory Syncytial Virus Vaccine (Recombinant, Adjuvanted) 05/03/2024   • Tdap 05/05/2022   • Zoster Vaccine Recombinant 10/12/2023, 10/12/2023, 04/15/2024     Objective   /72   Pulse 80   Temp 98.5 °F (36.9 °C)   Ht 5' 3.5\" (1.613 m)   Wt 91.5 kg (201 lb 11.5 oz)   LMP 12/15/2003 (Within Years)   SpO2 97%   BMI 35.17 kg/m²     Physical Exam  Cardiovascular:      Rate and Rhythm: Normal rate and regular rhythm.      Heart sounds: Normal heart sounds.      Comments: Carotids: no bruits  Ext: no edema  Pulmonary:      Effort: Pulmonary effort is normal. No respiratory distress.      Breath sounds: No wheezing or rales.   Psychiatric:         Behavior: Behavior normal.         Thought Content: Thought content normal.         "

## 2025-04-17 NOTE — ASSESSMENT & PLAN NOTE
History of thyroid nodules.  Status post FNA biopsy June 2023 which was negative.  Last ultrasound June 2024.  Due for repeat ultrasound in 2026

## 2025-04-17 NOTE — ASSESSMENT & PLAN NOTE
A1c stable at 5.6%.  Will continue to monitor  Orders:  •  Comprehensive metabolic panel; Future  •  Hemoglobin A1C; Future

## 2025-04-17 NOTE — ASSESSMENT & PLAN NOTE
History of stage Ia breast cancer diagnosed in 2021.  ER/NM positive, HER2 negative.  Status post lumpectomy and radiation.  Doing well on anastrozole.  Continue follow-up with oncology team as directed

## 2025-04-17 NOTE — TELEPHONE ENCOUNTER
Called and spoke to pt to confirm procedure for 04/25 with Dr. Jaiyeola at [place], pt has confirmed procedure and had no questions

## 2025-04-17 NOTE — ASSESSMENT & PLAN NOTE
Patient has not been compliant with CPAP.  She states she has problems tolerating it.  She will be trying a new mask in near future

## 2025-04-17 NOTE — ASSESSMENT & PLAN NOTE
DEXA from November 2024 reviewed with patient.  T-score -2.5 and lumbar spine.  She is already taking calcium, vitamin D and exercising.  Discussed prescription treatment including Prolia and bisphosphonates.  Patient will think about it and get back to me if she would like to proceed with 1 of these options.

## 2025-04-25 ENCOUNTER — ANESTHESIA (OUTPATIENT)
Dept: GASTROENTEROLOGY | Facility: MEDICAL CENTER | Age: 65
End: 2025-04-25
Payer: MEDICARE

## 2025-04-25 ENCOUNTER — ANESTHESIA EVENT (OUTPATIENT)
Dept: GASTROENTEROLOGY | Facility: MEDICAL CENTER | Age: 65
End: 2025-04-25
Payer: MEDICARE

## 2025-04-25 ENCOUNTER — HOSPITAL ENCOUNTER (OUTPATIENT)
Dept: GASTROENTEROLOGY | Facility: MEDICAL CENTER | Age: 65
Setting detail: OUTPATIENT SURGERY
End: 2025-04-25
Attending: INTERNAL MEDICINE
Payer: MEDICARE

## 2025-04-25 VITALS
OXYGEN SATURATION: 99 % | HEIGHT: 64 IN | WEIGHT: 201 LBS | SYSTOLIC BLOOD PRESSURE: 117 MMHG | TEMPERATURE: 97.5 F | RESPIRATION RATE: 20 BRPM | BODY MASS INDEX: 34.31 KG/M2 | DIASTOLIC BLOOD PRESSURE: 59 MMHG | HEART RATE: 61 BPM

## 2025-04-25 DIAGNOSIS — K22.2 PEPTIC STRICTURE OF ESOPHAGUS: ICD-10-CM

## 2025-04-25 PROCEDURE — C1726 CATH, BAL DIL, NON-VASCULAR: HCPCS

## 2025-04-25 PROCEDURE — 43249 ESOPH EGD DILATION <30 MM: CPT | Performed by: INTERNAL MEDICINE

## 2025-04-25 RX ORDER — SODIUM CHLORIDE, SODIUM LACTATE, POTASSIUM CHLORIDE, CALCIUM CHLORIDE 600; 310; 30; 20 MG/100ML; MG/100ML; MG/100ML; MG/100ML
125 INJECTION, SOLUTION INTRAVENOUS CONTINUOUS
Status: DISCONTINUED | OUTPATIENT
Start: 2025-04-25 | End: 2025-04-29 | Stop reason: HOSPADM

## 2025-04-25 RX ORDER — PROPOFOL 10 MG/ML
INJECTION, EMULSION INTRAVENOUS AS NEEDED
Status: DISCONTINUED | OUTPATIENT
Start: 2025-04-25 | End: 2025-04-25

## 2025-04-25 RX ORDER — SUCRALFATE ORAL 1 G/10ML
1 SUSPENSION ORAL
Qty: 90 ML | Refills: 0 | Status: SHIPPED | OUTPATIENT
Start: 2025-04-25 | End: 2025-04-28

## 2025-04-25 RX ORDER — LIDOCAINE HYDROCHLORIDE 10 MG/ML
INJECTION, SOLUTION EPIDURAL; INFILTRATION; INTRACAUDAL; PERINEURAL AS NEEDED
Status: DISCONTINUED | OUTPATIENT
Start: 2025-04-25 | End: 2025-04-25

## 2025-04-25 RX ORDER — GLYCOPYRROLATE 0.2 MG/ML
INJECTION INTRAMUSCULAR; INTRAVENOUS AS NEEDED
Status: DISCONTINUED | OUTPATIENT
Start: 2025-04-25 | End: 2025-04-25

## 2025-04-25 RX ADMIN — GLYCOPYRROLATE 0.1 MG: 0.2 INJECTION, SOLUTION INTRAMUSCULAR; INTRAVENOUS at 13:07

## 2025-04-25 RX ADMIN — PROPOFOL 50 MG: 10 INJECTION, EMULSION INTRAVENOUS at 13:14

## 2025-04-25 RX ADMIN — LIDOCAINE HYDROCHLORIDE 50 MG: 10 INJECTION, SOLUTION EPIDURAL; INFILTRATION; INTRACAUDAL at 13:07

## 2025-04-25 RX ADMIN — SODIUM CHLORIDE, SODIUM LACTATE, POTASSIUM CHLORIDE, AND CALCIUM CHLORIDE: .6; .31; .03; .02 INJECTION, SOLUTION INTRAVENOUS at 12:33

## 2025-04-25 RX ADMIN — PROPOFOL 50 MG: 10 INJECTION, EMULSION INTRAVENOUS at 13:09

## 2025-04-25 RX ADMIN — PROPOFOL 30 MG: 10 INJECTION, EMULSION INTRAVENOUS at 13:11

## 2025-04-25 RX ADMIN — PROPOFOL 100 MG: 10 INJECTION, EMULSION INTRAVENOUS at 13:07

## 2025-04-25 NOTE — ANESTHESIA POSTPROCEDURE EVALUATION
Post-Op Assessment Note    CV Status:  Stable    Pain management: adequate       Mental Status:  Alert and awake   Hydration Status:  Euvolemic   PONV Controlled:  Controlled   Airway Patency:  Patent     Post Op Vitals Reviewed: Yes    No anethesia notable event occurred.    Staff: CRNA           Last Filed PACU Vitals:  Vitals Value Taken Time   Temp     Pulse 78 04/25/25 1318   /51 04/25/25 1318   Resp 20 04/25/25 1318   SpO2 99 % 04/25/25 1318

## 2025-04-25 NOTE — H&P
H&P - Gastroenterology   Name: Nehal Bryant 64 y.o. female I MRN: [id number]  Unit/Bed#:  I Date of Admission: 4/25/2025   Date of Service: 4/25/2025 I Hospital Day: 0     Assessment & Plan   This is a 64 y.o. year old female here for egd, and she is stable and optimized for her procedure.    History of Present Illness    Nehal Bryant is a 64 y.o. year old female who presents for peptic stricture    REVIEW OF SYSTEMS: Per the HPI, and otherwise unremarkable.    Historical Information   Past Medical History:   Diagnosis Date    Asthma     Last Assessed: 10/19/2016    Breast cancer (HCC)     Left breast 2021    Cancer (HCC) 10/2021    CPAP (continuous positive airway pressure) dependence     Fibroid     GERD (gastroesophageal reflux disease)     controlled with diet    Hypercholesteremia     Hypertension     Sleep apnea     Wears glasses      Past Surgical History:   Procedure Laterality Date    BREAST BIOPSY Left 09/28/2021    multiple sites    BREAST LUMPECTOMY Left 11/19/2021    Procedure: LUMPECTOMY BREAST BRACKETED AJAY LOCALIZED;  Surgeon: Audrey Lacey MD;  [facility];  Service: Surgical Oncology    BREAST LUMPECTOMY Left 1/21/2022    Procedure: LUMPECTOMY BREAST, re-excision; 0800 NUC MED;  Surgeon: Audrey Lacey MD;  [facility];  Service: Surgical Oncology    BUNIONECTOMY      BUNIONECTOMY Right 2009    BUNIONECTOMY Left 2007    COLONOSCOPY      FOOT SURGERY      LYMPH NODE BIOPSY Left 1/21/2022    Procedure: BIOPSY LYMPH NODE SENTINEL;  Surgeon: Audrey Lacey MD;  [facility];  Service: Surgical Oncology    MAMMO NEEDLE LOCALIZATION LEFT (ALL INC) Left 11/12/2021    MAMMO NEEDLE LOCALIZATION LEFT (ALL INC) EACH ADD Left 11/12/2021    MAMMO STEREOTACTIC BREAST BIOPSY LEFT (ALL INC) Left 9/28/2021    MAMMO STEREOTACTIC BREAST BIOPSY LEFT (ALL INC) EACH ADD Left 9/28/2021    NASAL POLYP EXCISION      MI COLONOSCOPY FLX DX W/COLLJ SPEC WHEN PFRMD N/A 5/23/2016    Procedure:  COLONOSCOPY;  Surgeon: Adriana Westfall DO;  [facility];  Service: Gastroenterology    SHOULDER ARTHROSCOPY Right 2012    SHOULDER ARTHROSCOPY W/ ROTATOR CUFF REPAIR Right 2012    US GUIDED THYROID BIOPSY  6/2/2023     Social History     Tobacco Use    Smoking status: Never     Passive exposure: Never    Smokeless tobacco: Never   Vaping Use    Vaping status: Never Used   Substance and Sexual Activity    Alcohol use: Not Currently     Comment: 0    Drug use: Never    Sexual activity: Yes     Partners: Male     Birth control/protection: Post-menopausal, None     E-Cigarette/Vaping    E-Cigarette Use Never User      E-Cigarette/Vaping Substances    Nicotine No     THC No     CBD No     Flavoring No     Other No     Unknown No      Family history non-contributory    Meds/Allergies     Current Outpatient Medications:     amLODIPine (NORVASC) 10 mg tablet    anastrozole (ARIMIDEX) 1 mg tablet    atorvastatin (LIPITOR) 10 mg tablet    calcium carbonate (OS-HALIMA) 1250 (500 Ca) MG tablet    cholecalciferol (VITAMIN D3) 1,000 units tablet    multivitamin (THERAGRAN) TABS    omeprazole (PriLOSEC) 40 MG capsule    albuterol (PROVENTIL HFA,VENTOLIN HFA) 90 mcg/act inhaler    Magnesium 400 MG CAPS    Riboflavin 400 MG TABS    sucralfate (CARAFATE) 1 g/10 mL suspension    Current Facility-Administered Medications:     lactated ringers infusion, 125 mL/hr, Intravenous, Continuous  No Known Allergies    Objective :  Temp:  [97.5 °F (36.4 °C)] 97.5 °F (36.4 °C)  HR:  [75] 75  BP: (145)/(64) 145/64  Resp:  [20] 20  SpO2:  [97 %] 97 %  O2 Device: None (Room air)    Physical Exam  Gen: NAD  Head: NCAT  CV: RRR  CHEST: Clear  ABD: soft, NT/ND  EXT: no edema

## 2025-04-25 NOTE — ANESTHESIA PREPROCEDURE EVALUATION
Procedure:  EGD    Relevant Problems   ANESTHESIA (within normal limits)      CARDIO   (+) Benign essential hypertension   (+) Combined hyperlipidemia      GI/HEPATIC   (+) GERD (gastroesophageal reflux disease)      GYN   (+) Malignant neoplasm of lower-inner quadrant of left breast in female, estrogen receptor positive (HCC)      NEURO/PSYCH   (+) Anxiety      PULMONARY   (+) Asthma   (+) SONIYA (obstructive sleep apnea)      Other   (+) Prediabetes        Physical Exam    Airway    Mallampati score: II  TM Distance: >3 FB  Neck ROM: full     Dental   No notable dental hx     Cardiovascular      Pulmonary      Other Findings  post-pubertal.      Anesthesia Plan  ASA Score- 2     Anesthesia Type- IV sedation with anesthesia with ASA Monitors.         Additional Monitors:     Airway Plan:            Plan Factors-Exercise tolerance (METS): >4 METS.    Chart reviewed. EKG reviewed.  Existing labs reviewed. Patient summary reviewed.    Patient is not a current smoker.              Induction- intravenous.    Postoperative Plan-         Informed Consent- Anesthetic plan and risks discussed with patient.  I personally reviewed this patient with the CRNA. Discussed and agreed on the Anesthesia Plan with the CRNA..      NPO Status:  No vitals data found for the desired time range.

## 2025-04-27 PROBLEM — Z08 ENCOUNTER FOR FOLLOW-UP EXAMINATION AFTER COMPLETED TREATMENT FOR MALIGNANT NEOPLASM: Status: RESOLVED | Noted: 2025-03-28 | Resolved: 2025-04-27

## 2025-05-05 ENCOUNTER — TELEPHONE (OUTPATIENT)
Age: 65
End: 2025-05-05

## 2025-05-05 NOTE — TELEPHONE ENCOUNTER
Patients GI provider:  Dr. Jaiyeola    Number to return call: [phone]    Reason for call: Pt calling to inquire if f/u was scheduled for her and if egd results were ready . Advised pt results aren't ready yet and no f/u scheduled . Pt would wait for results to determine if she would schedule o/v.     Scheduled procedure/appointment date if applicable: Apt/procedure n/a

## 2025-05-08 ENCOUNTER — TELEPHONE (OUTPATIENT)
Dept: SLEEP CENTER | Facility: CLINIC | Age: 65
End: 2025-05-08

## 2025-05-08 ENCOUNTER — OFFICE VISIT (OUTPATIENT)
Dept: SLEEP CENTER | Facility: CLINIC | Age: 65
End: 2025-05-08
Payer: MEDICARE

## 2025-05-08 VITALS
HEIGHT: 64 IN | SYSTOLIC BLOOD PRESSURE: 139 MMHG | WEIGHT: 201 LBS | BODY MASS INDEX: 34.31 KG/M2 | DIASTOLIC BLOOD PRESSURE: 79 MMHG | OXYGEN SATURATION: 98 % | HEART RATE: 85 BPM

## 2025-05-08 DIAGNOSIS — E66.812 CLASS 2 OBESITY: ICD-10-CM

## 2025-05-08 DIAGNOSIS — G47.33 OSA (OBSTRUCTIVE SLEEP APNEA): Primary | ICD-10-CM

## 2025-05-08 PROCEDURE — 99213 OFFICE O/P EST LOW 20 MIN: CPT

## 2025-05-08 NOTE — PATIENT INSTRUCTIONS
Continue PAP Therapy  - Continue APAP at 8-12 cmH2O.  - Start your humidity setting at level 4, go up by 1 per night if the air is too dry and down by 1 if it's too wet  - You can also adjust the tube temperature, but the tubing needs to be plugged into the device first  - If you're concerned about adjusting this on your own you can reach out to the medical equipment company.  Remember to clean your mask and equipment regularly, as directed.  You should be eligible for new supplies approximately every 3-6 months, depending on your insurance coverage. Contact your Durable Medical Equipment (DME) company for new supplies as needed.  Follow up in 6 months    Care and Maintenance  Headgear should be washed as needed. Daily inspection and weekly washings are recommended. Do not disassemble the straps. Machine wash in warm water, making sure to attach Velcro hooks and tabs before washing. Line dry or machine dry on a low setting.  Masks should be washed every day. Daily inspection is recommended. Leave the mask and tubing attached. Gently wash the mask with a soft cloth using warm water and mild detergent, concentrating on the mask cushion flaps. DO NOT use alcohol or bleach. Rinse thoroughly and air dry.  Tubing and headgear should be washed weekly. Daily inspection is recommended. Wash in warm water and mild detergent and rinse thoroughly. Hook the tubing to the machine and blow until dry.  Humidifier should be washed daily and filled with DISTILLED water before use. Wash with warm water and mild detergent. Disinfect weekly by soaking with a solution of 1 part white vinegar and 3 parts water for 30 minutes. Rinse thoroughly and air dry.  Disposable filters should be replaced once a month. Wash reusable foam filters with warm water and mild detergent at least once a month. Rinse thoroughly and dry with paper towels.  Avoid  that contain fragrance or conditioners, as these will leave a residue.  NEVER iron any  soft goods.    Insurance Requirements  Your insurance requires a face-to-face follow up visit within a 31-90 day period after starting PAP therapy.  Your insurance requires compliance with your PAP device, which is at least 4 hours per night for 70% of the time. This must be done over a 30 day period and must occur within the initial 31-90 day period after starting CPAP.  Your insurance also requires at least yearly follow ups to continue to pay for CPAP supplies.     PAP Supply Guidelines  Below are the guidelines for reordering your supplies. You will be responsible for your deductible, co payments, and out of pocket expenses.    Item Frequency   Nasal Mask (no headgear) 1 every 3 months   Nasal Mask Cushion 1 every 2 weeks   Full Face Mask (no headgear) 1 every 3 months   Full Face Mask Cushion 1 every month   Nasal Pillows 1 every 2 weeks   Headgear 1 every 6 months   hin Strap 1 every 6 months   cody 1 every 3 months   Filters: Reusable 1 every 6 months   Filters: Disposable 1 every 2 weeks   Humidifier Chamber(disposable) 1 every 6 months

## 2025-05-08 NOTE — PROGRESS NOTES
[facility]  Sleep Medicine Follow Up/Established Patient    PATIENT NAME: Nehal Bryant  DATE OF SERVICE: May 9, 2025  DATE OF LAST VISIT: 1/14/2025    ASSESSMENT/PLAN:  eNhal Bryant is a 64 y.o. female with PMHx of breast cancer, SONIYA on CPAP, GERD, asthma, HTN, HLD, prediabetes, anxiety and obesity who returns to the office for follow up.    SONIYA (obstructive sleep apnea)  - The patient has a history of severe SONIYA diagnosed in 2023 on HSAT (MICHAEL 33.2, supine MICHAEL 33.2, O2 clive 80%, TST <90% 21 min) is currently prescribed auto CPAP 8-15 cmH2O. she continues to struggle with CPAP.  We have talked about alternatives including HGNS, but she does not want to consider surgical options at this time.  Recommend that she try the FFM as we discussed at her last office visit.  - Therapy and compliance data reviewed: residual AHI 9.4, compliance 67% and mask leak is elevated  - Will narrow pressures to APAP 8-12 cmH2O with a full-face mask to try minimizing leak.  - Refill of supplies will be sent to the patient's DME.  - Explained the importance of keeping the machine clean, and that they should be eligible for refills of supplies every 3-6 months depending on insurance.  We also discussed the insurance compliance requirements.  - Encouraged healthy lifestyle with adequate sleep (7-9 hours per night), healthy balanced diet and routine exercise.  Explained the importance of avoiding driving while drowsy.  - Follow-up in 6 months.  Encouraged her to bring both the device and her mask to her next office visit.  -     PAP DME Pressure Change  -     Ambulatory Referral to Weight Management; Future  -     PAP DME Resupply/Reorder    Class 2 obesity  - We discussed the importance of weight loss, and that with weight loss sleep apnea may resolve.  She is amenable to referral to weight management.  -     Ambulatory Referral to Weight Management;  Future  ________________________________________________________________________________________________    Interval History: Nehal Bryant is a 64 y.o. female with PMHx of breast cancer, SONIYA on CPAP, GERD, asthma, HTN, HLD, prediabetes, anxiety and obesity who returns to the office for follow up.  She reports that with continued use she does feel she is slowly getting more accustomed to the device.  She did not try the fullface mask after her last office visit and is still using her old hybrid interface.  She states she continues to get dry mouth and elevated mask leak.  She did try switching her humidity back to auto, feels it is worse and is unsure how to switch it back.  The device/mask is unfortunately not in the office today.  She otherwise denies any other issues that are preventing her from using the CPAP on a routine basis.  She states she does not want to consider surgical options for treatment of sleep apnea, but would be amenable to help with weight loss.    PAP History  Sleep Apnea Type: SONIYA  Most Recent AHI: 33.2  Treatment: APAP    DME: [employer] [facility]    Current PAP Settings: 8-15 cmH2O  Compliance Data: 67%, see below    Mask Type: hybrid  PAP Issues: mask leak, mask is uncomfortable to wear  Uses Chin Strap: No  Uses Ramp Function: Yes   Uses Humidity: Yes     There is a perceived benefit by the patient: does feel more rested at times when wearing CPAP    Prior History: The patient for started following with [facility] sleep medicine in 12/2023 due to an HSAT that showed severe SONIYA (MICHAEL 33.2, supine MICHAEL 33.2, O2 clive 80%, TST <90% 21 min). Recommendation was for her to begin on auto CPAP 5-15 cmH2O. On her initial visit she did note multiple nocturnal awakenings due to nocturia and for unclear reasons, but denied symptoms of sleep onset insomnia or RLS. She did report significant daytime sleepiness with napping 4 times a week up to 2 to 3 hours at a time.  At her initial compliance visit she noted  benefit with using the device, but felt as if she was breathing through her mouth at times while wearing a nasal pillow interface.  She ultimately switched to a hybrid interface, but has continued to have issues with mask fit.  She has been prescribed refittings, but was still using the hybrid mask at her last visit.     ESS: Total score: (Patient-Rptd) (P) 7/24  Greater or equal to 10 is positive for excessive daytime sleepiness  Pertinent Meds: None    Sleep-Wake Schedule:  Bedtime: 2330  Wake Time: 0730  Difficulty Falling Asleep: No  Avg Number of Awakenings: 1-2x a night  Cause of Awakenings: mask leak, bathroom  Weekend Sleep Schedule: unchanged  Naps: denies, but may doze unintentionally    Avg TST per 24 hours: 6 hours    Past Treatments:  APAP 5-15, 8-15 cmH2O    Prior Sleep Studies:  HSAT -- 11/25/2023 (Wt 199.0 lbs)  MICHAEL - 33.2  Sup MICHAEL - 33.2  O2 [place] - 80.0%  TST < 90% - 21 min    Other Relevant Labs and Studies:  Therapy and Compliance Data -- 4/8/2025 - 5/7/2025      Past Medical History:   Diagnosis Date    Asthma     Last Assessed: 10/19/2016    Breast cancer (HCC)     Left breast 2021    Cancer (HCC) 10/2021    CPAP (continuous positive airway pressure) dependence     Fibroid     GERD (gastroesophageal reflux disease)     controlled with diet    Hypercholesteremia     Hypertension     Sleep apnea     Wears glasses      Past Surgical History:   Procedure Laterality Date    BREAST BIOPSY Left 09/28/2021    multiple sites    BREAST LUMPECTOMY Left 11/19/2021    Procedure: LUMPECTOMY BREAST BRACKETED AJAY LOCALIZED;  Surgeon: Audrey Lacey MD;  Location: [facility];  Service: Surgical Oncology    BREAST LUMPECTOMY Left 1/21/2022    Procedure: LUMPECTOMY BREAST, re-excision; 0800 NUC MED;  Surgeon: Audrey Lacey MD;  Location: [facility];  Service: Surgical Oncology    BUNIONECTOMY      BUNIONECTOMY Right 2009    BUNIONECTOMY Left 2007    COLONOSCOPY      FOOT SURGERY      LYMPH NODE BIOPSY Left  1/21/2022    Procedure: BIOPSY LYMPH NODE SENTINEL;  Surgeon: Audrey Lacey MD;  Location: [facility];  Service: Surgical Oncology    MAMMO NEEDLE LOCALIZATION LEFT (ALL INC) Left 11/12/2021    MAMMO NEEDLE LOCALIZATION LEFT (ALL INC) EACH ADD Left 11/12/2021    MAMMO STEREOTACTIC BREAST BIOPSY LEFT (ALL INC) Left 9/28/2021    MAMMO STEREOTACTIC BREAST BIOPSY LEFT (ALL INC) EACH ADD Left 9/28/2021    NASAL POLYP EXCISION      AR COLONOSCOPY FLX DX W/COLLJ SPEC WHEN PFRMD N/A 5/23/2016    Procedure: COLONOSCOPY;  Surgeon: Adriana Westfall DO;  Location: [facility];  Service: Gastroenterology    SHOULDER ARTHROSCOPY Right 2012    SHOULDER ARTHROSCOPY W/ ROTATOR CUFF REPAIR Right 2012    US GUIDED THYROID BIOPSY  6/2/2023     Patient Active Problem List   Diagnosis    Anxiety    Asthma    Benign essential hypertension    Combined hyperlipidemia    Prediabetes    Vitamin D deficiency    Fibroid    Localized osteoporosis without current pathological fracture    Acute pain of right shoulder    Pain in both lower extremities    Neck pain    BMI 35.0-35.9,adult    Malignant neoplasm of lower-inner quadrant of left breast in female, estrogen receptor positive (HCC)    Family history of cancer    BRCA negative    GERD (gastroesophageal reflux disease)    Use of anastrozole    Thyroid nodule    Trigger finger    Multiple nevi    Contusion of fifth toe of right foot    SONIYA (obstructive sleep apnea)    Trigger middle finger of left hand    Shortness of breath    Facial numbness     Allergies as of 05/08/2025    (No Known Allergies)     REVIEW OF SYSTEMS:  Review of Systems  10-point system review completed, all of which are negative except as mentioned above.    CURRENT MEDICATIONS:  Current Outpatient Medications   Medication Instructions    albuterol (PROVENTIL HFA,VENTOLIN HFA) 90 mcg/act inhaler 2 puffs, Inhalation, Every 6 hours PRN    amLODIPine (NORVASC) 10 mg, Oral, Daily    anastrozole (ARIMIDEX) 1 mg, Oral, Daily     "atorvastatin (LIPITOR) 10 mg, Oral, Daily    calcium carbonate (OS-HALIMA) 1,200 mg, Daily    cholecalciferol (VITAMIN D3) 2,000 Units, Daily    Magnesium 400 mg, Oral, Daily    multivitamin (THERAGRAN) TABS 1 tablet, Daily    omeprazole (PRILOSEC) 40 mg, Oral, Daily    Riboflavin 400 mg, Oral, Daily    sucralfate (CARAFATE) 1 g, Oral, 3 times daily before meals     SOCIAL HISTORY:  Social History     Tobacco Use    Smoking status: Never     Passive exposure: Never    Smokeless tobacco: Never   Vaping Use    Vaping status: Never Used   Substance Use Topics    Alcohol use: Not Currently     Comment: 0    Drug use: Never     FAMILY HISTORY:  Family History   Problem Relation Age of Onset    Hypertension Mother     Breast cancer Mother         age unknown; bilateral breast cancer    Lung cancer Mother     Cervical cancer Mother     Cancer Mother     Lung disease Mother     Diabetes Father     Hypertension Father     Prostate cancer Father         age unknown    Breast cancer Maternal Grandmother 44    Diabetes Paternal Grandmother     Hypertension Brother     Prostate cancer Brother 50    Heart disease Brother     Hypertension Brother     Prostate cancer Brother     Hypertension Maternal Aunt     No Known Problems Maternal Aunt     Lung cancer Maternal Uncle     Diabetes Paternal Aunt     Diabetes Paternal Uncle     Hypertension Cousin     Breast cancer Cousin         under 50    Diabetes Cousin     Breast cancer Cousin     Breast cancer Cousin         under 50    Hypertension Brother     Hypertension Maternal Aunt      PHYSICAL EXAMINATION:  Vital Signs:  /79 (BP Location: Right arm, Patient Position: Sitting, Cuff Size: Large)   Pulse 85   Ht 5' 3.5\" (1.613 m)   Wt 91.2 kg (201 lb)   LMP 12/15/2003 (Within Years)   SpO2 98%   BMI 35.05 kg/m²   Body mass index is 35.05 kg/m².  Constitutional: NAD, well appearing, obese   Mental Status: AAOx3  Skin: Warm, dry, no rashes noted   Eyes: PERRL, normal " "conjunctiva  ENT: Nasal congestion absent, nasal valve incompetence absent.  Chest: RRR, +S1/S2, CTA B/L, no W/R/R, no M/R/G   Extremities: No digital clubbing or pedal edema      Virginia Mercer MD  Pulmonary-Critical Care and Sleep Medicine  05/09/25    Portions of the record may have been created with voice recognition software. Occasional wrong word or \"sound a like\" substitutions may have occurred due to the inherent limitations of voice recognition software. Please read the chart carefully and recognize, using context, where substitutions have occurred.   "

## 2025-05-09 LAB

## 2025-05-12 LAB
DME PARACHUTE DELIVERY DATE ACTUAL: NORMAL
DME PARACHUTE DELIVERY DATE REQUESTED: NORMAL
DME PARACHUTE ITEM DESCRIPTION: NORMAL
DME PARACHUTE ORDER STATUS: NORMAL
DME PARACHUTE SUPPLIER NAME: NORMAL
DME PARACHUTE SUPPLIER PHONE: NORMAL

## 2025-06-15 NOTE — PROGRESS NOTES
Name: Nehal Bryant      : 1960      MRN: [id number]  Encounter Provider: Mary Alaniz MD  Encounter Date: 2025   Encounter department: [facility]  :  Assessment & Plan  Malignant neoplasm of lower-inner quadrant of left breast in female, estrogen receptor positive (HCC)    This is a 64 y.o. c PMHx notable for hypertension, SONIYA, asthma, GERD, osteoporosis, being seen in consultation for stage Ia hormone positive, HER2 negative breast cancer  On adjuvant endocrine therapy  Orders:    anastrozole (ARIMIDEX) 1 mg tablet; Take 1 tablet (1 mg total) by mouth daily      Cancer Stage at diagnosis: 1A    Referral Physician: No ref. provider found    Primary Care Physician:  Shankar Knight DO     Nickname: Nehal    Spouse: Annette Shankar    Original ECO    Today's ECO    Goals and Barriers:  Current Goal: Minimize effects of disease burden, extend life.   Barriers to accomplishing this: None    Patient's Capacity to Self Care:  Patient is able to self care    Code Status: Not assessed    Advanced directives: Not assessed      This is a 64 y.o. PMHx notable for hypertension, SONIYA, asthma, GERD, osteoporosis, being seen in consultation for stage Ia hormone positive, HER2 negative breast cancer  On adjuvant endocrine therapy    Discussion of decision making  Oncology history updated, accordingly, during this visit  Goals of care/patient communication  I discussed with the patient the clinical course leading up to their cancer diagnosis. I reviewed relevant office notes, imaging reports and pathology result as well.  I told the patient that this is a case of curable disease and what this means. We discussed that the goal of anti-cancer therapy is to provide best quality of life, extend overall survival, and progression free survival as shown in clinical trials. We also discussed that there might be a point when the cancer will no longer respond to this  anti-neoplastic therapy.   I explained the risks/benefits of the proposed cancer therapy: Anastrozole and after discussion including understanding risks of possible life-threatening complications and therapy-related malignancy development  TNM/Staging At Diagnosis  pT1b, pN0, M0   Cancer Staging   Malignant neoplasm of lower-inner quadrant of left breast in female, estrogen receptor positive (HCC)  Staging form: Breast, AJCC 8th Edition  - Clinical: Stage 0 (cTis (DCIS), cN0, cM0, ER+, WA+, HER2: Not Assessed) - Signed by Audrey Lacey MD on 10/26/2021  Stage prefix: Initial diagnosis  Method of lymph node assessment: Clinical  Nuclear grade: G2  - Pathologic stage from 12/7/2021: Stage IA (pT1b, pN0, cM0, G2, ER+, WA+, HER2-) - Signed by Audrey Lacey MD on 12/7/2021  Stage prefix: Initial diagnosis  Method of lymph node assessment: Clinical  Histologic grading system: 3 grade system  - Pathologic: No stage assigned - Unsigned    Disease Features/Tumor Markers/Genetics  Tumor Marker: N/A  Notable Path Features:   8 mm of invasive ductal carcinoma, grade 2. No evidence of lymphovascular invasion.  4 sentinel lymph nodes were all negative for metastatic disease.  ER 90% positive, WA 90% positive, HER2 negative disease.  Stage I A (pT1b, pN0, M0)   MSH-6 variant with uncertain significance   Treatment: Adjuvant hormonal therapy with anastrozole since February 2022.   Other Supportive care: NA  Treatment Team Members  Surgeon: Audrey Lacey  Rad Onc: Xin Weber  Palliative  Labs  Diagnostics  10/25/2023 diagnostic bilateral mammogram benign BI-RADS 2  5/28/2019: DEXA scan indicates osteoporosis at the spine area. T measure at -2.8   11/27/2024 DEXA: RESULTS: LUMBAR SPINE:  L1-L4: BMD 0.876 gm/cm2  T-score -2.5, osteoporosis      Discussion of decision making    I personally reviewed the following lab results, the image studies, pathology, other specialty/physicians consult notes and recommendations, and outside medical  records. I had a lengthy discussion with the patient and shared the work-up findings. We discussed the diagnosis and management plan as below. I spent 41 minutes reviewing the records (labs, clinician notes, outside records, medical history, ordering medicine/tests/procedures, monitoring of anti-neoplastic toxicities, interpreting the imaging/labs previously done) and coordination of care as well as direct time with the patient today, of which greater than 50% of the time was spent in counseling and coordination of care with the patient/family.      Plan/Labs  DEXA scan due 11/2026  Continue refilled anastrozole 1 mg daily until February 2027, discuss BCI as we get closer  Cont to f/u with Surg-Onc, do yearly mammogram follow-up        Follow Up: 6 months    All questions were answered to the patient's satisfaction during this encounter. The patient knows the contact information for our office and knows to reach out for any relevant concerns related to this encounter. They are to call for any temperature 100.4 or higher, new symptoms including but not restricted to shaking chills, decreased appetite, nausea, vomiting, diarrhea, increased fatigue, shortness of breath or chest pain, confusion, and not feeling the strength to come to the clinic. For all other listed problems and medical diagnosis in their chart - they are managed by PCP and/or other specialists, which the patient acknowledges. Thank you very much for your consultation and making us a part of this patient's care. We are continuing to follow closely with you. Please do not hesitate to reach out to me with any additional questions or concerns.    Mary Alaniz MD  Hematology & Medical Oncology Staff Physician  Assessment & Plan  1. Bilateral knee pain.  Bilateral knee pain may be a side effect of anastrozole. Topical treatments such as lidocaine patches or hot and cold compresses were recommended for symptom management. If the joint aches become  "severe, a change in hormone medication may be considered.    2. Osteoporosis.  The DEXA scan shows a T-score improvement from -2.8 in 2019 to -2.5, indicating a roughly 10% improvement. This improvement is likely due to the calcium and vitamin D supplements. A shot for osteoporosis was discussed as a potential treatment option (Prolia) and she will consider this    3. Hot flashes.  Hot flashes occur occasionally and are managed by adjusting covers at night.      No follow-ups on file.    History of Present Illness   Chief Complaint   Patient presents with    Follow-up     History of Present Illness  The patient presents for evaluation of bilateral knee pain and osteoporosis.    She reports experiencing persistent, aching pain in both knees. This discomfort is described as constant. Additionally, she mentions occasional leg pain and wonders if it could be a side effect of anastrozole. For the knee pain, she has not yet tried topical treatments such as lidocaine patches or hot and cold compresses.    She continues to experience intermittent hot flashes, which she has become accustomed to over her prolonged menopausal period. These episodes occur approximately once a week during the night, and she manages them by exposing her leg to cool air before covering it again. She reports no new mood swings, skin dryness, or wounds.        Objective   /80 (BP Location: Left arm, Patient Position: Sitting, Cuff Size: Adult)   Pulse 86   Temp 97.7 °F (36.5 °C) (Temporal)   Resp 16   Ht 5' 3.5\" (1.613 m)   Wt 93.4 kg (206 lb)   LMP 12/15/2003 (Within Years)   SpO2 98%   BMI 35.92 kg/m²                  Disclaimer: This document was prepared using [employer] Direct technology. If a word or phrase is confusing, or does not make sense, this is likely due to recognition error which was not discovered during this clinician's review. If you believe an error has occurred, please contact me through [employer] service line " for [email or web address]?cation.      ONCOLOGY HISTORY OF PRESENT ILLNESS        Oncology History   Malignant neoplasm of lower-inner quadrant of left breast in female, estrogen receptor positive (HCC)   9/28/2021 Initial Diagnosis    Ductal carcinoma in situ (DCIS) of left breast     9/28/2021 Biopsy    Final Diagnosis   A. Breast, Left, 7:00 location 2 cores w/ Calcs, Biopsy:  - Ductal carcinoma in situ.  - No invasive carcinoma is seen.       Architectural patterns: Solid, papillary       Nuclear grade of ductal carcinoma in situ: Grade 2 (II).       Necrosis:  Focal.       Size of in-situ carcinoma: At least 4 mm.  - Microcalcifications: Present in DCIS.  - Breast cancer biomarker studies (paraffin block number: A1):           Estrogen, progesterone receptor studies pending, to be described in a separate receptor report.      ER 90-95% positive  Pr90-95% positive  B. Breast, Left, 7:00 location 1 core w/ No Calcs, Biopsy:  - Atypical lobular hyperplasia.  - Usual ductal hyperplasia involving intraductal papilloma.      C. Breast, Left, 9:00 location 2 cores w/ Calcs, Biopsy:  - Non-proliferative fibrocystic changes, including microcysts, microcalcifications with stromal fibrosis.  - Negative for atypia or carcinoma.      D. Breast, Left, 9:00 location 1 core w/ No Calcs, Biopsy:  - Intraductal papilloma.  - Negative for atypia or carcinoma.         10/26/2021 -  Cancer Staged    Staging form: Breast, AJCC 8th Edition  - Clinical: Stage 0 (cTis (DCIS), cN0, cM0, ER+, NJ+, HER2: Not Assessed) - Signed by Audrey Lacey MD on 10/26/2021  Stage prefix: Initial diagnosis  Method of lymph node assessment: Clinical  Nuclear grade: G2       10/2021 Genetic Testing    Test: [employer] BRCAplus STAT Panel (8 genes): ZAKIA, BRCA1, BRCA2, CDH1, CHEK2, PALB2, PTEN, TP53   Result:   Negative - No Clinically Significant Variants Detected       Assessment:  Negative     Additional Testing:  Test(s): [employer] BRCAplus STAT Panel (8 genes): ZAKIA, BRCA1,  BRCA2, CDH1, CHEK2, PALB2, PTEN, TP53 with reflex to [employer] (28 additional genes): APC, ZAKIA, AXIN2 BARD1, BRCA1, BRCA2, BRIP1, BMPR1A, CDH1, CDK4, CDKN2A, CHEK2, DICER1, EPCAM, GREM1, HOXB13, MLH1, MSH2, MSH3, MSH6, MUTYH, NBN, NF1, NTHL1, PALB2, PMS2, POLD1, POLE, PTEN, RAD51C, RAD51D, RECQL SMAD4, SMARCA4, STK11, TP53      Result: Variant of uncertain significance     Variant: MSH6 Variant, Unknown Significance: p.T6P       Variant of uncertain significance (VUS)     11/19/2021 Surgery    Final Diagnosis   A. Breast, Left, Left breast lumpectomy, short superior, long lateral:  - Invasive mammary carcinoma of no special type (ductal), 8 mm in greatest dimension with    extensive adjacent ductal carcinoma in-situ and associated non-invasive lobular neoplasia.    -- Jamie histologic grade 2 of 3 (total score: 7 of 9)        * Glandular (acinar) Tubular Differentiation < 10%, score 3.        * Nuclear Pleomorphism 2 of 3, score 2.        * Mitotic Rate 4-7/mm2, (8-14 mitoses/10HPF; 8 mitoses/10 HPF), score 2.    -- Confirmed by tumor cell immunophenotype:        * Positive: E-cadherin, P120 - each in a membranous pattern.        * Negative: p63, SMMHC.  - Ductal carcinoma in-situ (DCIS): Present; not an extensive intraductal component associated with     invasive carcinoma (< 5% of total tumor) but extensive DCIS in adjacent tissue.     -- Confirmed by positive E-cadherin, p120 (membranous), SMMHC, p63 and loss of epithelial         mosaicism on CK5/6 immunostains.     -- Size and Extent:  At least 48 mm in approximate extent; present in 12 sequential sections; 43 of 91 blocks.     -- Architectural Patterns: Cribriform, micropapillary, solid, papillary.    -- Nuclear grade: I-II (low to intermediate)    -- Necrosis: Present, focal and central necrosis identified.     -- Associated prior biopsy site changes with twist clip and Aliyah  marker.  - Multifocal non-invasive lobular neoplasia (focally  florid lobular carcinoma in-situ/LCIS and atypical lobular     hyperplasia with pagetoid ductal spread) involving and adjacent to DCIS.    -- Confirmed by negative E-cadherin; positive p120 (cytoplasmic), SMMHC, p63; and loss of epithelial        mosaicism on CK5/6 immunostains.   - Margins uninvolved by invasive and in-situ carcinoma.    -- Invasive carcinoma: 8 mm from nearest posterior margin.     -- DCIS: 0.1 mm from nearest posterior margin; < 1 mm from nearest anterior margin; 1.5 mm from        nearest medial margin; > 2 mm from all remaining margins.   - Benign skin.    -- No dermal lymphovascular invasion.   - Lymphovascular invasion: Not identified.  - Microcalcifications: Present, associated with DCIS.   - Benign fibrocystic changes with atypia (atypical and florid usual ductal hyperplasia, columnar cell     lesions with flat epithelial atypia/FEA, cystic and papillary apocrine metaplasia).  - Radial scar, 0.4 cm.      B. Breast, Left, Left Breast Lumpectomy new superior margin - Short Superior, Long Lateral:  - Ductal carcinoma in-situ (DCIS).     -- Size and Extent: 16 mm in greatest approximate extent; present in 4 sequential sections; 7 of 78 blocks.     -- Architectural Patterns: Cribriform, micropapillary, solid, papillary.    -- Nuclear grade: I-II (low to intermediate).     -- Necrosis: Present, focal.     -- Confirmed by positive E-cadherin, P120 (membranous) and loss of epithelial mosaicism on         CK5/6 immunostains.    -- Associated prior biopsy site changes with biopsy clip and AJAY  marker.  - No invasive carcinoma identified.  - Margins uninvolved by DCIS.     -- DCIS < 1 mm from nearest posterior margin; < 1-2 mm from medial margin; > 2 mm from all       remaining margins.    - Lymphovascular invasion: Not identified.  - Microcalcifications: Present, associated with DCIS, lobular neoplasia and non-neoplastic breast tissue.    - Benign fibrocystic changes with atypia:    --  Multifocal/extensive non-invasive lobular neoplasia (focal lobular carcinoma in-situ and        predominantly atypical lobular hyperplasia with pagetoid ductal spread), focally involving        and adjacent to DCIS.        ** Confirmed by negative E-cadherin; positive p120 (cytoplasmic) immunostains.     -- Usual ductal hyperplasia, columnar cell change and hyperplasia with focal flat epithelial        atypia/FEA, sclerosing adenosis, fibroadenomatoid changes, cystic and papillary apocrine metaplasia.   - Small intraductal papilloma without atypia, 0.1 cm.         12/7/2021 -  Cancer Staged    Staging form: Breast, AJCC 8th Edition  - Pathologic stage from 12/7/2021: Stage IA (pT1b, pN0, cM0, G2, ER+, ND+, HER2-) - Signed by Audrey Lacey MD on 12/7/2021  Stage prefix: Initial diagnosis  Method of lymph node assessment: Clinical  Histologic grading system: 3 grade system       1/21/2022 Surgery    Final Diagnosis   A.  Left breast, anterior margin:     - Histologic changes compatible with prior surgical procedure.     - Skin with dermal cicatrix.     - No invasive or in situ carcinoma identified.     - Final anterior margin is negative for malignancy.     B.  Left breast, new posterior margin:     - Focus of atypical lobular hyperplasia (ALH).     - No invasive or in situ carcinoma identified.     - Final posterior margin is negative for malignancy.     C.  Left breast, medial margin:     - Histologic changes compatible with prior surgical procedure.     - No invasive or in situ carcinoma identified.     - Final medial margin is negative for malignacny.     D.  Left axilla, sentinel lymph node #1:     - Single lymph node; negative for malignancy (0/1).     E.  Left axilla, sentinel lymph node #2:     - Single lymph node; negative for malignancy (0/1).     F.  Left axilla, sentinel lymph node #3:     - Single lymph node; negative for malignancy (0/1).     G.  Left axilla, sentinel lymph node #4:     - Single lymph  node; negative for malignancy (0/1).        3/23/2022 - 4/2022 Radiation    Prone L Boost 10X/6X 5 / 5 200 0 1,000 6   Prone L Br # 6X 16 / 16 266 0 4,256 21      Treatment dates:  3/23/2022 - 4/20/2022            SUBJECTIVE        Clotting History  none   Bleeding History  none   Cancer History  Early stage HR+ breast cancer   Family Cancer History  Mother , grandmother breast cancer   H/O Blood/Plt Transfusion  none   Tobacco/etoh/drug abuse nonw       Cancer Screening history Up to date mammograms, uptodate with papsmears , 2016 colonoscopy   Occupation Retired - worked for [employer]     Past events: had chronic mild joint pains that are not daily. Has infrequent hot flashes.  Doing quite well overall.    See above    I have reviewed the relevant past medical, surgical, social and family history. I have also reviewed allergies and medications for this patient.    Review of Systems     A 10-point review of system was performed, pertinent positive and negative were detailed as above. Otherwise, the 10-point review of system was negative.      Past Medical History:   Diagnosis Date    Asthma     Last Assessed: 10/19/2016    Breast cancer (HCC)     Left breast 2021    Cancer (HCC) 10/2021    CPAP (continuous positive airway pressure) dependence     Fibroid     GERD (gastroesophageal reflux disease)     controlled with diet    Hypercholesteremia     Hypertension     Sleep apnea     Wears glasses        Past Surgical History:   Procedure Laterality Date    BREAST BIOPSY Left 09/28/2021    multiple sites    BREAST LUMPECTOMY Left 11/19/2021    Procedure: LUMPECTOMY BREAST BRACKETED AJAY LOCALIZED;  Surgeon: Audrey Lacey MD;  Location: [facility];  Service: Surgical Oncology    BREAST LUMPECTOMY Left 1/21/2022    Procedure: LUMPECTOMY BREAST, re-excision; 0800 NUC MED;  Surgeon: Audrey Lacey MD;  Location[facility];  Service: Surgical Oncology    BUNIONECTOMY      BUNIONECTOMY Right 2009    BUNIONECTOMY Left 2007     COLONOSCOPY      FOOT SURGERY      LYMPH NODE BIOPSY Left 1/21/2022    Procedure: BIOPSY LYMPH NODE SENTINEL;  Surgeon: Audrey Lacey MD;  Location: [facility];  Service: Surgical Oncology    MAMMO NEEDLE LOCALIZATION LEFT (ALL INC) Left 11/12/2021    MAMMO NEEDLE LOCALIZATION LEFT (ALL INC) EACH ADD Left 11/12/2021    MAMMO STEREOTACTIC BREAST BIOPSY LEFT (ALL INC) Left 9/28/2021    MAMMO STEREOTACTIC BREAST BIOPSY LEFT (ALL INC) EACH ADD Left 9/28/2021    NASAL POLYP EXCISION      LA COLONOSCOPY FLX DX W/COLLJ SPEC WHEN PFRMD N/A 5/23/2016    Procedure: COLONOSCOPY;  Surgeon: Adriana Westfall DO;  Location: [facility];  Service: Gastroenterology    SHOULDER ARTHROSCOPY Right 2012    SHOULDER ARTHROSCOPY W/ ROTATOR CUFF REPAIR Right 2012    US GUIDED THYROID BIOPSY  6/2/2023       Family History   Problem Relation Name Age of Onset    Hypertension Mother blas     Breast cancer Mother blas         age unknown; bilateral breast cancer    Lung cancer Mother blas     Cervical cancer Mother blas     Cancer Mother blas     Lung disease Mother blas     Diabetes Father Christopher Ravi Sr.     Hypertension Father Christopher Ravi Sr.     Prostate cancer Father Christopher Rodrigues Sr.         age unknown    Breast cancer Maternal Grandmother Caroline Matthewsthe 44    Diabetes Paternal Grandmother None     Hypertension Brother Christopher Hadley.     Prostate cancer Brother Christopher Hadley. 50    Heart disease Brother Nima Rodrigues     Hypertension Brother Nima Ravi     Prostate cancer Brother Nima Rodrigues     Hypertension Maternal Aunt virginia     No Known Problems Maternal Aunt everlene     Lung cancer Maternal Uncle      Diabetes Paternal Aunt coralee     Diabetes Paternal Uncle Thiago Ravi     Hypertension Cousin Rach Marques     Breast cancer Cousin Rach Marques         under 50    Diabetes Cousin paternal     Breast cancer Cousin paternal     Breast cancer Cousin maternal         under 50    Hypertension Brother Usha Rodrigues      Hypertension Maternal Aunt Sen Little        Social History     Socioeconomic History    Marital status: /Civil Union     Spouse name: Not on file    Number of children: Not on file    Years of education: Not on file    Highest education level: Not on file   Occupational History    Not on file   Tobacco Use    Smoking status: Never     Passive exposure: Never    Smokeless tobacco: Never   Vaping Use    Vaping status: Never Used   Substance and Sexual Activity    Alcohol use: Not Currently     Comment: 0    Drug use: Never    Sexual activity: Yes     Partners: Male     Birth control/protection: Post-menopausal, None   Other Topics Concern    Not on file   Social History Narrative    Not on file     Social Drivers of Health     Financial Resource Strain: Not on file   Food Insecurity: No Food Insecurity (2/26/2025)    Nursing - Inadequate Food Risk Classification     Worried About Running Out of Food in the Last Year: Never true     Ran Out of Food in the Last Year: Never true     Ran Out of Food in the Last Year: Never true   Transportation Needs: No Transportation Needs (2/26/2025)    Nursing - Transportation Risk Classification     Lack of Transportation: Not on file     Lack of Transportation: No   Physical Activity: Not on file   Stress: Not on file   Social Connections: Not on file   Intimate Partner Violence: Patient Unable To Answer (2/26/2025)    Nursing IPS     Feels Physically and Emotionally Safe: Not on file     Physically Hurt by Someone: Not on file     Humiliated or Emotionally Abused by Someone: Not on file     Physically Hurt by Someone: Patient unable to answer     Hurt or Threatened by Someone: Patient unable to answer   Housing Stability: Unknown (2/26/2025)    Nursing: Inadequate Housing Risk Classification     Has Housing: Not on file     Worried About Losing Housing: Not on file     Unable to Get Utilities: Not on file     Unable to Pay for Housing in the Last Year: No     Has  Housing: No       No Known Allergies    Current Outpatient Medications   Medication Sig Dispense Refill    albuterol (PROVENTIL HFA,VENTOLIN HFA) 90 mcg/act inhaler INHALE 2 PUFFS BY MOUTH EVERY 6 HOURS AS NEEDED FOR WHEEZING 18 g 0    amLODIPine (NORVASC) 10 mg tablet Take 1 tablet (10 mg total) by mouth daily 100 tablet 1    anastrozole (ARIMIDEX) 1 mg tablet Take 1 tablet (1 mg total) by mouth daily 90 tablet 3    atorvastatin (LIPITOR) 10 mg tablet Take 1 tablet by mouth once daily 90 tablet 1    calcium carbonate (OS-HALIMA) 1250 (500 Ca) MG tablet Take 1,200 mg by mouth in the morning.      cholecalciferol (VITAMIN D3) 1,000 units tablet Take 2,000 Units by mouth in the morning.      multivitamin (THERAGRAN) TABS Take 1 tablet by mouth in the morning.      omeprazole (PriLOSEC) 40 MG capsule Take 1 capsule by mouth once daily 30 capsule 2    Magnesium 400 MG CAPS Take 1 capsule (400 mg total) by mouth in the morning 30 capsule 0    Riboflavin 400 MG TABS Take 1 tablet (400 mg total) by mouth in the morning (Patient not taking: Reported on 4/25/2025) 30 tablet 0     No current facility-administered medications for this visit.       (Not in a hospital admission)      Objective:     24 Hour Vitals Assessment:     Vitals:    06/25/25 0944   BP: 132/80   Pulse: 86   Resp: 16   Temp: 97.7 °F (36.5 °C)   SpO2: 98%           PHYSICIAN EXAM    General: Appearance: alert, cooperative, no distress.  HEENT: Normocephalic, atraumatic. No scleral icterus. conjunctivae clear. EOMI.  Chest: No tenderness to palpation. No open wound noted.  Lungs: Clear to auscultation bilaterally, Respirations unlabored.  Cardiac: Regular rate and rhythm, +S1and S2, -r/m/g  Abdomen: Soft, non-tender, non-distended. Bowel sounds are normal.   Extremities:  No edema, cyanosis, clubbing.  Skin: Skin color, turgor are normal. No rashes.  Breast:  Lumpectomy scar at inner lower quadrant of her left breast with no palpable abnormalitiesWevelvet (MA) was  my chaperone. No nipple inversion or skin changes b/l  Lymphatics: no palpable supra-cervical, axillary  Neurologic: Awake, Alert, and oriented, no gross focal deficits noted b/l.       DATA REVIEW:    Pathology Result:    Final Diagnosis   Date Value Ref Range Status   04/04/2025   Final    A. Stomach (biopsy):  - Reactive gastropathy  - No H pylori identified (H&E)  - No intestinal metaplasia     B. Distal esophagus (biopsy):  - Esophageal mucosa with no diagnostic abnormality  - Eosinophils are inconspicuous  - No intestinal metaplasia     C. Proximal esophagus (biopsy):  - Esophageal mucosa with no diagnostic abnormality  - Eosinophils are inconspicuous  - No intestinal metaplasia     Interpretation performed at [facility], [address] [place] [address]       06/02/2023   Final    A. and B. Thyroid, Left Mid, FNA (ThinPrep and Smear Preparations):  - Benign ([place] Category II). See Note.  - Monolayered sheets of benign follicular cells and oncocytes, ample colloid, and macrophages.  - Consistent with benign follicular nodule.     01/21/2022   Final    A.  Left breast, anterior margin:     - Histologic changes compatible with prior surgical procedure.     - Skin with dermal cicatrix.     - No invasive or in situ carcinoma identified.     - Final anterior margin is negative for malignancy.    B.  Left breast, new posterior margin:     - Focus of atypical lobular hyperplasia (ALH).     - No invasive or in situ carcinoma identified.     - Final posterior margin is negative for malignancy.    C.  Left breast, medial margin:     - Histologic changes compatible with prior surgical procedure.     - No invasive or in situ carcinoma identified.     - Final medial margin is negative for malignacny.    D.  Left axilla, sentinel lymph node #1:     - Single lymph node; negative for malignancy (0/1).    E.  Left axilla, sentinel lymph node #2:     - Single lymph node; negative for malignancy (0/1).    F.  Left  axilla, sentinel lymph node #3:     - Single lymph node; negative for malignancy (0/1).    G.  Left axilla, sentinel lymph node #4:     - Single lymph node; negative for malignancy (0/1).       11/19/2021   Final    A. Breast, Left, Left breast lumpectomy, short superior, long lateral:  - Invasive mammary carcinoma of no special type (ductal), 8 mm in greatest dimension with    extensive adjacent ductal carcinoma in-situ and associated non-invasive lobular neoplasia.    -- Jamie histologic grade 2 of 3 (total score: 7 of 9)        * Glandular (acinar) Tubular Differentiation < 10%, score 3.        * Nuclear Pleomorphism 2 of 3, score 2.        * Mitotic Rate 4-7/mm2, (8-14 mitoses/10HPF; 8 mitoses/10 HPF), score 2.    -- Confirmed by tumor cell immunophenotype:        * Positive: E-cadherin, P120 - each in a membranous pattern.        * Negative: p63, SMMHC.  - Ductal carcinoma in-situ (DCIS): Present; not an extensive intraductal component associated with     invasive carcinoma (< 5% of total tumor) but extensive DCIS in adjacent tissue.     -- Confirmed by positive E-cadherin, p120 (membranous), SMMHC, p63 and loss of epithelial         mosaicism on CK5/6 immunostains.     -- Size and Extent:  At least 48 mm in approximate extent; present in 12 sequential sections; 43 of 91 blocks.     -- Architectural Patterns: Cribriform, micropapillary, solid, papillary.    -- Nuclear grade: I-II (low to intermediate)    -- Necrosis: Present, focal and central necrosis identified.     -- Associated prior biopsy site changes with twist clip and Aliyah  marker.  - Multifocal non-invasive lobular neoplasia (focally florid lobular carcinoma in-situ/LCIS and atypical lobular     hyperplasia with pagetoid ductal spread) involving and adjacent to DCIS.    -- Confirmed by negative E-cadherin; positive p120 (cytoplasmic), SMMHC, p63; and loss of epithelial        mosaicism on CK5/6 immunostains.   - Margins uninvolved by  invasive and in-situ carcinoma.    -- Invasive carcinoma: 8 mm from nearest posterior margin.     -- DCIS: 0.1 mm from nearest posterior margin; < 1 mm from nearest anterior margin; 1.5 mm from        nearest medial margin; > 2 mm from all remaining margins.   - Benign skin.    -- No dermal lymphovascular invasion.   - Lymphovascular invasion: Not identified.  - Microcalcifications: Present, associated with DCIS.   - Benign fibrocystic changes with atypia (atypical and florid usual ductal hyperplasia, columnar cell     lesions with flat epithelial atypia/FEA, cystic and papillary apocrine metaplasia).  - Radial scar, 0.4 cm.     B. Breast, Left, Left Breast Lumpectomy new superior margin - Short Superior, Long Lateral:  - Ductal carcinoma in-situ (DCIS).     -- Size and Extent: 16 mm in greatest approximate extent; present in 4 sequential sections; 7 of 78 blocks.     -- Architectural Patterns: Cribriform, micropapillary, solid, papillary.    -- Nuclear grade: I-II (low to intermediate).     -- Necrosis: Present, focal.     -- Confirmed by positive E-cadherin, P120 (membranous) and loss of epithelial mosaicism on         CK5/6 immunostains.    -- Associated prior biopsy site changes with biopsy clip and AJAY  marker.  - No invasive carcinoma identified.  - Margins uninvolved by DCIS.     -- DCIS < 1 mm from nearest posterior margin; < 1-2 mm from medial margin; > 2 mm from all       remaining margins.    - Lymphovascular invasion: Not identified.  - Microcalcifications: Present, associated with DCIS, lobular neoplasia and non-neoplastic breast tissue.    - Benign fibrocystic changes with atypia:    -- Multifocal/extensive non-invasive lobular neoplasia (focal lobular carcinoma in-situ and        predominantly atypical lobular hyperplasia with pagetoid ductal spread), focally involving        and adjacent to DCIS.        ** Confirmed by negative E-cadherin; positive p120 (cytoplasmic) immunostains.     -- Usual  ductal hyperplasia, columnar cell change and hyperplasia with focal flat epithelial        atypia/FEA, sclerosing adenosis, fibroadenomatoid changes, cystic and papillary apocrine metaplasia.   - Small intraductal papilloma without atypia, 0.1 cm.      09/28/2021   Final    A. Breast, Left, 7:00 location 2 cores w/ Calcs, Biopsy:  - Ductal carcinoma in situ.  - No invasive carcinoma is seen.       Architectural patterns: Solid, papillary       Nuclear grade of ductal carcinoma in situ: Grade 2 (II).       Necrosis:  Focal.       Size of in-situ carcinoma: At least 4 mm.  - Microcalcifications: Present in DCIS.  - Breast cancer biomarker studies (paraffin block number: A1):           Estrogen, progesterone receptor studies pending, to be described in a separate receptor report.     B. Breast, Left, 7:00 location 1 core w/ No Calcs, Biopsy:  - Atypical lobular hyperplasia.  - Usual ductal hyperplasia involving intraductal papilloma.     C. Breast, Left, 9:00 location 2 cores w/ Calcs, Biopsy:  - Non-proliferative fibrocystic changes, including microcysts, microcalcifications with stromal fibrosis.  - Negative for atypia or carcinoma.     D. Breast, Left, 9:00 location 1 core w/ No Calcs, Biopsy:  - Intraductal papilloma.  - Negative for atypia or carcinoma.           Image Results:   Image result are reviewed and documented in Hematology/Oncology history    EGD  Narrative: Table formatting from the original result was not included.  [facility]  [address]  [place] [address]  [phone]    DATE OF SERVICE:  4/25/25    PHYSICIAN(S):  Attending:   Diana M Jaiyeola, MD     Fellow:   No Staff Documented     INDICATION:  Peptic stricture of esophagus    POST-OP DIAGNOSIS:  See the impression below.    PREPROCEDURE:  Informed consent was obtained for the procedure, including sedation.    Risks of perforation, hemorrhage, adverse drug reaction and aspiration   were discussed. The  patient was placed in the left lateral decubitus   position.    Patient was explained about the risks and benefits of the procedure. Risks   including but not limited to bleeding, infection, and perforation were   explained in detail. Also explained about less than 100% sensitivity with   the exam and other alternatives.    PROCEDURE: EGD    DETAILS OF PROCEDURE:   Patient was taken to the procedure room where a time out was performed to   confirm correct patient and correct procedure. The patient underwent   monitored anesthesia care, which was administered by an anesthesia   professional. The patient's blood pressure, heart rate, level of   consciousness, respirations, oxygen, ECG and ETCO2 were monitored   throughout the procedure. The scope was introduced through the mouth and   advanced to the second part of the duodenum. Retroflexion was performed in   the fundus. The patient experienced no blood loss. The procedure was not   difficult. The patient tolerated the procedure well. There were no   apparent adverse events.     ANESTHESIA INFORMATION:  ASA: II  Anesthesia Type: IV Sedation with Anesthesia    MEDICATIONS:  No administrations occurring from 1303 to 1315 on 04/25/25     FINDINGS:  3 cm hiatal hernia - GE junction 36 cm from the incisors, diaphragmatic   impression 39 cm from the incisors, confirmed by retroflexion  Non-obstructing Schatzki ring in the GE junction; dilated with balloon   dilator from 10 mm starting size to 15 mm end size. Dilation caused   mucosal tears, disruption of ring and improved lumen appearance;   post-dilation mucosal tears were superficial  The stomach appeared normal.  Moderate, generalized erythematous mucosa in the duodenal bulb  The 2nd part of the duodenum appeared normal.    SPECIMENS:  * No specimens in log *  Impression: 3 cm hiatal hernia  Schatzki ring in the GE junction; dilated to 15 mm end size. Dilation   caused mucosal tears, disruption of ring and improved  "lumen appearance;   post-dilation mucosal tears were superficial  The stomach appeared normal.  Moderate erythematous mucosa in the duodenal bulb  The 2nd part of the duodenum appeared normal.    RECOMMENDATION:  Await pathology results   Follow up with GI Clinic   Repeat EGD as needed for dilation in the future               Diana M Jaiyeola, MD       LABS:  Lab data are reviewed and documented in [facility] history.     Lab Results   Component Value Date    HGB 12.8 02/26/2025    HCT 39.3 02/26/2025    MCV 90 02/26/2025     02/26/2025    WBC 5.25 02/26/2025    NRBC 0 02/25/2025     Lab Results   Component Value Date     03/05/2015    K 3.8 03/26/2025     03/26/2025    CO2 27 03/26/2025    ANIONGAP 10 03/05/2015    BUN 14 03/26/2025    CREATININE 0.85 03/26/2025    GLUCOSE 91 03/05/2015    GLUF 90 03/26/2025    CALCIUM 10.1 03/26/2025    CORRECTEDCA 10.1 12/08/2021    AST 15 03/26/2025    ALT 12 03/26/2025    ALKPHOS 109 (H) 03/26/2025    PROT 7.7 03/05/2015    BILITOT 0.81 03/05/2015    EGFR 72 03/26/2025       No results found for: \"IRON\", \"TIBC\", \"FERRITIN\"    No results found for: \"UUSIGAYT05\"    No results for input(s): \"WBC\", \"CREAT\", \"PLT\" in the last 72 hours.    By:  Mary Alaniz MD, 6/25/2025, 10:02 AM                                   "

## 2025-06-15 NOTE — ASSESSMENT & PLAN NOTE
This is a 64 y.o. c PMHx notable for hypertension, SONIYA, asthma, GERD, osteoporosis, being seen in consultation for stage Ia hormone positive, HER2 negative breast cancer  On adjuvant endocrine therapy  Orders:    anastrozole (ARIMIDEX) 1 mg tablet; Take 1 tablet (1 mg total) by mouth daily

## 2025-06-22 DIAGNOSIS — R13.19 ESOPHAGEAL DYSPHAGIA: ICD-10-CM

## 2025-06-23 RX ORDER — OMEPRAZOLE 40 MG/1
40 CAPSULE, DELAYED RELEASE ORAL DAILY
Qty: 30 CAPSULE | Refills: 2 | Status: SHIPPED | OUTPATIENT
Start: 2025-06-23

## 2025-06-24 ENCOUNTER — TELEPHONE (OUTPATIENT)
Age: 65
End: 2025-06-24

## 2025-06-24 NOTE — TELEPHONE ENCOUNTER
Jumana from [facility] called in regards to patients copay. Identifiers were provided. Per Earlene patient was told she had a copay for tomorrows visit, per Earlene patient has medicare part A&B as primary so she does not have a copay to pay tomorrow.       Thank you.

## 2025-06-25 ENCOUNTER — OFFICE VISIT (OUTPATIENT)
Dept: HEMATOLOGY ONCOLOGY | Facility: CLINIC | Age: 65
End: 2025-06-25
Payer: MEDICARE

## 2025-06-25 VITALS
BODY MASS INDEX: 35.17 KG/M2 | HEART RATE: 86 BPM | HEIGHT: 64 IN | TEMPERATURE: 97.7 F | WEIGHT: 206 LBS | RESPIRATION RATE: 16 BRPM | DIASTOLIC BLOOD PRESSURE: 80 MMHG | OXYGEN SATURATION: 98 % | SYSTOLIC BLOOD PRESSURE: 132 MMHG

## 2025-06-25 DIAGNOSIS — Z17.0 MALIGNANT NEOPLASM OF LOWER-INNER QUADRANT OF LEFT BREAST IN FEMALE, ESTROGEN RECEPTOR POSITIVE (HCC): Primary | ICD-10-CM

## 2025-06-25 DIAGNOSIS — C50.312 MALIGNANT NEOPLASM OF LOWER-INNER QUADRANT OF LEFT BREAST IN FEMALE, ESTROGEN RECEPTOR POSITIVE (HCC): Primary | ICD-10-CM

## 2025-06-25 PROCEDURE — G2211 COMPLEX E/M VISIT ADD ON: HCPCS | Performed by: INTERNAL MEDICINE

## 2025-06-25 PROCEDURE — 99215 OFFICE O/P EST HI 40 MIN: CPT | Performed by: INTERNAL MEDICINE

## 2025-06-25 RX ORDER — ANASTROZOLE 1 MG/1
1 TABLET ORAL DAILY
Qty: 90 TABLET | Refills: 3 | Status: SHIPPED | OUTPATIENT
Start: 2025-06-25

## 2025-07-14 ENCOUNTER — OFFICE VISIT (OUTPATIENT)
Dept: GASTROENTEROLOGY | Facility: CLINIC | Age: 65
End: 2025-07-14
Payer: MEDICARE

## 2025-07-14 VITALS
WEIGHT: 207 LBS | OXYGEN SATURATION: 98 % | TEMPERATURE: 97 F | HEART RATE: 68 BPM | SYSTOLIC BLOOD PRESSURE: 154 MMHG | HEIGHT: 64 IN | DIASTOLIC BLOOD PRESSURE: 86 MMHG | BODY MASS INDEX: 35.34 KG/M2

## 2025-07-14 DIAGNOSIS — Z12.11 SCREENING FOR COLON CANCER: ICD-10-CM

## 2025-07-14 DIAGNOSIS — K22.2 SCHATZKI RING OF DISTAL ESOPHAGUS: ICD-10-CM

## 2025-07-14 DIAGNOSIS — R13.19 ESOPHAGEAL DYSPHAGIA: Primary | ICD-10-CM

## 2025-07-14 PROCEDURE — G2211 COMPLEX E/M VISIT ADD ON: HCPCS | Performed by: INTERNAL MEDICINE

## 2025-07-14 PROCEDURE — 99214 OFFICE O/P EST MOD 30 MIN: CPT | Performed by: INTERNAL MEDICINE

## 2025-07-14 NOTE — PROGRESS NOTES
"Name: Nehal Bryant      : 1960      MRN: [id number]  Encounter Provider: Analisa Santos DO  Encounter Date: 2025   Encounter department: [facility] [place]  :  Assessment & Plan  Esophageal dysphagia  Patient previously evaluated in the office due to complaints of dysphagia.  She ultimately underwent EGD at which time she was found to have a stricture/Schatzki's ring in the distal esophagus.  Patient now status post EGD x 2 with dilation initially on 2025 and then again on 2025.  Most recently, patient had esophageal dilation performed up to ending size of 15 mm with CRE balloon.  Since most recent endoscopy, patient has been maintained on omeprazole 40 mg daily.  Post dilation, patient admits to overall symptom improvement.  She denies any current dysphagia and states that her reflux symptoms are overall controlled at this time.  Discussed the importance of PPI therapy long-term as stricture/Schatzki's ring was likely a result of reflux, specifically in setting of hiatal hernia.  Patient understanding and agreeable to continue on current dose.    Plan:  Continue on omeprazole 40 mg daily; advised to take PPI 30 - 60 min prior to meals  No indication for repeat endoscopy at this time  Discussed the importance of lifestyle modifications including:  Recommend small meals and avoidance of ulcerogenic foods   Avoid eating prior to bedtime, elevate head of bed at night  Limit intake of alcohol and caffeine   Okay to utilize Tylenol but avoid use of all NSAIDs  Daily exercise to promote weight loss    Schatzki ring of distal esophagus  See management as above under \"Esophageal Dysphagia\"    Screening for colon cancer  Last colonoscopy completed in  which revealed sigmoid diverticulosis.  Patient currently without alarm symptoms.  Patient due for repeat colonoscopy in .           History of Present Illness   Nehal Bryant is a 65 y.o. female who " "presents for a follow up appointment. Patient with a PMHx of HTN, SONIYA, asthma, GERD, osteoporosis, breast cancer on s/p whole breast radiation and currently on Anastrozole, anxiety, prediabetes, and HLD.     Patient previously seen in the office on 3/2025. Patient at that time with complaint of solid food dysphagia which had been ongoing for many years. Patient was therefore scheduled for EGD which she underwent on 4/4/2025. This revealed a benign-appearing, inflammed peptic intrinsic stricture measuring 1 cm in length and 9 mm in diameter. S/P balloon dilation from 8 mm to 12 mm ending size. Patient also with a 3 cm hiatal hernia and benign esophageal and gastric biopsies. Repeat EGD was then performed on 4/25/2025 during which patient was found to have a non-obstructing Schatzki ring. S/P dilation from 10 mm to 15 mm. Patient has since been maintained on Omeprazole 40 mg daily.     During today's appointment, patient states that she is doing well overall.  Admits to complete resolution to dysphagia.  Denies any issues with solids or liquids.  Also explains that her GERD symptoms are well-controlled.  Denies any burning, regurgitation, or nausea/vomiting.  Denies any chest pain.  She does explain that she will infrequently develop symptoms after eating late at night.  Otherwise, patient offers no additional complaints.    Of note: Last colonoscopy in 2016 with significant diverticulosis. X10 year recall recommended.       HPI  History obtained from: patient  Review of Systems A complete review of systems is negative other than that noted above in the HPI.    Medical History Reviewed by provider this encounter:     .  Medications Ordered Prior to Encounter[1]   Social History[2]  Current Medications[3]  Objective   /86 (BP Location: Left arm, Patient Position: Sitting, Cuff Size: Standard)   Pulse 68   Temp (!) 97 °F (36.1 °C) (Tympanic)   Ht 5' 3.5\" (1.613 m)   Wt 93.9 kg (207 lb)   LMP 12/15/2003 " (Within Years)   SpO2 98%   BMI 36.09 kg/m²     Physical Exam  Constitutional:       General: She is not in acute distress.     Appearance: She is normal weight. She is not ill-appearing or toxic-appearing.   HENT:      Head: Normocephalic.      Nose: Nose normal. No congestion.     Eyes:      General: No scleral icterus.      Cardiovascular:      Rate and Rhythm: Normal rate.      Pulses: Normal pulses.   Pulmonary:      Effort: Pulmonary effort is normal. No respiratory distress.   Abdominal:      General: Abdomen is flat. Bowel sounds are normal. There is no distension.      Palpations: Abdomen is soft.      Tenderness: There is no abdominal tenderness. There is no guarding or rebound.     Musculoskeletal:      Cervical back: Normal range of motion.     Skin:     General: Skin is warm.      Capillary Refill: Capillary refill takes less than 2 seconds.      Coloration: Skin is not pale.     Neurological:      Mental Status: She is alert and oriented to person, place, and time. Mental status is at baseline.     Psychiatric:         Mood and Affect: Mood normal.         Behavior: Behavior normal.            Lab Results: I personally reviewed relevant lab results. CBC/BMP: No new results in last 24 hours. , Creatinine Clearance: CrCl cannot be calculated (Patient's most recent lab result is older than the maximum 7 days allowed.)., LFTs: No new results in last 24 hours. , PTT/INR:No new results in last 24 hours.     Radiology Results Review: I have reviewed the following images/report studies in PACS: No results found.   Results for orders placed during the hospital encounter of 04/25/25    EGD    Impression  3 cm hiatal hernia  Schatzki ring in the GE junction; dilated to 15 mm end size. Dilation caused mucosal tears, disruption of ring and improved lumen appearance; post-dilation mucosal tears were superficial  The stomach appeared normal.  Moderate erythematous mucosa in the duodenal bulb  The 2nd part of the  duodenum appeared normal.      RECOMMENDATION:  Await pathology results  Follow up with GI Clinic  Repeat EGD as needed for dilation in the future            Diana M Jaiyeola, MD      Administrative Statements   I have spent a total time of 30 minutes in caring for this patient on the day of the visit/encounter including Diagnostic results, Prognosis, Risks and benefits of tx options, Instructions for management, Patient and family education, Importance of tx compliance, Risk factor reductions, Impressions, Counseling / Coordination of care, Documenting in the medical record, Reviewing/placing orders in the medical record (including tests, medications, and/or procedures), and Obtaining or reviewing history  .         [1]   Current Outpatient Medications on File Prior to Visit   Medication Sig Dispense Refill    albuterol (PROVENTIL HFA,VENTOLIN HFA) 90 mcg/act inhaler INHALE 2 PUFFS BY MOUTH EVERY 6 HOURS AS NEEDED FOR WHEEZING 18 g 0    amLODIPine (NORVASC) 10 mg tablet Take 1 tablet (10 mg total) by mouth daily 100 tablet 1    anastrozole (ARIMIDEX) 1 mg tablet Take 1 tablet (1 mg total) by mouth daily 90 tablet 3    atorvastatin (LIPITOR) 10 mg tablet Take 1 tablet by mouth once daily 90 tablet 1    calcium carbonate (OS-HALIMA) 1250 (500 Ca) MG tablet Take 1,200 mg by mouth in the morning.      cholecalciferol (VITAMIN D3) 1,000 units tablet Take 2,000 Units by mouth in the morning.      multivitamin (THERAGRAN) TABS Take 1 tablet by mouth in the morning.      omeprazole (PriLOSEC) 40 MG capsule Take 1 capsule by mouth once daily 30 capsule 2    Magnesium 400 MG CAPS Take 1 capsule (400 mg total) by mouth in the morning (Patient not taking: Reported on 7/14/2025) 30 capsule 0    Riboflavin 400 MG TABS Take 1 tablet (400 mg total) by mouth in the morning (Patient not taking: Reported on 4/25/2025) 30 tablet 0     No current facility-administered medications on file prior to visit.   [2]   Social History  Tobacco Use     Smoking status: Never     Passive exposure: Never    Smokeless tobacco: Never   Vaping Use    Vaping status: Never Used   Substance and Sexual Activity    Alcohol use: Not Currently     Comment: 0    Drug use: Never    Sexual activity: Not Currently     Partners: Male     Birth control/protection: Post-menopausal, None   [3]   Current Outpatient Medications   Medication Sig Dispense Refill    albuterol (PROVENTIL HFA,VENTOLIN HFA) 90 mcg/act inhaler INHALE 2 PUFFS BY MOUTH EVERY 6 HOURS AS NEEDED FOR WHEEZING 18 g 0    amLODIPine (NORVASC) 10 mg tablet Take 1 tablet (10 mg total) by mouth daily 100 tablet 1    anastrozole (ARIMIDEX) 1 mg tablet Take 1 tablet (1 mg total) by mouth daily 90 tablet 3    atorvastatin (LIPITOR) 10 mg tablet Take 1 tablet by mouth once daily 90 tablet 1    calcium carbonate (OS-HALIMA) 1250 (500 Ca) MG tablet Take 1,200 mg by mouth in the morning.      cholecalciferol (VITAMIN D3) 1,000 units tablet Take 2,000 Units by mouth in the morning.      multivitamin (THERAGRAN) TABS Take 1 tablet by mouth in the morning.      omeprazole (PriLOSEC) 40 MG capsule Take 1 capsule by mouth once daily 30 capsule 2    Magnesium 400 MG CAPS Take 1 capsule (400 mg total) by mouth in the morning (Patient not taking: Reported on 7/14/2025) 30 capsule 0    Riboflavin 400 MG TABS Take 1 tablet (400 mg total) by mouth in the morning (Patient not taking: Reported on 4/25/2025) 30 tablet 0     No current facility-administered medications for this visit.

## 2025-07-18 ENCOUNTER — TELEPHONE (OUTPATIENT)
Age: 65
End: 2025-07-18

## 2025-07-18 NOTE — TELEPHONE ENCOUNTER
Patient called in having problems with her cpap machine and the mask    Settings all are set to auto, and she stated it feels like when her mask is on it feels like  her lips are stuck together  and she has to seperate her lips with her finger,    She said this is has happened before primarily with the heat.     please advise

## 2025-07-22 NOTE — TELEPHONE ENCOUNTER
Per provider:   7/18/25  6:19 PM  Recommend adjust her humidity to see if this helps.  At her last visit we had also spoken about changing to a FFM from a hybrid mask, if she hasn't tried this yet I would recommend switching, thanks.  ---------------------------------------------    Call placed to patient, advised of above.    Patient has adjusted humidity settings but still having some difficulties.  Advised [employer] representatives would be notified to provide technical assistance.    Patient states she will try using a FFM at her home and will schedule a mask fitting appointment if this mask does not work for her.

## (undated) DEVICE — DRAPE EQUIPMENT RF WAND

## (undated) DEVICE — 2000CC GUARDIAN II: Brand: GUARDIAN

## (undated) DEVICE — BRA SURGICAL SZ LGE (36-39)

## (undated) DEVICE — SUPER SPONGES,MEDIUM: Brand: KERLIX

## (undated) DEVICE — MEDI-VAC YANKAUER SUCTION HANDLE W/BULBOUS AND CONTROL VENT: Brand: CARDINAL HEALTH

## (undated) DEVICE — SUT MONOCRYL 3-0 SH 27 IN Y416H

## (undated) DEVICE — NEEDLE 25G X 1 1/2

## (undated) DEVICE — LIGACLIP MCA MULTIPLE CLIP APPLIERS, 20 MEDIUM CLIPS: Brand: LIGACLIP

## (undated) DEVICE — SCD SEQUENTIAL COMPRESSION COMFORT SLEEVE MEDIUM KNEE LENGTH: Brand: KENDALL SCD

## (undated) DEVICE — BETHLEHEM UNIVERSAL MINOR GEN: Brand: CARDINAL HEALTH

## (undated) DEVICE — SUT SILK 2-0 SH 30 IN K833H

## (undated) DEVICE — SUT MONOCRYL 4-0 PS-2 27 IN Y426H

## (undated) DEVICE — GLOVE SRG BIOGEL 6

## (undated) DEVICE — ADHESIVE SKIN HIGH VISCOSITY EXOFIN 1ML

## (undated) DEVICE — HEMOSTAT POWDER ADSORB SURGICEL 3GM

## (undated) DEVICE — INTENDED FOR TISSUE SEPARATION, AND OTHER PROCEDURES THAT REQUIRE A SHARP SURGICAL BLADE TO PUNCTURE OR CUT.: Brand: BARD-PARKER SAFETY BLADES SIZE 15, STERILE

## (undated) DEVICE — DRAPE PROBE NEO-PROBE/ULTRASOUND

## (undated) DEVICE — TUBING SUCTION 5MM X 12 FT

## (undated) DEVICE — LIGACLIP MCA MULTIPLE CLIP APPLIERS, 20 SMALL CLIPS: Brand: LIGACLIP

## (undated) DEVICE — PLUMEPEN PRO 10FT

## (undated) DEVICE — GAUZE SPONGES,16 PLY: Brand: CURITY

## (undated) DEVICE — CHLORAPREP HI-LITE 26ML ORANGE

## (undated) DEVICE — DRAPE SHEET THREE QUARTER

## (undated) DEVICE — BRA SURGICAL SZ XLGE (39-42)